# Patient Record
Sex: FEMALE | Race: ASIAN | NOT HISPANIC OR LATINO | Employment: OTHER | URBAN - METROPOLITAN AREA
[De-identification: names, ages, dates, MRNs, and addresses within clinical notes are randomized per-mention and may not be internally consistent; named-entity substitution may affect disease eponyms.]

---

## 2018-04-17 ENCOUNTER — OFFICE VISIT (OUTPATIENT)
Dept: MEDICAL GROUP | Facility: PHYSICIAN GROUP | Age: 83
End: 2018-04-17
Payer: MEDICARE

## 2018-04-17 VITALS
WEIGHT: 155.6 LBS | OXYGEN SATURATION: 94 % | TEMPERATURE: 98 F | DIASTOLIC BLOOD PRESSURE: 82 MMHG | SYSTOLIC BLOOD PRESSURE: 130 MMHG | HEIGHT: 64 IN | BODY MASS INDEX: 26.56 KG/M2 | RESPIRATION RATE: 16 BRPM | HEART RATE: 81 BPM

## 2018-04-17 DIAGNOSIS — Z12.11 COLON CANCER SCREENING: ICD-10-CM

## 2018-04-17 DIAGNOSIS — J44.9 CHRONIC OBSTRUCTIVE PULMONARY DISEASE, UNSPECIFIED COPD TYPE (HCC): ICD-10-CM

## 2018-04-17 DIAGNOSIS — I10 ESSENTIAL HYPERTENSION, BENIGN: ICD-10-CM

## 2018-04-17 DIAGNOSIS — R51.9 ACUTE NONINTRACTABLE HEADACHE, UNSPECIFIED HEADACHE TYPE: ICD-10-CM

## 2018-04-17 PROCEDURE — 99204 OFFICE O/P NEW MOD 45 MIN: CPT | Performed by: NURSE PRACTITIONER

## 2018-04-17 RX ORDER — ALBUTEROL SULFATE 90 UG/1
1-2 AEROSOL, METERED RESPIRATORY (INHALATION) EVERY 4 HOURS PRN
Qty: 1 INHALER | Refills: 3 | Status: SHIPPED | OUTPATIENT
Start: 2018-04-17 | End: 2021-05-03

## 2018-04-17 ASSESSMENT — PATIENT HEALTH QUESTIONNAIRE - PHQ9: CLINICAL INTERPRETATION OF PHQ2 SCORE: 0

## 2018-04-17 ASSESSMENT — ENCOUNTER SYMPTOMS
COUGH: 0
FACIAL SWEATING: 0
MIGRAINE HEADACHES: 1
SINUS PRESSURE: 1
TINGLING: 0
SEIZURES: 0
DIAPHORESIS: 0
SPUTUM PRODUCTION: 0
DIZZINESS: 0
WHEEZING: 0
SHORTNESS OF BREATH: 0
EYE PAIN: 1
BLURRED VISION: 0
ABDOMINAL PAIN: 0
SENSORY CHANGE: 0
CHILLS: 0
DEPRESSION: 0
NAUSEA: 0
VOMITING: 0
FEVER: 0
HEADACHES: 1
SORE THROAT: 1
FOCAL WEAKNESS: 0
BLOOD IN STOOL: 0
PALPITATIONS: 0
SCALP TENDERNESS: 0
VISUAL CHANGE: 0
SPEECH CHANGE: 0
LOSS OF BALANCE: 0

## 2018-04-17 NOTE — ASSESSMENT & PLAN NOTE
Reports that she was diagnosed 3 years ago as incidental finding with xray. She denies CP, cough, or wheezing. She does experience intermittent SOB with vigorous physical activity. 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 She is a former smoker of 30 years.

## 2018-04-17 NOTE — ASSESSMENT & PLAN NOTE
Chronic in nature. Her BP is well-controlled with valsartan 320 mg daily. She denies CP, SOB, and peripheral edema.

## 2018-04-17 NOTE — PROGRESS NOTES
New Patient appointment    Licha Pope is a 82 y.o. female here to establish care. Her previous PCP was Adarsh Jay. She presents with the following concerns:    HPI:      COPD (chronic obstructive pulmonary disease) (CMS-[facility])  Reports that she was diagnosed 3 years ago as incidental finding with xray. She denies CP, cough, or wheezing. She does experience intermittent SOB with vigorous physical activity. 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 She is a former smoker of 30 years.       Essential hypertension, benign  Chronic in nature. Her BP is well-controlled with valsartan 320 mg daily. She denies CP, SOB, and peripheral edema.     Headache    This is a new problem. Episode onset: Friday. The problem occurs intermittently. The problem has been unchanged. The pain is located in the right unilateral region. The quality of the pain is described as stabbing and aching. The pain is mild. Associated symptoms include ear pain, eye pain, sinus pressure and a sore throat. Pertinent negatives include no abdominal pain, blurred vision, coughing, dizziness, facial sweating, fever, hearing loss, loss of balance, nausea, scalp tenderness, seizures, tingling, visual change or vomiting. Nothing aggravates the symptoms. She has tried nothing for the symptoms. Her past medical history is significant for hypertension and migraine headaches.   She does admit to drinking 5 cups of coffee daily. She admits to drinking minimal water intake daily.    Current medicines (including changes today)  Current Outpatient Prescriptions   Medication Sig Dispense Refill   • albuterol 108 (90 Base) MCG/ACT Aero Soln inhalation aerosol Inhale 1-2 Puffs by mouth every four hours as needed for Shortness of Breath. 1 Inhaler 3   • Calcium Carbonate-Vitamin D (CALCIUM + D PO) Take  by mouth every day.     • multivitamin (THERAGRAN) TABS Take 1 Tab by mouth every day.     • Cranberry 300 MG TABS Take  by mouth.     •  GLUCOSAMINE HCL-MSM PO Take  by mouth every day. Takes two     • Omega 3-6-9 Fatty Acids (TRIPLE OMEGA-3-6-9) CAPS Take  by mouth.     • valsartan (DIOVAN) 320 MG tablet Take 1 Tab by mouth every day. 90 Tab 3     No current facility-administered medications for this visit.      She  has a past medical history of Arthritis; COPD (chronic obstructive pulmonary disease) (CMS-[facility]); EMPHYSEMA; and Hypertension.  She  has a past surgical history that includes hemorrhoidectomy (); pr  delivery only (); knee arthroplasty total (2014); cataract phaco with iol (2014); and cataract phaco with iol (2014).  Social History   Substance Use Topics   • Smoking status: Former Smoker     Packs/day: 1.00     Years: 20.00     Types: Cigarettes     Quit date: 1975   • Smokeless tobacco: Never Used   • Alcohol use 1.5 oz/week     3 Standard drinks or equivalent per week      Comment: RARE     Social History     Social History Narrative   • No narrative on file     Family History   Problem Relation Age of Onset   • Alzheimer's Disease Mother    • Heart Disease Father    • Other Father      AAA   • Heart Attack Sister    • Diabetes Brother      Family Status   Relation Status   • Mother    • Father    • Sister    • Brother      Review of Systems   Constitutional: Negative for chills, diaphoresis, fever and malaise/fatigue.   HENT: Positive for congestion, ear pain, sinus pressure and sore throat. Negative for hearing loss.    Eyes: Positive for pain. Negative for blurred vision.   Respiratory: Negative for cough, sputum production, shortness of breath and wheezing.    Cardiovascular: Negative for chest pain, palpitations and leg swelling.   Gastrointestinal: Negative for abdominal pain, blood in stool, nausea and vomiting.   Genitourinary: Negative for dysuria.   Musculoskeletal: Negative for joint pain.   Neurological: Positive for headaches. Negative for dizziness,  "tingling, sensory change, speech change, focal weakness, seizures and loss of balance.   Psychiatric/Behavioral: Negative for depression.       All other systems reviewed and are negative    Depression Screening    Little interest or pleasure in doing things?  0 - not at all  Feeling down, depressed , or hopeless? 0 - not at all  Patient Health Questionnaire Score: 0       Objective:     Blood pressure 130/82, pulse 81, temperature 36.7 °C (98 °F), resp. rate 16, height 1.626 m (5' 4\"), weight 70.6 kg (155 lb 9.6 oz), SpO2 94 %. Body mass index is 26.71 kg/m².    Physical Exam:  Constitutional: Oriented to person, place, and time and well-developed, well-nourished, and in no distress.   HENT:   Head: Normocephalic and atraumatic.   Right Ear: Tympanic membrane and external ear normal.   Left Ear: Tympanic membrane and external ear normal.   Mouth/Throat: Oropharynx is clear and moist and mucous membranes are normal. No oropharyngeal exudate or posterior oropharyngeal erythema.   Eyes: Conjunctivae and EOM are normal. Pupils are equal, round, and reactive to light.   Neck: Normal range of motion. Neck supple. No thyromegaly present.   Cardiovascular: Normal rate, regular rhythm, normal heart sounds. Radial and pedal pulses intact. Exam reveals no friction rub. No murmur heard.  Pulmonary/Chest: Effort normal and breath sounds normal. No respiratory distress or use of accessory muscles. No wheezes, rhonchi, or rales.   Abdominal: Soft. Bowel sounds are normal. Exhibits no distension and no mass. There is no tenderness. No hepatosplenomegaly.    Musculoskeletal: Full range of motion. No deformity or swelling of joints. DTRs intact.   Lymphadenopathy:     No cervical adenopathy.   Neurological: Alert and oriented to person, place, and time. Gait normal.   Skin: Skin is warm and dry. No cyanosis. No edema.  Psychiatric: Mood, memory, affect and judgment normal.     Assessment and Plan:   The following treatment plan was " discussed    1. Chronic obstructive pulmonary disease, unspecified COPD type (CMS-HCC)  Stable. Order for albuterol to use as needed for symptoms of SOB, wheezing, cough, and/or chest tightness. She declined referral to pulmonology for further evaluation.  - albuterol 108 (90 Base) MCG/ACT Aero Soln inhalation aerosol; Inhale 1-2 Puffs by mouth every four hours as needed for Shortness of Breath.  Dispense: 1 Inhaler; Refill: 3    2. Acute nonintractable headache, unspecified headache type  Stable. I suspect her symptoms are r/t dehydration due to minimal daily water intake and excessive daily coffee intake. Advised to take OTC tylenol as needed for pain. Encouraged to limit caffeine intake and increase water intake. Recommend journaling symptoms and RTC if symptoms persist or worsen.    3. Essential hypertension, benign  Stable, controlled. Continue medication as prescribed.    4. Colon cancer screening  - OCCULT BLOOD FECES IMMUNOASSAY (FIT); Future    Records requested. Her last labs were drawn Sept 2017.     Followup: Return in about 6 months (around 10/17/2018), or if symptoms worsen or fail to improve, for For follow-up on, HTN.

## 2018-10-12 ENCOUNTER — HOSPITAL ENCOUNTER (OUTPATIENT)
Dept: RADIOLOGY | Facility: MEDICAL CENTER | Age: 83
End: 2018-10-12
Attending: FAMILY MEDICINE
Payer: MEDICARE

## 2018-10-12 ENCOUNTER — HOSPITAL ENCOUNTER (OUTPATIENT)
Dept: CARDIOLOGY | Facility: MEDICAL CENTER | Age: 83
End: 2018-10-12
Attending: FAMILY MEDICINE
Payer: MEDICARE

## 2018-10-12 DIAGNOSIS — R94.31 ABNORMAL ELECTROCARDIOGRAM: ICD-10-CM

## 2018-10-12 LAB
LV EJECT FRACT  99904: 55
LV EJECT FRACT MOD 2C 99903: 60.72
LV EJECT FRACT MOD 4C 99902: 55.73
LV EJECT FRACT MOD BP 99901: 58.12

## 2018-10-12 PROCEDURE — 78452 HT MUSCLE IMAGE SPECT MULT: CPT | Mod: 26 | Performed by: INTERNAL MEDICINE

## 2018-10-12 PROCEDURE — 93306 TTE W/DOPPLER COMPLETE: CPT

## 2018-10-12 PROCEDURE — 93306 TTE W/DOPPLER COMPLETE: CPT | Mod: 26 | Performed by: INTERNAL MEDICINE

## 2018-10-12 PROCEDURE — 93018 CV STRESS TEST I&R ONLY: CPT | Performed by: INTERNAL MEDICINE

## 2018-10-12 PROCEDURE — 700111 HCHG RX REV CODE 636 W/ 250 OVERRIDE (IP)

## 2018-10-12 PROCEDURE — A9502 TC99M TETROFOSMIN: HCPCS

## 2018-10-12 RX ORDER — REGADENOSON 0.08 MG/ML
INJECTION, SOLUTION INTRAVENOUS
Status: COMPLETED
Start: 2018-10-12 | End: 2018-10-12

## 2018-10-12 RX ADMIN — REGADENOSON 0.4 MG: 0.08 INJECTION, SOLUTION INTRAVENOUS at 09:58

## 2018-10-12 NOTE — PROCEDURES
Nursing care plan includes knowledge deficit, potential for discomfort, potential for compromised cardiac output. POC includes teaching, comfort measures and reassurance, and access to code cart, cardiology stand by and availability of rapid response team. Pt verbalizes good understanding of benefits and risks of pharmacological cardiac stress test. Informed consent obtained. Lexiscan given, pt developed the following symptoms SOB and headache. VS stable, major symptoms resolved. To waiting room, caffeinated fluids and/or snacks given, awaiting second scan. Nursing goals met.

## 2018-12-26 ENCOUNTER — OFFICE VISIT (OUTPATIENT)
Dept: CARDIOLOGY | Facility: MEDICAL CENTER | Age: 83
End: 2018-12-26
Payer: MEDICARE

## 2018-12-26 VITALS
DIASTOLIC BLOOD PRESSURE: 58 MMHG | OXYGEN SATURATION: 95 % | BODY MASS INDEX: 26.46 KG/M2 | HEART RATE: 90 BPM | WEIGHT: 155 LBS | SYSTOLIC BLOOD PRESSURE: 112 MMHG | HEIGHT: 64 IN

## 2018-12-26 DIAGNOSIS — I10 ESSENTIAL HYPERTENSION, BENIGN: ICD-10-CM

## 2018-12-26 DIAGNOSIS — R94.31 NONSPECIFIC ABNORMAL ELECTROCARDIOGRAM (ECG) (EKG): ICD-10-CM

## 2018-12-26 PROCEDURE — 99202 OFFICE O/P NEW SF 15 MIN: CPT | Performed by: INTERNAL MEDICINE

## 2018-12-26 RX ORDER — IRBESARTAN 150 MG/1
150 TABLET ORAL NIGHTLY
COMMUNITY
End: 2019-03-13

## 2018-12-26 ASSESSMENT — ENCOUNTER SYMPTOMS
PSYCHIATRIC NEGATIVE: 1
CARDIOVASCULAR NEGATIVE: 1
NEUROLOGICAL NEGATIVE: 1
CONSTITUTIONAL NEGATIVE: 1
BRUISES/BLEEDS EASILY: 0
RESPIRATORY NEGATIVE: 1
GASTROINTESTINAL NEGATIVE: 1

## 2018-12-26 NOTE — LETTER
"     [facility]   [address]  [place] [address]  Phone: [phone]  Fax: [phone]              Licha Pope  1935    Encounter Date: 2018    Tariq Coyne M.D.          PROGRESS NOTE:  Chief Complaint   Patient presents with   • Abnormal EKG       Subjective:   Licha Pope is a 83 y.o. female who is self-referred for evaluation of an abnormal EKG.  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  Since then she had a myocardial perfusion scan that was entirely normal and an echocardiogram which I have also personally reviewed and demonstrates normal segmental wall motion.  There is no history of chest pain or exercise intolerance.  Cardiac risk factor profile positive for hypertension.  Patient has not smoked in many many years.  There is no family history of premature coronary disease, and no history of diabetes.  Recent lab was reviewed and LDL was not significantly elevated.  She is not very physically active due to knee pain.  She has a long history of hypertension.  Family history: Father  of complications of abdominal aortic aneurysm surgery at age 78.  There is no family history of premature coronary disease.  Social history: , retired laboratory technician.  Non-smoker    Past Medical History:   Diagnosis Date   • Arthritis    • COPD (chronic obstructive pulmonary disease) (HCC)    • EMPHYSEMA    • Hypertension      Past Surgical History:   Procedure Laterality Date   • CATARACT PHACO WITH IOL  2014    Performed by Jarred Almazan M.D. at [facility] ORS   • CATARACT PHACO WITH IOL  2014    Performed by Jarred Almazan M.D. at [facility] ORS   • KNEE ARTHROPLASTY TOTAL  2014    Performed by Jose Hahn M.D. at [facility]   • PB  DELIVERY ONLY     • HEMORRHOIDECTOMY       "     Family History   Problem Relation Age of Onset   • Alzheimer's Disease Mother    • Heart Disease Father    • Other Father         AAA   • Heart Attack Sister    • Diabetes Brother      Social History     Social History   • Marital status:      Spouse name: N/A   • Number of children: N/A   • Years of education: N/A     Occupational History   • Not on file.     Social History Main Topics   • Smoking status: Former Smoker     Packs/day: 1.00     Years: 20.00     Types: Cigarettes     Quit date: 1/1/1975   • Smokeless tobacco: Never Used   • Alcohol use 1.5 oz/week     3 Standard drinks or equivalent per week      Comment: RARE   • Drug use: No   • Sexual activity: Not Currently     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      Comment:      Other Topics Concern   • Not on file     Social History Narrative   • No narrative on file     Allergies   Allergen Reactions   • Codeine Vomiting   • Demerol Vomiting     Injectable type   • Ciprofloxacin Rash     rash   • Iodine      PATIENT ALLERGIC TO SHELLFISH, NOT SURE IF SHE IS ALLERGIC TO IODINE   • Shellfish Allergy      Outpatient Encounter Prescriptions as of 12/26/2018   Medication Sig Dispense Refill   • irbesartan (AVAPRO) 150 MG Tab Take 150 mg by mouth every evening.     • albuterol 108 (90 Base) MCG/ACT Aero Soln inhalation aerosol Inhale 1-2 Puffs by mouth every four hours as needed for Shortness of Breath. 1 Inhaler 3   • Calcium Carbonate-Vitamin D (CALCIUM + D PO) Take  by mouth every day.     • multivitamin (THERAGRAN) TABS Take 1 Tab by mouth every day.     • Cranberry 300 MG TABS Take  by mouth.     • GLUCOSAMINE HCL-MSM PO Take  by mouth every day. Takes two     • Omega 3-6-9 Fatty Acids (TRIPLE OMEGA-3-6-9) CAPS Take  by mouth.     • valsartan (DIOVAN) 320 MG tablet Take 1 Tab by mouth every day. (Patient not taking: Reported on 12/26/2018) 90 Tab 3     No facility-administered encounter medications on file as of 12/26/2018.      Review of Systems  "  Constitutional: Negative.    HENT: Negative.    Respiratory: Negative.    Cardiovascular: Negative.    Gastrointestinal: Negative.    Musculoskeletal: Positive for joint pain.   Skin: Negative.    Neurological: Negative.    Endo/Heme/Allergies: Negative.  Does not bruise/bleed easily.   Psychiatric/Behavioral: Negative.         Objective:   /58 (BP Location: Left arm, Patient Position: Sitting, BP Cuff Size: Adult)   Pulse 90   Ht 1.626 m (5' 4\")   Wt 70.3 kg (155 lb)   SpO2 95%   BMI 26.61 kg/m²      Physical Exam   Constitutional: She is oriented to person, place, and time. No distress.   HENT:   Head: Normocephalic and atraumatic.   Eyes: Pupils are equal, round, and reactive to light. No scleral icterus.   Neck: No JVD present.   Cardiovascular: Normal rate and regular rhythm.  Exam reveals gallop and S4.    No murmur heard.  Pulmonary/Chest: No respiratory distress. She has no wheezes.   Abdominal: Soft. She exhibits no distension. There is no tenderness.   Musculoskeletal: She exhibits no edema.   Neurological: She is alert and oriented to person, place, and time.   Skin: Skin is warm.   Psychiatric: She has a normal mood and affect.       Assessment:     1. Essential hypertension, benign     2. Nonspecific abnormal electrocardiogram (ECG) (EKG)         Medical Decision Making:  Today's Assessment / Status / Plan:   EKG: Personally reviewed.  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    Abnormal EKG: Patient's EKG is a normal variant and not due to prior inferior wall MI.  Both echo and myocardial perfusion scan are normal.  The EKG abnormality is most likely secondary to left anterior hemiblock which is a benign condition.  No further testing is necessary.    Hypertension: Under good control on her current dose of irbesartan.    History of hyperlipidemia: The patient watches her diet somewhat.  Recent LDL was only 108.  She was reassured " that her EKG is a normal variant and that no further testing is necessary.  We will continue her current antihypertensive medication.  Return as needed.      Vazquez Johnson M.D.  [id number]  [address]  [facility] [address]  VIA In Basket

## 2018-12-26 NOTE — PROGRESS NOTES
"Chief Complaint   Patient presents with   • Abnormal EKG       Subjective:   Licha Pope is a 83 y.o. female who is self-referred for evaluation of an abnormal EKG.  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  Since then she had a myocardial perfusion scan that was entirely normal and an echocardiogram which I have also personally reviewed and demonstrates normal segmental wall motion.  There is no history of chest pain or exercise intolerance.  Cardiac risk factor profile positive for hypertension.  Patient has not smoked in many many years.  There is no family history of premature coronary disease, and no history of diabetes.  Recent lab was reviewed and LDL was not significantly elevated.  She is not very physically active due to knee pain.  She has a long history of hypertension.  Family history: Father  of complications of abdominal aortic aneurysm surgery at age 78.  There is no family history of premature coronary disease.  Social history: , retired laboratory technician.  Non-smoker    Past Medical History:   Diagnosis Date   • Arthritis    • COPD (chronic obstructive pulmonary disease) (HCC)    • EMPHYSEMA    • Hypertension      Past Surgical History:   Procedure Laterality Date   • CATARACT PHACO WITH IOL  2014    Performed by Jarred Almazan M.D. at SUR[facility] ORS   • CATARACT PHACO WITH IOL  2014    Performed by Jarred Almazan M.D. at SUR[facility] ORS   • KNEE ARTHROPLASTY TOTAL  2014    Performed by Jose Hahn M.D. at [facility]   • PB  DELIVERY ONLY     • HEMORRHOIDECTOMY  /     Family History   Problem Relation Age of Onset   • Alzheimer's Disease Mother    • Heart Disease Father    • Other Father         AAA   • Heart Attack Sister    • Diabetes Brother      Social History     Social History   • Marital status:      Spouse name: N/A "   • Number of children: N/A   • Years of education: N/A     Occupational History   • Not on file.     Social History Main Topics   • Smoking status: Former Smoker     Packs/day: 1.00     Years: 20.00     Types: Cigarettes     Quit date: 1/1/1975   • Smokeless tobacco: Never Used   • Alcohol use 1.5 oz/week     3 Standard drinks or equivalent per week      Comment: RARE   • Drug use: No   • Sexual activity: Not Currently     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      Comment:      Other Topics Concern   • Not on file     Social History Narrative   • No narrative on file     Allergies   Allergen Reactions   • Codeine Vomiting   • Demerol Vomiting     Injectable type   • Ciprofloxacin Rash     rash   • Iodine      PATIENT ALLERGIC TO SHELLFISH, NOT SURE IF SHE IS ALLERGIC TO IODINE   • Shellfish Allergy      Outpatient Encounter Prescriptions as of 12/26/2018   Medication Sig Dispense Refill   • irbesartan (AVAPRO) 150 MG Tab Take 150 mg by mouth every evening.     • albuterol 108 (90 Base) MCG/ACT Aero Soln inhalation aerosol Inhale 1-2 Puffs by mouth every four hours as needed for Shortness of Breath. 1 Inhaler 3   • Calcium Carbonate-Vitamin D (CALCIUM + D PO) Take  by mouth every day.     • multivitamin (THERAGRAN) TABS Take 1 Tab by mouth every day.     • Cranberry 300 MG TABS Take  by mouth.     • GLUCOSAMINE HCL-MSM PO Take  by mouth every day. Takes two     • Omega 3-6-9 Fatty Acids (TRIPLE OMEGA-3-6-9) CAPS Take  by mouth.     • valsartan (DIOVAN) 320 MG tablet Take 1 Tab by mouth every day. (Patient not taking: Reported on 12/26/2018) 90 Tab 3     No facility-administered encounter medications on file as of 12/26/2018.      Review of Systems   Constitutional: Negative.    HENT: Negative.    Respiratory: Negative.    Cardiovascular: Negative.    Gastrointestinal: Negative.    Musculoskeletal: Positive for joint pain.   Skin: Negative.    Neurological: Negative.    Endo/Heme/Allergies: Negative.  Does not bruise/bleed easily.  "  Psychiatric/Behavioral: Negative.         Objective:   /58 (BP Location: Left arm, Patient Position: Sitting, BP Cuff Size: Adult)   Pulse 90   Ht 1.626 m (5' 4\")   Wt 70.3 kg (155 lb)   SpO2 95%   BMI 26.61 kg/m²     Physical Exam   Constitutional: She is oriented to person, place, and time. No distress.   HENT:   Head: Normocephalic and atraumatic.   Eyes: Pupils are equal, round, and reactive to light. No scleral icterus.   Neck: No JVD present.   Cardiovascular: Normal rate and regular rhythm.  Exam reveals gallop and S4.    No murmur heard.  Pulmonary/Chest: No respiratory distress. She has no wheezes.   Abdominal: Soft. She exhibits no distension. There is no tenderness.   Musculoskeletal: She exhibits no edema.   Neurological: She is alert and oriented to person, place, and time.   Skin: Skin is warm.   Psychiatric: She has a normal mood and affect.       Assessment:     1. Essential hypertension, benign     2. Nonspecific abnormal electrocardiogram (ECG) (EKG)         Medical Decision Making:  Today's Assessment / Status / Plan:   EKG: Personally reviewed.  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    Abnormal EKG: Patient's EKG is a normal variant and not due to prior inferior wall MI.  Both echo and myocardial perfusion scan are normal.  The EKG abnormality is most likely secondary to left anterior hemiblock which is a benign condition.  No further testing is necessary.    Hypertension: Under good control on her current dose of irbesartan.    History of hyperlipidemia: The patient watches her diet somewhat.  Recent LDL was only 108.  She was reassured that her EKG is a normal variant and that no further testing is necessary.  We will continue her current antihypertensive medication.  Return as needed.  "

## 2019-03-13 ENCOUNTER — OFFICE VISIT (OUTPATIENT)
Dept: MEDICAL GROUP | Facility: MEDICAL CENTER | Age: 84
End: 2019-03-13
Payer: MEDICARE

## 2019-03-13 VITALS
SYSTOLIC BLOOD PRESSURE: 144 MMHG | OXYGEN SATURATION: 96 % | HEART RATE: 86 BPM | DIASTOLIC BLOOD PRESSURE: 80 MMHG | HEIGHT: 64 IN | BODY MASS INDEX: 26.57 KG/M2 | WEIGHT: 155.65 LBS | RESPIRATION RATE: 14 BRPM | TEMPERATURE: 98.2 F

## 2019-03-13 DIAGNOSIS — K21.9 GASTROESOPHAGEAL REFLUX DISEASE WITHOUT ESOPHAGITIS: ICD-10-CM

## 2019-03-13 DIAGNOSIS — I10 ESSENTIAL HYPERTENSION, BENIGN: ICD-10-CM

## 2019-03-13 DIAGNOSIS — M19.90 ARTHRITIS: ICD-10-CM

## 2019-03-13 DIAGNOSIS — R94.31 NONSPECIFIC ABNORMAL ELECTROCARDIOGRAM (ECG) (EKG): ICD-10-CM

## 2019-03-13 DIAGNOSIS — J44.9 CHRONIC OBSTRUCTIVE PULMONARY DISEASE, UNSPECIFIED COPD TYPE (HCC): ICD-10-CM

## 2019-03-13 PROCEDURE — 99214 OFFICE O/P EST MOD 30 MIN: CPT | Performed by: FAMILY MEDICINE

## 2019-03-13 RX ORDER — IRBESARTAN 300 MG/1
150 TABLET ORAL DAILY
Qty: 45 TAB | Refills: 3 | Status: SHIPPED | OUTPATIENT
Start: 2019-03-13 | End: 2021-05-03

## 2019-03-13 RX ORDER — OMEPRAZOLE 20 MG/1
20 CAPSULE, DELAYED RELEASE ORAL DAILY
Qty: 90 CAP | Refills: 3 | Status: SHIPPED | OUTPATIENT
Start: 2019-03-13 | End: 2021-05-03

## 2019-03-13 ASSESSMENT — PATIENT HEALTH QUESTIONNAIRE - PHQ9: CLINICAL INTERPRETATION OF PHQ2 SCORE: 0

## 2019-03-13 NOTE — ASSESSMENT & PLAN NOTE
This is a chronic health problem that is well controlled with current medications and lifestyle measures.  She uses an albuterol inhaler as needed.

## 2019-03-13 NOTE — ASSESSMENT & PLAN NOTE
This is a chronic problem that she is trying to manage with OTC meds. She doesn't exercise regularly.  She will start with Tylenol arthritis taking 2 in the AM and PM to help with her back pain and left elbow pain

## 2019-03-13 NOTE — ASSESSMENT & PLAN NOTE
This is a chronic health problem that is well controlled with current medications and lifestyle measures.  Pt requesting that we change her to Irbesartan 300mg and split in 1/2.  The patient denies chest pain, shortness of breath or dyspnea on exertion.

## 2019-03-13 NOTE — ASSESSMENT & PLAN NOTE
This is a new problem where she is having problems with reflux most of the time.  She notes the biggest problem is after she lays down at night, where she will have reflux into the back of her throat.  She needs to be on a PPI for the coming 6 weeks.

## 2019-03-13 NOTE — ASSESSMENT & PLAN NOTE
This is a chronic problem for this patient where she was seen by Dr. Coyne in October because she had an abnormal EKG.  At that time she had an echocardiogram and a stress test and those test she passed with flying colors.  She has nonspecific changes in her EKG that are not concerning.

## 2019-03-13 NOTE — PROGRESS NOTES
CC:  Diagnoses of Essential hypertension, benign, Chronic obstructive pulmonary disease, unspecified COPD type (HCC), Nonspecific abnormal electrocardiogram (ECG) (EKG), Arthritis, and Gastroesophageal reflux disease without esophagitis were pertinent to this visit.    HISTORY OF THE PRESENT ILLNESS: Patient is a 83 y.o. female. This pleasant patient is here today to establish care previously having primary care outside of the [facility] system.  Patient tells me that she sees Dr. Coyne as her cardiologist and would like to be seen by a primary care in the same system.    Health Maintenance: Completed      Essential hypertension, benign  This is a chronic health problem that is well controlled with current medications and lifestyle measures.  Pt requesting that we change her to Irbesartan 300mg and split in 1/2.  The patient denies chest pain, shortness of breath or dyspnea on exertion.      COPD (chronic obstructive pulmonary disease) (HCC)  This is a chronic health problem that is well controlled with current medications and lifestyle measures.  She uses an albuterol inhaler as needed.    Nonspecific abnormal electrocardiogram (ECG) (EKG)  This is a chronic problem for this patient where she was seen by Dr. Coyne in October because she had an abnormal EKG.  At that time she had an echocardiogram and a stress test and those test she passed with flying colors.  She has nonspecific changes in her EKG that are not concerning.    Arthritis  This is a chronic problem that she is trying to manage with OTC meds. She doesn't exercise regularly.  She will start with Tylenol arthritis taking 2 in the AM and PM to help with her back pain and left elbow pain    Gastroesophageal reflux disease without esophagitis  This is a new problem where she is having problems with reflux most of the time.  She notes the biggest problem is after she lays down at night, where she will have reflux into the back of her throat.  She needs to be on  a PPI for the coming 6 weeks.    Allergies: Codeine; Demerol; Ciprofloxacin; Iodine; and Shellfish allergy    Current Outpatient Prescriptions Ordered in [facility]   Medication Sig Dispense Refill   • irbesartan (AVAPRO) 300 MG Tab Take 0.5 Tabs by mouth every day. 45 Tab 3   • omeprazole (PRILOSEC) 20 MG delayed-release capsule Take 1 Cap by mouth every day. 90 Cap 3   • albuterol 108 (90 Base) MCG/ACT Aero Soln inhalation aerosol Inhale 1-2 Puffs by mouth every four hours as needed for Shortness of Breath. 1 Inhaler 3   • Calcium Carbonate-Vitamin D (CALCIUM + D PO) Take  by mouth every day.     • multivitamin (THERAGRAN) TABS Take 1 Tab by mouth every day.     • Cranberry 300 MG TABS Take  by mouth.     • GLUCOSAMINE HCL-MSM PO Take  by mouth every day. Takes two     • Omega 3-6-9 Fatty Acids (TRIPLE OMEGA-3-6-9) CAPS Take  by mouth.       No current Epic-ordered facility-administered medications on file.        Past Medical History:   Diagnosis Date   • Arthritis    • COPD (chronic obstructive pulmonary disease) (HCC)    • EMPHYSEMA    • Gastroesophageal reflux disease without esophagitis 3/13/2019   • Hypertension        Past Surgical History:   Procedure Laterality Date   • CATARACT PHACO WITH IOL  2014    Performed by Jarred Almazan M.D. at SURGERY [facility] ORS   • CATARACT PHACO WITH IOL  2014    Performed by Jarred Almazan M.D. at SURGERY [facility] ORS   • KNEE ARTHROPLASTY TOTAL  2014    Performed by Jose Hahn M.D. at SURGERY [facility] ORS   • PB  DELIVERY ONLY     • HEMORRHOIDECTOMY         Social History   Substance Use Topics   • Smoking status: Former Smoker     Packs/day: 1.00     Years: 20.00     Types: Cigarettes     Quit date: 1975   • Smokeless tobacco: Never Used   • Alcohol use 1.5 oz/week     3 Standard drinks or equivalent per week      Comment: RARE       Social History     Social History Narrative   • No narrative on file  "      Family History   Problem Relation Age of Onset   • Alzheimer's Disease Mother    • Heart Disease Father    • Other Father         AAA   • Heart Attack Sister    • Diabetes Brother        ROS:     - Constitutional: Negative for fever, chills, unexpected weight change, and fatigue/generalized weakness.     - HEENT: Negative for headaches, vision changes, hearing changes, ear pain, ear discharge, rhinorrhea, sinus congestion, sore throat, and neck pain.      - Respiratory: Negative for cough, sputum production, chest congestion, dyspnea, wheezing, and crackles.      - Cardiovascular: Negative for chest pain, palpitations, orthopnea, and bilateral lower extremity edema.     - Gastrointestinal: Positive for heartburn as in HPI otherwise denies nausea, vomiting, abdominal pain, hematochezia, melena, diarrhea, constipation, and greasy/foul-smelling stools.     - Genitourinary: Negative for dysuria, polyuria, hematuria, pyuria, urinary urgency, and urinary incontinence.     - Musculoskeletal: Positive for left elbow pain over the medial epicondyles.      - Skin: Negative for rash, itching, cyanotic skin color change.     - Neurological: Negative for dizziness, tingling, tremors, focal sensory deficit, focal weakness and headaches.     - Endo/Heme/Allergies: Does not bruise/bleed easily.     - Psychiatric/Behavioral: Negative for depression, suicidal/homicidal ideation and memory loss.        - NOTE: All other systems reviewed and are negative, except as in HPI.      .      Exam: Blood pressure 144/80, pulse 86, temperature 36.8 °C (98.2 °F), temperature source Temporal, resp. rate 14, height 1.626 m (5' 4\"), SpO2 96 %. Body mass index is 26.61 kg/m².    General: Normal appearing. No distress.  HEENT: Normocephalic. Eyes conjunctiva clear lids without ptosis, pupils equal and reactive to light accommodation, ears normal shape and contour, canals are clear bilaterally, tympanic membranes are benign, nasal mucosa benign, " "oropharynx is without erythema, edema or exudates.   Neck: Supple without JVD or bruit. Thyroid is not enlarged.  Pulmonary: Clear to ausculation.  Normal effort. No rales, ronchi, or wheezing.  Cardiovascular: Regular rate and rhythm without murmur. Carotid and radial pulses are intact and equal bilaterally.  Abdomen: Soft, nontender, nondistended. Normal bowel sounds. Liver and spleen are not palpable  Neurologic: Grossly nonfocal  Lymph: No cervical, supraclavicular or axillary lymph nodes are palpable  Skin: Warm and dry.  No obvious lesions.  Musculoskeletal: Normal gait. No extremity cyanosis, clubbing, or edema.  Patient with tenderness upon palpation of the medial epicondyles consistent with a torn ligament.  Psych: Normal mood and affect. Alert and oriented x3. Judgment and insight is normal.    Please note that this dictation was created using voice recognition software. I have made every reasonable attempt to correct obvious errors, but I expect that there are errors of grammar and possibly content that I did not discover before finalizing the note.      Assessment/Plan  1. Essential hypertension, benign  Adequately controlled with current medications.  We will recheck in 2 months  - CBC WITH DIFFERENTIAL; Future  - Comp Metabolic Panel; Future  - Lipid Profile; Future    2. Chronic obstructive pulmonary disease, unspecified COPD type (HCC)  Controlled, continue with current meds and lifestyle.      3. Nonspecific abnormal electrocardiogram (ECG) (EKG)  The nonspecific changes on her EKG evidently are \"normal for her\"    4. Arthritis  Uncontrolled, I recommended to the patient that she start on Tylenol extra strength taking 2 in the morning and 2 at night to try and help with her joint pain as well as the pain at her left elbow.  She is going to try this and let us know how is doing when we see her back.    5. Gastroesophageal reflux disease without esophagitis  Uncontrolled, patient has tried " over-the-counter measures without relief.  We will try utilizing omeprazole 20 mg prior to dinner for the coming 3 months and see if we can resolve her issues.

## 2019-05-08 LAB
ALBUMIN SERPL-MCNC: 4.2 G/DL (ref 3.5–4.7)
ALBUMIN/GLOB SERPL: 1.3 {RATIO} (ref 1.2–2.2)
ALP SERPL-CCNC: 70 IU/L (ref 39–117)
ALT SERPL-CCNC: 16 IU/L (ref 0–32)
AST SERPL-CCNC: 20 IU/L (ref 0–40)
BASOPHILS # BLD AUTO: 0 X10E3/UL (ref 0–0.2)
BASOPHILS NFR BLD AUTO: 1 %
BILIRUB SERPL-MCNC: 0.5 MG/DL (ref 0–1.2)
BUN SERPL-MCNC: 17 MG/DL (ref 8–27)
BUN/CREAT SERPL: 18 (ref 12–28)
CALCIUM SERPL-MCNC: 9.3 MG/DL (ref 8.7–10.3)
CHLORIDE SERPL-SCNC: 107 MMOL/L (ref 96–106)
CHOLEST SERPL-MCNC: 176 MG/DL (ref 100–199)
CO2 SERPL-SCNC: 25 MMOL/L (ref 20–29)
CREAT SERPL-MCNC: 0.95 MG/DL (ref 0.57–1)
EOSINOPHIL # BLD AUTO: 0.3 X10E3/UL (ref 0–0.4)
EOSINOPHIL NFR BLD AUTO: 7 %
ERYTHROCYTE [DISTWIDTH] IN BLOOD BY AUTOMATED COUNT: 12.5 % (ref 12.3–15.4)
GLOBULIN SER CALC-MCNC: 3.2 G/DL (ref 1.5–4.5)
GLUCOSE SERPL-MCNC: 112 MG/DL (ref 65–99)
HCT VFR BLD AUTO: 40.6 % (ref 34–46.6)
HDLC SERPL-MCNC: 44 MG/DL
HGB BLD-MCNC: 14.4 G/DL (ref 11.1–15.9)
IMM GRANULOCYTES # BLD AUTO: 0 X10E3/UL (ref 0–0.1)
IMM GRANULOCYTES NFR BLD AUTO: 0 %
IMMATURE CELLS  115398: ABNORMAL
LABORATORY COMMENT REPORT: ABNORMAL
LDLC SERPL CALC-MCNC: 89 MG/DL (ref 0–99)
LYMPHOCYTES # BLD AUTO: 1 X10E3/UL (ref 0.7–3.1)
LYMPHOCYTES NFR BLD AUTO: 20 %
MCH RBC QN AUTO: 34 PG (ref 26.6–33)
MCHC RBC AUTO-ENTMCNC: 35.5 G/DL (ref 31.5–35.7)
MCV RBC AUTO: 96 FL (ref 79–97)
MONOCYTES # BLD AUTO: 0.5 X10E3/UL (ref 0.1–0.9)
MONOCYTES NFR BLD AUTO: 11 %
MORPHOLOGY BLD-IMP: ABNORMAL
NEUTROPHILS # BLD AUTO: 3.1 X10E3/UL (ref 1.4–7)
NEUTROPHILS NFR BLD AUTO: 61 %
NRBC BLD AUTO-RTO: ABNORMAL %
PLATELET # BLD AUTO: 199 X10E3/UL (ref 150–379)
POTASSIUM SERPL-SCNC: 4.2 MMOL/L (ref 3.5–5.2)
PROT SERPL-MCNC: 7.4 G/DL (ref 6–8.5)
RBC # BLD AUTO: 4.23 X10E6/UL (ref 3.77–5.28)
SODIUM SERPL-SCNC: 146 MMOL/L (ref 134–144)
TRIGL SERPL-MCNC: 215 MG/DL (ref 0–149)
VLDLC SERPL CALC-MCNC: 43 MG/DL (ref 5–40)
WBC # BLD AUTO: 5.1 X10E3/UL (ref 3.4–10.8)

## 2019-05-13 ENCOUNTER — OFFICE VISIT (OUTPATIENT)
Dept: MEDICAL GROUP | Facility: MEDICAL CENTER | Age: 84
End: 2019-05-13
Payer: MEDICARE

## 2019-05-13 VITALS
BODY MASS INDEX: 26.76 KG/M2 | DIASTOLIC BLOOD PRESSURE: 98 MMHG | TEMPERATURE: 98.2 F | WEIGHT: 156.75 LBS | SYSTOLIC BLOOD PRESSURE: 144 MMHG | HEART RATE: 95 BPM | RESPIRATION RATE: 14 BRPM | HEIGHT: 64 IN | OXYGEN SATURATION: 98 %

## 2019-05-13 DIAGNOSIS — J44.9 CHRONIC OBSTRUCTIVE PULMONARY DISEASE, UNSPECIFIED COPD TYPE (HCC): ICD-10-CM

## 2019-05-13 DIAGNOSIS — E78.1 HYPERTRIGLYCERIDEMIA: ICD-10-CM

## 2019-05-13 DIAGNOSIS — R73.03 PREDIABETES: ICD-10-CM

## 2019-05-13 DIAGNOSIS — I10 ESSENTIAL HYPERTENSION, BENIGN: ICD-10-CM

## 2019-05-13 PROCEDURE — 99214 OFFICE O/P EST MOD 30 MIN: CPT | Performed by: FAMILY MEDICINE

## 2019-05-13 NOTE — PROGRESS NOTES
CC:Diagnoses of Prediabetes, Essential hypertension, benign, Chronic obstructive pulmonary disease, unspecified COPD type (HCC), and Hypertriglyceridemia were pertinent to this visit.    HISTORY OF PRESENT ILLNESS: Patient is a 83 y.o. female established patient who presents today to talk about her chronic health problems and the labs that she had done prior to the visit.  Patient tells me she is never been told she had an elevated blood sugar.  Back in 2014 her previous notes do not show elevations but her current labs show a blood sugar of 112.  Patient also has elevated triglycerides.  We talked about trying to get this under better control.  She knows that she needs to start being more physically active.    Health Maintenance: Completed    Prediabetes  This is a chronic health problem that is uncontrolled with current medications and lifestyle measures.  Patient has an elevated glucose at 112 with the upper limits of normal being 100.  She is never been told that in the past and the previous lab work I have from 2014 does not show her glucose being elevated.  Patient is going to work on cutting back on chocolates and trying to walk for at least 15 minutes every day to see if we can get her blood sugar under better control and I would like to recheck that in 3 months.    Essential hypertension, benign  This is a chronic health problem for this patient where the blood pressure is high today at 144/98.  In talking with the patient she actually forgot to take her irbesartan 150 mg this morning.  She will take it as soon as she gets home.  Her goal is to keep her blood pressure less than 130/80.The patient denies chest pain, shortness of breath or dyspnea on exertion.      COPD (chronic obstructive pulmonary disease) (HCC)  This patient has a history of COPD for which she does have an albuterol inhaler.  She has not been using it regularly.  I recommended that she take at least 2 puffs prior to her going walking for  15 minutes every day.  This will make her breathing easier for her.    Hypertriglyceridemia  This is a new problem for this patient that had not been present previously.  Her total cholesterol is 176 but her triglycerides are elevated at 215 consistent with her elevated blood sugar.  Her HDL is good at 44 and her LDL is good at 89.  Patient does not like to take pills so she is going to work on trying to get her glucose under control which will get her triglycerides under control.  We will plan to recheck things in 3 months.      Patient Active Problem List    Diagnosis Date Noted   • Prediabetes 05/13/2019   • Hypertriglyceridemia 05/13/2019   • Gastroesophageal reflux disease without esophagitis 03/13/2019   • COPD (chronic obstructive pulmonary disease) ([facility]) 04/17/2018   • Senile nuclear sclerosis 12/02/2014   • Cataract, right 11/18/2014   • Primary localized osteoarthrosis, lower leg 01/27/2014   • Arthritis 01/13/2014   • Nonspecific abnormal electrocardiogram (ECG) (EKG) 01/13/2014   • Essential hypertension, benign 01/13/2014      Allergies:Codeine; Demerol; Ciprofloxacin; Iodine; and Shellfish allergy    Current Outpatient Prescriptions   Medication Sig Dispense Refill   • irbesartan (AVAPRO) 300 MG Tab Take 0.5 Tabs by mouth every day. 45 Tab 3   • omeprazole (PRILOSEC) 20 MG delayed-release capsule Take 1 Cap by mouth every day. 90 Cap 3   • albuterol 108 (90 Base) MCG/ACT Aero Soln inhalation aerosol Inhale 1-2 Puffs by mouth every four hours as needed for Shortness of Breath. 1 Inhaler 3   • Calcium Carbonate-Vitamin D (CALCIUM + D PO) Take  by mouth every day.     • multivitamin (THERAGRAN) TABS Take 1 Tab by mouth every day.     • Cranberry 300 MG TABS Take  by mouth.     • GLUCOSAMINE HCL-MSM PO Take  by mouth every day. Takes two     • Omega 3-6-9 Fatty Acids (TRIPLE OMEGA-3-6-9) CAPS Take  by mouth.       No current facility-administered medications for this visit.        Social History    Substance Use Topics   • Smoking status: Former Smoker     Packs/day: 1.00     Years: 20.00     Types: Cigarettes     Quit date: 1/1/1975   • Smokeless tobacco: Never Used   • Alcohol use 1.5 oz/week     3 Standard drinks or equivalent per week      Comment: RARE     Social History     Social History Narrative   • No narrative on file       Family History   Problem Relation Age of Onset   • Alzheimer's Disease Mother    • Heart Disease Father    • Other Father         AAA   • Heart Attack Sister    • Diabetes Brother         ROS:     - Constitutional:  Negative for fever, chills, unexpected weight change, and fatigue/generalized weakness.    - HEENT:  Negative for headaches, vision changes, hearing changes, ear pain, ear discharge, rhinorrhea, sinus congestion, sore throat, and neck pain.      - Respiratory: Negative for cough, sputum production, chest congestion, dyspnea, wheezing, and crackles.      - Cardiovascular: Negative for chest pain, palpitations, orthopnea, and bilateral lower extremity edema.     - Gastrointestinal: Negative for heartburn, nausea, vomiting, abdominal pain, hematochezia, melena, diarrhea, constipation, and greasy/foul-smelling stools.     - Genitourinary: Negative for dysuria, polyuria, hematuria, pyuria, urinary urgency, and urinary incontinence.     - Musculoskeletal: Negative for myalgias, back pain, and joint pain.     - Skin: Negative for rash, itching, cyanotic skin color change.     - Neurological: Negative for dizziness, tingling, tremors, focal sensory deficit, focal weakness and headaches.     - Endo/Heme/Allergies: Does not bruise/bleed easily.     - Psychiatric/Behavioral: Negative for depression, suicidal/homicidal ideation and memory loss.          - NOTE: All other systems reviewed and are negative, except as in HPI.      Exam:    /98 (BP Location: Left arm, Patient Position: Sitting, BP Cuff Size: Adult)   Pulse 95   Temp 36.8 °C (98.2 °F) (Temporal)   Resp 14   " Ht 1.626 m (5' 4\")   Wt 71.1 kg (156 lb 12 oz)   SpO2 98%  Body mass index is 26.91 kg/m².    General:  Well nourished, well developed female in NAD  Head is grossly normal.  Neck: Supple without JVD or bruit. Thyroid is not enlarged.  Pulmonary: Clear to ausculation and percussion.  Normal effort. No rales, ronchi, or wheezing.  Cardiovascular: Regular rate and rhythm without murmur. Carotid and radial pulses are intact and equal bilaterally.  Extremities: no clubbing, cyanosis, or edema.  LABS: 5/7/2019: Results reviewed and discussed with the patient, questions answered.    Please note that this dictation was created using voice recognition software. I have made every reasonable attempt to correct obvious errors, but I expect that there are errors of grammar and possibly content that I did not discover before finalizing the note.    Assessment/Plan:  1. Prediabetes  Uncontrolled, patient's going to cut back on her intake of chocolate and try to walk for at least 15 minutes every day.  We will plan to recheck in 3 months.  - HEMOGLOBIN A1C; Future    2. Essential hypertension, benign  Patient's blood pressures typically fairly well controlled but she forgot her medication this morning.  She will take it as soon as she gets home.  We will plan to see her back in 3 months.    3. Chronic obstructive pulmonary disease, unspecified COPD type (HCC)  Chronic health problem for this patient that she is going to start utilizing her albuterol inhaler.  She should use this definitely before she goes walking 15 minutes every day.    4. Hypertriglyceridemia  Uncontrolled, patient going to make lifestyle changes and see if we can get this back under control in the coming 3 months.  We will call her to set up that appointment because the computers went out while she was in the office.  - Comp Metabolic Panel; Future  - Lipid Profile; Future         "

## 2019-05-13 NOTE — ASSESSMENT & PLAN NOTE
This is a chronic health problem that is uncontrolled with current medications and lifestyle measures.  Patient has an elevated glucose at 112 with the upper limits of normal being 100.  She is never been told that in the past and the previous lab work I have from 2014 does not show her glucose being elevated.  Patient is going to work on cutting back on chocolates and trying to walk for at least 15 minutes every day to see if we can get her blood sugar under better control and I would like to recheck that in 3 months.

## 2019-05-13 NOTE — ASSESSMENT & PLAN NOTE
This is a chronic health problem for this patient where the blood pressure is high today at 144/98.  In talking with the patient she actually forgot to take her irbesartan 150 mg this morning.  She will take it as soon as she gets home.  Her goal is to keep her blood pressure less than 130/80.The patient denies chest pain, shortness of breath or dyspnea on exertion.

## 2019-05-13 NOTE — ASSESSMENT & PLAN NOTE
This is a new problem for this patient that had not been present previously.  Her total cholesterol is 176 but her triglycerides are elevated at 215 consistent with her elevated blood sugar.  Her HDL is good at 44 and her LDL is good at 89.  Patient does not like to take pills so she is going to work on trying to get her glucose under control which will get her triglycerides under control.  We will plan to recheck things in 3 months.

## 2019-05-13 NOTE — ASSESSMENT & PLAN NOTE
This patient has a history of COPD for which she does have an albuterol inhaler.  She has not been using it regularly.  I recommended that she take at least 2 puffs prior to her going walking for 15 minutes every day.  This will make her breathing easier for her.

## 2019-05-24 ENCOUNTER — OFFICE VISIT (OUTPATIENT)
Dept: URGENT CARE | Facility: MEDICAL CENTER | Age: 84
End: 2019-05-24
Payer: MEDICARE

## 2019-05-24 VITALS
HEART RATE: 97 BPM | SYSTOLIC BLOOD PRESSURE: 140 MMHG | HEIGHT: 64 IN | OXYGEN SATURATION: 95 % | DIASTOLIC BLOOD PRESSURE: 90 MMHG | WEIGHT: 155.2 LBS | TEMPERATURE: 98.6 F | BODY MASS INDEX: 26.5 KG/M2

## 2019-05-24 DIAGNOSIS — J06.9 URI, ACUTE: ICD-10-CM

## 2019-05-24 DIAGNOSIS — R05.9 COUGH: ICD-10-CM

## 2019-05-24 PROCEDURE — 99214 OFFICE O/P EST MOD 30 MIN: CPT | Performed by: NURSE PRACTITIONER

## 2019-05-24 RX ORDER — BENZONATATE 200 MG/1
200 CAPSULE ORAL 3 TIMES DAILY PRN
Qty: 60 CAP | Refills: 0 | Status: ON HOLD | OUTPATIENT
Start: 2019-05-24 | End: 2021-06-30

## 2019-05-24 RX ORDER — AZITHROMYCIN 250 MG/1
TABLET, FILM COATED ORAL
Qty: 6 TAB | Refills: 0 | Status: SHIPPED | OUTPATIENT
Start: 2019-05-24 | End: 2021-04-21

## 2019-05-24 ASSESSMENT — ENCOUNTER SYMPTOMS
FEVER: 0
COUGH: 1
SORE THROAT: 1
SWOLLEN GLANDS: 1

## 2019-05-24 NOTE — PROGRESS NOTES
Subjective:      Licha Pope is a 83 y.o. female who presents with Sore Throat (nasal drip, cough, cant sleep  X3 days knee hurts)    Past Medical History:   Diagnosis Date   • Arthritis    • COPD (chronic obstructive pulmonary disease) (HCC)    • EMPHYSEMA    • Gastroesophageal reflux disease without esophagitis 3/13/2019   • Hypertension    • Hypertriglyceridemia 5/13/2019   • Prediabetes 5/13/2019     Social History     Social History   • Marital status:      Spouse name: N/A   • Number of children: N/A   • Years of education: N/A     Occupational History   • Not on file.     Social History Main Topics   • Smoking status: Former Smoker     Packs/day: 1.00     Years: 20.00     Types: Cigarettes     Quit date: 1/1/1975   • Smokeless tobacco: Never Used   • Alcohol use 1.5 oz/week     3 Standard drinks or equivalent per week      Comment: RARE   • Drug use: No   • Sexual activity: Not Currently     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      Comment: , retired      Other Topics Concern   • Not on file     Social History Narrative   • No narrative on file     Family History   Problem Relation Age of Onset   • Alzheimer's Disease Mother    • Heart Disease Father    • Other Father         AAA   • Heart Attack Sister    • Diabetes Brother        Allergies: Codeine; Demerol; Ciprofloxacin; Iodine; and Shellfish allergy    Patient is a 83-year-old female who presents today with complaint of nasal congestion and cough.  Cough occasionally expectorate sputum that is clear in color.  States she has nasal congestion and postnasal drainage.  She denies any fever, body aches, or chills.  Denies shortness of breath.  Symptoms started over the last 2 days.  States she is preparing to travel out of town and is concerned for getting worse while she is traveling.            Other   This is a new problem. The current episode started in the past 7 days. The problem occurs constantly. The problem has been unchanged. Associated  "symptoms include congestion, coughing, a sore throat and swollen glands. Pertinent negatives include no fever. Associated symptoms comments: Knee pain. Nothing aggravates the symptoms. She has tried nothing for the symptoms. The treatment provided no relief.       Review of Systems   Constitutional: Positive for malaise/fatigue. Negative for fever.   HENT: Positive for congestion and sore throat.    Respiratory: Positive for cough.    Musculoskeletal:        Knee pain   Skin: Negative.    All other systems reviewed and are negative.         Objective:     /90   Pulse 97   Temp 37 °C (98.6 °F)   Ht 1.626 m (5' 4\")   Wt 70.4 kg (155 lb 3.2 oz)   SpO2 95%   BMI 26.64 kg/m²      Physical Exam   Constitutional: She is oriented to person, place, and time. She appears well-developed and well-nourished.   HENT:   Head: Normocephalic.   Right Ear: External ear normal.   Left Ear: External ear normal.   Clear nasal and postnasal drainage.  No tender over the frontal or maxillary sinuses.   Eyes: Pupils are equal, round, and reactive to light. Conjunctivae and EOM are normal.   Neck: Normal range of motion. Neck supple.   Cardiovascular: Normal rate and regular rhythm.    Pulmonary/Chest: Effort normal and breath sounds normal.   Dry cough noted   Musculoskeletal: Normal range of motion.   Neurological: She is alert and oriented to person, place, and time.   Skin: Skin is warm and dry. Capillary refill takes less than 2 seconds.   Psychiatric: She has a normal mood and affect. Her behavior is normal. Thought content normal.   Vitals reviewed.              Assessment/Plan:   Upper respiratory infection  Cough    Tessalon as needed for cough  Zithromax; start only for development of purulent drainage  Tylenol as needed  Follow-up for persistent or worsening of symptoms  There are no diagnoses linked to this encounter.      "

## 2019-07-25 ENCOUNTER — OFFICE VISIT (OUTPATIENT)
Dept: URGENT CARE | Facility: CLINIC | Age: 84
End: 2019-07-25
Payer: MEDICARE

## 2019-07-25 VITALS
TEMPERATURE: 98.6 F | RESPIRATION RATE: 20 BRPM | HEART RATE: 78 BPM | WEIGHT: 152 LBS | HEIGHT: 64 IN | SYSTOLIC BLOOD PRESSURE: 140 MMHG | BODY MASS INDEX: 25.95 KG/M2 | DIASTOLIC BLOOD PRESSURE: 70 MMHG | OXYGEN SATURATION: 94 %

## 2019-07-25 DIAGNOSIS — H92.01 RIGHT EAR PAIN: ICD-10-CM

## 2019-07-25 PROCEDURE — 99214 OFFICE O/P EST MOD 30 MIN: CPT | Performed by: FAMILY MEDICINE

## 2019-07-25 RX ORDER — TRAMADOL HYDROCHLORIDE 50 MG/1
50 TABLET ORAL EVERY 4 HOURS PRN
COMMUNITY
End: 2021-06-22

## 2019-07-25 RX ORDER — AMOXICILLIN 500 MG/1
500 CAPSULE ORAL 3 TIMES DAILY
Qty: 30 CAP | Refills: 0 | Status: SHIPPED | OUTPATIENT
Start: 2019-07-25 | End: 2021-04-21

## 2019-07-25 ASSESSMENT — ENCOUNTER SYMPTOMS
SORE THROAT: 1
HEADACHES: 1

## 2019-07-25 NOTE — PROGRESS NOTES
"Subjective:   Licha Pope is a 83 y.o. female who presents for Otalgia (Right ear, headaches, shooting pain to the top of her head, comes and goes, sore throat x5 days)        Otalgia    There is pain in the right ear. This is a new problem. The current episode started in the past 7 days. The problem occurs every few hours. The problem has been waxing and waning. There has been no fever. The pain is moderate. Associated symptoms include headaches and a sore throat. Pertinent negatives include no ear discharge or hearing loss.     Review of Systems   HENT: Positive for ear pain and sore throat. Negative for ear discharge and hearing loss.    Neurological: Positive for headaches.     Allergies   Allergen Reactions   • Codeine Vomiting   • Demerol Vomiting     Injectable type   • Ciprofloxacin Rash     rash   • Iodine      PATIENT ALLERGIC TO SHELLFISH, NOT SURE IF SHE IS ALLERGIC TO IODINE   • Shellfish Allergy      Soft shell      Objective:   /70 (BP Location: Left arm, Patient Position: Sitting, BP Cuff Size: Adult)   Pulse 78   Temp 37 °C (98.6 °F) (Temporal)   Resp 20   Ht 1.626 m (5' 4\")   Wt 68.9 kg (152 lb)   SpO2 94%   BMI 26.09 kg/m²   Physical Exam   Constitutional: She is oriented to person, place, and time. She appears well-developed and well-nourished. No distress.   HENT:   Head: Normocephalic and atraumatic.   Right Ear: External ear normal.   Left Ear: External ear normal.   Mouth/Throat: Oropharynx is clear and moist.   Eyes: Pupils are equal, round, and reactive to light. Conjunctivae and EOM are normal.   Cardiovascular: Normal rate and regular rhythm.    No murmur heard.  Pulmonary/Chest: Effort normal and breath sounds normal. No respiratory distress.   Abdominal: Soft. She exhibits no distension. There is no tenderness.   Neurological: She is alert and oriented to person, place, and time. She has normal reflexes. No sensory deficit.   Skin: Skin is warm and dry.   Psychiatric: " She has a normal mood and affect.         Assessment/Plan:   1. Right ear pain  - amoxicillin (AMOXIL) 500 MG Cap; Take 1 Cap by mouth 3 times a day.  Dispense: 30 Cap; Refill: 0    Other orders  - tramadol (ULTRAM) 50 MG Tab; Take 50 mg by mouth every four hours as needed.  Patient was given a contingent antibiotic prescription to fill and use as directed if symptoms progressed as discussed and agreed upon.   Differential diagnosis, natural history, supportive care, and indications for immediate follow-up discussed.

## 2019-11-21 ENCOUNTER — APPOINTMENT (RX ONLY)
Dept: URBAN - METROPOLITAN AREA CLINIC 4 | Facility: CLINIC | Age: 84
Setting detail: DERMATOLOGY
End: 2019-11-21

## 2019-11-21 DIAGNOSIS — B07.8 OTHER VIRAL WARTS: ICD-10-CM

## 2019-11-21 PROBLEM — I10 ESSENTIAL (PRIMARY) HYPERTENSION: Status: ACTIVE | Noted: 2019-11-21

## 2019-11-21 PROBLEM — K21.9 GASTRO-ESOPHAGEAL REFLUX DISEASE WITHOUT ESOPHAGITIS: Status: ACTIVE | Noted: 2019-11-21

## 2019-11-21 PROCEDURE — 17110 DESTRUCTION B9 LES UP TO 14: CPT

## 2019-11-21 PROCEDURE — ? COUNSELING

## 2019-11-21 PROCEDURE — ? ADDITIONAL NOTES

## 2019-11-21 PROCEDURE — ? LIQUID NITROGEN

## 2019-11-21 ASSESSMENT — LOCATION DETAILED DESCRIPTION DERM: LOCATION DETAILED: RIGHT NARIS

## 2019-11-21 ASSESSMENT — LOCATION SIMPLE DESCRIPTION DERM: LOCATION SIMPLE: RIGHT NOSE

## 2019-11-21 ASSESSMENT — LOCATION ZONE DERM: LOCATION ZONE: NOSE

## 2019-11-21 NOTE — PROCEDURE: ADDITIONAL NOTES
Detail Level: Simple
Additional Notes: Advised patient if lesion is still present and doesn’t fall off in a month please call our office for another treatment.\\n

## 2020-11-09 ENCOUNTER — HOSPITAL ENCOUNTER (OUTPATIENT)
Dept: RADIOLOGY | Facility: MEDICAL CENTER | Age: 85
End: 2020-11-09
Attending: FAMILY MEDICINE
Payer: MEDICARE

## 2020-11-09 DIAGNOSIS — Z12.31 ENCOUNTER FOR SCREENING MAMMOGRAM FOR MALIGNANT NEOPLASM OF BREAST: ICD-10-CM

## 2020-11-09 DIAGNOSIS — Z78.0 POSTMENOPAUSAL: ICD-10-CM

## 2020-11-09 PROCEDURE — 77080 DXA BONE DENSITY AXIAL: CPT

## 2020-11-09 PROCEDURE — 77067 SCR MAMMO BI INCL CAD: CPT

## 2021-01-11 DIAGNOSIS — Z23 NEED FOR VACCINATION: ICD-10-CM

## 2021-01-14 PROCEDURE — 0001A PFIZER SARS-COV-2 VACCINE: CPT

## 2021-01-14 PROCEDURE — 91300 PFIZER SARS-COV-2 VACCINE: CPT

## 2021-01-15 ENCOUNTER — APPOINTMENT (OUTPATIENT)
Dept: FAMILY PLANNING/WOMEN'S HEALTH CLINIC | Facility: IMMUNIZATION CENTER | Age: 86
End: 2021-01-15
Attending: INTERNAL MEDICINE
Payer: MEDICARE

## 2021-01-15 ENCOUNTER — IMMUNIZATION (OUTPATIENT)
Dept: FAMILY PLANNING/WOMEN'S HEALTH CLINIC | Facility: IMMUNIZATION CENTER | Age: 86
End: 2021-01-15
Payer: MEDICARE

## 2021-01-15 DIAGNOSIS — Z23 ENCOUNTER FOR VACCINATION: Primary | ICD-10-CM

## 2021-01-15 DIAGNOSIS — Z23 NEED FOR VACCINATION: ICD-10-CM

## 2021-02-04 ENCOUNTER — IMMUNIZATION (OUTPATIENT)
Dept: FAMILY PLANNING/WOMEN'S HEALTH CLINIC | Facility: IMMUNIZATION CENTER | Age: 86
End: 2021-02-04
Attending: INTERNAL MEDICINE
Payer: MEDICARE

## 2021-02-04 DIAGNOSIS — Z23 ENCOUNTER FOR VACCINATION: Primary | ICD-10-CM

## 2021-02-04 PROCEDURE — 91300 PFIZER SARS-COV-2 VACCINE: CPT

## 2021-02-04 PROCEDURE — 0002A PFIZER SARS-COV-2 VACCINE: CPT

## 2021-04-21 ENCOUNTER — HOSPITAL ENCOUNTER (EMERGENCY)
Facility: MEDICAL CENTER | Age: 86
End: 2021-04-21
Attending: EMERGENCY MEDICINE
Payer: MEDICARE

## 2021-04-21 VITALS
BODY MASS INDEX: 25.44 KG/M2 | HEART RATE: 95 BPM | SYSTOLIC BLOOD PRESSURE: 128 MMHG | DIASTOLIC BLOOD PRESSURE: 60 MMHG | OXYGEN SATURATION: 93 % | WEIGHT: 149.03 LBS | TEMPERATURE: 97.7 F | HEIGHT: 64 IN | RESPIRATION RATE: 17 BRPM

## 2021-04-21 DIAGNOSIS — H66.90 ACUTE OTITIS MEDIA, UNSPECIFIED OTITIS MEDIA TYPE: ICD-10-CM

## 2021-04-21 DIAGNOSIS — H61.21 IMPACTED CERUMEN OF RIGHT EAR: ICD-10-CM

## 2021-04-21 DIAGNOSIS — N30.00 ACUTE CYSTITIS WITHOUT HEMATURIA: Primary | ICD-10-CM

## 2021-04-21 LAB
ALBUMIN SERPL BCP-MCNC: 3.4 G/DL (ref 3.2–4.9)
ALBUMIN/GLOB SERPL: 1 G/DL
ALP SERPL-CCNC: 53 U/L (ref 30–99)
ALT SERPL-CCNC: 17 U/L (ref 2–50)
ANION GAP SERPL CALC-SCNC: 11 MMOL/L (ref 7–16)
APPEARANCE UR: ABNORMAL
AST SERPL-CCNC: 37 U/L (ref 12–45)
BACTERIA #/AREA URNS HPF: ABNORMAL /HPF
BASOPHILS # BLD AUTO: 0.3 % (ref 0–1.8)
BASOPHILS # BLD: 0.04 K/UL (ref 0–0.12)
BILIRUB SERPL-MCNC: 1.8 MG/DL (ref 0.1–1.5)
BILIRUB UR QL STRIP.AUTO: NEGATIVE
BUN SERPL-MCNC: 21 MG/DL (ref 8–22)
CALCIUM SERPL-MCNC: 8.8 MG/DL (ref 8.4–10.2)
CHLORIDE SERPL-SCNC: 100 MMOL/L (ref 96–112)
CO2 SERPL-SCNC: 22 MMOL/L (ref 20–33)
COLOR UR: YELLOW
COMMENT 1642: NORMAL
CREAT SERPL-MCNC: 1.04 MG/DL (ref 0.5–1.4)
EOSINOPHIL # BLD AUTO: 0.01 K/UL (ref 0–0.51)
EOSINOPHIL NFR BLD: 0.1 % (ref 0–6.9)
EPI CELLS #/AREA URNS HPF: ABNORMAL /HPF
ERYTHROCYTE [DISTWIDTH] IN BLOOD BY AUTOMATED COUNT: 46.4 FL (ref 35.9–50)
GLOBULIN SER CALC-MCNC: 3.5 G/DL (ref 1.9–3.5)
GLUCOSE SERPL-MCNC: 138 MG/DL (ref 65–99)
GLUCOSE UR STRIP.AUTO-MCNC: NEGATIVE MG/DL
HCT VFR BLD AUTO: 41.2 % (ref 37–47)
HGB BLD-MCNC: 13.3 G/DL (ref 12–16)
IMM GRANULOCYTES # BLD AUTO: 0.05 K/UL (ref 0–0.11)
IMM GRANULOCYTES NFR BLD AUTO: 0.4 % (ref 0–0.9)
KETONES UR STRIP.AUTO-MCNC: 15 MG/DL
LEUKOCYTE ESTERASE UR QL STRIP.AUTO: ABNORMAL
LYMPHOCYTES # BLD AUTO: 0.37 K/UL (ref 1–4.8)
LYMPHOCYTES NFR BLD: 3 % (ref 22–41)
MCH RBC QN AUTO: 32.4 PG (ref 27–33)
MCHC RBC AUTO-ENTMCNC: 32.3 G/DL (ref 33.6–35)
MCV RBC AUTO: 100.5 FL (ref 81.4–97.8)
MICRO URNS: ABNORMAL
MONOCYTES # BLD AUTO: 0.76 K/UL (ref 0–0.85)
MONOCYTES NFR BLD AUTO: 6.2 % (ref 0–13.4)
NEUTROPHILS # BLD AUTO: 11 K/UL (ref 2–7.15)
NEUTROPHILS NFR BLD: 90 % (ref 44–72)
NITRITE UR QL STRIP.AUTO: POSITIVE
NRBC # BLD AUTO: 0 K/UL
NRBC BLD-RTO: 0 /100 WBC
PH UR STRIP.AUTO: 5.5 [PH] (ref 5–8)
PLATELET # BLD AUTO: 105 K/UL (ref 164–446)
PMV BLD AUTO: 12.5 FL (ref 9–12.9)
POTASSIUM SERPL-SCNC: 3.8 MMOL/L (ref 3.6–5.5)
PROT SERPL-MCNC: 6.9 G/DL (ref 6–8.2)
PROT UR QL STRIP: 30 MG/DL
RBC # BLD AUTO: 4.1 M/UL (ref 4.2–5.4)
RBC # URNS HPF: ABNORMAL /HPF
RBC UR QL AUTO: ABNORMAL
SODIUM SERPL-SCNC: 133 MMOL/L (ref 135–145)
SP GR UR STRIP.AUTO: 1.02
WBC # BLD AUTO: 12.2 K/UL (ref 4.8–10.8)
WBC #/AREA URNS HPF: ABNORMAL /HPF

## 2021-04-21 PROCEDURE — 700111 HCHG RX REV CODE 636 W/ 250 OVERRIDE (IP): Performed by: EMERGENCY MEDICINE

## 2021-04-21 PROCEDURE — 99284 EMERGENCY DEPT VISIT MOD MDM: CPT

## 2021-04-21 PROCEDURE — 85025 COMPLETE CBC W/AUTO DIFF WBC: CPT

## 2021-04-21 PROCEDURE — 80053 COMPREHEN METABOLIC PANEL: CPT

## 2021-04-21 PROCEDURE — 700105 HCHG RX REV CODE 258: Performed by: EMERGENCY MEDICINE

## 2021-04-21 PROCEDURE — 81001 URINALYSIS AUTO W/SCOPE: CPT

## 2021-04-21 PROCEDURE — 69209 REMOVE IMPACTED EAR WAX UNI: CPT

## 2021-04-21 PROCEDURE — 96365 THER/PROPH/DIAG IV INF INIT: CPT | Mod: XU

## 2021-04-21 PROCEDURE — 36415 COLL VENOUS BLD VENIPUNCTURE: CPT

## 2021-04-21 PROCEDURE — 87086 URINE CULTURE/COLONY COUNT: CPT

## 2021-04-21 RX ORDER — CEFDINIR 300 MG/1
300 CAPSULE ORAL 2 TIMES DAILY
Qty: 20 CAPSULE | Refills: 0 | Status: SHIPPED | OUTPATIENT
Start: 2021-04-21 | End: 2021-05-01

## 2021-04-21 RX ADMIN — CEFTRIAXONE SODIUM 2 G: 2 INJECTION, POWDER, FOR SOLUTION INTRAMUSCULAR; INTRAVENOUS at 16:09

## 2021-04-21 ASSESSMENT — ENCOUNTER SYMPTOMS
FEVER: 0
ABDOMINAL PAIN: 0
NECK PAIN: 0
SHORTNESS OF BREATH: 0
SORE THROAT: 0
NAUSEA: 0
HEADACHES: 0
COUGH: 0
BACK PAIN: 0
VOMITING: 0
CHILLS: 1

## 2021-04-21 ASSESSMENT — LIFESTYLE VARIABLES
DO YOU DRINK ALCOHOL: YES
HAVE YOU EVER FELT YOU SHOULD CUT DOWN ON YOUR DRINKING: NO

## 2021-04-21 ASSESSMENT — FIBROSIS 4 INDEX: FIB4 SCORE: 2.14

## 2021-04-21 NOTE — ED PROVIDER NOTES
ED Provider Note     4/21/2021  12:18 PM    Means of Arrival: Walk In  History obtained by: patient,   Limitations:none  PCP: Elza Morales  CODE STATUS: Full    CHIEF COMPLAINT  Chief Complaint   Patient presents with   • Shaking     shaking started yesterday R ear pain started yesterday       HPI  Licha Pope is a 85 y.o. female who presents with 2 concerns.  First concern is having intermittent right ear pain.  She does have a history of hearing loss and wears hearing aids.  Over the past few days she has had intermittent pain at her right ear.  No drainage.  Review of records she has had right ear pain before associated with infection.  She also has concerns that yesterday she was having some shaking.  Describes the symptom as arms and legs were shaking.  She also had chills.  She did not record fever or check.  No abdominal pain.  No vomiting.  Overall not feeling well.    REVIEW OF SYSTEMS  Review of Systems   Constitutional: Positive for chills. Negative for fever.   HENT: Positive for ear pain. Negative for congestion and sore throat.    Respiratory: Negative for cough and shortness of breath.    Cardiovascular: Negative for chest pain.   Gastrointestinal: Negative for abdominal pain, nausea and vomiting.   Genitourinary: Negative for dysuria.   Musculoskeletal: Negative for back pain and neck pain.   Neurological: Negative for headaches.   All other systems reviewed and are negative.    See HPI for further details.    PAST MEDICAL HISTORY   has a past medical history of Arthritis, COPD (chronic obstructive pulmonary disease) (HCC), EMPHYSEMA, Gastroesophageal reflux disease without esophagitis (3/13/2019), Hypertension, Hypertriglyceridemia (5/13/2019), and Prediabetes (5/13/2019).    SOCIAL HISTORY  Social History     Tobacco Use   • Smoking status: Former Smoker     Packs/day: 1.00     Years: 20.00     Pack years: 20.00     Types: Cigarettes     Quit date: 1/1/1975     Years since quitting:  "46.3   • Smokeless tobacco: Never Used   Substance and Sexual Activity   • Alcohol use: Yes     Alcohol/week: 1.5 oz     Types: 3 Standard drinks or equivalent per week     Comment: RARE   • Drug use: No   • Sexual activity: Not Currently     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     Comment: , retired [employer]       SURGICAL HISTORY   has a past surgical history that includes hemorrhoidectomy ();  delivery only (); knee arthroplasty total (2014); cataract phaco with iol (2014); and cataract phaco with iol (2014).    CURRENT MEDICATIONS  Home Medications    **Home medications have not yet been reviewed for this encounter**         ALLERGIES  Allergies   Allergen Reactions   • Codeine Vomiting   • Demerol Vomiting     Injectable type   • Ciprofloxacin Rash     rash   • Iodine      PATIENT ALLERGIC TO SHELLFISH, NOT SURE IF SHE IS ALLERGIC TO IODINE   • Shellfish Allergy      Soft shell       PHYSICAL EXAM  VITAL SIGNS: /62   Pulse 93   Temp 36.6 °C (97.9 °F) (Temporal)   Resp 17   Ht 1.626 m (5' 4\")   Wt 67.6 kg (149 lb 0.5 oz)   SpO2 92%   BMI 25.58 kg/m²     Pulse ox interpretation: I interpret this pulse ox as normal.  Constitutional: Very pleasant 85-year-old woman.  HENT: No signs of trauma, Bilateral external ears normal, Nose normal.  Right TM with cerumen impaction.  This was irrigated.  On reevaluation there was erythema at the right TM.  Eyes: Pupils are equal, Conjunctiva normal, Non-icteric.   Neck: Normal range of motion, No tenderness, Supple, No stridor.    Cardiovascular: Regular rate and rhythm, no murmurs. Symmetric distal pulses. No cyanosis of extremities. No peripheral edema of extremities.  Thorax & Lungs: Normal breath sounds, No respiratory distress, No wheezing, No chest tenderness.   Abdomen:  Soft, No tenderness, No masses, No pulsatile masses. No peritoneal signs.  Skin: Warm, Dry, No erythema, No rash.   Back: No midline bony tenderness, No CVA " tenderness.   Musculoskeletal: Good range of motion in all major joints. No tenderness to palpation or major deformities noted.   Neurologic: Alert , Normal motor function, Normal sensory function, No focal deficits noted.   Psychiatric: Affect normal, Judgment normal, Mood normal.   Physical Exam      DIAGNOSTIC STUDIES / PROCEDURES    LABS  Pertinent Labs & Imaging studies reviewed. (See chart for details)    RADIOLOGY  Pertinent Labs & Imaging studies reviewed. (See chart for details)    COURSE & MEDICAL DECISION MAKING  Pertinent Labs & Imaging studies reviewed. (See chart for details)    12:18 PM This is an emergent evaluation of a  85 y.o. female who presents with concerns of right ear pain.  After irrigation of the right ear I was able to see the right TM.  There is erythema consistent with otitis.  Symptoms of shaking very vague and she was screened for potential infection.  She has slightly elevated white count 12 with a left shift.  Urinalysis took some time to obtain and when he did return is showed nitrite positive urine with 5100 white blood cell counts and bacteria.  She was given a dose of IV Rocephin for this.  She will be discharged with a prescription for Omnicef.  Typically would prescribe Bactrim given local resistance however she does have mildly decreased GFR and is elderly.  Better option with less side effects would be Omnicef.  This should also cover the mild otitis media infection she has. I would like her to follow up with her primary provider within the next week to be re-evaluated      The patient will return for worsening symptoms and is stable at the time of discharge. The patient verbalizes understanding. Guidance was provided on appropriate use of medications including driving under the influence, overdose, and side effects.         FINAL IMPRESSION    ICD-10-CM   1. Acute cystitis without hematuria  N30.00   2. Impacted cerumen of right ear  H61.21   3. Acute otitis media,  unspecified otitis media type  H66.90            This dictation was created using voice recognition software. The accuracy of the dictation is limited to the abilities of the software. I expect there may be some errors of grammar and possibly content. The nursing notes were reviewed and certain aspects of this information were incorporated into this note.    Electronically signed by: Perry Mcmahan II, M.D., 4/21/2021 12:18 PM

## 2021-04-23 LAB
BACTERIA UR CULT: NORMAL
SIGNIFICANT IND 70042: NORMAL
SITE SITE: NORMAL
SOURCE SOURCE: NORMAL

## 2021-05-03 ENCOUNTER — HOSPITAL ENCOUNTER (OUTPATIENT)
Dept: RADIOLOGY | Facility: MEDICAL CENTER | Age: 86
End: 2021-05-03
Attending: PHYSICIAN ASSISTANT
Payer: MEDICARE

## 2021-05-03 ENCOUNTER — APPOINTMENT (OUTPATIENT)
Dept: RADIOLOGY | Facility: IMAGING CENTER | Age: 86
End: 2021-05-03
Attending: PHYSICIAN ASSISTANT
Payer: MEDICARE

## 2021-05-03 ENCOUNTER — TELEPHONE (OUTPATIENT)
Dept: URGENT CARE | Facility: CLINIC | Age: 86
End: 2021-05-03

## 2021-05-03 ENCOUNTER — OFFICE VISIT (OUTPATIENT)
Dept: URGENT CARE | Facility: CLINIC | Age: 86
End: 2021-05-03
Payer: MEDICARE

## 2021-05-03 ENCOUNTER — HOSPITAL ENCOUNTER (OUTPATIENT)
Facility: MEDICAL CENTER | Age: 86
End: 2021-05-03
Attending: PHYSICIAN ASSISTANT
Payer: MEDICARE

## 2021-05-03 VITALS
DIASTOLIC BLOOD PRESSURE: 78 MMHG | HEART RATE: 88 BPM | OXYGEN SATURATION: 93 % | SYSTOLIC BLOOD PRESSURE: 132 MMHG | WEIGHT: 144.8 LBS | HEIGHT: 64 IN | TEMPERATURE: 98.1 F | RESPIRATION RATE: 16 BRPM | BODY MASS INDEX: 24.72 KG/M2

## 2021-05-03 DIAGNOSIS — R22.41 LOCALIZED SWELLING OF RIGHT LOWER LEG: ICD-10-CM

## 2021-05-03 DIAGNOSIS — R53.83 FATIGUE, UNSPECIFIED TYPE: ICD-10-CM

## 2021-05-03 DIAGNOSIS — M79.671 RIGHT FOOT PAIN: ICD-10-CM

## 2021-05-03 LAB — COVID ORDER STATUS COVID19: NORMAL

## 2021-05-03 PROCEDURE — U0003 INFECTIOUS AGENT DETECTION BY NUCLEIC ACID (DNA OR RNA); SEVERE ACUTE RESPIRATORY SYNDROME CORONAVIRUS 2 (SARS-COV-2) (CORONAVIRUS DISEASE [COVID-19]), AMPLIFIED PROBE TECHNIQUE, MAKING USE OF HIGH THROUGHPUT TECHNOLOGIES AS DESCRIBED BY CMS-2020-01-R: HCPCS

## 2021-05-03 PROCEDURE — 99214 OFFICE O/P EST MOD 30 MIN: CPT | Performed by: PHYSICIAN ASSISTANT

## 2021-05-03 PROCEDURE — 93971 EXTREMITY STUDY: CPT | Mod: RT

## 2021-05-03 PROCEDURE — U0005 INFEC AGEN DETEC AMPLI PROBE: HCPCS

## 2021-05-03 PROCEDURE — 73630 X-RAY EXAM OF FOOT: CPT | Mod: TC,FY,RT | Performed by: PHYSICIAN ASSISTANT

## 2021-05-03 PROCEDURE — 93971 EXTREMITY STUDY: CPT | Mod: 26 | Performed by: INTERNAL MEDICINE

## 2021-05-03 PROCEDURE — 71046 X-RAY EXAM CHEST 2 VIEWS: CPT | Mod: TC,FY | Performed by: PHYSICIAN ASSISTANT

## 2021-05-03 RX ORDER — FEBUXOSTAT 40 MG/1
40 TABLET, FILM COATED ORAL DAILY
COMMUNITY
Start: 2021-04-01 | End: 2022-02-17

## 2021-05-03 RX ORDER — VALSARTAN 160 MG/1
160 TABLET ORAL DAILY
Status: ON HOLD | COMMUNITY
End: 2022-02-19 | Stop reason: SDUPTHER

## 2021-05-03 ASSESSMENT — ENCOUNTER SYMPTOMS
PALPITATIONS: 0
COUGH: 0
SPUTUM PRODUCTION: 0
HEADACHES: 1
MYALGIAS: 1
SHORTNESS OF BREATH: 1
HEMOPTYSIS: 0
WHEEZING: 0

## 2021-05-03 ASSESSMENT — PAIN SCALES - GENERAL: PAINLEVEL: 10=SEVERE PAIN

## 2021-05-03 ASSESSMENT — FIBROSIS 4 INDEX: FIB4 SCORE: 7.26

## 2021-05-03 NOTE — PROGRESS NOTES
Subjective:   Licha Pope is a 85 y.o. female who presents today with   Chief Complaint   Patient presents with   • Coronavirus Screening     bodyache/headache/fatigue x1 week   • Edema     right foot x5 days     Foot Swelling  This is a new problem. The current episode started in the past 7 days. The problem occurs constantly. The problem has been unchanged. Associated symptoms include headaches and myalgias. Pertinent negatives include no chest pain or coughing. She has tried acetaminophen for the symptoms. The treatment provided mild relief.   Patient states she has had fatigue over the last week and 1/2 to 2 weeks.  She has had body aches for about a week now.  She was seen in the ER on 4/21 treated for UTI. Seen by PCP and had labs done at [facility] on Friday 4/30/21. All were WNL upon my review besides Covid antibody test which was positive. Because patient had symptoms she was told she should have COVID swab done.  Patient denies any recent trauma to the right foot. Patient states she has been in bed mostly the last couple of weeks.  reports her sleeping 18 hours a day.   PMH:  has a past medical history of Arthritis, COPD (chronic obstructive pulmonary disease) ([facility]), EMPHYSEMA, Gastroesophageal reflux disease without esophagitis (3/13/2019), Hypertension, Hypertriglyceridemia (5/13/2019), and Prediabetes (5/13/2019).  MEDS:   Current Outpatient Medications:   •  valsartan (DIOVAN) 160 MG Tab, Take 160 mg by mouth every day., Disp: , Rfl:   •  Calcium Carbonate-Vitamin D (CALCIUM + D PO), Take  by mouth every day., Disp: , Rfl:   •  multivitamin (THERAGRAN) TABS, Take 1 Tab by mouth every day., Disp: , Rfl:   •  GLUCOSAMINE HCL-MSM PO, Take  by mouth every day. Takes two, Disp: , Rfl:   •  febuxostat (ULORIC) 40 MG Tab, , Disp: , Rfl:   •  tramadol (ULTRAM) 50 MG Tab, Take 50 mg by mouth every four hours as needed., Disp: , Rfl:   •  benzonatate (TESSALON) 200 MG capsule, Take 1 Cap by mouth 3  "times a day as needed., Disp: 60 Cap, Rfl: 0  •  Cranberry 300 MG TABS, Take  by mouth., Disp: , Rfl:   •  Omega 3-6-9 Fatty Acids (TRIPLE OMEGA-3-6-9) CAPS, Take  by mouth., Disp: , Rfl:   ALLERGIES:   Allergies   Allergen Reactions   • Codeine Vomiting   • Demerol Vomiting     Injectable type   • Ciprofloxacin Rash     rash   • Iodine      PATIENT ALLERGIC TO SHELLFISH, NOT SURE IF SHE IS ALLERGIC TO IODINE   • Shellfish Allergy      Soft shell     SURGHX:   Past Surgical History:   Procedure Laterality Date   • CATARACT PHACO WITH IOL  2014    Performed by Jarred Almazan M.D. at [facility] ORS   • CATARACT PHACO WITH IOL  2014    Performed by Jarred Almazan M.D. at SURGERY [facility] ORS   • KNEE ARTHROPLASTY TOTAL  2014    Performed by Jose Hahn M.D. at SURGERY [facility] ORS   • PB  DELIVERY ONLY     • HEMORRHOIDECTOMY       SOCHX:  reports that she quit smoking about 46 years ago. Her smoking use included cigarettes. She has a 20.00 pack-year smoking history. She has never used smokeless tobacco. She reports previous alcohol use of about 1.5 oz of alcohol per week. She reports that she does not use drugs.  FH: Reviewed with patient, not pertinent to this visit.       Review of Systems   Constitutional: Positive for malaise/fatigue.   Respiratory: Positive for shortness of breath (at baseline since moving to [place]). Negative for cough, hemoptysis, sputum production and wheezing.    Cardiovascular: Positive for leg swelling. Negative for chest pain and palpitations.   Musculoskeletal: Positive for myalgias.        Right foot pain/swelling   Neurological: Positive for headaches.        Objective:   /78 (BP Location: Left arm, Patient Position: Sitting)   Pulse 88   Temp 36.7 °C (98.1 °F) (Tympanic)   Resp 16   Ht 1.626 m (5' 4\")   Wt 65.7 kg (144 lb 12.8 oz)   SpO2 93%   BMI 24.85 kg/m²   Physical Exam  Vitals and nursing note reviewed. "   Constitutional:       General: She is not in acute distress.     Appearance: Normal appearance. She is well-developed. She is not ill-appearing or toxic-appearing.   HENT:      Head: Normocephalic and atraumatic.      Right Ear: Hearing normal.      Left Ear: Hearing normal.   Eyes:      Conjunctiva/sclera: Conjunctivae normal.   Cardiovascular:      Rate and Rhythm: Normal rate and regular rhythm.      Heart sounds: Normal heart sounds.   Pulmonary:      Effort: Pulmonary effort is normal.      Breath sounds: Normal breath sounds. No stridor. No wheezing, rhonchi or rales.   Musculoskeletal:      Right lower le+ Edema present.        Legs:       Comments: TTP to right calf. Erythema limited to right great toe. Swelling extends to dorsum of the right foot. TTP to right great toe.   Skin:     General: Skin is warm and dry.   Neurological:      Mental Status: She is alert.      Coordination: Coordination normal.   Psychiatric:         Mood and Affect: Mood normal.     DX CHEST  FINDINGS:  The heart is normal in size.  No pulmonary infiltrates or consolidations are noted.  No pleural effusions are appreciated.  4 cm hiatal hernia is identified. There is linear opacification in the left lung base.     IMPRESSION:     1.  No focal infiltrates or consolidations identified.     2.  There is hiatal hernia and probable discoid atelectasis or scarring in the left lung base.    DX FOOT  FINDINGS:  Bone mineralization is normal.  There is no evidence of fracture or dislocation.  There is joint space narrowing and marginal spurring and periarticular sclerosis most prominent in the first MTP joint and at the navicular cuneiform joint.     IMPRESSION:     No evidence of fracture or dislocation.  Findings are consistent with osteoarthritis.    US  FINDINGS:   Right lower extremity -.    No evidence of   deep venous thrombosis.   Assessment/Plan:   Assessment    1. Fatigue, unspecified type  - SARS-CoV-2 PCR (24 hour  In-House): Collect NP swab in VTM; Future  - DX-CHEST-2 VIEWS; Future    2. Localized swelling of right lower leg  - US-EXTREMITY VENOUS LOWER UNILAT RIGHT; Future    3. Right foot pain  - DX-FOOT-COMPLETE 3+ RIGHT; Future    Other orders  - valsartan (DIOVAN) 160 MG Tab; Take 160 mg by mouth every day.  - febuxostat (ULORIC) 40 MG Tab  Would like to rule out DVT with ultrasound given pain and swelling in right calf.  Erythema to the right great toe appears more consistent with gout. Recommend continued use of tylenol as this has helped her so far.  X-ray of the foot shows signs of osteoarthritis.  Chest x-ray with atelectasis in the left lung base.  Ultrasound is negative at this time for DVT.  Discussed CDC guidelines including self isolation at home.   Patient encouraged to get plenty of rest, use OTC tylenol for pain/fever, and drink plenty of fluids.  Elevation, compression of right foot.  Differential diagnosis, natural history, supportive care, and indications for immediate follow-up discussed.   Patient given instructions and understanding of medications and treatment.    If not improving in 3-5 days, F/U with PCP or return to  if symptoms worsen.    Patient agreeable to plan.      Please note that this dictation was created using voice recognition software. I have made every reasonable attempt to correct obvious errors, but I expect that there are errors of grammar and possibly content that I did not discover before finalizing the note.    Tony Lane PA-C

## 2021-05-03 NOTE — TELEPHONE ENCOUNTER
Attempted to call patient and inform her that the imaging Rep. Suggested to go to [address] right now to get the US ordered on 05/03/2021.   Was not able to get a hold of patient or pt's .

## 2021-05-04 LAB
SARS-COV-2 RNA RESP QL NAA+PROBE: NOTDETECTED
SPECIMEN SOURCE: NORMAL

## 2021-05-10 ENCOUNTER — TELEPHONE (OUTPATIENT)
Dept: URGENT CARE | Facility: CLINIC | Age: 86
End: 2021-05-10

## 2021-05-10 NOTE — TELEPHONE ENCOUNTER
Called and checked in on patient.  She is very appreciative of my call.  She did follow-up with podiatry and they started her on some prednisone for what they found to be gout at that time.  She states she is feeling much better.

## 2021-06-22 ENCOUNTER — APPOINTMENT (OUTPATIENT)
Dept: RADIOLOGY | Facility: MEDICAL CENTER | Age: 86
DRG: 341 | End: 2021-06-22
Attending: EMERGENCY MEDICINE
Payer: MEDICARE

## 2021-06-22 ENCOUNTER — HOSPITAL ENCOUNTER (INPATIENT)
Facility: MEDICAL CENTER | Age: 86
LOS: 6 days | DRG: 341 | End: 2021-06-28
Attending: EMERGENCY MEDICINE | Admitting: HOSPITALIST
Payer: MEDICARE

## 2021-06-22 ENCOUNTER — OFFICE VISIT (OUTPATIENT)
Dept: URGENT CARE | Facility: CLINIC | Age: 86
End: 2021-06-22
Payer: MEDICARE

## 2021-06-22 VITALS
TEMPERATURE: 98.9 F | RESPIRATION RATE: 15 BRPM | DIASTOLIC BLOOD PRESSURE: 80 MMHG | BODY MASS INDEX: 24.07 KG/M2 | SYSTOLIC BLOOD PRESSURE: 142 MMHG | WEIGHT: 141 LBS | OXYGEN SATURATION: 95 % | HEART RATE: 86 BPM | HEIGHT: 64 IN

## 2021-06-22 DIAGNOSIS — M19.90 ARTHRITIS: ICD-10-CM

## 2021-06-22 DIAGNOSIS — Z86.718 HISTORY OF DEEP VENOUS THROMBOSIS: ICD-10-CM

## 2021-06-22 DIAGNOSIS — M17.10 PRIMARY LOCALIZED OSTEOARTHROSIS OF LOWER LEG, UNSPECIFIED LATERALITY: ICD-10-CM

## 2021-06-22 DIAGNOSIS — I95.81 POSTOPERATIVE HYPOTENSION: ICD-10-CM

## 2021-06-22 DIAGNOSIS — R10.31 RIGHT LOWER QUADRANT ABDOMINAL PAIN: ICD-10-CM

## 2021-06-22 DIAGNOSIS — K35.80 ACUTE APPENDICITIS, UNSPECIFIED ACUTE APPENDICITIS TYPE: ICD-10-CM

## 2021-06-22 DIAGNOSIS — I10 ESSENTIAL HYPERTENSION, BENIGN: ICD-10-CM

## 2021-06-22 DIAGNOSIS — R94.31 NONSPECIFIC ABNORMAL ELECTROCARDIOGRAM (ECG) (EKG): ICD-10-CM

## 2021-06-22 DIAGNOSIS — K35.30 ACUTE APPENDICITIS WITH LOCALIZED PERITONITIS, WITHOUT PERFORATION, ABSCESS, OR GANGRENE: ICD-10-CM

## 2021-06-22 LAB
ALBUMIN SERPL BCP-MCNC: 4.2 G/DL (ref 3.2–4.9)
ALBUMIN/GLOB SERPL: 1.2 G/DL
ALP SERPL-CCNC: 70 U/L (ref 30–99)
ALT SERPL-CCNC: 13 U/L (ref 2–50)
ANION GAP SERPL CALC-SCNC: 11 MMOL/L (ref 7–16)
APPEARANCE UR: ABNORMAL
APPEARANCE UR: NORMAL
AST SERPL-CCNC: 20 U/L (ref 12–45)
BACTERIA #/AREA URNS HPF: ABNORMAL /HPF
BASOPHILS # BLD AUTO: 0.8 % (ref 0–1.8)
BASOPHILS # BLD: 0.08 K/UL (ref 0–0.12)
BILIRUB SERPL-MCNC: 0.7 MG/DL (ref 0.1–1.5)
BILIRUB UR QL STRIP.AUTO: NEGATIVE
BILIRUB UR STRIP-MCNC: NEGATIVE MG/DL
BUN SERPL-MCNC: 15 MG/DL (ref 8–22)
CALCIUM SERPL-MCNC: 9.7 MG/DL (ref 8.4–10.2)
CHLORIDE SERPL-SCNC: 100 MMOL/L (ref 96–112)
CO2 SERPL-SCNC: 28 MMOL/L (ref 20–33)
COLOR UR AUTO: YELLOW
COLOR UR: YELLOW
CREAT SERPL-MCNC: 0.81 MG/DL (ref 0.5–1.4)
EOSINOPHIL # BLD AUTO: 0.27 K/UL (ref 0–0.51)
EOSINOPHIL NFR BLD: 2.8 % (ref 0–6.9)
EPI CELLS #/AREA URNS HPF: ABNORMAL /HPF
ERYTHROCYTE [DISTWIDTH] IN BLOOD BY AUTOMATED COUNT: 46 FL (ref 35.9–50)
GLOBULIN SER CALC-MCNC: 3.5 G/DL (ref 1.9–3.5)
GLUCOSE SERPL-MCNC: 101 MG/DL (ref 65–99)
GLUCOSE UR STRIP.AUTO-MCNC: NEGATIVE MG/DL
GLUCOSE UR STRIP.AUTO-MCNC: NEGATIVE MG/DL
HCT VFR BLD AUTO: 42.3 % (ref 37–47)
HGB BLD-MCNC: 13.7 G/DL (ref 12–16)
IMM GRANULOCYTES # BLD AUTO: 0.04 K/UL (ref 0–0.11)
IMM GRANULOCYTES NFR BLD AUTO: 0.4 % (ref 0–0.9)
KETONES UR STRIP.AUTO-MCNC: ABNORMAL MG/DL
KETONES UR STRIP.AUTO-MCNC: NEGATIVE MG/DL
LEUKOCYTE ESTERASE UR QL STRIP.AUTO: ABNORMAL
LEUKOCYTE ESTERASE UR QL STRIP.AUTO: NORMAL
LIPASE SERPL-CCNC: 97 U/L (ref 7–58)
LYMPHOCYTES # BLD AUTO: 1.08 K/UL (ref 1–4.8)
LYMPHOCYTES NFR BLD: 11.3 % (ref 22–41)
MCH RBC QN AUTO: 32.2 PG (ref 27–33)
MCHC RBC AUTO-ENTMCNC: 32.4 G/DL (ref 33.6–35)
MCV RBC AUTO: 99.3 FL (ref 81.4–97.8)
MICRO URNS: ABNORMAL
MONOCYTES # BLD AUTO: 0.81 K/UL (ref 0–0.85)
MONOCYTES NFR BLD AUTO: 8.5 % (ref 0–13.4)
NEUTROPHILS # BLD AUTO: 7.3 K/UL (ref 2–7.15)
NEUTROPHILS NFR BLD: 76.2 % (ref 44–72)
NITRITE UR QL STRIP.AUTO: NEGATIVE
NITRITE UR QL STRIP.AUTO: NEGATIVE
NRBC # BLD AUTO: 0 K/UL
NRBC BLD-RTO: 0 /100 WBC
PH UR STRIP.AUTO: 5 [PH] (ref 5–8)
PH UR STRIP.AUTO: 5.5 [PH] (ref 5–8)
PLATELET # BLD AUTO: 199 K/UL (ref 164–446)
PMV BLD AUTO: 11.1 FL (ref 9–12.9)
POTASSIUM SERPL-SCNC: 4.1 MMOL/L (ref 3.6–5.5)
PROT SERPL-MCNC: 7.7 G/DL (ref 6–8.2)
PROT UR QL STRIP: NEGATIVE MG/DL
PROT UR QL STRIP: NEGATIVE MG/DL
RBC # BLD AUTO: 4.26 M/UL (ref 4.2–5.4)
RBC # URNS HPF: ABNORMAL /HPF
RBC UR QL AUTO: ABNORMAL
RBC UR QL AUTO: NORMAL
SARS-COV+SARS-COV-2 AG RESP QL IA.RAPID: NOTDETECTED
SODIUM SERPL-SCNC: 139 MMOL/L (ref 135–145)
SP GR UR STRIP.AUTO: 1.01
SP GR UR STRIP.AUTO: 1.01
SPECIMEN SOURCE: NORMAL
UROBILINOGEN UR STRIP-MCNC: 0.2 MG/DL
WBC # BLD AUTO: 9.6 K/UL (ref 4.8–10.8)
WBC #/AREA URNS HPF: ABNORMAL /HPF

## 2021-06-22 PROCEDURE — 99285 EMERGENCY DEPT VISIT HI MDM: CPT

## 2021-06-22 PROCEDURE — C9803 HOPD COVID-19 SPEC COLLECT: HCPCS | Performed by: EMERGENCY MEDICINE

## 2021-06-22 PROCEDURE — 80053 COMPREHEN METABOLIC PANEL: CPT

## 2021-06-22 PROCEDURE — 99223 1ST HOSP IP/OBS HIGH 75: CPT | Performed by: HOSPITALIST

## 2021-06-22 PROCEDURE — 96365 THER/PROPH/DIAG IV INF INIT: CPT

## 2021-06-22 PROCEDURE — 700105 HCHG RX REV CODE 258: Performed by: HOSPITALIST

## 2021-06-22 PROCEDURE — 36415 COLL VENOUS BLD VENIPUNCTURE: CPT

## 2021-06-22 PROCEDURE — 700117 HCHG RX CONTRAST REV CODE 255: Performed by: EMERGENCY MEDICINE

## 2021-06-22 PROCEDURE — 96367 TX/PROPH/DG ADDL SEQ IV INF: CPT

## 2021-06-22 PROCEDURE — 700111 HCHG RX REV CODE 636 W/ 250 OVERRIDE (IP): Performed by: EMERGENCY MEDICINE

## 2021-06-22 PROCEDURE — 700105 HCHG RX REV CODE 258: Performed by: EMERGENCY MEDICINE

## 2021-06-22 PROCEDURE — 81002 URINALYSIS NONAUTO W/O SCOPE: CPT | Performed by: PHYSICIAN ASSISTANT

## 2021-06-22 PROCEDURE — U0003 INFECTIOUS AGENT DETECTION BY NUCLEIC ACID (DNA OR RNA); SEVERE ACUTE RESPIRATORY SYNDROME CORONAVIRUS 2 (SARS-COV-2) (CORONAVIRUS DISEASE [COVID-19]), AMPLIFIED PROBE TECHNIQUE, MAKING USE OF HIGH THROUGHPUT TECHNOLOGIES AS DESCRIBED BY CMS-2020-01-R: HCPCS

## 2021-06-22 PROCEDURE — 74177 CT ABD & PELVIS W/CONTRAST: CPT | Mod: ME

## 2021-06-22 PROCEDURE — 83690 ASSAY OF LIPASE: CPT

## 2021-06-22 PROCEDURE — 700101 HCHG RX REV CODE 250: Performed by: HOSPITALIST

## 2021-06-22 PROCEDURE — 85025 COMPLETE CBC W/AUTO DIFF WBC: CPT

## 2021-06-22 PROCEDURE — 87426 SARSCOV CORONAVIRUS AG IA: CPT

## 2021-06-22 PROCEDURE — 770006 HCHG ROOM/CARE - MED/SURG/GYN SEMI*

## 2021-06-22 PROCEDURE — U0005 INFEC AGEN DETEC AMPLI PROBE: HCPCS

## 2021-06-22 PROCEDURE — 99213 OFFICE O/P EST LOW 20 MIN: CPT | Performed by: PHYSICIAN ASSISTANT

## 2021-06-22 PROCEDURE — 81001 URINALYSIS AUTO W/SCOPE: CPT

## 2021-06-22 RX ORDER — BISACODYL 10 MG
10 SUPPOSITORY, RECTAL RECTAL
Status: DISCONTINUED | OUTPATIENT
Start: 2021-06-22 | End: 2021-06-28 | Stop reason: HOSPADM

## 2021-06-22 RX ORDER — M-VIT,TX,IRON,MINS/CALC/FOLIC 27MG-0.4MG
1 TABLET ORAL DAILY
COMMUNITY
End: 2022-10-26

## 2021-06-22 RX ORDER — AMOXICILLIN 250 MG
2 CAPSULE ORAL 2 TIMES DAILY
Status: DISCONTINUED | OUTPATIENT
Start: 2021-06-22 | End: 2021-06-28 | Stop reason: HOSPADM

## 2021-06-22 RX ORDER — OXYCODONE HYDROCHLORIDE 5 MG/1
2.5 TABLET ORAL
Status: DISCONTINUED | OUTPATIENT
Start: 2021-06-22 | End: 2021-06-28 | Stop reason: HOSPADM

## 2021-06-22 RX ORDER — VALSARTAN 80 MG/1
160 TABLET ORAL DAILY
Status: DISCONTINUED | OUTPATIENT
Start: 2021-06-23 | End: 2021-06-28 | Stop reason: HOSPADM

## 2021-06-22 RX ORDER — ONDANSETRON 4 MG/1
4 TABLET, ORALLY DISINTEGRATING ORAL EVERY 4 HOURS PRN
Status: DISCONTINUED | OUTPATIENT
Start: 2021-06-22 | End: 2021-06-28 | Stop reason: HOSPADM

## 2021-06-22 RX ORDER — POLYETHYLENE GLYCOL 3350 17 G/17G
1 POWDER, FOR SOLUTION ORAL
Status: DISCONTINUED | OUTPATIENT
Start: 2021-06-22 | End: 2021-06-28 | Stop reason: HOSPADM

## 2021-06-22 RX ORDER — OXYCODONE HYDROCHLORIDE 5 MG/1
5 TABLET ORAL
Status: DISCONTINUED | OUTPATIENT
Start: 2021-06-22 | End: 2021-06-22

## 2021-06-22 RX ORDER — ONDANSETRON 2 MG/ML
4 INJECTION INTRAMUSCULAR; INTRAVENOUS EVERY 4 HOURS PRN
Status: DISCONTINUED | OUTPATIENT
Start: 2021-06-22 | End: 2021-06-28 | Stop reason: HOSPADM

## 2021-06-22 RX ORDER — SODIUM CHLORIDE, SODIUM LACTATE, POTASSIUM CHLORIDE, CALCIUM CHLORIDE 600; 310; 30; 20 MG/100ML; MG/100ML; MG/100ML; MG/100ML
INJECTION, SOLUTION INTRAVENOUS CONTINUOUS
Status: DISCONTINUED | OUTPATIENT
Start: 2021-06-22 | End: 2021-06-27

## 2021-06-22 RX ORDER — HYDROMORPHONE HYDROCHLORIDE 1 MG/ML
0.25 INJECTION, SOLUTION INTRAMUSCULAR; INTRAVENOUS; SUBCUTANEOUS
Status: DISCONTINUED | OUTPATIENT
Start: 2021-06-22 | End: 2021-06-28 | Stop reason: HOSPADM

## 2021-06-22 RX ORDER — ACETAMINOPHEN 325 MG/1
650 TABLET ORAL EVERY 6 HOURS PRN
Status: DISCONTINUED | OUTPATIENT
Start: 2021-06-22 | End: 2021-06-28 | Stop reason: HOSPADM

## 2021-06-22 RX ADMIN — METRONIDAZOLE 500 MG: 500 INJECTION, SOLUTION INTRAVENOUS at 19:44

## 2021-06-22 RX ADMIN — CEFTRIAXONE SODIUM 1 G: 1 INJECTION, POWDER, FOR SOLUTION INTRAMUSCULAR; INTRAVENOUS at 19:33

## 2021-06-22 RX ADMIN — IOHEXOL 100 ML: 350 INJECTION, SOLUTION INTRAVENOUS at 18:33

## 2021-06-22 RX ADMIN — SODIUM CHLORIDE, POTASSIUM CHLORIDE, SODIUM LACTATE AND CALCIUM CHLORIDE: 600; 310; 30; 20 INJECTION, SOLUTION INTRAVENOUS at 21:02

## 2021-06-22 ASSESSMENT — ENCOUNTER SYMPTOMS
NAUSEA: 1
BRUISES/BLEEDS EASILY: 0
STRIDOR: 0
ABDOMINAL PAIN: 1
EYE REDNESS: 0
NAUSEA: 0
NERVOUS/ANXIOUS: 0
DIARRHEA: 0
FLANK PAIN: 0
VOMITING: 0
VOMITING: 0
BLOOD IN STOOL: 0
DIARRHEA: 0
MYALGIAS: 0
ABDOMINAL PAIN: 1
CONSTIPATION: 0
HEMATOCHEZIA: 0
FEVER: 0
EYE DISCHARGE: 0
ANOREXIA: 0
FEVER: 0
SHORTNESS OF BREATH: 0
FOCAL WEAKNESS: 0
CHILLS: 0
SHORTNESS OF BREATH: 0
COUGH: 0

## 2021-06-22 ASSESSMENT — LIFESTYLE VARIABLES
TOTAL SCORE: 0
CONSUMPTION TOTAL: NEGATIVE
ALCOHOL_USE: YES
AVERAGE NUMBER OF DAYS PER WEEK YOU HAVE A DRINK CONTAINING ALCOHOL: 0
HOW MANY TIMES IN THE PAST YEAR HAVE YOU HAD 5 OR MORE DRINKS IN A DAY: 0
HAVE PEOPLE ANNOYED YOU BY CRITICIZING YOUR DRINKING: NO
ON A TYPICAL DAY WHEN YOU DRINK ALCOHOL HOW MANY DRINKS DO YOU HAVE: 0
HAVE YOU EVER FELT YOU SHOULD CUT DOWN ON YOUR DRINKING: NO
TOTAL SCORE: 0
EVER HAD A DRINK FIRST THING IN THE MORNING TO STEADY YOUR NERVES TO GET RID OF A HANGOVER: NO
EVER FELT BAD OR GUILTY ABOUT YOUR DRINKING: NO
TOTAL SCORE: 0

## 2021-06-22 ASSESSMENT — FIBROSIS 4 INDEX
FIB4 SCORE: 7.26
FIB4 SCORE: 2.37
FIB4 SCORE: 7.26

## 2021-06-22 ASSESSMENT — PAIN DESCRIPTION - PAIN TYPE: TYPE: ACUTE PAIN

## 2021-06-22 NOTE — ED TRIAGE NOTES
Pt ambulates to triage with   Sent from   Chief Complaint   Patient presents with   • RLQ Pain   sudden RLQ pain last night, pain is worsening,  denies N/V/D  Denies hx of same  Pt A & 0 x 4, speech clear, ambulates well  + COVID vaccine, [place]  Pt back for blood draw

## 2021-06-22 NOTE — PROGRESS NOTES
Subjective:   Licha Pope is a 85 y.o. female who presents for Abdominal Pain (lower abd, low back)      Abdominal Pain  This is a new problem. The current episode started yesterday. The onset quality is gradual. The problem occurs constantly. The problem has been gradually worsening. The pain is located in the LLQ and RLQ (worse to RLQ). The pain is at a severity of 9/10. The quality of the pain is sharp. The abdominal pain does not radiate. Pertinent negatives include no anorexia, constipation, diarrhea, dysuria, fever, frequency, hematochezia, melena, nausea or vomiting. The pain is aggravated by certain positions. The pain is relieved by nothing. She has tried nothing for the symptoms. There is no history of ulcerative colitis. C-Secion in 1969. Colonoscopy 2014       Review of Systems   Constitutional: Negative for fever.   HENT: Negative.    Respiratory: Negative for shortness of breath.    Cardiovascular: Negative for chest pain.   Gastrointestinal: Positive for abdominal pain. Negative for anorexia, blood in stool, constipation, diarrhea, hematochezia, melena, nausea and vomiting.   Genitourinary: Negative for dysuria, frequency and urgency.       Medications:    • apixaban Tabs  • benzonatate  • CALCIUM + D PO  • Cranberry Tabs  • febuxostat Tabs  • GLUCOSAMINE HCL-MSM PO  • multivitamin Tabs  • traMADol Tabs  • Triple Omega-3-6-9 Caps  • valsartan Tabs    Allergies: Codeine, Demerol, Ciprofloxacin, Iodine, and Shellfish allergy    Problem List: Licha Pope does not have any pertinent problems on file.    Surgical History:  Past Surgical History:   Procedure Laterality Date   • CATARACT PHACO WITH IOL  12/2/2014    Performed by Jarred Almazan M.D. at SURGERY [facility] ORS   • CATARACT PHACO WITH IOL  11/18/2014    Performed by Jarred Almazan M.D. at SURGERY [facility] ORS   • KNEE ARTHROPLASTY TOTAL  1/27/2014    Performed by Jose Hahn M.D. at [facility] ORS   • PB  " DELIVERY ONLY     • HEMORRHOIDECTOMY         Past Social Hx: Licha Pope  reports that she quit smoking about 46 years ago. Her smoking use included cigarettes. She has a 20.00 pack-year smoking history. She has never used smokeless tobacco. She reports previous alcohol use of about 1.5 oz of alcohol per week. She reports that she does not use drugs.     Past Family Hx:  Licha Pope family history includes Alzheimer's Disease in her mother; Diabetes in her brother; Heart Attack in her sister; Heart Disease in her father; Other in her father.     Problem list, medications, and allergies reviewed by myself today in Epic.     Objective:     Pulse 86   Temp 37.2 °C (98.9 °F) (Temporal)   Resp 15   Ht 1.626 m (5' 4\")   Wt 64 kg (141 lb)   SpO2 95%   BMI 24.20 kg/m²     Physical Exam  Vitals reviewed.   Constitutional:       General: She is not in acute distress.     Appearance: Normal appearance. She is not ill-appearing or toxic-appearing.   Eyes:      Conjunctiva/sclera: Conjunctivae normal.      Pupils: Pupils are equal, round, and reactive to light.   Cardiovascular:      Rate and Rhythm: Normal rate and regular rhythm.      Heart sounds: Normal heart sounds.   Pulmonary:      Effort: Pulmonary effort is normal. No respiratory distress.      Breath sounds: Normal breath sounds. No wheezing, rhonchi or rales.   Abdominal:      General: Abdomen is flat. Bowel sounds are normal. There is no distension.      Palpations: Abdomen is soft. There is no hepatomegaly, splenomegaly or mass.      Tenderness: There is abdominal tenderness in the right lower quadrant. There is guarding. There is no right CVA tenderness, left CVA tenderness or rebound. Negative signs include Rovsing's sign.   Musculoskeletal:      Cervical back: Neck supple.   Skin:     General: Skin is warm and dry.   Neurological:      General: No focal deficit present.      Mental Status: She is alert and oriented to person, " place, and time.   Psychiatric:         Mood and Affect: Mood normal.         Behavior: Behavior normal.         Assessment/Associated Orders     1. Right lower quadrant abdominal pain  POCT Urinalysis       Medical Decision Making      This is a pleasant 85-year-old female who presents to clinic with complaints of severe right lower quadrant abdominal pain onset last night.  She states this is gradually worsening and described as a sharp pain.  Exam findings above with significant tenderness to palpation more so to the right lower quadrant.  There is some guarding but no rebound.  UA nonspecific.  She is complaining of no other signs of urinary tract infection.    Due to patient's age and risk factors, severe abdominal tenderness symptoms and exam findings, it was mutually agreed she is going to present to the emergency room for higher level evaluation and care.  Discussed differential diagnosis to include but not limited to colitis, SBO, appendicitis, abscess.  The patient verbalized an understanding and agreed to plan of care.  She denied any other questions or concerns.  She appreciated the visit.  She is going to present to the ER today via private vehicle.  Her  is driving.    Please note that this dictation was created using voice recognition software. I have made a reasonable attempt to correct obvious errors, but I expect that there are errors of grammar and possibly content that I did not discover before finalizing the note.    This note was electronically signed by Lonnie Aguirre PA-C

## 2021-06-23 LAB
ALBUMIN SERPL BCP-MCNC: 3.6 G/DL (ref 3.2–4.9)
ALBUMIN/GLOB SERPL: 1.2 G/DL
ALP SERPL-CCNC: 60 U/L (ref 30–99)
ALT SERPL-CCNC: 10 U/L (ref 2–50)
ANION GAP SERPL CALC-SCNC: 12 MMOL/L (ref 7–16)
AST SERPL-CCNC: 18 U/L (ref 12–45)
BASOPHILS # BLD AUTO: 0.5 % (ref 0–1.8)
BASOPHILS # BLD: 0.05 K/UL (ref 0–0.12)
BILIRUB SERPL-MCNC: 0.8 MG/DL (ref 0.1–1.5)
BUN SERPL-MCNC: 11 MG/DL (ref 8–22)
CALCIUM SERPL-MCNC: 9.1 MG/DL (ref 8.4–10.2)
CHLORIDE SERPL-SCNC: 100 MMOL/L (ref 96–112)
CO2 SERPL-SCNC: 25 MMOL/L (ref 20–33)
CREAT SERPL-MCNC: 0.68 MG/DL (ref 0.5–1.4)
EOSINOPHIL # BLD AUTO: 0.09 K/UL (ref 0–0.51)
EOSINOPHIL NFR BLD: 1 % (ref 0–6.9)
ERYTHROCYTE [DISTWIDTH] IN BLOOD BY AUTOMATED COUNT: 45.5 FL (ref 35.9–50)
GLOBULIN SER CALC-MCNC: 3.1 G/DL (ref 1.9–3.5)
GLUCOSE SERPL-MCNC: 127 MG/DL (ref 65–99)
HCT VFR BLD AUTO: 39.9 % (ref 37–47)
HGB BLD-MCNC: 12.8 G/DL (ref 12–16)
IMM GRANULOCYTES # BLD AUTO: 0.04 K/UL (ref 0–0.11)
IMM GRANULOCYTES NFR BLD AUTO: 0.4 % (ref 0–0.9)
LYMPHOCYTES # BLD AUTO: 0.57 K/UL (ref 1–4.8)
LYMPHOCYTES NFR BLD: 6.1 % (ref 22–41)
MCH RBC QN AUTO: 31.4 PG (ref 27–33)
MCHC RBC AUTO-ENTMCNC: 32.1 G/DL (ref 33.6–35)
MCV RBC AUTO: 97.8 FL (ref 81.4–97.8)
MONOCYTES # BLD AUTO: 0.75 K/UL (ref 0–0.85)
MONOCYTES NFR BLD AUTO: 8 % (ref 0–13.4)
NEUTROPHILS # BLD AUTO: 7.89 K/UL (ref 2–7.15)
NEUTROPHILS NFR BLD: 84 % (ref 44–72)
NRBC # BLD AUTO: 0 K/UL
NRBC BLD-RTO: 0 /100 WBC
PLATELET # BLD AUTO: 186 K/UL (ref 164–446)
PMV BLD AUTO: 11.9 FL (ref 9–12.9)
POTASSIUM SERPL-SCNC: 3.9 MMOL/L (ref 3.6–5.5)
PROT SERPL-MCNC: 6.7 G/DL (ref 6–8.2)
RBC # BLD AUTO: 4.08 M/UL (ref 4.2–5.4)
SARS-COV-2 RNA RESP QL NAA+PROBE: NOTDETECTED
SODIUM SERPL-SCNC: 137 MMOL/L (ref 135–145)
SPECIMEN SOURCE: NORMAL
WBC # BLD AUTO: 9.4 K/UL (ref 4.8–10.8)

## 2021-06-23 PROCEDURE — 80053 COMPREHEN METABOLIC PANEL: CPT

## 2021-06-23 PROCEDURE — 770006 HCHG ROOM/CARE - MED/SURG/GYN SEMI*

## 2021-06-23 PROCEDURE — 700101 HCHG RX REV CODE 250: Performed by: HOSPITALIST

## 2021-06-23 PROCEDURE — 99233 SBSQ HOSP IP/OBS HIGH 50: CPT | Performed by: INTERNAL MEDICINE

## 2021-06-23 PROCEDURE — 700111 HCHG RX REV CODE 636 W/ 250 OVERRIDE (IP): Performed by: HOSPITALIST

## 2021-06-23 PROCEDURE — 36415 COLL VENOUS BLD VENIPUNCTURE: CPT

## 2021-06-23 PROCEDURE — 85025 COMPLETE CBC W/AUTO DIFF WBC: CPT

## 2021-06-23 PROCEDURE — A9270 NON-COVERED ITEM OR SERVICE: HCPCS | Performed by: HOSPITALIST

## 2021-06-23 PROCEDURE — 700102 HCHG RX REV CODE 250 W/ 637 OVERRIDE(OP): Performed by: HOSPITALIST

## 2021-06-23 PROCEDURE — 700105 HCHG RX REV CODE 258: Performed by: HOSPITALIST

## 2021-06-23 RX ADMIN — METRONIDAZOLE 500 MG: 500 INJECTION, SOLUTION INTRAVENOUS at 15:01

## 2021-06-23 RX ADMIN — METRONIDAZOLE 500 MG: 500 INJECTION, SOLUTION INTRAVENOUS at 06:01

## 2021-06-23 RX ADMIN — METRONIDAZOLE 500 MG: 500 INJECTION, SOLUTION INTRAVENOUS at 22:36

## 2021-06-23 RX ADMIN — DOCUSATE SODIUM 50 MG AND SENNOSIDES 8.6 MG 2 TABLET: 8.6; 5 TABLET, FILM COATED ORAL at 05:59

## 2021-06-23 RX ADMIN — CEFTRIAXONE SODIUM 1 G: 1 INJECTION, POWDER, FOR SOLUTION INTRAMUSCULAR; INTRAVENOUS at 12:58

## 2021-06-23 RX ADMIN — DOCUSATE SODIUM 50 MG AND SENNOSIDES 8.6 MG 2 TABLET: 8.6; 5 TABLET, FILM COATED ORAL at 17:47

## 2021-06-23 RX ADMIN — SODIUM CHLORIDE, POTASSIUM CHLORIDE, SODIUM LACTATE AND CALCIUM CHLORIDE: 600; 310; 30; 20 INJECTION, SOLUTION INTRAVENOUS at 10:33

## 2021-06-23 ASSESSMENT — COGNITIVE AND FUNCTIONAL STATUS - GENERAL
DAILY ACTIVITIY SCORE: 24
MOVING TO AND FROM BED TO CHAIR: A LITTLE
WALKING IN HOSPITAL ROOM: A LITTLE
SUGGESTED CMS G CODE MODIFIER DAILY ACTIVITY: CH
STANDING UP FROM CHAIR USING ARMS: A LITTLE
MOBILITY SCORE: 19
CLIMB 3 TO 5 STEPS WITH RAILING: A LITTLE
SUGGESTED CMS G CODE MODIFIER MOBILITY: CK
MOVING FROM LYING ON BACK TO SITTING ON SIDE OF FLAT BED: A LITTLE

## 2021-06-23 ASSESSMENT — PAIN DESCRIPTION - PAIN TYPE
TYPE: ACUTE PAIN
TYPE: ACUTE PAIN

## 2021-06-23 ASSESSMENT — ENCOUNTER SYMPTOMS
NAUSEA: 1
ABDOMINAL PAIN: 1

## 2021-06-23 ASSESSMENT — PATIENT HEALTH QUESTIONNAIRE - PHQ9
SUM OF ALL RESPONSES TO PHQ9 QUESTIONS 1 AND 2: 0
1. LITTLE INTEREST OR PLEASURE IN DOING THINGS: NOT AT ALL
2. FEELING DOWN, DEPRESSED, IRRITABLE, OR HOPELESS: NOT AT ALL

## 2021-06-23 NOTE — ED PROVIDER NOTES
ED Provider Note    CHIEF COMPLAINT  Chief Complaint   Patient presents with   • RLQ Pain         HPI  Licha Pope is a 85 y.o. female who presents to the ED with right lower quadrant abdominal pain.  Patient states she started building right lower quadrant abdominal pain last night, is constant, no fevers, nausea vomiting, diarrhea, normal bowel movements.  The patient states is worse with movement.  She has had a previous C-sections.  She has no hematuria or dysuria.  She does have some decreased appetite.  She has never had this before    REVIEW OF SYSTEMS  See HPI for further details. All other systems are negative.     PAST MEDICAL HISTORY  Past Medical History:   Diagnosis Date   • Arthritis    • COPD (chronic obstructive pulmonary disease) ([facility])    • EMPHYSEMA    • Gastroesophageal reflux disease without esophagitis 3/13/2019   • Hypertension    • Hypertriglyceridemia 2019   • Prediabetes 2019       FAMILY HISTORY  Family History   Problem Relation Age of Onset   • Alzheimer's Disease Mother    • Heart Disease Father    • Other Father         AAA   • Heart Attack Sister    • Diabetes Brother        SOCIAL HISTORY  Social History     Socioeconomic History   • Marital status:      Spouse name: Not on file   • Number of children: Not on file   • Years of education: Not on file   • Highest education level: Not on file   Occupational History   • Not on file   Tobacco Use   • Smoking status: Former Smoker     Packs/day: 1.00     Years: 20.00     Pack years: 20.00     Types: Cigarettes     Quit date: 1975     Years since quittin.5   • Smokeless tobacco: Never Used   Vaping Use   • Vaping Use: Never used   Substance and Sexual Activity   • Alcohol use: Not Currently     Alcohol/week: 1.5 oz     Types: 3 Standard drinks or equivalent per week     Comment: RARE   • Drug use: No   • Sexual activity: Not Currently     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     Comment: , retired lab tech   Other Topics  Concern   • Not on file   Social History Narrative   • Not on file     Social Determinants of Health     Financial Resource Strain:    • Difficulty of Paying Living Expenses:    Food Insecurity:    • Worried About Running Out of Food in the Last Year:    • Ran Out of Food in the Last Year:    Transportation Needs:    • Lack of Transportation (Medical):    • Lack of Transportation (Non-Medical):    Physical Activity:    • Days of Exercise per Week:    • Minutes of Exercise per Session:    Stress:    • Feeling of Stress :    Social Connections:    • Frequency of Communication with Friends and Family:    • Frequency of Social Gatherings with Friends and Family:    • Attends Zoroastrianism Services:    • Active Member of Clubs or Organizations:    • Attends Club or Organization Meetings:    • Marital Status:    Intimate Partner Violence:    • Fear of Current or Ex-Partner:    • Emotionally Abused:    • Physically Abused:    • Sexually Abused:        SURGICAL HISTORY  Past Surgical History:   Procedure Laterality Date   • CATARACT PHACO WITH IOL  2014    Performed by Jarred Almazan M.D. at SURGERY SURGICAL [facility] ORS   • CATARACT PHACO WITH IOL  2014    Performed by Jarred Almazan M.D. at SURGERY [facility] ORS   • KNEE ARTHROPLASTY TOTAL  2014    Performed by Jose Hahn M.D. at SURGERY [facility] ORS   • PB  DELIVERY ONLY  1969   • HEMORRHOIDECTOMY         CURRENT MEDICATIONS  Home Medications     Reviewed by Meeta Crocker (Pharmacy Tech) on 21 at 1711  Med List Status: Complete   Medication Last Dose Status   apixaban (ELIQUIS) 5mg Tab 2021 Active   Ascorbic Acid (VITAMIN C PO) 2021 Active   benzonatate (TESSALON) 200 MG capsule Not Taking Active   Boswellia-Glucosamine-Vit D (OSTEO BI-FLEX ONE PER DAY PO) 2021 Active   CALCIUM-MAGNESIUM-ZINC PO 2021 Active   Cholecalciferol (D3 PO) 2021 Active   febuxostat (ULORIC) 40 MG Tab 2021  "Active   GLUCOSAMINE HCL-MSM PO 6/21/2021 Active   Multiple Vitamins-Minerals (LUTEIN-ZEAXANTHIN PO) 6/21/2021 Active   therapeutic multivitamin-minerals (THERAGRAN-M) Tab 6/21/2021 Active   TURMERIC PO 6/21/2021 Active   valsartan (DIOVAN) 160 MG Tab 6/21/2021 Active                ALLERGIES  Allergies   Allergen Reactions   • Codeine Vomiting   • Demerol Vomiting     Injectable type   • Shellfish Allergy Shortness of Breath     Soft shell   • Ciprofloxacin Rash     rash   • Iodine      PATIENT ALLERGIC TO SHELLFISH, NOT SURE IF SHE IS ALLERGIC TO IODINE       PHYSICAL EXAM  VITAL SIGNS: /74   Pulse 86   Temp 36.4 °C (97.5 °F) (Axillary)   Resp 18   Ht 1.626 m (5' 4\")   Wt 64.4 kg (141 lb 15.6 oz)   SpO2 93%   BMI 24.37 kg/m²   Constitutional: Well developed, Well nourished, mild to moderate distress, Non-toxic appearance.   HENT: Normocephalic, Atraumatic, Bilateral external ears normal, Oropharynx clear, No oral exudates, Nose normal.   Eyes: PERRLA, EOMI, Conjunctiva normal, No discharge.   Neck: Normal range of motion, No tenderness, Supple, No stridor.   Lymphatic: No lymphadenopathy noted.   Cardiovascular: Normal heart rate, Normal rhythm.   Thorax & Lungs: Normal breath sounds, No respiratory distress, No wheezing, No chest tenderness.   Abdomen: Focal tenderness palpation of the right lower quadrant no rebound  Skin: Warm, Dry, No erythema, No rash.   Back: No tenderness, No CVA tenderness.   Extremities: Intact distal pulses, No edema, No tenderness.   Neurologic: Alert & oriented x 3, moves all extremities spontaneously.     RADIOLOGY/PROCEDURES  CT-ABDOMEN-PELVIS WITH   Final Result      1.  Acute appendicitis.  No evidence for perforation.   2.  Extrahepatic biliary dilation, unchanged from prior.   3.  Multiple liver cysts, similar to prior.  No further evaluation necessary.   4.  Moderate hiatal hernia.        Results for orders placed or performed during the hospital encounter of " 06/22/21   CBC WITH DIFFERENTIAL   Result Value Ref Range    WBC 9.6 4.8 - 10.8 K/uL    RBC 4.26 4.20 - 5.40 M/uL    Hemoglobin 13.7 12.0 - 16.0 g/dL    Hematocrit 42.3 37.0 - 47.0 %    MCV 99.3 (H) 81.4 - 97.8 fL    MCH 32.2 27.0 - 33.0 pg    MCHC 32.4 (L) 33.6 - 35.0 g/dL    RDW 46.0 35.9 - 50.0 fL    Platelet Count 199 164 - 446 K/uL    MPV 11.1 9.0 - 12.9 fL    Neutrophils-Polys 76.20 (H) 44.00 - 72.00 %    Lymphocytes 11.30 (L) 22.00 - 41.00 %    Monocytes 8.50 0.00 - 13.40 %    Eosinophils 2.80 0.00 - 6.90 %    Basophils 0.80 0.00 - 1.80 %    Immature Granulocytes 0.40 0.00 - 0.90 %    Nucleated RBC 0.00 /100 WBC    Neutrophils (Absolute) 7.30 (H) 2.00 - 7.15 K/uL    Lymphs (Absolute) 1.08 1.00 - 4.80 K/uL    Monos (Absolute) 0.81 0.00 - 0.85 K/uL    Eos (Absolute) 0.27 0.00 - 0.51 K/uL    Baso (Absolute) 0.08 0.00 - 0.12 K/uL    Immature Granulocytes (abs) 0.04 0.00 - 0.11 K/uL    NRBC (Absolute) 0.00 K/uL   COMP METABOLIC PANEL   Result Value Ref Range    Sodium 139 135 - 145 mmol/L    Potassium 4.1 3.6 - 5.5 mmol/L    Chloride 100 96 - 112 mmol/L    Co2 28 20 - 33 mmol/L    Anion Gap 11.0 7.0 - 16.0    Glucose 101 (H) 65 - 99 mg/dL    Bun 15 8 - 22 mg/dL    Creatinine 0.81 0.50 - 1.40 mg/dL    Calcium 9.7 8.4 - 10.2 mg/dL    AST(SGOT) 20 12 - 45 U/L    ALT(SGPT) 13 2 - 50 U/L    Alkaline Phosphatase 70 30 - 99 U/L    Total Bilirubin 0.7 0.1 - 1.5 mg/dL    Albumin 4.2 3.2 - 4.9 g/dL    Total Protein 7.7 6.0 - 8.2 g/dL    Globulin 3.5 1.9 - 3.5 g/dL    A-G Ratio 1.2 g/dL   LIPASE   Result Value Ref Range    Lipase 97 (H) 7 - 58 U/L   URINALYSIS    Specimen: Urine, Clean Catch   Result Value Ref Range    Color Yellow     Character Hazy (A)     Specific Gravity 1.010 <1.035    Ph 5.0 5.0 - 8.0    Glucose Negative Negative mg/dL    Ketones Trace (A) Negative mg/dL    Protein Negative Negative mg/dL    Bilirubin Negative Negative    Nitrite Negative Negative    Leukocyte Esterase Small (A) Negative    Occult  Blood Small (A) Negative    Micro Urine Req Microscopic    ESTIMATED GFR   Result Value Ref Range    GFR If African American >60 >60 mL/min/1.73 m 2    GFR If Non African American >60 >60 mL/min/1.73 m 2   URINE MICROSCOPIC (W/UA)   Result Value Ref Range    WBC 20-50 (A) /hpf    RBC 0-2 /hpf    Bacteria Few (A) None /hpf    Epithelial Cells Rare Few /hpf   SARS-COV Antigen RADHA: Collect dry nasal swab AND NP swab in VTM   Result Value Ref Range    SARS-CoV-2 Source Nasal Swab     SARS-COV ANTIGEN RADHA NotDetected Not-Detected   SARS-CoV-2 PCR (24 hour In-House): Collect NP swab in VTM    Specimen: Respirate   Result Value Ref Range    SARS-CoV-2 Source NP Swab         COURSE & MEDICAL DECISION MAKING  Pertinent Labs & Imaging studies reviewed. (See chart for details)  Patient with focal right lower quadrant abdominal pain concerning for appendicitis, the patient's lipase is slightly elevated, we will get a CT scan of the abdomen pelvis, the patient does not want any pain medicines at this point time.    CT scan shows acute Dr. Ding who will consult on the patient, discussed the case with the hospitalist for hospitalization.      FINAL IMPRESSION  1. Acute appendicitis with localized peritonitis, without perforation, abscess, or gangrene        Patient referred to primary care provider for blood pressure management    This dictation was created using voice recognition software. The accuracy of the dictation is limited to the abilities of the software. I expect there may be some errors of grammar and possibly content. The nursing notes were reviewed and certain aspects of this information were incorporated into this note.    Electronically signed by: Philip Thomas M.D., 6/22/2021 6:18 PM

## 2021-06-23 NOTE — CARE PLAN
The patient is Stable - Low risk of patient condition declining or worsening    Shift Goals  Clinical Goals: pain control  Patient Goals: pain control; knowledge of POC    Progress made toward(s) clinical / shift goals:  pt reports pain is within a tolerable level; pt is able to effectively verbalize plan of care  Problem: Pain - Standard  Goal: Alleviation of pain or a reduction in pain to the patient’s comfort goal  Outcome: Progressing     Problem: Knowledge Deficit - Standard  Goal: Patient and family/care givers will demonstrate understanding of plan of care, disease process/condition, diagnostic tests and medications  Outcome: Progressing       Patient is not progressing towards the following goals:

## 2021-06-23 NOTE — PROGRESS NOTES
Assumed care of patient at 0700hrs; bedside report from SIDRA Hutchins. Updated on POC. Patient currently A & O x 4; on RA; up SBA; without complaints of acute pain. Assessment completed. Call light within reach. Whiteboard updated. Fall precautions in place. Bed locked and in lowest position. All questions answered. No other needs indicated at this time.

## 2021-06-23 NOTE — ASSESSMENT & PLAN NOTE
Not currently in exacerbation.  Oxygen as needed, Respiratory protocol, Bronchodilators, Incentive spirometry   We will need outpatient pulmonary referral

## 2021-06-23 NOTE — CONSULTS
ATSP by Dr Samuel for Appendicitis    HPI: 85y F here with acute onset abdominal pain that started several days ago. She was seen in the ED and diagnosed with appendicitis and admitted for further care.  Since that time she continues to have pain although she is able to rest comfortably.  Surgery was consulted, but operative intervention deferred due to her anticoagulation.  She takes Elliquis daily and last dose was yesterday morning.  Medication was held on admission to the hospital.  She is currently receiving IVF and Abx.       PMHx: Arthritis, COPD, GERD, HTN, HLD, Prediabetes, on Elliquis for hx of DVT    PSHx: Hemorrhoidectomy, , Knee replacement, cataracts    Meds: see Med Rec, no anticoagulation    All: Codeine, Demerol, Shellfish, Cipro, Iodine    FamHx: no colon/rectal cancers, no other pertinent family history    SocHx: No Tob/Drugs, occasional EtOH, quit smoking 46years ag      ROS: negative except as above    Consitutional- above  HEENT- no visual changes, no sneezing or runny nose  Skin- no rashes or itching  Cardiovascular- no chest pain or palpatations  Respiratory- no SOB or cough  GI- above  - no dysurea  Neuro- no weakness or syncope  Musculoskeletal- no muscle or joint pain  Heme- no bleeding or bruising  Lymphatic- no enlarged nodes or previous splenectomy  Endocrine- No sweating or heat/cold intolerance  Allergy- No asthma or hives  Psychiatric- no depression or anxiety        Physical Exam:   AFVSS  A@O x3, NAD  NCAT, no scleral icterus  Neck nontender, no lymphadenopathy  Normal respiratory effort, no chest wall masses  RRR, 2+ pulses  Abdomen soft, no peritonitis, no masses, mildly tender in RQL at McBurney's point  Extremities warm and well perfused  No skin rashes or lesions    Labs: WBC 9.4, Hct 40, Plt 186, marge 86  Lytes wnl    Radiology: CT A/P: 1.  Acute appendicitis.  No evidence for perforation.  2.  Extrahepatic biliary dilation, unchanged from prior.  3.   Multiple liver cysts, similar to prior.  No further evaluation necessary.  4.  Moderate hiatal hernia.    A/P: 85y F here with acute appendicitis.  She is on blood thinners without ready reversal agent available.  Fortunately she does not have evidence of appendiceal perforation.  I would recommend holding her anticoagulation and deferring surgery until she is off this medicine 72hrs.  Iin the meantime, will maintain her hydration with IVF, IV Abx for appendicitis.  There is some evidence for non-operative treatment success in this setting, so it is possible these interventions alone may resolve the issue.  Clear liquid diet should be ok, continue to monitor for improvement.

## 2021-06-23 NOTE — ED NOTES
ERP at bedside. Pt agrees with plan of care discussed by ERP. AIDET acknowledged with patient. IV established. Blood sent to lab. Alysia in low position, side rail up for pt safety. Call light within reach. Will continue to monitor.

## 2021-06-23 NOTE — H&P
Hospital Medicine History & Physical Note    Date of Service  2021    Primary Care Physician  Elza Morales M.D.    Consultants  Surgery, [facility]    Code Status  Full Code    Chief Complaint  Chief Complaint   Patient presents with   • RLQ Pain     History of Presenting Illness  85 y.o. female with a past medical history of hypertension, chronic obstructive pulmonary disease, history of deep vein thrombosis on anticoagulation with apixaban who presented 2021 with abdominal pain.  Pain is mostly in the right lower quadrant.  Patient reports that pain started the day prior to admission.  She described the pain as severe rated as 10 out of 10, sharp in nature.  No radiation to other places no aggravating or relieving factors.  She denies having fevers chills cough or shortness of breath.  She denies having diarrhea or vomiting.      Review of Systems  Review of Systems   Constitutional: Positive for malaise/fatigue. Negative for chills and fever.   Eyes: Negative for discharge and redness.   Respiratory: Negative for cough, shortness of breath and stridor.    Cardiovascular: Negative for chest pain and leg swelling.   Gastrointestinal: Positive for abdominal pain and nausea. Negative for diarrhea and vomiting.   Genitourinary: Negative for flank pain.   Musculoskeletal: Negative for myalgias.   Skin: Negative.    Neurological: Negative for focal weakness.   Endo/Heme/Allergies: Does not bruise/bleed easily.   Psychiatric/Behavioral: The patient is not nervous/anxious.      Past Medical History   has a past medical history of Arthritis, COPD (chronic obstructive pulmonary disease) (HCC), EMPHYSEMA, Gastroesophageal reflux disease without esophagitis (3/13/2019), Hypertension, Hypertriglyceridemia (2019), and Prediabetes (2019).    Surgical History   has a past surgical history that includes hemorrhoidectomy (/); pr  delivery only (); knee arthroplasty total (2014); cataract  phaco with iol (11/18/2014); and cataract phaco with iol (12/2/2014).     Family History  family history includes Alzheimer's Disease in her mother; Diabetes in her brother; Heart Attack in her sister; Heart Disease in her father; Other in her father.     Social History   reports that she quit smoking about 46 years ago. Her smoking use included cigarettes. She has a 20.00 pack-year smoking history. She has never used smokeless tobacco. She reports previous alcohol use of about 1.5 oz of alcohol per week. She reports that she does not use drugs.    Allergies  Allergies   Allergen Reactions   • Codeine Vomiting   • Demerol Vomiting     Injectable type   • Shellfish Allergy Shortness of Breath     Soft shell   • Ciprofloxacin Rash     rash   • Iodine      PATIENT ALLERGIC TO SHELLFISH, NOT SURE IF SHE IS ALLERGIC TO IODINE     Medications  Prior to Admission Medications   Prescriptions Last Dose Informant Patient Reported? Taking?   Ascorbic Acid (VITAMIN C PO) 6/21/2021 at 1000 Patient Yes Yes   Sig: Take 1 tablet by mouth every day.   Boswellia-Glucosamine-Vit D (OSTEO BI-FLEX ONE PER DAY PO) 6/21/2021 at 1000 Patient Yes Yes   Sig: Take 2 Tablets by mouth every day.   CALCIUM-MAGNESIUM-ZINC PO 6/21/2021 at 1000 Patient Yes Yes   Sig: Take 1 tablet by mouth every day.   Cholecalciferol (D3 PO) 6/21/2021 at 1000 Patient Yes Yes   Sig: Take 1 capsule by mouth every day.   GLUCOSAMINE HCL-MSM PO 6/21/2021 at 1000 Patient Yes No   Sig: Take 2 Tablets by mouth every day.   Multiple Vitamins-Minerals (LUTEIN-ZEAXANTHIN PO) 6/21/2021 at 1000 Patient Yes Yes   Sig: Take 1 tablet by mouth every day.   TURMERIC PO 6/21/2021 at 1000 Patient Yes Yes   Sig: Take 2 Capsules by mouth every day.   apixaban (ELIQUIS) 5mg Tab 6/22/2021 at 0930 Patient Yes No   Sig: Take 1 tablet by mouth 2 times a day.   benzonatate (TESSALON) 200 MG capsule Not Taking at Unknown time Patient No No   Sig: Take 1 Cap by mouth 3 times a day as needed.    Patient not taking: Reported on 6/22/2021   febuxostat (ULORIC) 40 MG Tab 6/21/2021 at 1000 Patient Yes No   Sig: Take 40 mg by mouth every day.   therapeutic multivitamin-minerals (THERAGRAN-M) Tab 6/21/2021 at 1000 Patient Yes Yes   Sig: Take 1 tablet by mouth every day.   valsartan (DIOVAN) 160 MG Tab 6/21/2021 at 1000 Patient Yes No   Sig: Take 160 mg by mouth every day.      Facility-Administered Medications: None     Physical Exam  Temp:  [36.4 °C (97.5 °F)-37.1 °C (98.7 °F)] 36.4 °C (97.5 °F)  Pulse:  [83-92] 86  Resp:  [16-18] 18  BP: (142-162)/(73-90) 150/74  SpO2:  [89 %-94 %] 93 %    Physical Exam  Constitutional:       General: She is not in acute distress.  HENT:      Head: Normocephalic and atraumatic.      Right Ear: External ear normal.      Left Ear: External ear normal.      Nose: No congestion or rhinorrhea.      Mouth/Throat:      Mouth: Mucous membranes are dry.      Pharynx: No oropharyngeal exudate or posterior oropharyngeal erythema.   Eyes:      General: No scleral icterus.        Right eye: No discharge.         Left eye: No discharge.      Conjunctiva/sclera: Conjunctivae normal.      Pupils: Pupils are equal, round, and reactive to light.   Cardiovascular:      Rate and Rhythm: Tachycardia present.      Heart sounds: No friction rub. No gallop.    Pulmonary:      Effort: Pulmonary effort is normal.   Abdominal:      General: Abdomen is flat. There is no distension.      Tenderness: There is no guarding.   Musculoskeletal:         General: No swelling.      Cervical back: Neck supple. No rigidity. No muscular tenderness.      Right lower leg: No edema.      Left lower leg: No edema.   Skin:     General: Skin is dry.      Capillary Refill: Capillary refill takes 2 to 3 seconds.      Coloration: Skin is pale. Skin is not jaundiced.      Findings: No bruising or erythema.   Neurological:      Mental Status: She is alert and oriented to person, place, and time.   Psychiatric:         Mood  "and Affect: Mood normal.         Judgment: Judgment normal.         Laboratory:  Recent Labs     06/22/21  1535   WBC 9.6   RBC 4.26   HEMOGLOBIN 13.7   HEMATOCRIT 42.3   MCV 99.3*   MCH 32.2   MCHC 32.4*   RDW 46.0   PLATELETCT 199   MPV 11.1     Recent Labs     06/22/21  1535   SODIUM 139   POTASSIUM 4.1   CHLORIDE 100   CO2 28   GLUCOSE 101*   BUN 15   CREATININE 0.81   CALCIUM 9.7     Recent Labs     06/22/21  1535   ALTSGPT 13   ASTSGOT 20   ALKPHOSPHAT 70   TBILIRUBIN 0.7   LIPASE 97*   GLUCOSE 101*         No results for input(s): NTPROBNP in the last 72 hours.      No results for input(s): TROPONINT in the last 72 hours.    Imaging:  CT-ABDOMEN-PELVIS WITH   Final Result      1.  Acute appendicitis.  No evidence for perforation.   2.  Extrahepatic biliary dilation, unchanged from prior.   3.  Multiple liver cysts, similar to prior.  No further evaluation necessary.   4.  Moderate hiatal hernia.        Assessment/Plan:  I anticipate this patient will require at least two midnights for appropriate medical management, necessitating inpatient admission.    * Appendicitis, acute- (present on admission)  Assessment & Plan  Started on ceftriaxone and metronidazole in the emergency department, continue for now follow on cultures and sensitivities  Takes apixaban for history of venous thrombosis, last dose 6/22 AM, will hold  Revised Cardiac Risk Index for Pre-Operative Risk score of:  2 points Class III Risk  The patient has a 10.1 % 30-day risk of death, MI, or cardiac arrest   General surgery consulted by EDP, Dr Edmond will see  Patient reports \"very sensitive to anesthetics, and had prolonged recovery after anesthesia on prior surgeries\", and if surgery is indicated ensure anesthesiologist are aware.     History of deep venous thrombosis- (present on admission)  Assessment & Plan  We will hold home apixaban for anticipated need for surgical intervention    COPD (chronic obstructive pulmonary disease) (HCC)- " (present on admission)  Assessment & Plan  Not currently in exacerbation.  Oxygen as needed, Respiratory protocol, Bronchodilators, Incentive spirometry     Essential hypertension, benign- (present on admission)  Assessment & Plan  Resume home valsartan with hold parameters

## 2021-06-23 NOTE — RESPIRATORY CARE
"   COPD EDUCATION by COPD CLINICAL EDUCATOR  6/23/2021 at 1:37 PM by Karen England, RRT     Patient reviewed by COPD education team. Patient not have a formal history or diagnosis of COPD and is a  Former smoker.  Therefore does not qualify for the COPD program. A short intervention was completed with this patient covering: What is COPD (how the lungs work), daily medications rescue and maintenance, breathing techniques, infection prevention covered in detail.  A comprehensive packet including information about COPD, treatments, and oxygen safety was given.           COPD Screen       COPD Assessment  COPD Clinical Specialists ONLY  COPD Education Initiated: Yes--Short Intervention (Pt does not have a formal diagnosis of COPD, quit smoking in 1975, no respiratory medications used, pt had an xray in 2018 and was told by this xray she had COPD, encourage follow up w/ Pulmonary for outpatient PFT, admit for apendix RLQ pain)  Referrals Initiated: Yes (Pt interested in seeing a Pulmonologist)  Pulmonary Rehab: N/A  Smoking Cessation: N/A  Hospice: N/A  Home Health Care: N/A  [facility]: N/A  [facility]: N/A  [facility]: N/A  Private In-Home Care Agency: N/A  Is this a COPD exacerbation patient?: No (RLQ Pain -)  (OP) Pulmonary Function Testing: Yes (doctor to place referral will call ext [phone])    Meds to Beds        MY COPD ACTION PLAN     It is recommended that patients and physicians /healthcare providers complete this action plan together. This plan should be discussed at each physician visit and updated as needed.    The green, yellow and red zones show groups of symptoms of COPD. This list of symptoms is not comprehensive, and you may experience other symptoms. In the \"Actions\" column, your healthcare provider has recommended actions for you to take based on your symptoms.    Patient Name: Licha Pope   YOB: 1935   Last Updated on:     Green Zone:  I am " "doing well today Actions   •  Usual activitiy and exercise level •  Take daily medications   •  Usual amounts of cough and phlegm/mucus •  Use oxygen as prescribed   •  Sleep well at night •  Continue regular exercise/diet plan   •  Appetite is good •  At all times avoid cigarette smoke, inhaled irritants     Daily Medications (these medications are taken every day):                Yellow Zone:  I am having a bad day or a COPD flare Actions   •  More breathless than usual •  Continue daily medications   •  I have less energy for my daily activities •  Use quick relief inhaler as ordered   •  Increased or thicker phlegm/mucus •  Use oxygen as prescribed   •  Using quick relief inhaler/nebulizer more often •  Get plenty of rest   •  Swelling of ankles more than usual •  Use pursed lip breathing   •  More coughing than usual •  At all times avoid cigarette smoke, inhaled irritants   •  I feel like I have a \"chest cold\"   •  Poor sleep and my symptoms woke me up   •  My appetite is not good   •  My medicine is not helping    •  Call provider immediately if symptoms don’t improve     Continue daily medications, add rescue medications:               Medications to be used during a flare up, (as Discussed with Provider):              Red Zone:  I need urgent medical care Actions   •  Severe shortness of breath even at rest •  Call 911 or seek medical care immediately   •  Not able to do any activity because of breathing    •  Fever or shaking chills    •  Feeling confused or very drowsy     •  Chest pains    •  Coughing up blood              "

## 2021-06-23 NOTE — ASSESSMENT & PLAN NOTE
"Started on ceftriaxone and metronidazole in the emergency department, continue for now follow on cultures and sensitivities  Takes apixaban for history of venous thrombosis, last dose 6/22 AM, will hold  Revised Cardiac Risk Index for Pre-Operative Risk score of:  2 points Class III Risk  The patient has a 10.1 % 30-day risk of death, MI, or cardiac arrest   Patient reports \"very sensitive to anesthetics, and had prolonged recovery after anesthesia on prior surgeries\", and if surgery is indicated ensure anesthesiologist are aware.  She does have some postop hypotension.  I did discuss with the patient's  that given her previous sensitivity to anesthetics and anesthesia as well as her age she is at risk for delirium, we reviewed the risk of delirium and will monitor closely.    Discussed with general surgery, patient underwent appendectomy on 6/25, progress diet as tolerated.  Patient had slight drop in hemoglobin, will continue to monitor closely, for surgery if stable can discharge in the morning    PT recommending home health.  "

## 2021-06-23 NOTE — ASSESSMENT & PLAN NOTE
Apixaban has been held since 6/22 for surgical intervention.  Will discuss with surgery when appropriate to restart.

## 2021-06-23 NOTE — CARE PLAN
The patient is Watcher - Medium risk of patient condition declining or worsening    Shift Goals  Clinical Goals: Pt will be able to rest comfortably   Patient Goals: Use the call light to use the bathroom    Progress made toward(s) clinical / shift goals:  Pt denies the need for pain medication at this time. Pt is resting comfortably in bed, bed alarm on     Patient is not progressing towards the following goals: n/a       Problem: Pain - Standard  Goal: Alleviation of pain or a reduction in pain to the patient’s comfort goal  Outcome: Progressing     Problem: Knowledge Deficit - Standard  Goal: Patient and family/care givers will demonstrate understanding of plan of care, disease process/condition, diagnostic tests and medications  Outcome: Progressing

## 2021-06-23 NOTE — PROGRESS NOTES
4 Eyes Skin Assessment Completed by Liset , RN and Hemanth RN.    Head WDL  Ears WDL  Nose WDL  Mouth WDL  Neck WDL  Breast/Chest WDL  Shoulder Blades WDL  Spine WDL  (R) Arm/Elbow/Hand WDL  (L) Arm/Elbow/Hand WDL  Abdomen WDL  Groin WDL  Scrotum/Coccyx/Buttocks WDL  (R) Leg Redness, Swelling and Edema  (L) Leg Redness and Scar  (R) Heel/Foot/Toe Discoloration  (L) Heel/Foot/Toe Discoloration          Devices In Places SCD's      Interventions In Place Pillows    Possible Skin Injury No    Pictures Uploaded Into Epic N/A  Wound Consult Placed N/A  RN Wound Prevention Protocol Ordered No

## 2021-06-23 NOTE — PROGRESS NOTES
Report from ER RN at 2038    Pt up to the floor 2045. Pt A+Ox4, showing no signs of distress. Denies chest pains and SOB. Pt educated on POC. Fall precautions in place. Call light in reach, bed locked in lowest position. Rounding in place

## 2021-06-23 NOTE — ED NOTES
Med rec updated and complete  Allergies reviewed  Pt had a list of medications, went over list of medications and returned list of medications back to pt at bedside.   Pt reports no antibiotics in the last 2 weeks      No current facility-administered medications on file prior to encounter.     Current Outpatient Medications on File Prior to Encounter   Medication Sig Dispense Refill   • CALCIUM-MAGNESIUM-ZINC PO Take 1 tablet by mouth every day.     • therapeutic multivitamin-minerals (THERAGRAN-M) Tab Take 1 tablet by mouth every day.     • Multiple Vitamins-Minerals (LUTEIN-ZEAXANTHIN PO) Take 1 tablet by mouth every day.     • [place]-Glucosamine-Vit D (OSTEO BI-FLEX ONE PER DAY PO) Take 2 Tablets by mouth every day.     • TURMERIC PO Take 2 Capsules by mouth every day.     • Ascorbic Acid (VITAMIN C PO) Take 1 tablet by mouth every day.     • Cholecalciferol (D3 PO) Take 1 capsule by mouth every day.     • apixaban (ELIQUIS) 5mg Tab Take 1 tablet by mouth 2 times a day.     • valsartan (DIOVAN) 160 MG Tab Take 160 mg by mouth every day.     • febuxostat (ULORIC) 40 MG Tab Take 40 mg by mouth every day.     • GLUCOSAMINE HCL-MSM PO Take 2 Tablets by mouth every day.

## 2021-06-24 PROCEDURE — 700111 HCHG RX REV CODE 636 W/ 250 OVERRIDE (IP): Performed by: HOSPITALIST

## 2021-06-24 PROCEDURE — 770006 HCHG ROOM/CARE - MED/SURG/GYN SEMI*

## 2021-06-24 PROCEDURE — 700101 HCHG RX REV CODE 250: Performed by: HOSPITALIST

## 2021-06-24 PROCEDURE — 99233 SBSQ HOSP IP/OBS HIGH 50: CPT | Performed by: INTERNAL MEDICINE

## 2021-06-24 PROCEDURE — 700105 HCHG RX REV CODE 258: Performed by: HOSPITALIST

## 2021-06-24 RX ADMIN — METRONIDAZOLE 500 MG: 500 INJECTION, SOLUTION INTRAVENOUS at 22:03

## 2021-06-24 RX ADMIN — METRONIDAZOLE 500 MG: 500 INJECTION, SOLUTION INTRAVENOUS at 05:34

## 2021-06-24 RX ADMIN — CEFTRIAXONE SODIUM 1 G: 1 INJECTION, POWDER, FOR SOLUTION INTRAMUSCULAR; INTRAVENOUS at 12:07

## 2021-06-24 RX ADMIN — METRONIDAZOLE 500 MG: 500 INJECTION, SOLUTION INTRAVENOUS at 14:22

## 2021-06-24 RX ADMIN — SODIUM CHLORIDE, POTASSIUM CHLORIDE, SODIUM LACTATE AND CALCIUM CHLORIDE: 600; 310; 30; 20 INJECTION, SOLUTION INTRAVENOUS at 16:10

## 2021-06-24 RX ADMIN — SODIUM CHLORIDE, POTASSIUM CHLORIDE, SODIUM LACTATE AND CALCIUM CHLORIDE: 600; 310; 30; 20 INJECTION, SOLUTION INTRAVENOUS at 01:01

## 2021-06-24 ASSESSMENT — PAIN DESCRIPTION - PAIN TYPE
TYPE: ACUTE PAIN
TYPE: ACUTE PAIN

## 2021-06-24 ASSESSMENT — ENCOUNTER SYMPTOMS
NAUSEA: 1
ABDOMINAL PAIN: 1

## 2021-06-24 NOTE — HOSPITAL COURSE
"Per notes, \"85 y.o. female with a past medical history of hypertension, chronic obstructive pulmonary disease, history of deep vein thrombosis on anticoagulation with apixaban who presented 6/22/2021 with abdominal pain.  Pain is mostly in the right lower quadrant.  Patient reports that pain started the day prior to admission.  She described the pain as severe rated as 10 out of 10, sharp in nature.  No radiation to other places no aggravating or relieving factors.  She denies having fevers chills cough or shortness of breath.  She denies having diarrhea or vomiting.\"  "

## 2021-06-24 NOTE — PROGRESS NOTES
6/24  Pt seen and examined, resting comfortably this am, ambulating in room and hallways.  Denies any nausea or emesis, tolerating some clear liquids PO.  She still has the same abdominal pain though, focally present in the RLQ.  She remains off Eliquis at this time. Pain not improving with abx care, appropriate for surgery tomorrow, will tentatively plan on laparoscopic appendectomy.

## 2021-06-24 NOTE — PROGRESS NOTES
Received report from Day shift RN. Pt is laying in bed, A+OX4 , showing no signs of distress. VSS. CMS intact. Pt educated on POC, SCD's applied to BLE.  Call light and belongings at bedside and within reach. All questions answered at this time. Fall precautions in place. Rounding in place

## 2021-06-24 NOTE — CARE PLAN
The patient is Stable - Low risk of patient condition declining or worsening    Shift Goals  Clinical Goals: Pt will remain free from falls  Patient Goals: Remain aware of POC    Progress made toward(s) clinical / shift goals:  Bed alarm on, fall education provided, Rounding in place. Pt educated on POC.     Patient is not progressing towards the following goals: n/a      Problem: Pain - Standard  Goal: Alleviation of pain or a reduction in pain to the patient’s comfort goal  Outcome: Progressing     Problem: Knowledge Deficit - Standard  Goal: Patient and family/care givers will demonstrate understanding of plan of care, disease process/condition, diagnostic tests and medications  Outcome: Progressing

## 2021-06-24 NOTE — PROGRESS NOTES
Received bedside report. Assumed pt care. Assessment and chart check complete. Pt is AAOX4, denies N/V, ambulating in room. Denies abdominal pain. VSS. CMS intact. SCD's in place. Tolerating clear liquids. Fall precautions in place, treaded socks on pt, bed in low position. Call light within reach. Educated pt to call if needing anything.

## 2021-06-24 NOTE — PROGRESS NOTES
"Hospital Medicine Daily Progress Note    Date of Service  6/23/2021    Chief Complaint  85 y.o. female admitted 6/22/2021 with abdominal pain    Hospital Course  Per notes, \"85 y.o. female with a past medical history of hypertension, chronic obstructive pulmonary disease, history of deep vein thrombosis on anticoagulation with apixaban who presented 6/22/2021 with abdominal pain.  Pain is mostly in the right lower quadrant.  Patient reports that pain started the day prior to admission.  She described the pain as severe rated as 10 out of 10, sharp in nature.  No radiation to other places no aggravating or relieving factors.  She denies having fevers chills cough or shortness of breath.  She denies having diarrhea or vomiting.\"      Interval Problem Update  Seen examined by me at bedside, still having some abdominal pain.    Discussed with surgery, patient had been on Eliquis, for now we will treat medically and plan for surgery on Friday if appropriate at that time.    Consultants/Specialty  General surgery    Code Status  Full Code    Disposition  Anticipated discharge TBD    Review of Systems  Review of Systems   Gastrointestinal: Positive for abdominal pain and nausea.   All other systems reviewed and are negative.       Physical Exam  Temp:  [36.4 °C (97.5 °F)-36.6 °C (97.8 °F)] 36.6 °C (97.8 °F)  Pulse:  [] 102  Resp:  [18-20] 20  BP: (114-150)/(58-74) 127/68  SpO2:  [91 %-93 %] 91 %    Physical Exam  Vitals and nursing note reviewed.   Constitutional:       Appearance: Normal appearance.   Cardiovascular:      Rate and Rhythm: Normal rate and regular rhythm.      Pulses: Normal pulses.      Heart sounds: Normal heart sounds.   Pulmonary:      Effort: Pulmonary effort is normal.      Breath sounds: Normal breath sounds.   Abdominal:      General: Abdomen is flat. Bowel sounds are normal.      Tenderness: There is abdominal tenderness. There is no guarding or rebound.   Musculoskeletal:      Right lower " "leg: No edema.      Left lower leg: No edema.   Skin:     General: Skin is warm and dry.   Neurological:      Mental Status: She is alert.         Fluids    Intake/Output Summary (Last 24 hours) at 6/23/2021 1949  Last data filed at 6/23/2021 0400  Gross per 24 hour   Intake 1018.23 ml   Output 200 ml   Net 818.23 ml       Laboratory  Recent Labs     06/22/21  1535 06/23/21  0342   WBC 9.6 9.4   RBC 4.26 4.08*   HEMOGLOBIN 13.7 12.8   HEMATOCRIT 42.3 39.9   MCV 99.3* 97.8   MCH 32.2 31.4   MCHC 32.4* 32.1*   RDW 46.0 45.5   PLATELETCT 199 186   MPV 11.1 11.9     Recent Labs     06/22/21  1535 06/23/21  0342   SODIUM 139 137   POTASSIUM 4.1 3.9   CHLORIDE 100 100   CO2 28 25   GLUCOSE 101* 127*   BUN 15 11   CREATININE 0.81 0.68   CALCIUM 9.7 9.1                   Imaging  CT-ABDOMEN-PELVIS WITH   Final Result      1.  Acute appendicitis.  No evidence for perforation.   2.  Extrahepatic biliary dilation, unchanged from prior.   3.  Multiple liver cysts, similar to prior.  No further evaluation necessary.   4.  Moderate hiatal hernia.           Assessment/Plan  * Appendicitis, acute- (present on admission)  Assessment & Plan  Started on ceftriaxone and metronidazole in the emergency department, continue for now follow on cultures and sensitivities  Takes apixaban for history of venous thrombosis, last dose 6/22 AM, will hold  Revised Cardiac Risk Index for Pre-Operative Risk score of:  2 points Class III Risk  The patient has a 10.1 % 30-day risk of death, MI, or cardiac arrest   Patient reports \"very sensitive to anesthetics, and had prolonged recovery after anesthesia on prior surgeries\", and if surgery is indicated ensure anesthesiologist are aware.     Discussed with general surgery, due to patient's use of apixaban, will treat with antibiotics for now and reevaluate once the exam is persistent.  Surgery following along, follow-up on notes for further recommendations.    History of deep venous thrombosis- (present " on admission)  Assessment & Plan  We will hold home apixaban for anticipated need for surgical intervention    COPD (chronic obstructive pulmonary disease) (HCC)- (present on admission)  Assessment & Plan  Not currently in exacerbation.  Oxygen as needed, Respiratory protocol, Bronchodilators, Incentive spirometry     Essential hypertension, benign- (present on admission)  Assessment & Plan  Continue valsartan with hold parameters       VTE prophylaxis: SCDs

## 2021-06-24 NOTE — CARE PLAN
The patient is Stable - Low risk of patient condition declining or worsening    Shift Goals  Clinical Goals: manage pain anfd free from falls  Patient Goals: rest, use of call light    Progress made toward(s) clinical / shift goals:  Pt is educated to call for pain medication when needed.            Call light within reach.    Patient is not progressing towards the following goals:      Problem: Pain - Standard  Goal: Alleviation of pain or a reduction in pain to the patient’s comfort goal  Outcome: Progressing     Problem: Fall Risk  Goal: Patient will remain free from falls  Outcome: Progressing

## 2021-06-25 ENCOUNTER — ANESTHESIA (OUTPATIENT)
Dept: SURGERY | Facility: MEDICAL CENTER | Age: 86
DRG: 341 | End: 2021-06-25
Payer: MEDICARE

## 2021-06-25 ENCOUNTER — ANESTHESIA EVENT (OUTPATIENT)
Dept: SURGERY | Facility: MEDICAL CENTER | Age: 86
DRG: 341 | End: 2021-06-25
Payer: MEDICARE

## 2021-06-25 PROBLEM — I95.81 POSTOPERATIVE HYPOTENSION: Status: ACTIVE | Noted: 2021-06-25

## 2021-06-25 LAB
ANION GAP SERPL CALC-SCNC: 11 MMOL/L (ref 7–16)
BUN SERPL-MCNC: 9 MG/DL (ref 8–22)
CALCIUM SERPL-MCNC: 8.7 MG/DL (ref 8.4–10.2)
CHLORIDE SERPL-SCNC: 104 MMOL/L (ref 96–112)
CO2 SERPL-SCNC: 25 MMOL/L (ref 20–33)
CREAT SERPL-MCNC: 0.63 MG/DL (ref 0.5–1.4)
ERYTHROCYTE [DISTWIDTH] IN BLOOD BY AUTOMATED COUNT: 45.2 FL (ref 35.9–50)
GLUCOSE SERPL-MCNC: 107 MG/DL (ref 65–99)
HCT VFR BLD AUTO: 36.5 % (ref 37–47)
HGB BLD-MCNC: 11.6 G/DL (ref 12–16)
MCH RBC QN AUTO: 31.4 PG (ref 27–33)
MCHC RBC AUTO-ENTMCNC: 31.8 G/DL (ref 33.6–35)
MCV RBC AUTO: 98.9 FL (ref 81.4–97.8)
PATHOLOGY CONSULT NOTE: NORMAL
PLATELET # BLD AUTO: 189 K/UL (ref 164–446)
PMV BLD AUTO: 11.5 FL (ref 9–12.9)
POTASSIUM SERPL-SCNC: 3.5 MMOL/L (ref 3.6–5.5)
RBC # BLD AUTO: 3.69 M/UL (ref 4.2–5.4)
SODIUM SERPL-SCNC: 140 MMOL/L (ref 135–145)
WBC # BLD AUTO: 7.2 K/UL (ref 4.8–10.8)

## 2021-06-25 PROCEDURE — 700101 HCHG RX REV CODE 250: Performed by: HOSPITALIST

## 2021-06-25 PROCEDURE — 501463 HCHG STAPLES, UNIV. ROTIC: Performed by: SURGERY

## 2021-06-25 PROCEDURE — 700101 HCHG RX REV CODE 250: Performed by: SURGERY

## 2021-06-25 PROCEDURE — 700111 HCHG RX REV CODE 636 W/ 250 OVERRIDE (IP): Performed by: HOSPITALIST

## 2021-06-25 PROCEDURE — 160035 HCHG PACU - 1ST 60 MINS PHASE I: Performed by: SURGERY

## 2021-06-25 PROCEDURE — 500802 HCHG LAPRASONIC ULTRA SHEAR: Performed by: SURGERY

## 2021-06-25 PROCEDURE — 502571 HCHG PACK, LAP CHOLE: Performed by: SURGERY

## 2021-06-25 PROCEDURE — 160036 HCHG PACU - EA ADDL 30 MINS PHASE I: Performed by: SURGERY

## 2021-06-25 PROCEDURE — 160048 HCHG OR STATISTICAL LEVEL 1-5: Performed by: SURGERY

## 2021-06-25 PROCEDURE — 160041 HCHG SURGERY MINUTES - EA ADDL 1 MIN LEVEL 4: Performed by: SURGERY

## 2021-06-25 PROCEDURE — 501583 HCHG TROCAR, THRD CAN&SEAL 5X100: Performed by: SURGERY

## 2021-06-25 PROCEDURE — 501429 HCHG STAPLER, ENDO GIA UNIV: Performed by: SURGERY

## 2021-06-25 PROCEDURE — 0DTJ4ZZ RESECTION OF APPENDIX, PERCUTANEOUS ENDOSCOPIC APPROACH: ICD-10-PCS | Performed by: SURGERY

## 2021-06-25 PROCEDURE — 501571 HCHG TROCAR, SEPARATOR 12X100: Performed by: SURGERY

## 2021-06-25 PROCEDURE — 80048 BASIC METABOLIC PNL TOTAL CA: CPT

## 2021-06-25 PROCEDURE — 501838 HCHG SUTURE GENERAL: Performed by: SURGERY

## 2021-06-25 PROCEDURE — 36415 COLL VENOUS BLD VENIPUNCTURE: CPT

## 2021-06-25 PROCEDURE — 99233 SBSQ HOSP IP/OBS HIGH 50: CPT | Performed by: INTERNAL MEDICINE

## 2021-06-25 PROCEDURE — 501399 HCHG SPECIMAN BAG, ENDO CATC: Performed by: SURGERY

## 2021-06-25 PROCEDURE — 500002 HCHG ADHESIVE, DERMABOND: Performed by: SURGERY

## 2021-06-25 PROCEDURE — 700111 HCHG RX REV CODE 636 W/ 250 OVERRIDE (IP): Performed by: ANESTHESIOLOGY

## 2021-06-25 PROCEDURE — 501570 HCHG TROCAR, SEPARATOR: Performed by: SURGERY

## 2021-06-25 PROCEDURE — 160009 HCHG ANES TIME/MIN: Performed by: SURGERY

## 2021-06-25 PROCEDURE — 700105 HCHG RX REV CODE 258: Performed by: SURGERY

## 2021-06-25 PROCEDURE — 770006 HCHG ROOM/CARE - MED/SURG/GYN SEMI*

## 2021-06-25 PROCEDURE — 88304 TISSUE EXAM BY PATHOLOGIST: CPT

## 2021-06-25 PROCEDURE — 700105 HCHG RX REV CODE 258: Performed by: HOSPITALIST

## 2021-06-25 PROCEDURE — 85027 COMPLETE CBC AUTOMATED: CPT

## 2021-06-25 PROCEDURE — 160002 HCHG RECOVERY MINUTES (STAT): Performed by: SURGERY

## 2021-06-25 PROCEDURE — 160029 HCHG SURGERY MINUTES - 1ST 30 MINS LEVEL 4: Performed by: SURGERY

## 2021-06-25 PROCEDURE — 700101 HCHG RX REV CODE 250: Performed by: ANESTHESIOLOGY

## 2021-06-25 RX ORDER — HALOPERIDOL 5 MG/ML
1 INJECTION INTRAMUSCULAR
Status: DISCONTINUED | OUTPATIENT
Start: 2021-06-25 | End: 2021-06-25 | Stop reason: HOSPADM

## 2021-06-25 RX ORDER — KETOROLAC TROMETHAMINE 30 MG/ML
INJECTION, SOLUTION INTRAMUSCULAR; INTRAVENOUS PRN
Status: DISCONTINUED | OUTPATIENT
Start: 2021-06-25 | End: 2021-06-25 | Stop reason: SURG

## 2021-06-25 RX ORDER — CEFOTETAN DISODIUM 2 G/20ML
INJECTION, POWDER, FOR SOLUTION INTRAMUSCULAR; INTRAVENOUS PRN
Status: DISCONTINUED | OUTPATIENT
Start: 2021-06-25 | End: 2021-06-25 | Stop reason: SURG

## 2021-06-25 RX ORDER — METOCLOPRAMIDE HYDROCHLORIDE 5 MG/ML
INJECTION INTRAMUSCULAR; INTRAVENOUS PRN
Status: DISCONTINUED | OUTPATIENT
Start: 2021-06-25 | End: 2021-06-25 | Stop reason: SURG

## 2021-06-25 RX ORDER — ONDANSETRON 2 MG/ML
4 INJECTION INTRAMUSCULAR; INTRAVENOUS
Status: DISCONTINUED | OUTPATIENT
Start: 2021-06-25 | End: 2021-06-25 | Stop reason: HOSPADM

## 2021-06-25 RX ORDER — DIPHENHYDRAMINE HYDROCHLORIDE 50 MG/ML
12.5 INJECTION INTRAMUSCULAR; INTRAVENOUS
Status: DISCONTINUED | OUTPATIENT
Start: 2021-06-25 | End: 2021-06-25 | Stop reason: HOSPADM

## 2021-06-25 RX ORDER — ONDANSETRON 2 MG/ML
INJECTION INTRAMUSCULAR; INTRAVENOUS PRN
Status: DISCONTINUED | OUTPATIENT
Start: 2021-06-25 | End: 2021-06-25 | Stop reason: SURG

## 2021-06-25 RX ORDER — BUPIVACAINE HYDROCHLORIDE 5 MG/ML
INJECTION, SOLUTION EPIDURAL; INTRACAUDAL
Status: DISCONTINUED | OUTPATIENT
Start: 2021-06-25 | End: 2021-06-25 | Stop reason: HOSPADM

## 2021-06-25 RX ORDER — LIDOCAINE HYDROCHLORIDE 20 MG/ML
INJECTION, SOLUTION EPIDURAL; INFILTRATION; INTRACAUDAL; PERINEURAL PRN
Status: DISCONTINUED | OUTPATIENT
Start: 2021-06-25 | End: 2021-06-25 | Stop reason: SURG

## 2021-06-25 RX ORDER — SIMETHICONE 80 MG
80 TABLET,CHEWABLE ORAL 3 TIMES DAILY PRN
Status: DISCONTINUED | OUTPATIENT
Start: 2021-06-25 | End: 2021-06-28 | Stop reason: HOSPADM

## 2021-06-25 RX ORDER — SODIUM CHLORIDE, SODIUM LACTATE, POTASSIUM CHLORIDE, CALCIUM CHLORIDE 600; 310; 30; 20 MG/100ML; MG/100ML; MG/100ML; MG/100ML
INJECTION, SOLUTION INTRAVENOUS CONTINUOUS
Status: ACTIVE | OUTPATIENT
Start: 2021-06-25 | End: 2021-06-25

## 2021-06-25 RX ADMIN — ONDANSETRON 4 MG: 2 INJECTION INTRAMUSCULAR; INTRAVENOUS at 11:26

## 2021-06-25 RX ADMIN — LIDOCAINE HYDROCHLORIDE 100 MG: 20 INJECTION, SOLUTION EPIDURAL; INFILTRATION; INTRACAUDAL; PERINEURAL at 10:45

## 2021-06-25 RX ADMIN — METRONIDAZOLE 500 MG: 500 INJECTION, SOLUTION INTRAVENOUS at 14:02

## 2021-06-25 RX ADMIN — SUGAMMADEX 200 MG: 100 INJECTION, SOLUTION INTRAVENOUS at 11:24

## 2021-06-25 RX ADMIN — SODIUM CHLORIDE, POTASSIUM CHLORIDE, SODIUM LACTATE AND CALCIUM CHLORIDE: 600; 310; 30; 20 INJECTION, SOLUTION INTRAVENOUS at 10:22

## 2021-06-25 RX ADMIN — ONDANSETRON 4 MG: 2 INJECTION INTRAMUSCULAR; INTRAVENOUS at 12:46

## 2021-06-25 RX ADMIN — METRONIDAZOLE 500 MG: 500 INJECTION, SOLUTION INTRAVENOUS at 05:45

## 2021-06-25 RX ADMIN — ROCURONIUM BROMIDE 40 MG: 10 INJECTION INTRAVENOUS at 10:45

## 2021-06-25 RX ADMIN — PROPOFOL 100 MG: 10 INJECTION, EMULSION INTRAVENOUS at 10:45

## 2021-06-25 RX ADMIN — KETOROLAC TROMETHAMINE 15 MG: 30 INJECTION, SOLUTION INTRAMUSCULAR at 11:26

## 2021-06-25 RX ADMIN — CEFOTETAN DISODIUM 2 G: 2 INJECTION, POWDER, FOR SOLUTION INTRAMUSCULAR; INTRAVENOUS at 10:45

## 2021-06-25 RX ADMIN — METRONIDAZOLE 500 MG: 500 INJECTION, SOLUTION INTRAVENOUS at 22:07

## 2021-06-25 RX ADMIN — METOCLOPRAMIDE 10 MG: 5 INJECTION, SOLUTION INTRAMUSCULAR; INTRAVENOUS at 11:26

## 2021-06-25 RX ADMIN — FENTANYL CITRATE 25 MCG: 50 INJECTION, SOLUTION INTRAMUSCULAR; INTRAVENOUS at 11:51

## 2021-06-25 RX ADMIN — SODIUM CHLORIDE, POTASSIUM CHLORIDE, SODIUM LACTATE AND CALCIUM CHLORIDE: 600; 310; 30; 20 INJECTION, SOLUTION INTRAVENOUS at 10:38

## 2021-06-25 ASSESSMENT — COGNITIVE AND FUNCTIONAL STATUS - GENERAL
SUGGESTED CMS G CODE MODIFIER MOBILITY: CJ
HELP NEEDED FOR BATHING: A LITTLE
SUGGESTED CMS G CODE MODIFIER DAILY ACTIVITY: CJ
MOBILITY SCORE: 20
WALKING IN HOSPITAL ROOM: A LITTLE
DAILY ACTIVITIY SCORE: 22
MOVING TO AND FROM BED TO CHAIR: A LITTLE
STANDING UP FROM CHAIR USING ARMS: A LITTLE
CLIMB 3 TO 5 STEPS WITH RAILING: A LITTLE
DRESSING REGULAR LOWER BODY CLOTHING: A LITTLE

## 2021-06-25 ASSESSMENT — ENCOUNTER SYMPTOMS
ABDOMINAL PAIN: 1
NAUSEA: 1

## 2021-06-25 ASSESSMENT — PAIN DESCRIPTION - PAIN TYPE
TYPE: SURGICAL PAIN

## 2021-06-25 NOTE — ANESTHESIA PREPROCEDURE EVALUATION
Relevant Problems   PULMONARY   (positive) COPD (chronic obstructive pulmonary disease) (HCC)      NEURO   (positive) History of deep venous thrombosis      CARDIAC   (positive) Essential hypertension, benign      GI   (positive) Gastroesophageal reflux disease without esophagitis      Other   (positive) Arthritis     Very slow to awaken from anesthesia    Physical Exam    Airway   Mallampati: II  TM distance: <3 FB  Neck ROM: full       Cardiovascular   Rhythm: regular  Rate: normal     Dental    Pulmonary   Breath sounds clear to auscultation     Abdominal    Neurological - normal exam                 Anesthesia Plan    ASA 2- EMERGENT       Plan - general       Airway plan will be ETT          Induction: intravenous      Pertinent diagnostic labs and testing reviewed    Informed Consent:    Anesthetic plan and risks discussed with patient.

## 2021-06-25 NOTE — CARE PLAN
Problem: Pain - Standard  Goal: Alleviation of pain or a reduction in pain to the patient’s comfort goal  Outcome: Progressing     Problem: Knowledge Deficit - Standard  Goal: Patient and family/care givers will demonstrate understanding of plan of care, disease process/condition, diagnostic tests and medications  Outcome: Progressing     Problem: Fall Risk  Goal: Patient will remain free from falls  Outcome: Progressing   The patient is Stable - Low risk of patient condition declining or worsening    Shift Goals  Clinical Goals: Rest comfortably and pain mangement   Patient Goals: pain management     Progress made toward(s) clinical / shift goals: Fall risk and pain    Patient is not progressing towards the following goals:Knowledge deficit

## 2021-06-25 NOTE — PROGRESS NOTES
4 Eyes Skin Assessment Completed by SIDRA White and SIDRA Calderon.    Head WDL  Ears WDL  Nose WDL  Mouth WDL  Neck WDL  Breast/Chest WDL  Shoulder Blades WDL  Spine WDL  (R) Arm/Elbow/Hand Bruising  (L) Arm/Elbow/Hand Bruising  Abdomen WDL  Groin WDL  Scrotum/Coccyx/Buttocks WDL  (R) Leg WDL  (L) Leg Bruising and Abrasion  (R) Heel/Foot/Toe Bruising  (L) Heel/Foot/Toe Bruising          Devices In Places SCD's      Interventions In Place N/A    Possible Skin Injury No    Pictures Uploaded Into Epic N/A  Wound Consult Placed N/A  RN Wound Prevention Protocol Ordered No

## 2021-06-25 NOTE — ANESTHESIA PROCEDURE NOTES
Airway    Date/Time: 6/25/2021 10:45 AM  Performed by: Robert Callejas M.D.  Authorized by: Robert Callejas M.D.     Location:  OR  Urgency:  Elective  Indications for Airway Management:  Anesthesia      Spontaneous Ventilation: absent    Sedation Level:  Deep  Preoxygenated: Yes    Patient Position:  Sniffing  Final Airway Type:  Endotracheal airway  Final Endotracheal Airway:  ETT  Cuffed: Yes    Technique Used for Successful ETT Placement:  Direct laryngoscopy    Insertion Site:  Oral  Blade Type:  Glide  Laryngoscope Blade/Videolaryngoscope Blade Size:  3  ETT Size (mm):  7.0  Measured from:  Teeth  ETT to Teeth (cm):  22  Placement Verified by: auscultation and capnometry    Cormack-Lehane Classification:  Grade I - full view of glottis  Number of Attempts at Approach:  1

## 2021-06-25 NOTE — PROGRESS NOTES
"Hospital Medicine Daily Progress Note    Date of Service  6/25/2021    Chief Complaint  85 y.o. female admitted 6/22/2021 with abdominal pain    Hospital Course  Per notes, \"85 y.o. female with a past medical history of hypertension, chronic obstructive pulmonary disease, history of deep vein thrombosis on anticoagulation with apixaban who presented 6/22/2021 with abdominal pain.  Pain is mostly in the right lower quadrant.  Patient reports that pain started the day prior to admission.  She described the pain as severe rated as 10 out of 10, sharp in nature.  No radiation to other places no aggravating or relieving factors.  She denies having fevers chills cough or shortness of breath.  She denies having diarrhea or vomiting.\"      Interval Problem Update  Patient was seen and examined by me this morning at bedside and then again in the afternoon after procedure.    During patient's procedure  was at bedside, I was able to give him updates regarding the plan.  Previously had not received a request to provide updates to family from patient, nor the available during rounds.  I did explain to the  that the patient has been seen and updated on plan daily, with additional updates for desired by family I am available to answer any questions, at the patient's request as she has been awake alert and oriented.  All questions and concerns were answered.    Patient underwent appendectomy on 6/25/2021, nursing reported she did have some hypotension after surgery, she was receiving IV fluids and will monitor closely.  If patient is progressing well and surgery is able to sign off possible discharge in 24 hours on 6/26.    Consultants/Specialty  General surgery    Code Status  Full Code    Disposition  Anticipated discharge to home in 24 to 48 hours.    Review of Systems  Review of Systems   Gastrointestinal: Positive for abdominal pain and nausea.   All other systems reviewed and are negative.       Physical " Exam  Temp:  [36.3 °C (97.3 °F)-36.7 °C (98 °F)] 36.5 °C (97.7 °F)  Pulse:  [79-96] 80  Resp:  [14-20] 16  BP: (125-158)/(62-90) 144/78  SpO2:  [88 %-99 %] 96 %    Physical Exam  Vitals and nursing note reviewed.   Constitutional:       Appearance: Normal appearance.   Cardiovascular:      Rate and Rhythm: Normal rate and regular rhythm.      Pulses: Normal pulses.      Heart sounds: Normal heart sounds.   Pulmonary:      Effort: Pulmonary effort is normal.      Breath sounds: Normal breath sounds.   Abdominal:      General: Abdomen is flat. Bowel sounds are normal. There is no distension.      Palpations: There is no mass.      Tenderness: There is no abdominal tenderness. There is no guarding or rebound.   Musculoskeletal:      Right lower leg: No edema.      Left lower leg: No edema.   Skin:     General: Skin is warm and dry.   Neurological:      General: No focal deficit present.      Mental Status: She is alert and oriented to person, place, and time. Mental status is at baseline.         Fluids    Intake/Output Summary (Last 24 hours) at 6/25/2021 1426  Last data filed at 6/25/2021 1158  Gross per 24 hour   Intake 2178 ml   Output --   Net 2178 ml       Laboratory  Recent Labs     06/22/21  1535 06/23/21  0342 06/25/21  0341   WBC 9.6 9.4 7.2   RBC 4.26 4.08* 3.69*   HEMOGLOBIN 13.7 12.8 11.6*   HEMATOCRIT 42.3 39.9 36.5*   MCV 99.3* 97.8 98.9*   MCH 32.2 31.4 31.4   MCHC 32.4* 32.1* 31.8*   RDW 46.0 45.5 45.2   PLATELETCT 199 186 189   MPV 11.1 11.9 11.5     Recent Labs     06/22/21  1535 06/23/21  0342 06/25/21  0341   SODIUM 139 137 140   POTASSIUM 4.1 3.9 3.5*   CHLORIDE 100 100 104   CO2 28 25 25   GLUCOSE 101* 127* 107*   BUN 15 11 9   CREATININE 0.81 0.68 0.63   CALCIUM 9.7 9.1 8.7                   Imaging  CT-ABDOMEN-PELVIS WITH   Final Result      1.  Acute appendicitis.  No evidence for perforation.   2.  Extrahepatic biliary dilation, unchanged from prior.   3.  Multiple liver cysts, similar to  "prior.  No further evaluation necessary.   4.  Moderate hiatal hernia.           Assessment/Plan  * Appendicitis, acute- (present on admission)  Assessment & Plan  Started on ceftriaxone and metronidazole in the emergency department, continue for now follow on cultures and sensitivities  Takes apixaban for history of venous thrombosis, last dose 6/22 AM, will hold  Revised Cardiac Risk Index for Pre-Operative Risk score of:  2 points Class III Risk  The patient has a 10.1 % 30-day risk of death, MI, or cardiac arrest   Patient reports \"very sensitive to anesthetics, and had prolonged recovery after anesthesia on prior surgeries\", and if surgery is indicated ensure anesthesiologist are aware.  She does have some postop hypotension.  I did discuss with the patient's  that given her previous sensitivity to anesthetics and anesthesia as well as her age she is at risk for delirium, we reviewed the risk of delirium and will monitor closely.    Discussed with general surgery, patient underwent appendectomy on 6/25, without complications.  We will continue to follow-up with surgery regarding further recommendations.  If patient is progressing well can possibly discharge on 6/26, will monitor closely.    Postoperative hypotension- (present on admission)  Assessment & Plan  Patient's blood pressure meds have holding parameters, is receiving some IV fluids, will continue to monitor closely.  Discussed with nursing.    History of deep venous thrombosis- (present on admission)  Assessment & Plan  Apixaban has been held since 6/22 for surgical intervention.  Will discuss with surgery when appropriate to restart.    COPD (chronic obstructive pulmonary disease) (HCC)- (present on admission)  Assessment & Plan  Not currently in exacerbation.  Oxygen as needed, Respiratory protocol, Bronchodilators, Incentive spirometry     Essential hypertension, benign- (present on admission)  Assessment & Plan  Continue valsartan with hold " parameters       VTE prophylaxis: SCDs

## 2021-06-25 NOTE — OP REPORT
Operative Report    Date: 6/25/2021    Surgeon: Pedro Mcneil M.D.    Assistant: Kasey BURNHAM  The indications for a surgical assistant in this surgery were indicated due to complexity of the procedure. Their role included aiding in incision, retraction, holding devices including camera for laparoscopic procedure, and closure of the wound.      Anesthesiologist: Kai Callejas    Pre-operative Diagnosis: acute appendicitis    K35.3 Acute appendicitis with localized peritonitis    Post-operative Diagnosis:    Arthritis  COPD  GERD  Hypertension  Hyperlipidemia  Prediabetes  History of DVT on chronic anticoagulation    Procedure: laparoscopic appendectomy    Findings: Acute appendicitis    Procedure in detail: The patient was identified in the pre-operative holding area and brought to the operating room. Correct side and site were identified. Pre-operative antibiotics of Cefotetan were administered prior to the procedure. GETA was smoothly induced. The patient was prepped and draped in the usual sterile fashion.     After infiltration of local anesthetic, an incision was made superior to the umbilicus to accomodate a 5 mm trocar. A 0° 5 mm laparoscope was used with a Visiport toenter the abdomen under direct visualization.  The abdomen was insufflated to 15 mmHg, which the patient tolerated well, with initially low insufflation pressures. A 5 mm trocar was placed, and a 5 mm 30 degree lapararoscope was used to survey the abdomen. No evidence of intraperitoneal trauma from trocar placement was found.    I then placed, under video assistance, a 5 mm trocar in the suprapubic area, and a 12 mm trocar in the left lower quadrant. I visualized the cecum and found the appendix, it was acute appendicitis. The appendix was elevated with an atraumatic grasper, a window was created in its mesentery with a [place] dissector, and the appendiceal mesentery was divided with a white load of the endoGIA stapler. The base of  the appendix was divided with a blue load of the endoGIA stapler. The appendix was placed in an endocatch bag and removed from the 12 mm port site. Copious irrigation was performed. The appendiceal artery and base were hemostatic.    All ports were removed with video assist, and were hemostatic. The 12 mm port site fascia was closed with 0 vicryl suture. All skin sites were closed with subcuticular Monocryl and band-aids were applied.    The patient was awakened from general anesthetic, and was taken to the recovery room in stable condition.    Sponge and needle counts were correct at the end of the case.     Specimen: appendix for permanent pathology    EBL: 10mL    Dispo: stable, extubated, to PACU    Pedro Mcneil MD PhD  [facility]  Colon and Rectal Surgeon  [phone]

## 2021-06-25 NOTE — OR NURSING
1128: To PACU from OR via bed, respirations spontaneous and non-labored. 3 derma-bond closed surgical sites on abdomen, all CDI. No pain or nausea.  1141: Pain is increased, but tolerable. Tolerating decrease of supplemental O2.  1151: Pain increased, pain medication given per MAR, no nausea. Incision sites still CDI and derma-bond still intact.  1210: Pain is tolerable, pt sitting up more in the bed, tolerating it well, tolerating sips of water.  1225: Meets criteria to transfer back to her room, left [facility] for [facility] RN to call for report.  1233: Report given to SIDRA White and transport scheduled. Pain remains tolerable.  1236: Transport arrived, pt readied to transfer, pain remains tolerable, no nausea, awake and alert, incision sites CDI.

## 2021-06-25 NOTE — ASSESSMENT & PLAN NOTE
Patient's blood pressure meds have holding parameters, is receiving some IV fluids, will continue to monitor closely.  Discussed with nursing.

## 2021-06-25 NOTE — PROGRESS NOTES
"Hospital Medicine Daily Progress Note    Date of Service  6/24/2021    Chief Complaint  85 y.o. female admitted 6/22/2021 with abdominal pain    Hospital Course  Per notes, \"85 y.o. female with a past medical history of hypertension, chronic obstructive pulmonary disease, history of deep vein thrombosis on anticoagulation with apixaban who presented 6/22/2021 with abdominal pain.  Pain is mostly in the right lower quadrant.  Patient reports that pain started the day prior to admission.  She described the pain as severe rated as 10 out of 10, sharp in nature.  No radiation to other places no aggravating or relieving factors.  She denies having fevers chills cough or shortness of breath.  She denies having diarrhea or vomiting.\"      Interval Problem Update  Since edema remains morning at bedside, abdominal pain well controlled.    Per surgery, plan is for surgery on Friday 6/25 in a.m.    Consultants/Specialty  General surgery    Code Status  Full Code    Disposition  Anticipated discharge TBD    Review of Systems  Review of Systems   Gastrointestinal: Positive for abdominal pain and nausea.   All other systems reviewed and are negative.       Physical Exam  Temp:  [36.3 °C (97.4 °F)-36.6 °C (97.9 °F)] 36.4 °C (97.6 °F)  Pulse:  [80-96] 96  Resp:  [16-18] 18  BP: (111-146)/(55-76) 146/76  SpO2:  [88 %-91 %] 88 %    Physical Exam  Vitals and nursing note reviewed.   Constitutional:       Appearance: Normal appearance.   Cardiovascular:      Rate and Rhythm: Normal rate and regular rhythm.      Pulses: Normal pulses.      Heart sounds: Normal heart sounds.   Pulmonary:      Effort: Pulmonary effort is normal.      Breath sounds: Normal breath sounds.   Abdominal:      General: Abdomen is flat. Bowel sounds are normal.      Tenderness: There is abdominal tenderness. There is no guarding or rebound.   Musculoskeletal:      Right lower leg: No edema.      Left lower leg: No edema.   Skin:     General: Skin is warm and " "dry.   Neurological:      Mental Status: She is alert.         Fluids    Intake/Output Summary (Last 24 hours) at 6/24/2021 2030  Last data filed at 6/24/2021 1321  Gross per 24 hour   Intake 1134 ml   Output 200 ml   Net 934 ml       Laboratory  Recent Labs     06/22/21  1535 06/23/21  0342   WBC 9.6 9.4   RBC 4.26 4.08*   HEMOGLOBIN 13.7 12.8   HEMATOCRIT 42.3 39.9   MCV 99.3* 97.8   MCH 32.2 31.4   MCHC 32.4* 32.1*   RDW 46.0 45.5   PLATELETCT 199 186   MPV 11.1 11.9     Recent Labs     06/22/21  1535 06/23/21  0342   SODIUM 139 137   POTASSIUM 4.1 3.9   CHLORIDE 100 100   CO2 28 25   GLUCOSE 101* 127*   BUN 15 11   CREATININE 0.81 0.68   CALCIUM 9.7 9.1                   Imaging  CT-ABDOMEN-PELVIS WITH   Final Result      1.  Acute appendicitis.  No evidence for perforation.   2.  Extrahepatic biliary dilation, unchanged from prior.   3.  Multiple liver cysts, similar to prior.  No further evaluation necessary.   4.  Moderate hiatal hernia.           Assessment/Plan  * Appendicitis, acute- (present on admission)  Assessment & Plan  Started on ceftriaxone and metronidazole in the emergency department, continue for now follow on cultures and sensitivities  Takes apixaban for history of venous thrombosis, last dose 6/22 AM, will hold  Revised Cardiac Risk Index for Pre-Operative Risk score of:  2 points Class III Risk  The patient has a 10.1 % 30-day risk of death, MI, or cardiac arrest   Patient reports \"very sensitive to anesthetics, and had prolonged recovery after anesthesia on prior surgeries\", and if surgery is indicated ensure anesthesiologist are aware.     Discussed with general surgery, plan is for surgery on 6/25 in a.m.  N.p.o. after midnight    History of deep venous thrombosis- (present on admission)  Assessment & Plan  We will hold home apixaban for anticipated need for surgical intervention    COPD (chronic obstructive pulmonary disease) (HCC)- (present on admission)  Assessment & Plan  Not " currently in exacerbation.  Oxygen as needed, Respiratory protocol, Bronchodilators, Incentive spirometry     Essential hypertension, benign- (present on admission)  Assessment & Plan  Continue valsartan with hold parameters       VTE prophylaxis: SCDs

## 2021-06-25 NOTE — CARE PLAN
The patient is Stable - Low risk of patient condition declining or worsening    Shift Goals  Clinical Goals: Pt will be able to rest comfortably   Patient Goals: Rest before surgery     Progress made toward(s) clinical / shift goals:  Pt resting in bed and denies the need for pain medication. Pt encouraged to voice concerns.     Patient is not progressing towards the following goals: n/a       Problem: Pain - Standard  Goal: Alleviation of pain or a reduction in pain to the patient’s comfort goal  Outcome: Progressing     Problem: Knowledge Deficit - Standard  Goal: Patient and family/care givers will demonstrate understanding of plan of care, disease process/condition, diagnostic tests and medications  Outcome: Progressing     Problem: Fall Risk  Goal: Patient will remain free from falls  Outcome: Progressing

## 2021-06-25 NOTE — PROGRESS NOTES
HISTORY OF PRESENT ILLNESS: The patient is a 85 y.o. female with acute appendicitis who is on Eliquis and has been treated with antibiotics while she has been off Eliquis in anticipation of a laparoscopic appendectomy.. The patient describes the pain as continued and worsened with movement. The patient denies any recent or intercurrent illness.     PAST MEDICAL HISTORY:  has a past medical history of Arthritis, COPD (chronic obstructive pulmonary disease) (HCC), EMPHYSEMA, Gastroesophageal reflux disease without esophagitis (3/13/2019), Hypertension, Hypertriglyceridemia (2019), and Prediabetes (2019).     PAST SURGICAL HISTORY:  has a past surgical history that includes hemorrhoidectomy ();  delivery only (); knee arthroplasty total (2014); cataract phaco with iol (2014); and cataract phaco with iol (2014).     ALLERGIES:   Allergies   Allergen Reactions   • Codeine Vomiting   • Demerol Vomiting     Injectable type   • Shellfish Allergy Shortness of Breath     Soft shell   • Ciprofloxacin Rash     rash   • Iodine      PATIENT ALLERGIC TO SHELLFISH, NOT SURE IF SHE IS ALLERGIC TO IODINE        CURRENT MEDICATIONS:   Home Medications     Reviewed by Meeta Crocker (Pharmacy Tech) on 21 at 1711  Med List Status: Complete   Medication Last Dose Status   apixaban (ELIQUIS) 5mg Tab 2021 Active   Ascorbic Acid (VITAMIN C PO) 2021 Active   benzonatate (TESSALON) 200 MG capsule Not Taking Active   Boswellia-Glucosamine-Vit D (OSTEO BI-FLEX ONE PER DAY PO) 2021 Active   CALCIUM-MAGNESIUM-ZINC PO 2021 Active   Cholecalciferol (D3 PO) 2021 Active   febuxostat (ULORIC) 40 MG Tab 2021 Active   GLUCOSAMINE HCL-MSM PO 2021 Active   Multiple Vitamins-Minerals (LUTEIN-ZEAXANTHIN PO) 2021 Active   therapeutic multivitamin-minerals (THERAGRAN-M) Tab 2021 Active   TURMERIC PO 2021 Active   valsartan (DIOVAN) 160 MG Tab  "2021 Active                FAMILY HISTORY:   Family History   Problem Relation Age of Onset   • Alzheimer's Disease Mother    • Heart Disease Father    • Other Father         AAA   • Heart Attack Sister    • Diabetes Brother         SOCIAL HISTORY:   Social History     Tobacco Use   • Smoking status: Former Smoker     Packs/day: 1.00     Years: 20.00     Pack years: 20.00     Types: Cigarettes     Quit date: 1975     Years since quittin.5   • Smokeless tobacco: Never Used   Vaping Use   • Vaping Use: Never used   Substance and Sexual Activity   • Alcohol use: Not Currently     Alcohol/week: 1.5 oz     Types: 3 Standard drinks or equivalent per week     Comment: RARE   • Drug use: No   • Sexual activity: Not Currently     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     Comment: , retired        Review of Systems:  Constitutional: Negative for fever, chills or weight loss  HENT: Negative for nosebleeds   Eyes: Negative for changes in vision or photophobia  Respiratory: Negative for cough, shortness of breath or wheezing  Cardiovascular: Negative for chest pain or palpitations  Gastrointestinal: Negative for nausea, vomiting, diarrhea, blood in stool and melena.   Genitourinary: Negative for dysuria or urinary incontinence   Musculoskeletal: Negative for back pain and joint pain.   Skin: Negative for itching and rash.  Neurological: Negative for dizziness, lightheadedness or loss of consciousness  Endo/Heme/Allergies: Does not bruise/bleed easily.   Psychiatric/Behavioral: Negative for substance abuse. The patient is not nervous/anxious and does not have insomnia.    PHYSICAL EXAMINATION:   GENERAL: The patient is not ill-appearing.   VITAL SIGNS: /72   Pulse 79   Temp 36.7 °C (98 °F) (Oral)   Resp 18   Ht 1.626 m (5' 4\")   Wt 64.4 kg (141 lb 15.6 oz)   SpO2 91%   ENT: Extra-ocular movement intact. Nares and oropharynx are clear.  Throat is clear with moist mucus membranes.  NECK: Soft and supple with no " lymphadenopathy.  CHEST: Clear to auscultation bilaterally.    CARDIOVASCULAR: Regular rate and rhythm without appreciable murmurs. Extremities are warm and well perfused.   ABDOMEN: Focal tenderness to palpation over the right lower quadrant, no peritoneal signs, no Rovsing's sign.  EXTREMITIES: Examination of the bilateral upper and lower extremities demonstrates no cyanosis edema or clubbing.  NEUROLOGIC: Alert & oriented x 3, Normal motor function, Normal sensory function, No focal deficits noted.    LABORATORY VALUES:   Recent Labs     06/22/21  1535 06/23/21  0342 06/25/21  0341   WBC 9.6 9.4 7.2   RBC 4.26 4.08* 3.69*   HEMOGLOBIN 13.7 12.8 11.6*   HEMATOCRIT 42.3 39.9 36.5*   MCV 99.3* 97.8 98.9*   MCH 32.2 31.4 31.4   MCHC 32.4* 32.1* 31.8*   RDW 46.0 45.5 45.2   PLATELETCT 199 186 189   MPV 11.1 11.9 11.5     Recent Labs     06/22/21  1535 06/23/21  0342 06/25/21  0341   SODIUM 139 137 140   POTASSIUM 4.1 3.9 3.5*   CHLORIDE 100 100 104   CO2 28 25 25   GLUCOSE 101* 127* 107*   BUN 15 11 9   CREATININE 0.81 0.68 0.63   CALCIUM 9.7 9.1 8.7     Recent Labs     06/22/21  1535 06/23/21  0342   ASTSGOT 20 18   ALTSGPT 13 10   TBILIRUBIN 0.7 0.8   ALKPHOSPHAT 70 60   GLOBULIN 3.5 3.1            IMAGING:   CT-ABDOMEN-PELVIS WITH   Final Result      1.  Acute appendicitis.  No evidence for perforation.   2.  Extrahepatic biliary dilation, unchanged from prior.   3.  Multiple liver cysts, similar to prior.  No further evaluation necessary.   4.  Moderate hiatal hernia.          IMPRESSION AND PLAN:     Active Hospital Problems    Diagnosis    • History of deep venous thrombosis [Z86.718]    • Appendicitis, acute [K35.80]    • COPD (chronic obstructive pulmonary disease) (HCC) [J44.9]    • Essential hypertension, benign [I10]        Given the above presentation, the patient will be taken to the operating room for laparoscopic appendectomy. The surgical plan was discussed the the patient and available family.  Potential complications were discussed in detail and include but are not limited to infection, bleeding, damage to adjacent tissues and organs, anesthetic complications . Additional possible complications specifically related to the planned procedure included in the discussion were related to stump leak, dropped/ lost fecalith or appendix, bowel, bladder, or vascular trocar injury.    Questions were elicited and answered to the patient's and available family's satisfaction. The patient understands the rationale for surgery and agrees to proceed.  Operative consent was obtained.  ____________________________________   Pedro Mcneil MD PhD  [facility]  Colon and Rectal Surgeon  [phone]      DD: 6/25/2021   DT: 10:10 AM

## 2021-06-25 NOTE — ANESTHESIA TIME REPORT
Anesthesia Start and Stop Event Times     Date Time Event    6/25/2021 1018 Ready for Procedure     1038 Anesthesia Start     1151 Anesthesia Stop        Responsible Staff  06/25/21    Name Role Begin End    Robert Callejas M.D. Anesth 1038 1151        Preop Diagnosis (Free Text):  Pre-op Diagnosis     Acute appendicitis        Preop Diagnosis (Codes):    Post op Diagnosis  Acute appendicitis      Premium Reason  Non-Premium    Comments:

## 2021-06-25 NOTE — ANESTHESIA POSTPROCEDURE EVALUATION
Patient: Licha Pope    Procedure Summary     Date: 06/25/21 Room / Location:  OR  / SURGERY [facility]    Anesthesia Start: 1038 Anesthesia Stop: 1151    Procedure: APPENDECTOMY, LAPAROSCOPIC (Abdomen) Diagnosis: (Acute appendicitis)    Surgeons: Pedro Mcneil M.D. Responsible Provider: Robert Callejas M.D.    Anesthesia Type: general ASA Status: 2 - Emergent          Final Anesthesia Type: general  Last vitals  BP   Blood Pressure : 144/78    Temp   36.5 °C (97.7 °F)    Pulse   80   Resp   16    SpO2   96 %      Anesthesia Post Evaluation    Patient location during evaluation: PACU  Patient participation: complete - patient participated  Level of consciousness: awake and alert    Airway patency: patent  Anesthetic complications: no  Cardiovascular status: hemodynamically stable  Respiratory status: acceptable  Hydration status: euvolemic    PONV: none          There were no known complications for this encounter.     Nurse Pain Score: 1 (NPRS)

## 2021-06-25 NOTE — PROGRESS NOTES
6/25/2021  Pt seen and examined.  Resting in bed with family member at bedside.  NPO since midnight in preparation for laparoscopic appendectomy this am.  Denies N/V, pain controlled, voids w/o difficulty and is passing flatus and stool.  Her pain persists to her RLQ when she moves, coughs, focal in nature.  She has been off eliquis since Tuesday.      Of note, she is very sensitive to anesthesia.  Discussed with MD Mcneil.      Plan for lap appy this am

## 2021-06-25 NOTE — PROGRESS NOTES
1930: Received report from Day shift RN. Pt is laying in bed with  at bedside, A+OX4 , showing no signs of distress. Pt educated on POC. VSS. CMS intact. Pt is aware of NPO status at midnight. Fall precautions in place. Call light and belongings at bedside and within reach. All questions answered at this time. Rounding in place

## 2021-06-26 LAB
ANION GAP SERPL CALC-SCNC: 10 MMOL/L (ref 7–16)
BASOPHILS # BLD AUTO: 0.3 % (ref 0–1.8)
BASOPHILS # BLD: 0.03 K/UL (ref 0–0.12)
BUN SERPL-MCNC: 14 MG/DL (ref 8–22)
CALCIUM SERPL-MCNC: 8.1 MG/DL (ref 8.4–10.2)
CHLORIDE SERPL-SCNC: 101 MMOL/L (ref 96–112)
CO2 SERPL-SCNC: 26 MMOL/L (ref 20–33)
CREAT SERPL-MCNC: 0.81 MG/DL (ref 0.5–1.4)
EOSINOPHIL # BLD AUTO: 0.08 K/UL (ref 0–0.51)
EOSINOPHIL NFR BLD: 0.8 % (ref 0–6.9)
ERYTHROCYTE [DISTWIDTH] IN BLOOD BY AUTOMATED COUNT: 45.2 FL (ref 35.9–50)
ERYTHROCYTE [DISTWIDTH] IN BLOOD BY AUTOMATED COUNT: 45.7 FL (ref 35.9–50)
GLUCOSE SERPL-MCNC: 106 MG/DL (ref 65–99)
HCT VFR BLD AUTO: 27.9 % (ref 37–47)
HCT VFR BLD AUTO: 28.3 % (ref 37–47)
HGB BLD-MCNC: 8.9 G/DL (ref 12–16)
HGB BLD-MCNC: 9.2 G/DL (ref 12–16)
IMM GRANULOCYTES # BLD AUTO: 0.07 K/UL (ref 0–0.11)
IMM GRANULOCYTES NFR BLD AUTO: 0.7 % (ref 0–0.9)
LYMPHOCYTES # BLD AUTO: 0.6 K/UL (ref 1–4.8)
LYMPHOCYTES NFR BLD: 6.3 % (ref 22–41)
MAGNESIUM SERPL-MCNC: 1.7 MG/DL (ref 1.5–2.5)
MCH RBC QN AUTO: 31.9 PG (ref 27–33)
MCH RBC QN AUTO: 32.7 PG (ref 27–33)
MCHC RBC AUTO-ENTMCNC: 31.9 G/DL (ref 33.6–35)
MCHC RBC AUTO-ENTMCNC: 32.5 G/DL (ref 33.6–35)
MCV RBC AUTO: 100 FL (ref 81.4–97.8)
MCV RBC AUTO: 100.7 FL (ref 81.4–97.8)
MONOCYTES # BLD AUTO: 0.64 K/UL (ref 0–0.85)
MONOCYTES NFR BLD AUTO: 6.7 % (ref 0–13.4)
NEUTROPHILS # BLD AUTO: 8.13 K/UL (ref 2–7.15)
NEUTROPHILS NFR BLD: 85.2 % (ref 44–72)
NRBC # BLD AUTO: 0 K/UL
NRBC BLD-RTO: 0 /100 WBC
PLATELET # BLD AUTO: 204 K/UL (ref 164–446)
PLATELET # BLD AUTO: 206 K/UL (ref 164–446)
PMV BLD AUTO: 10.6 FL (ref 9–12.9)
PMV BLD AUTO: 11.7 FL (ref 9–12.9)
POTASSIUM SERPL-SCNC: 3.7 MMOL/L (ref 3.6–5.5)
RBC # BLD AUTO: 2.79 M/UL (ref 4.2–5.4)
RBC # BLD AUTO: 2.81 M/UL (ref 4.2–5.4)
SODIUM SERPL-SCNC: 137 MMOL/L (ref 135–145)
WBC # BLD AUTO: 10.5 K/UL (ref 4.8–10.8)
WBC # BLD AUTO: 9.6 K/UL (ref 4.8–10.8)

## 2021-06-26 PROCEDURE — 85027 COMPLETE CBC AUTOMATED: CPT

## 2021-06-26 PROCEDURE — 36415 COLL VENOUS BLD VENIPUNCTURE: CPT

## 2021-06-26 PROCEDURE — 770006 HCHG ROOM/CARE - MED/SURG/GYN SEMI*

## 2021-06-26 PROCEDURE — 700101 HCHG RX REV CODE 250: Performed by: HOSPITALIST

## 2021-06-26 PROCEDURE — 700111 HCHG RX REV CODE 636 W/ 250 OVERRIDE (IP): Performed by: HOSPITALIST

## 2021-06-26 PROCEDURE — 80048 BASIC METABOLIC PNL TOTAL CA: CPT

## 2021-06-26 PROCEDURE — 99233 SBSQ HOSP IP/OBS HIGH 50: CPT | Performed by: INTERNAL MEDICINE

## 2021-06-26 PROCEDURE — 85025 COMPLETE CBC W/AUTO DIFF WBC: CPT

## 2021-06-26 PROCEDURE — 700102 HCHG RX REV CODE 250 W/ 637 OVERRIDE(OP): Performed by: HOSPITALIST

## 2021-06-26 PROCEDURE — A9270 NON-COVERED ITEM OR SERVICE: HCPCS | Performed by: HOSPITALIST

## 2021-06-26 PROCEDURE — 83735 ASSAY OF MAGNESIUM: CPT

## 2021-06-26 PROCEDURE — 700105 HCHG RX REV CODE 258: Performed by: HOSPITALIST

## 2021-06-26 RX ADMIN — DOCUSATE SODIUM 50 MG AND SENNOSIDES 8.6 MG 2 TABLET: 8.6; 5 TABLET, FILM COATED ORAL at 18:01

## 2021-06-26 RX ADMIN — METRONIDAZOLE 500 MG: 500 INJECTION, SOLUTION INTRAVENOUS at 23:03

## 2021-06-26 RX ADMIN — SODIUM CHLORIDE, POTASSIUM CHLORIDE, SODIUM LACTATE AND CALCIUM CHLORIDE: 600; 310; 30; 20 INJECTION, SOLUTION INTRAVENOUS at 05:35

## 2021-06-26 RX ADMIN — ACETAMINOPHEN 650 MG: 325 TABLET, FILM COATED ORAL at 09:31

## 2021-06-26 RX ADMIN — CEFTRIAXONE SODIUM 1 G: 1 INJECTION, POWDER, FOR SOLUTION INTRAMUSCULAR; INTRAVENOUS at 13:14

## 2021-06-26 RX ADMIN — METRONIDAZOLE 500 MG: 500 INJECTION, SOLUTION INTRAVENOUS at 05:33

## 2021-06-26 RX ADMIN — METRONIDAZOLE 500 MG: 500 INJECTION, SOLUTION INTRAVENOUS at 15:37

## 2021-06-26 ASSESSMENT — ENCOUNTER SYMPTOMS
ABDOMINAL PAIN: 1
NAUSEA: 1

## 2021-06-26 ASSESSMENT — PAIN DESCRIPTION - PAIN TYPE
TYPE: SURGICAL PAIN
TYPE: SURGICAL PAIN

## 2021-06-26 NOTE — PROGRESS NOTES
"Hospital Medicine Daily Progress Note    Date of Service  6/26/2021    Chief Complaint  85 y.o. female admitted 6/22/2021 with abdominal pain    Hospital Course  Per notes, \"85 y.o. female with a past medical history of hypertension, chronic obstructive pulmonary disease, history of deep vein thrombosis on anticoagulation with apixaban who presented 6/22/2021 with abdominal pain.  Pain is mostly in the right lower quadrant.  Patient reports that pain started the day prior to admission.  She described the pain as severe rated as 10 out of 10, sharp in nature.  No radiation to other places no aggravating or relieving factors.  She denies having fevers chills cough or shortness of breath.  She denies having diarrhea or vomiting.\"      Interval Problem Update  6/25: During patient's procedure  was at bedside, I was able to give him updates regarding the plan.  Previously had not received a request to provide updates to family from patient, nor the available during rounds.  I did explain to the  that the patient has been seen and updated on plan daily, with additional updates for desired by family I am available to answer any questions, at the patient's request as she has been awake alert and oriented.  All questions and concerns were answered.    6/26: Patient underwent appendectomy on 6/25/2021, and had some hypotension after. This am still feeling a little \"tired\" and weak. Will continue to monitor, if progressing well, possible DC in 24h.     Consultants/Specialty  General surgery    Code Status  Full Code    Disposition  Anticipated discharge to home in 24 to 48 hours.    Review of Systems  Review of Systems   Gastrointestinal: Positive for abdominal pain and nausea.   All other systems reviewed and are negative.       Physical Exam  Temp:  [36.3 °C (97.3 °F)-36.7 °C (98 °F)] 36.3 °C (97.4 °F)  Pulse:  [83-98] 89  Resp:  [17-20] 17  BP: ()/(44-58) 99/47  SpO2:  [81 %-98 %] 91 %    Physical " Exam  Vitals and nursing note reviewed.   Constitutional:       Appearance: Normal appearance.   Cardiovascular:      Rate and Rhythm: Normal rate and regular rhythm.      Pulses: Normal pulses.      Heart sounds: Normal heart sounds.   Pulmonary:      Effort: Pulmonary effort is normal.      Breath sounds: Normal breath sounds.   Abdominal:      General: Abdomen is flat. Bowel sounds are normal. There is no distension.      Palpations: There is no mass.      Tenderness: There is no abdominal tenderness. There is no guarding or rebound.   Musculoskeletal:      Right lower leg: No edema.      Left lower leg: No edema.   Skin:     General: Skin is warm and dry.   Neurological:      General: No focal deficit present.      Mental Status: She is alert and oriented to person, place, and time. Mental status is at baseline.         Fluids    Intake/Output Summary (Last 24 hours) at 6/26/2021 1445  Last data filed at 6/26/2021 1200  Gross per 24 hour   Intake 1126.33 ml   Output 900 ml   Net 226.33 ml       Laboratory  Recent Labs     06/25/21  0341 06/26/21  0358 06/26/21  1137   WBC 7.2 10.5 9.6   RBC 3.69* 2.81* 2.79*   HEMOGLOBIN 11.6* 9.2* 8.9*   HEMATOCRIT 36.5* 28.3* 27.9*   MCV 98.9* 100.7* 100.0*   MCH 31.4 32.7 31.9   MCHC 31.8* 32.5* 31.9*   RDW 45.2 45.7 45.2   PLATELETCT 189 206 204   MPV 11.5 11.7 10.6     Recent Labs     06/25/21  0341 06/26/21  0358   SODIUM 140 137   POTASSIUM 3.5* 3.7   CHLORIDE 104 101   CO2 25 26   GLUCOSE 107* 106*   BUN 9 14   CREATININE 0.63 0.81   CALCIUM 8.7 8.1*                   Imaging  CT-ABDOMEN-PELVIS WITH   Final Result      1.  Acute appendicitis.  No evidence for perforation.   2.  Extrahepatic biliary dilation, unchanged from prior.   3.  Multiple liver cysts, similar to prior.  No further evaluation necessary.   4.  Moderate hiatal hernia.           Assessment/Plan  * Appendicitis, acute- (present on admission)  Assessment & Plan  Started on ceftriaxone and metronidazole  "in the emergency department, continue for now follow on cultures and sensitivities  Takes apixaban for history of venous thrombosis, last dose 6/22 AM, will hold  Revised Cardiac Risk Index for Pre-Operative Risk score of:  2 points Class III Risk  The patient has a 10.1 % 30-day risk of death, MI, or cardiac arrest   Patient reports \"very sensitive to anesthetics, and had prolonged recovery after anesthesia on prior surgeries\", and if surgery is indicated ensure anesthesiologist are aware.  She does have some postop hypotension.  I did discuss with the patient's  that given her previous sensitivity to anesthetics and anesthesia as well as her age she is at risk for delirium, we reviewed the risk of delirium and will monitor closely.    Discussed with general surgery, patient underwent appendectomy on 6/25, progress diet as tolerated.   Possible DC in am if progressing well.     Postoperative hypotension- (present on admission)  Assessment & Plan  Patient's blood pressure meds have holding parameters, is receiving some IV fluids, will continue to monitor closely.  Discussed with nursing.    History of deep venous thrombosis- (present on admission)  Assessment & Plan  Apixaban has been held since 6/22 for surgical intervention.  Will discuss with surgery when appropriate to restart.    COPD (chronic obstructive pulmonary disease) (HCC)- (present on admission)  Assessment & Plan  Not currently in exacerbation.  Oxygen as needed, Respiratory protocol, Bronchodilators, Incentive spirometry     Essential hypertension, benign- (present on admission)  Assessment & Plan  Continue valsartan with hold parameters for hypotension          VTE prophylaxis: SCDs      "

## 2021-06-26 NOTE — PROGRESS NOTES
Patient up with staff assistance and use of walker to restroom with bowel movement noted. Patient states she has walker for home. Tylenol given as charted for patient complaints of abdominal discomfort. Patient abdomen noted as soft and round with tenderness noted upon palpation to abdomen. Patient sat to bedside chair for approximately 10 minutes followed by complaints of dizziness at time patient assisted back to bed. Call bell at side and patient advised to call for needs as they arise.

## 2021-06-26 NOTE — PROGRESS NOTES
"S:  85 y.o.female s/p laparoscopic appendectomy POD# 1.  Patient did well overnight.  Has been out of bed to chair but not ambulating.  Tolerating small amounts of food.  Passing stool and flatus.  Denies nausea vomiting.    O:  /55   Pulse 85   Temp 36.3 °C (97.3 °F) (Oral)   Resp 17   Ht 1.626 m (5' 4\")   Wt 64.4 kg (141 lb 15.6 oz)   SpO2 97%   I/O last 3 completed shifts:  In: 2824.3 [P.O.:500; I.V.:1724.3]  Out: 200 [Urine:200]  Recent Labs     06/25/21  0341 06/26/21  0358   SODIUM 140 137   POTASSIUM 3.5* 3.7   CHLORIDE 104 101   CO2 25 26   GLUCOSE 107* 106*   BUN 9 14   CREATININE 0.63 0.81   CALCIUM 8.7 8.1*     Recent Labs     06/25/21  0341 06/26/21  0358   WBC 7.2 10.5   RBC 3.69* 2.81*   HEMOGLOBIN 11.6* 9.2*   HEMATOCRIT 36.5* 28.3*   MCV 98.9* 100.7*   MCH 31.4 32.7   MCHC 31.8* 32.5*   RDW 45.2 45.7   PLATELETCT 189 206   MPV 11.5 11.7       Alert and Oriented x3, No Acute Distress  Normal Respiratory Effort  Abdomen soft, appropriately tender  Incisions/Bandages clean/dry/intact  Extremities warm and well perfused      A/P: Patient is doing well this morning  Pain control with multimodal pain control and judicious narcotics  Diet as tolerated  Fluids per primary  Ambulate tid and ad sheyla  Recommend continuation of antibiotics  We will repeat CBC this afternoon and continue to hold anticoagulation  Pedro Mcneil MD PhD  [facility]  Colon and Rectal Surgeon  [phone]    "

## 2021-06-26 NOTE — CARE PLAN
The patient is Stable - Low risk of patient condition declining or worsening    Shift Goals  Clinical Goals: Rest comfortably   Patient Goals: Go home 6/26    Progress made toward(s) clinical / shift goals:  Pt is able to rest comfortably and denies the need for pain medication at this time.     Patient is not progressing towards the following goals: n/a       Problem: Pain - Standard  Goal: Alleviation of pain or a reduction in pain to the patient’s comfort goal  Outcome: Progressing     Problem: Knowledge Deficit - Standard  Goal: Patient and family/care givers will demonstrate understanding of plan of care, disease process/condition, diagnostic tests and medications  Outcome: Progressing     Problem: Fall Risk  Goal: Patient will remain free from falls  Outcome: Progressing

## 2021-06-26 NOTE — PROGRESS NOTES
1930: Received report from Day shift RN. Pt is laying in bed, A+OX4 , showing no signs of distress. Pt c/o 0/10 pain. VSS. CMS intact. Lap stabs x3, CDI. Pt up to the bathroom with SBA. Pt educated on POC. Call light and belongings at bedside and within reach. All questions answered at this time. Rounding in place

## 2021-06-27 PROBLEM — D64.9 ANEMIA: Status: ACTIVE | Noted: 2021-06-27

## 2021-06-27 LAB
ANION GAP SERPL CALC-SCNC: 8 MMOL/L (ref 7–16)
BASOPHILS # BLD AUTO: 0.6 % (ref 0–1.8)
BASOPHILS # BLD: 0.04 K/UL (ref 0–0.12)
BUN SERPL-MCNC: 13 MG/DL (ref 8–22)
CALCIUM SERPL-MCNC: 8.1 MG/DL (ref 8.4–10.2)
CHLORIDE SERPL-SCNC: 102 MMOL/L (ref 96–112)
CO2 SERPL-SCNC: 26 MMOL/L (ref 20–33)
CREAT SERPL-MCNC: 0.63 MG/DL (ref 0.5–1.4)
EOSINOPHIL # BLD AUTO: 0.27 K/UL (ref 0–0.51)
EOSINOPHIL NFR BLD: 3.7 % (ref 0–6.9)
ERYTHROCYTE [DISTWIDTH] IN BLOOD BY AUTOMATED COUNT: 45.3 FL (ref 35.9–50)
ERYTHROCYTE [DISTWIDTH] IN BLOOD BY AUTOMATED COUNT: 45.3 FL (ref 35.9–50)
GLUCOSE SERPL-MCNC: 105 MG/DL (ref 65–99)
HCT VFR BLD AUTO: 26.3 % (ref 37–47)
HCT VFR BLD AUTO: 26.3 % (ref 37–47)
HGB BLD-MCNC: 8.4 G/DL (ref 12–16)
HGB BLD-MCNC: 8.4 G/DL (ref 12–16)
IMM GRANULOCYTES # BLD AUTO: 0.03 K/UL (ref 0–0.11)
IMM GRANULOCYTES NFR BLD AUTO: 0.4 % (ref 0–0.9)
LYMPHOCYTES # BLD AUTO: 0.55 K/UL (ref 1–4.8)
LYMPHOCYTES NFR BLD: 7.6 % (ref 22–41)
MAGNESIUM SERPL-MCNC: 1.7 MG/DL (ref 1.5–2.5)
MCH RBC QN AUTO: 32.4 PG (ref 27–33)
MCH RBC QN AUTO: 32.4 PG (ref 27–33)
MCHC RBC AUTO-ENTMCNC: 31.9 G/DL (ref 33.6–35)
MCHC RBC AUTO-ENTMCNC: 31.9 G/DL (ref 33.6–35)
MCV RBC AUTO: 101.5 FL (ref 81.4–97.8)
MCV RBC AUTO: 101.5 FL (ref 81.4–97.8)
MONOCYTES # BLD AUTO: 0.67 K/UL (ref 0–0.85)
MONOCYTES NFR BLD AUTO: 9.2 % (ref 0–13.4)
NEUTROPHILS # BLD AUTO: 5.71 K/UL (ref 2–7.15)
NEUTROPHILS NFR BLD: 78.5 % (ref 44–72)
NRBC # BLD AUTO: 0 K/UL
NRBC BLD-RTO: 0 /100 WBC
PLATELET # BLD AUTO: 194 K/UL (ref 164–446)
PLATELET # BLD AUTO: 194 K/UL (ref 164–446)
PMV BLD AUTO: 11.6 FL (ref 9–12.9)
PMV BLD AUTO: 11.6 FL (ref 9–12.9)
POTASSIUM SERPL-SCNC: 3.6 MMOL/L (ref 3.6–5.5)
RBC # BLD AUTO: 2.59 M/UL (ref 4.2–5.4)
RBC # BLD AUTO: 2.59 M/UL (ref 4.2–5.4)
SODIUM SERPL-SCNC: 136 MMOL/L (ref 135–145)
WBC # BLD AUTO: 7.4 K/UL (ref 4.8–10.8)
WBC # BLD AUTO: 7.4 K/UL (ref 4.8–10.8)

## 2021-06-27 PROCEDURE — 97161 PT EVAL LOW COMPLEX 20 MIN: CPT

## 2021-06-27 PROCEDURE — 80048 BASIC METABOLIC PNL TOTAL CA: CPT

## 2021-06-27 PROCEDURE — 85025 COMPLETE CBC W/AUTO DIFF WBC: CPT

## 2021-06-27 PROCEDURE — 36415 COLL VENOUS BLD VENIPUNCTURE: CPT

## 2021-06-27 PROCEDURE — A9270 NON-COVERED ITEM OR SERVICE: HCPCS | Performed by: HOSPITALIST

## 2021-06-27 PROCEDURE — 770006 HCHG ROOM/CARE - MED/SURG/GYN SEMI*

## 2021-06-27 PROCEDURE — 700102 HCHG RX REV CODE 250 W/ 637 OVERRIDE(OP): Performed by: HOSPITALIST

## 2021-06-27 PROCEDURE — 99233 SBSQ HOSP IP/OBS HIGH 50: CPT | Performed by: INTERNAL MEDICINE

## 2021-06-27 PROCEDURE — 83735 ASSAY OF MAGNESIUM: CPT

## 2021-06-27 PROCEDURE — A9270 NON-COVERED ITEM OR SERVICE: HCPCS | Performed by: INTERNAL MEDICINE

## 2021-06-27 PROCEDURE — 700102 HCHG RX REV CODE 250 W/ 637 OVERRIDE(OP): Performed by: INTERNAL MEDICINE

## 2021-06-27 RX ORDER — METRONIDAZOLE 500 MG/1
500 TABLET ORAL EVERY 8 HOURS
Status: DISCONTINUED | OUTPATIENT
Start: 2021-06-27 | End: 2021-06-28 | Stop reason: HOSPADM

## 2021-06-27 RX ADMIN — DOCUSATE SODIUM 50 MG AND SENNOSIDES 8.6 MG 2 TABLET: 8.6; 5 TABLET, FILM COATED ORAL at 17:29

## 2021-06-27 RX ADMIN — ACETAMINOPHEN 650 MG: 325 TABLET, FILM COATED ORAL at 06:28

## 2021-06-27 RX ADMIN — METRONIDAZOLE 500 MG: 500 TABLET ORAL at 15:31

## 2021-06-27 RX ADMIN — METRONIDAZOLE 500 MG: 500 TABLET ORAL at 22:27

## 2021-06-27 RX ADMIN — METRONIDAZOLE 500 MG: 500 TABLET ORAL at 06:28

## 2021-06-27 ASSESSMENT — GAIT ASSESSMENTS
ASSISTIVE DEVICE: FRONT WHEEL WALKER
DISTANCE (FEET): 75
GAIT LEVEL OF ASSIST: MINIMAL ASSIST
DEVIATION: SHUFFLED GAIT;BRADYKINETIC

## 2021-06-27 ASSESSMENT — PAIN DESCRIPTION - PAIN TYPE
TYPE: SURGICAL PAIN
TYPE: ACUTE PAIN;SURGICAL PAIN
TYPE: SURGICAL PAIN

## 2021-06-27 ASSESSMENT — COGNITIVE AND FUNCTIONAL STATUS - GENERAL
TURNING FROM BACK TO SIDE WHILE IN FLAT BAD: A LOT
CLIMB 3 TO 5 STEPS WITH RAILING: A LITTLE
MOVING FROM LYING ON BACK TO SITTING ON SIDE OF FLAT BED: A LITTLE
MOBILITY SCORE: 16
MOVING TO AND FROM BED TO CHAIR: A LOT
SUGGESTED CMS G CODE MODIFIER MOBILITY: CK
WALKING IN HOSPITAL ROOM: A LITTLE
STANDING UP FROM CHAIR USING ARMS: A LITTLE

## 2021-06-27 ASSESSMENT — ENCOUNTER SYMPTOMS
NAUSEA: 1
ABDOMINAL PAIN: 1

## 2021-06-27 NOTE — PROGRESS NOTES
"Hospital Medicine Daily Progress Note    Date of Service  6/27/2021    Chief Complaint  85 y.o. female admitted 6/22/2021 with abdominal pain    Hospital Course  Per notes, \"85 y.o. female with a past medical history of hypertension, chronic obstructive pulmonary disease, history of deep vein thrombosis on anticoagulation with apixaban who presented 6/22/2021 with abdominal pain.  Pain is mostly in the right lower quadrant.  Patient reports that pain started the day prior to admission.  She described the pain as severe rated as 10 out of 10, sharp in nature.  No radiation to other places no aggravating or relieving factors.  She denies having fevers chills cough or shortness of breath.  She denies having diarrhea or vomiting.\"      Interval Problem Update  6/25: During patient's procedure  was at bedside, I was able to give him updates regarding the plan.  Previously had not received a request to provide updates to family from patient, nor the available during rounds.  I did explain to the  that the patient has been seen and updated on plan daily, with additional updates for desired by family I am available to answer any questions, at the patient's request as she has been awake alert and oriented.  All questions and concerns were answered.    6/26: Patient underwent appendectomy on 6/25/2021, and had some hypotension after. This am still feeling a little \"tired\" and weak. Will continue to monitor, if progressing well, possible DC in 24h.     6/27: Seen and examined this morning bedside, still feeling very weak.  Encourage ambulation, up 3 times daily.  PT recommended home health.  Discussed with surgery, can discharge if hemoglobin is stable.  Start requiring some O2, continue to encourage incentive spirometry.    Consultants/Specialty  General surgery    Code Status  Full Code    Disposition  Anticipated discharge to home in 24 h      Review of Systems  Review of Systems   Gastrointestinal: Positive " for abdominal pain and nausea.   All other systems reviewed and are negative.       Physical Exam  Temp:  [36.3 °C (97.4 °F)-37.1 °C (98.7 °F)] 36.3 °C (97.4 °F)  Pulse:  [81-92] 83  Resp:  [16-20] 18  BP: (106-129)/(54-66) 120/58  SpO2:  [73 %-95 %] 95 %    Physical Exam  Vitals and nursing note reviewed.   Constitutional:       Appearance: Normal appearance.   Cardiovascular:      Rate and Rhythm: Normal rate and regular rhythm.      Pulses: Normal pulses.      Heart sounds: Normal heart sounds.   Pulmonary:      Effort: Pulmonary effort is normal.      Breath sounds: Normal breath sounds.   Abdominal:      General: Abdomen is flat. Bowel sounds are normal. There is no distension.      Palpations: There is no mass.      Tenderness: There is no abdominal tenderness. There is no guarding or rebound.   Musculoskeletal:      Right lower leg: No edema.      Left lower leg: No edema.   Skin:     General: Skin is warm and dry.   Neurological:      General: No focal deficit present.      Mental Status: She is alert and oriented to person, place, and time. Mental status is at baseline.         Fluids    Intake/Output Summary (Last 24 hours) at 6/27/2021 1401  Last data filed at 6/27/2021 0003  Gross per 24 hour   Intake 100 ml   Output --   Net 100 ml       Laboratory  Recent Labs     06/26/21  0358 06/26/21  1137 06/27/21  0208   WBC 10.5 9.6 7.4  7.4   RBC 2.81* 2.79* 2.59*  2.59*   HEMOGLOBIN 9.2* 8.9* 8.4*  8.4*   HEMATOCRIT 28.3* 27.9* 26.3*  26.3*   .7* 100.0* 101.5*  101.5*   MCH 32.7 31.9 32.4  32.4   MCHC 32.5* 31.9* 31.9*  31.9*   RDW 45.7 45.2 45.3  45.3   PLATELETCT 206 204 194  194   MPV 11.7 10.6 11.6  11.6     Recent Labs     06/25/21  0341 06/26/21  0358 06/27/21  0208   SODIUM 140 137 136   POTASSIUM 3.5* 3.7 3.6   CHLORIDE 104 101 102   CO2 25 26 26   GLUCOSE 107* 106* 105*   BUN 9 14 13   CREATININE 0.63 0.81 0.63   CALCIUM 8.7 8.1* 8.1*                   Imaging  CT-ABDOMEN-PELVIS WITH  "  Final Result      1.  Acute appendicitis.  No evidence for perforation.   2.  Extrahepatic biliary dilation, unchanged from prior.   3.  Multiple liver cysts, similar to prior.  No further evaluation necessary.   4.  Moderate hiatal hernia.           Assessment/Plan  * Appendicitis, acute- (present on admission)  Assessment & Plan  Started on ceftriaxone and metronidazole in the emergency department, continue for now follow on cultures and sensitivities  Takes apixaban for history of venous thrombosis, last dose 6/22 AM, will hold  Revised Cardiac Risk Index for Pre-Operative Risk score of:  2 points Class III Risk  The patient has a 10.1 % 30-day risk of death, MI, or cardiac arrest   Patient reports \"very sensitive to anesthetics, and had prolonged recovery after anesthesia on prior surgeries\", and if surgery is indicated ensure anesthesiologist are aware.  She does have some postop hypotension.  I did discuss with the patient's  that given her previous sensitivity to anesthetics and anesthesia as well as her age she is at risk for delirium, we reviewed the risk of delirium and will monitor closely.    Discussed with general surgery, patient underwent appendectomy on 6/25, progress diet as tolerated.  Patient had slight drop in hemoglobin, will continue to monitor closely, for surgery if stable can discharge in the morning    PT recommending home health.    Anemia- (present on admission)  Assessment & Plan  Postoperative anemia, no signs of acute bleed, continue to monitor    Postoperative hypotension- (present on admission)  Assessment & Plan  Patient's blood pressure meds have holding parameters, is receiving some IV fluids, will continue to monitor closely.  Discussed with nursing.    History of deep venous thrombosis- (present on admission)  Assessment & Plan  Apixaban has been held since 6/22 for surgical intervention.  Will discuss with surgery when appropriate to restart.    COPD (chronic " obstructive pulmonary disease) (HCC)- (present on admission)  Assessment & Plan  Not currently in exacerbation.  Oxygen as needed, Respiratory protocol, Bronchodilators, Incentive spirometry   We will need outpatient pulmonary referral    Essential hypertension, benign- (present on admission)  Assessment & Plan  Continue valsartan with hold parameters for hypotension          VTE prophylaxis: SCDs

## 2021-06-27 NOTE — CARE PLAN
The patient is Stable - Low risk of patient condition declining or worsening    Shift Goals  Clinical Goals: Pt's vitals will be stable and titrate O2 to baseline as tolerated.  Patient Goals: Pain relief    Progress made toward(s) clinical / shift goals:  Pt's vitals signs stable overnight. O2 titrated to 1L NC from 3L.    Patient is not progressing towards the following goals:      Problem: Pain - Standard  Goal: Alleviation of pain or a reduction in pain to the patient’s comfort goal  Outcome: Progressing     Problem: Fall Risk  Goal: Patient will remain free from falls  Outcome: Progressing

## 2021-06-27 NOTE — PROGRESS NOTES
Received bedside report. Assumed pt care. Assessment and chart check complete. Pt is AAOX4, no signs of distress, denies N/V. Ambulates to bathroom. Lap stabs in abdomen X3 dermabond, CDI. Fall precautions in place, treaded socks on pt, bed in low position. Call light within reach. Educated pt to call if needing anything. Hourly rounding.

## 2021-06-27 NOTE — CARE PLAN
The patient is Stable - Low risk of patient condition declining or worsening    Shift Goals  Clinical Goals: manage pain and mobility  Patient Goals: rest and pain relief    Progress made toward(s) clinical / shift goals:  Pt is educated to call for pain medication when needed.                      Ambulating in room with stand by assist.    Patient is not progressing towards the following goals:      Problem: Pain - Standard  Goal: Alleviation of pain or a reduction in pain to the patient’s comfort goal  Outcome: Progressing     Problem: Fall Risk  Goal: Patient will remain free from falls  Outcome: Progressing     Problem: Mobility  Goal: Patient's capacity to carry out activities will improve  Outcome: Progressing

## 2021-06-27 NOTE — PROGRESS NOTES
"S:  85 y.o.female s/p laparoscopic appendectomy POD# .  Overnight no acute events.  Has been out of bed to chair but not ambulating.  Tolerating small amounts of food.  Passing stool and flatus.  Denies nausea or vomiting but the patient is having belching.    O:  /58   Pulse 83   Temp 36.3 °C (97.4 °F) (Oral)   Resp 18   Ht 1.626 m (5' 4\")   Wt 64.4 kg (141 lb 15.6 oz)   SpO2 95%   I/O last 3 completed shifts:  In: 2824.3 [P.O.:500; I.V.:1724.3]  Out: 200 [Urine:200]  Recent Labs     06/25/21  0341 06/26/21  0358 06/27/21  0208   SODIUM 140 137 136   POTASSIUM 3.5* 3.7 3.6   CHLORIDE 104 101 102   CO2 25 26 26   GLUCOSE 107* 106* 105*   BUN 9 14 13   CREATININE 0.63 0.81 0.63   CALCIUM 8.7 8.1* 8.1*     Recent Labs     06/26/21  0358 06/26/21  1137 06/27/21  0208   WBC 10.5 9.6 7.4  7.4   RBC 2.81* 2.79* 2.59*  2.59*   HEMOGLOBIN 9.2* 8.9* 8.4*  8.4*   HEMATOCRIT 28.3* 27.9* 26.3*  26.3*   .7* 100.0* 101.5*  101.5*   MCH 32.7 31.9 32.4  32.4   MCHC 32.5* 31.9* 31.9*  31.9*   RDW 45.7 45.2 45.3  45.3   PLATELETCT 206 204 194  194   MPV 11.7 10.6 11.6  11.6       Alert and Oriented x3, No Acute Distress  Normal Respiratory Effort  Abdomen soft, appropriately tender  Incisions/Bandages clean/dry/intact  Extremities warm and well perfused      A/P: Patient is feeling poorly this morning  Pain control with multimodal pain control and judicious narcotics  Diet as tolerated  Fluids per primary  Ambulate tid and ad sheyla  Recommend continuation of antibiotics  Her hemoglobin has fell we will continue to hold anticoagulation despite the risk of clot formation as the patient has a larger risk for bleeding postoperatively.  Repeat CBC at noon.  Discharge planning for when hemoglobin is stabilized and the patient is tolerating adequate by mouth intake.  Pedro Mcneil MD PhD  [facility]  Colon and Rectal Surgeon  [phone]    "

## 2021-06-27 NOTE — FACE TO FACE
Face to Face Supporting Documentation - Home Health    The encounter with this patient was in whole or in part the primary reason for home health admission.    Date of encounter:   Patient:                    MRN:                       YOB: 2021  Licha Pope  [id number]  1935     Home health to see patient for:  Skilled Nursing care for assessment, interventions & education, Physical Therapy evaluation and treatment and Occupational therapy evaluation and treatment    Skilled need for:  Surgical Aftercare Appendectomy, pt/ot    Skilled nursing interventions to include:  Comment: PT/OT    Homebound status evidenced by:  Need the aid of supportive devices such as crutches, canes, wheelchairs or walkers or Needs the assistance of another person in order to leave the home. Leaving home requires a considerable and taxing effort. There is a normal inability to leave the home.    Community Physician to provide follow up care: Elza Morales M.D.     Optional Interventions? No      I certify the face to face encounter for this home health care referral meets the CMS requirements and the encounter/clinical assessment with the patient was, in whole, or in part, for the medical condition(s) listed above, which is the primary reason for home health care. Based on my clinical findings: the service(s) are medically necessary, support the need for home health care, and the homebound criteria are met.  I certify that this patient has had a face to face encounter by myself.  Nabil Delaney M.D. - NPI: [id number]

## 2021-06-27 NOTE — THERAPY
"Physical Therapy   Initial Evaluation     Patient Name: Licha Pope  Age:  85 y.o., Sex:  female  Medical Record #: [id number]  Today's Date: 6/27/2021     Precautions: Fall Risk (abdominal pxs)    Assessment  Patient is 85 y.o. female s/p appendectomy 6/25 presenting with post- op pain and decreased activity tolerance. Pt c/o feeling weak and tired at beginning of eval, while ambulating with FWW c/o feeling \"woozy\".  Pt does not appear to be ready for DC today, recommend continued acute PT to activity tolerance and independence. D/W RN and rec pt amb 2x more today at least and OOB to chair for meals. PT will f/u tomorrow, pt states would like to DC home tomorrow if feeling better. Pt has supportive spouse ath ome however he uses a SPC himself so pt will need to be moving better to DC home safely.     Plan    Recommend Physical Therapy 5 times per week until therapy goals are met for the following treatments:  Bed Mobility, Gait Training, Neuro Re-Education / Balance, Stair Training, Therapeutic Activities and Therapeutic Exercises    DC Equipment Recommendations: Unable to determine at this time  Discharge Recommendations: Recommend home health for continued physical therapy services        06/27/21 1040   Prior Living Situation   Housing / Facility 1 Story House   Steps Into Home 3   Steps In Home 0   Rail None   Equipment Owned Front-Wheel Walker;Single Point Cane   Lives with - Patient's Self Care Capacity Spouse   Comments Spouse uses a SPC at baseline and appears    Prior Level of Functional Mobility   Bed Mobility Independent   Transfer Status Independent   Ambulation Independent   Assistive Devices Used None   Stairs Independent   Cognition    Level of Consciousness Alert   Comments Pt slow to respond to questions, [place]   Passive ROM Lower Body   Passive ROM Lower Body Not Tested   Active ROM Lower Body    Active ROM Lower Body  WDL   Strength Lower Body   Lower Body Strength  WDL   Balance Assessment " "  Sitting Balance (Static) Fair +   Sitting Balance (Dynamic) Fair   Standing Balance (Static) Fair   Standing Balance (Dynamic) Fair -   Weight Shift Sitting Fair   Weight Shift Standing Fair   Comments stdg with FWW   Gait Analysis   Gait Level Of Assist Minimal Assist   Assistive Device Front Wheel Walker   Distance (Feet) 75   # of Times Distance was Traveled 1   Deviation Shuffled Gait;Bradykinetic   Comments c/o feeling \"woozy\" while ambulating   Bed Mobility    Supine to Sit Moderate Assist  (log roll, no rail)   Functional Mobility   Sit to Stand Minimal Assist   Bed, Chair, Wheelchair Transfer Minimal Assist   Transfer Method Stand Step   Activity Tolerance   Sitting in Chair as tolerated   Standing 5 min   Short Term Goals    Short Term Goal # 1 Pt will be able to perform bed mobility and sup <> sit Jeff in 6 visits.   Short Term Goal # 2 Pt will be able to perform sit < >stand and transfer Jeff in 6 visits socan DC home safely.   Short Term Goal # 3 Pt will be able to ambulate 200 ft with FWW Jeff in 6 visits so can DC home safely.   Short Term Goal # 4 Pt will be able to go up/down 3 steps Farhat in 6 visits so can enter home safely.       "

## 2021-06-27 NOTE — PROGRESS NOTES
Pt accidentally pulled IV line when coming back from bathroom to bed. Dr. Benoit notified and asked if pt needed to have another IV line. Pt has been tolerating regular diet and drinking oral fluids. MD stated he'll order Flagyl tablet instead, and okay to leave IV out, since pt is planned to d/c in the AM. Pt resting w/o reports of discomfort. Denies nausea, SOB. Safety and comfort measures in place. No other needs at this time.

## 2021-06-28 VITALS
HEIGHT: 64 IN | OXYGEN SATURATION: 93 % | BODY MASS INDEX: 24.24 KG/M2 | HEART RATE: 85 BPM | RESPIRATION RATE: 18 BRPM | DIASTOLIC BLOOD PRESSURE: 66 MMHG | WEIGHT: 141.98 LBS | SYSTOLIC BLOOD PRESSURE: 137 MMHG | TEMPERATURE: 98.8 F

## 2021-06-28 PROBLEM — K35.80 APPENDICITIS, ACUTE: Status: RESOLVED | Noted: 2021-06-22 | Resolved: 2021-06-28

## 2021-06-28 PROBLEM — I95.81 POSTOPERATIVE HYPOTENSION: Status: RESOLVED | Noted: 2021-06-25 | Resolved: 2021-06-28

## 2021-06-28 LAB
ANION GAP SERPL CALC-SCNC: 10 MMOL/L (ref 7–16)
BUN SERPL-MCNC: 9 MG/DL (ref 8–22)
CALCIUM SERPL-MCNC: 8.3 MG/DL (ref 8.4–10.2)
CHLORIDE SERPL-SCNC: 98 MMOL/L (ref 96–112)
CO2 SERPL-SCNC: 26 MMOL/L (ref 20–33)
CREAT SERPL-MCNC: 0.53 MG/DL (ref 0.5–1.4)
ERYTHROCYTE [DISTWIDTH] IN BLOOD BY AUTOMATED COUNT: 44.6 FL (ref 35.9–50)
GLUCOSE SERPL-MCNC: 93 MG/DL (ref 65–99)
HCT VFR BLD AUTO: 26.7 % (ref 37–47)
HGB BLD-MCNC: 8.6 G/DL (ref 12–16)
MCH RBC QN AUTO: 32 PG (ref 27–33)
MCHC RBC AUTO-ENTMCNC: 32.2 G/DL (ref 33.6–35)
MCV RBC AUTO: 99.3 FL (ref 81.4–97.8)
PLATELET # BLD AUTO: 220 K/UL (ref 164–446)
PMV BLD AUTO: 11.2 FL (ref 9–12.9)
POTASSIUM SERPL-SCNC: 3.5 MMOL/L (ref 3.6–5.5)
RBC # BLD AUTO: 2.69 M/UL (ref 4.2–5.4)
SODIUM SERPL-SCNC: 134 MMOL/L (ref 135–145)
WBC # BLD AUTO: 7.2 K/UL (ref 4.8–10.8)

## 2021-06-28 PROCEDURE — 85027 COMPLETE CBC AUTOMATED: CPT

## 2021-06-28 PROCEDURE — 36415 COLL VENOUS BLD VENIPUNCTURE: CPT

## 2021-06-28 PROCEDURE — 700102 HCHG RX REV CODE 250 W/ 637 OVERRIDE(OP): Performed by: HOSPITALIST

## 2021-06-28 PROCEDURE — A9270 NON-COVERED ITEM OR SERVICE: HCPCS | Performed by: INTERNAL MEDICINE

## 2021-06-28 PROCEDURE — 700102 HCHG RX REV CODE 250 W/ 637 OVERRIDE(OP): Performed by: INTERNAL MEDICINE

## 2021-06-28 PROCEDURE — 80048 BASIC METABOLIC PNL TOTAL CA: CPT

## 2021-06-28 PROCEDURE — A9270 NON-COVERED ITEM OR SERVICE: HCPCS | Performed by: HOSPITALIST

## 2021-06-28 PROCEDURE — 99239 HOSP IP/OBS DSCHRG MGMT >30: CPT | Performed by: INTERNAL MEDICINE

## 2021-06-28 RX ADMIN — VALSARTAN 160 MG: 80 TABLET, FILM COATED ORAL at 06:27

## 2021-06-28 RX ADMIN — DOCUSATE SODIUM 50 MG AND SENNOSIDES 8.6 MG 2 TABLET: 8.6; 5 TABLET, FILM COATED ORAL at 06:27

## 2021-06-28 RX ADMIN — METRONIDAZOLE 500 MG: 500 TABLET ORAL at 06:26

## 2021-06-28 ASSESSMENT — COGNITIVE AND FUNCTIONAL STATUS - GENERAL
MOVING TO AND FROM BED TO CHAIR: A LOT
MOBILITY SCORE: 16
TURNING FROM BACK TO SIDE WHILE IN FLAT BAD: A LOT
MOVING FROM LYING ON BACK TO SITTING ON SIDE OF FLAT BED: A LITTLE
STANDING UP FROM CHAIR USING ARMS: A LITTLE
CLIMB 3 TO 5 STEPS WITH RAILING: A LITTLE
WALKING IN HOSPITAL ROOM: A LITTLE
SUGGESTED CMS G CODE MODIFIER MOBILITY: CK

## 2021-06-28 ASSESSMENT — GAIT ASSESSMENTS
DEVIATION: BRADYKINETIC;SHUFFLED GAIT
DISTANCE (FEET): 100
ASSISTIVE DEVICE: FRONT WHEEL WALKER
GAIT LEVEL OF ASSIST: SUPERVISED

## 2021-06-28 ASSESSMENT — PAIN DESCRIPTION - PAIN TYPE: TYPE: SURGICAL PAIN

## 2021-06-28 NOTE — DISCHARGE SUMMARY
"Discharge Summary    CHIEF COMPLAINT ON ADMISSION  Chief Complaint   Patient presents with   • RLQ Pain       Reason for Admission  Abdominal Pain      Admission Date  6/22/2021    CODE STATUS  Full Code    HPI & HOSPITAL COURSE  Per notes, \"85 y.o. female with a past medical history of hypertension, chronic obstructive pulmonary disease, history of deep vein thrombosis on anticoagulation with apixaban who presented 6/22/2021 with abdominal pain.  Pain is mostly in the right lower quadrant.  Patient reports that pain started the day prior to admission.  She described the pain as severe rated as 10 out of 10, sharp in nature.  No radiation to other places no aggravating or relieving factors.  She denies having fevers chills cough or shortness of breath.  She denies having diarrhea or vomiting.\"    Patient was admitted and started on IV antibiotics for appendicitis.  She was evaluated by general surgery and the decision for appendectomy was made, however she was taking Eliquis and it was necessary to wait for washout..  She underwent an uncomplicated appendectomy on 6/25/2021.  During the postoperative period she did have some anemia which was closely monitored with daily labs, also had some hypotension and some respiratory failure, requiring oxygen.  She was evaluated by physical therapy recommended home with home health.  She was able to wean off oxygen and anemia was stable.  I discussed all findings and treatment plans in detail with the patient and her .    Therefore, she is discharged in good and stable condition to home with close outpatient follow-up.    The patient met 2-midnight criteria for an inpatient stay at the time of discharge.    Discharge Date  6/28/2021    FOLLOW UP ITEMS POST DISCHARGE  Follow-up with PCP for lab check in 1 to 2 weeks    DISCHARGE DIAGNOSES  Principal Problem (Resolved):    Appendicitis, acute POA: Yes  Active Problems:    Essential hypertension, benign POA: Yes    COPD " (chronic obstructive pulmonary disease) (HCC) POA: Yes    History of deep venous thrombosis POA: Yes    Anemia POA: Yes  Resolved Problems:    Postoperative hypotension POA: Yes      FOLLOW UP  Future Appointments   Date Time Provider Department Center   8/12/2021  1:30 PM Elza Morales M.D. [facility]     No follow-up provider specified.    MEDICATIONS ON DISCHARGE     Medication List      CONTINUE taking these medications      Instructions   apixaban 5mg Tabs  Commonly known as: ELIQUIS   Take 1 tablet by mouth 2 times a day.  Dose: 1 tablet     benzonatate 200 MG capsule  Commonly known as: TESSALON   Take 1 Cap by mouth 3 times a day as needed.  Dose: 200 mg     CALCIUM-MAGNESIUM-ZINC PO   Take 1 tablet by mouth every day.  Dose: 1 tablet     D3 PO   Take 1 capsule by mouth every day.  Dose: 1 capsule     febuxostat 40 MG Tabs  Commonly known as: ULORIC   Take 40 mg by mouth every day.  Dose: 40 mg     GLUCOSAMINE HCL-MSM PO   Take 2 Tablets by mouth every day.  Dose: 2 tablet     LUTEIN-ZEAXANTHIN PO   Take 1 tablet by mouth every day.  Dose: 1 tablet     OSTEO BI-FLEX ONE PER DAY PO   Take 2 Tablets by mouth every day.  Dose: 2 tablet     therapeutic multivitamin-minerals Tabs   Take 1 tablet by mouth every day.  Dose: 1 tablet     TURMERIC PO   Take 2 Capsules by mouth every day.  Dose: 2 capsule     valsartan 160 MG Tabs  Commonly known as: DIOVAN   Take 160 mg by mouth every day.  Dose: 160 mg     VITAMIN C PO   Take 1 tablet by mouth every day.  Dose: 1 tablet            Allergies  Allergies   Allergen Reactions   • Codeine Vomiting   • Demerol Vomiting     Injectable type   • Shellfish Allergy Shortness of Breath     Soft shell   • Ciprofloxacin Rash     rash   • Iodine      PATIENT ALLERGIC TO SHELLFISH, NOT SURE IF SHE IS ALLERGIC TO IODINE       DIET  Orders Placed This Encounter   Procedures   • Diet Order Diet: Regular     Standing Status:   Standing     Number of Occurrences:   1     Order  Specific Question:   Diet:     Answer:   Regular [1]       ACTIVITY  As tolerated.  Weight bearing as tolerated    CONSULTATIONS  General surgery    PROCEDURES  Appendectomy    LABORATORY  Lab Results   Component Value Date    SODIUM 134 (L) 06/28/2021    POTASSIUM 3.5 (L) 06/28/2021    CHLORIDE 98 06/28/2021    CO2 26 06/28/2021    GLUCOSE 93 06/28/2021    BUN 9 06/28/2021    CREATININE 0.53 06/28/2021        Lab Results   Component Value Date    WBC 7.2 06/28/2021    HEMOGLOBIN 8.6 (L) 06/28/2021    HEMATOCRIT 26.7 (L) 06/28/2021    PLATELETCT 220 06/28/2021        Total time of the discharge process exceeds 40 minutes.

## 2021-06-28 NOTE — PROGRESS NOTES
Received bedside report. Assumed pt care. Assessment and chart check complete. Pt is AAOX4, no signs of distress, denies N/V. Ambulates to bathroom and hallway. Lap stabs in abdomen X3, dermabond CDI. Fall precautions in place, treaded socks on pt, bed in low position. Call light within reach. Educated pt to call if needing anything. Hourly rounding.

## 2021-06-28 NOTE — DISCHARGE PLANNING
Anticipated Discharge Disposition: Home with HH     Action: Jacinta PAULSON was able to collect HH CHOICE. Pt provided referrals be placed to #1- [facility] #2- [facility] #3- [facility].     LSW faxed HH CHOICE to Lupe ZIMMER.     Pt discussed during morning rounds. Per Dr. Delaney it will be attempted to wean pt off of O2.     Barriers to Discharge: None     Plan: Await HH acceptance, LSW to assist as needed     Addendum 0956  LSW met with pt at bedside to provide copy of IMM.   Pt able to sign IMM.     Addendum 0947  LSW faxed signed IMM to Lupe ZIMMER.     Addendum 1017  LSW informed by RENÉ ZIMMER that pt has been accepted by [facility].

## 2021-06-28 NOTE — PROGRESS NOTES
Discharging patient home per MD order.  Pt demonstrated understanding of discharge instructions, follow up appointments, home medications, prescriptions, and home care for surgical wound. Pt is voiding without difficulty, pain well controlled, tolerating oral medications, O2 greater than 90%. Pt verbalized understanding of discharge instructions and educational handouts and all questions answered.  Pt discharged off unit with hospital escort.

## 2021-06-28 NOTE — DISCHARGE INSTRUCTIONS
Discharge Instructions    Discharged to home by car with relative. Discharged via wheelchair, hospital escort: Yes.  Special equipment needed: Not Applicable    Be sure to schedule a follow-up appointment with your primary care doctor or any specialists as instructed.     Discharge Plan:   Diet Plan: Discussed  Activity Level: Discussed  Confirmed Follow up Appointment: Patient to Call and Schedule Appointment  Confirmed Symptoms Management: Discussed  Medication Reconciliation Updated: No (Comments)    I understand that a diet low in cholesterol, fat, and sodium is recommended for good health. Unless I have been given specific instructions below for another diet, I accept this instruction as my diet prescription.   Other diet: Regular    Special Instructions: None    · Is patient discharged on Warfarin / Coumadin?   No     Depression / Suicide Risk    As you are discharged from this [facility] facility, it is important to learn how to keep safe from harming yourself.    Recognize the warning signs:  · Abrupt changes in personality, positive or negative- including increase in energy   · Giving away possessions  · Change in eating patterns- significant weight changes-  positive or negative  · Change in sleeping patterns- unable to sleep or sleeping all the time   · Unwillingness or inability to communicate  · Depression  · Unusual sadness, discouragement and loneliness  · Talk of wanting to die  · Neglect of personal appearance   · Rebelliousness- reckless behavior  · Withdrawal from people/activities they love  · Confusion- inability to concentrate     If you or a loved one observes any of these behaviors or has concerns about self-harm, here's what you can do:  · Talk about it- your feelings and reasons for harming yourself  · Remove any means that you might use to hurt yourself (examples: pills, rope, extension cords, firearm)  · Get professional help from the community (Mental Health, Substance Abuse,  psychological counseling)  · Do not be alone:Call your Safe Contact- someone whom you trust who will be there for you.  · Call your local CRISIS HOTLINE 811-9402 or 150-481-6488  · Call your local Children's Mobile Crisis Response Team [place] (213) 438-1959 or www.[employer]  · Call the toll free National Suicide Prevention Hotlines   · National Suicide Prevention Lifeline 212-812-FAMA (6987)  · [employer] Line Network 800-SUICIDE (814-2257)      Appendicitis, Adult    The appendix is a tube in the body that is shaped like a finger. It is attached to the large intestine. Appendicitis means that this tube is swollen (inflamed). If this is not treated, the tube can tear (rupture). This can lead to a life-threatening infection. This condition can also cause pus to build up in the appendix (abscess).  What are the causes?  This condition may be caused by something that blocks the appendix. These include:  · A ball of poop (stool).  · Lymph glands that are bigger than normal.  Sometimes the cause is not known.  What increases the risk?  You are more likely to develop this condition if you are between 10 and 30 years of age.  What are the signs or symptoms?  Symptoms of this condition include:  · Pain around the belly button (navel).  ? The pain moves toward the lower right belly (abdomen).  ? The pain can get worse with time.  ? The pain can get worse if you cough.  ? The pain can get worse if you move suddenly.  · Tenderness in the lower right belly.  · Feeling sick to your stomach (nauseous).  · Throwing up (vomiting).  · Not feeling hungry (loss of appetite).  · A fever.  · Having trouble pooping (constipation).  · Watery poop (diarrhea).  · Not feeling well.  How is this treated?  Most often, this condition is treated by taking out the appendix (appendectomy). There are two ways to do this:  · Open surgery. For this method, the appendix is taken out through a large cut (incision). The cut is made in  the lower right belly. This surgery may be used if:  ? You have scars from another surgery.  ? You have a bleeding condition.  ? You are pregnant and will be having your baby soon.  ? You have a condition that makes it hard to do the other type of surgery.  · Laparoscopic surgery. For this method, the appendix is taken out through small cuts. Often, this surgery:  ? Causes less pain.  ? Causes fewer problems.  ? Is easier to heal from.  If your appendix tears and pus forms:  · A drain may be put into the sore. The drain will be used to get rid of the pus.  · You may get an antibiotic medicine through an IV line.  · Your appendix may or may not need to be taken out.  Follow these instructions at home:  If you had surgery, follow instructions from your doctor on how to care for yourself at home and how to take care of your cut from surgery.  Medicines  · Take over-the-counter and prescription medicines only as told by your doctor.  · If you were prescribed an antibiotic medicine, take it as told by your doctor. Do not stop taking the antibiotic even if you start to feel better.  Eating and drinking  Follow instructions from your doctor about what you cannot eat or drink. You may go back to your diet slowly if:  · You no longer feel sick to your stomach.  · You have stopped throwing up.  General instructions  · Do not use any products that contain nicotine or tobacco, such as cigarettes, e-cigarettes, and chewing tobacco. If you need help quitting, ask your doctor.  · Do not drive or use heavy machinery while taking prescription pain medicine.  · Ask your doctor if the medicine you are taking can cause trouble pooping. You may need to take steps to prevent or treat trouble pooping:  ? Drink enough fluid to keep your pee (urine) pale yellow.  ? Take over-the-counter or prescription medicines.  ? Eat foods that are high in fiber. These include beans, whole grains, and fresh fruits and vegetables.  ? Limit foods that are  high in fat and sugar. These include fried or sweet foods.  · Keep all follow-up visits as told by your doctor. This is important.  Contact a doctor if:  · There is pus, blood, or a lot of fluid coming from your cut or cuts from surgery.  · You are sick to your stomach or you throw up.  Get help right away if:  · You have pain in your belly, and the pain is getting worse.  · You have a fever.  · You have chills.  · You are very tired.  · You have muscle pain.  · You are short of breath.  Summary  · Appendicitis is swelling of the appendix. The appendix is a tube that is shaped like a finger. It is joined to the large intestine.  · This condition may be caused by something that blocks the appendix. This can lead to an infection.  · This condition is usually treated by taking out the appendix.  This information is not intended to replace advice given to you by your health care provider. Make sure you discuss any questions you have with your health care provider.  Document Released: 03/11/2013 Document Revised: 06/05/2019 Document Reviewed: 06/05/2019  Elsevier Patient Education © 2020 [employer]

## 2021-06-28 NOTE — CARE PLAN
The patient is Stable - Low risk of patient condition declining or worsening    Shift Goals  Clinical Goals: Manage surgical pain   Patient Goals: Able to have bowel movement     Progress made toward(s) clinical / shift goals: normoactive bowel sounds to x4 quads, pt had BM yesterday, passed flatus; able to ambulate to bathroom with standby assist and FWW     Problem: Pain - Standard  Goal: Alleviation of pain or a reduction in pain to the patient’s comfort goal  Outcome: Progressing  Note: Pt denied any pain upon assessment   Educated pt to report to the nurse if pt starts to experience any pain; pt verbalized understanding     Problem: Discharge Barriers/Planning  Goal: Patient's continuum of care needs are met  Outcome: Progressing  Note: Planning to discharge home today with home health

## 2021-06-28 NOTE — DISCHARGE PLANNING
Agency/Facility Name: [facility]  Spoke To: Angelica  Outcome: Pt Accepted. This DPA called and notified Angelica about pts D/C date and manually fax D/C summary.       LSW notified via Teams

## 2021-06-28 NOTE — THERAPY
Physical Therapy   Daily Treatment     Patient Name: Licha Pope  Age:  85 y.o., Sex:  female  Medical Record #: [id number]  Today's Date: 6/28/2021     Precautions: Fall Risk (abdominal pxs)    Assessment    Pt making progress with activity tolerance, able to ambulate safely with FWW. Pt still with some difficulty with sup <> sit due to abdominal pain, log rolling reviewed with pt, pt states may sleep in recliner for a few days. Pt reports spouse should be able to assist at home with bed mobility and ADLS, pt also reports has supportive son who can help with 3 steps to enter home and will be with her daily.  Recommend HHPT.     Plan    Patient discharged from facility.    DC Equipment Recommendations: None  Discharge Recommendations: Recommend home health for continued physical therapy services       06/28/21 1158   Cognition    Level of Consciousness Alert   Comments Pt reports spouse can assist with getting dressed at home, states has supportive son who will be able to help- he is retired   Balance   Sitting Balance (Static) Good   Sitting Balance (Dynamic) Fair   Standing Balance (Static) Fair   Standing Balance (Dynamic) Fair   Weight Shift Sitting Fair   Weight Shift Standing Fair   Skilled Intervention Postural Facilitation;Sequencing;Tactile Cuing;Verbal Cuing   Comments stdg with FWW   Gait Analysis   Gait Level Of Assist Supervised   Assistive Device Front Wheel Walker   Distance (Feet) 100   # of Times Distance was Traveled 2   Deviation Bradykinetic;Shuffled Gait   # of Stairs Climbed 3   Level of Assist with Stairs Minimal Assist  (CGA- used 1 railing- pt states has shelf at home to assist)   Bed Mobility    Supine to Sit Minimal Assist  (log roll instructed)   Rolling Minimum Assist to Lt.;Minimal Assist to Rt.  (without bed rail)   Skilled Intervention Verbal Cuing;Tactile Cuing;Sequencing   Functional Mobility   Sit to Stand Supervised   Bed, Chair, Wheelchair Transfer Supervised   Toilet Transfers  Supervised   Transfer Method Stand Step   Activity Tolerance   Standing O2 sats 94% after amb on RA   Short Term Goals    Short Term Goal # 1 Pt will be able to perform bed mobility and sup <> sit Jeff in 6 visits.   Goal Outcome # 1 Progressing as expected   Short Term Goal # 2 Pt will be able to perform sit < >stand and transfer Jeff in 6 visits socan DC home safely.   Goal Outcome # 2 Goal met   Short Term Goal # 3 Pt will be able to ambulate 200 ft with FWW Jeff in 6 visits so can DC home safely.   Goal Outcome # 3 Goal met   Short Term Goal # 4 Pt will be able to go up/down 3 steps Farhat in 6 visits so can enter home safely.   Goal Outcome # 4 Goal met

## 2021-06-28 NOTE — DISCHARGE PLANNING
Received Choice form at 0912  Agency/Facility Name: [facility]   Referral sent per Choice form @ 0957    LSW informed

## 2021-06-28 NOTE — PROGRESS NOTES
Received report from day shift nurse.   Assumed pt care  Pt is A&Ox4, resting comfortably in bed.   Pt on 1L of oxygen via NC, will wean pt off oxygen   No signs of SOB/respiratory distress.   Labs noted, VSS.   Pt denied any pain upon assessment   Assessment completed; x3 lap stabs to abdomen - dermabond, CDI   Fall precautions in place.   Bed locked & at lowest position.   Call light and personal belongings within reach.   Continue to monitor

## 2021-06-29 ENCOUNTER — APPOINTMENT (OUTPATIENT)
Dept: RADIOLOGY | Facility: MEDICAL CENTER | Age: 86
End: 2021-06-29
Attending: EMERGENCY MEDICINE
Payer: MEDICARE

## 2021-06-29 ENCOUNTER — HOSPITAL ENCOUNTER (OUTPATIENT)
Facility: MEDICAL CENTER | Age: 86
End: 2021-06-30
Attending: EMERGENCY MEDICINE | Admitting: INTERNAL MEDICINE
Payer: MEDICARE

## 2021-06-29 PROBLEM — T14.8XXA HEMATOMA: Status: ACTIVE | Noted: 2021-06-29

## 2021-06-29 LAB
ALBUMIN SERPL BCP-MCNC: 3.7 G/DL (ref 3.2–4.9)
ALBUMIN/GLOB SERPL: 1.2 G/DL
ALP SERPL-CCNC: 46 U/L (ref 30–99)
ALT SERPL-CCNC: 8 U/L (ref 2–50)
ANION GAP SERPL CALC-SCNC: 7 MMOL/L (ref 7–16)
AST SERPL-CCNC: 28 U/L (ref 12–45)
BASOPHILS # BLD AUTO: 0.6 % (ref 0–1.8)
BASOPHILS # BLD: 0.05 K/UL (ref 0–0.12)
BILIRUB SERPL-MCNC: 1.5 MG/DL (ref 0.1–1.5)
BUN SERPL-MCNC: 12 MG/DL (ref 8–22)
CALCIUM SERPL-MCNC: 9.1 MG/DL (ref 8.4–10.2)
CHLORIDE SERPL-SCNC: 99 MMOL/L (ref 96–112)
CO2 SERPL-SCNC: 28 MMOL/L (ref 20–33)
CREAT SERPL-MCNC: 0.58 MG/DL (ref 0.5–1.4)
EOSINOPHIL # BLD AUTO: 0.34 K/UL (ref 0–0.51)
EOSINOPHIL NFR BLD: 4.2 % (ref 0–6.9)
ERYTHROCYTE [DISTWIDTH] IN BLOOD BY AUTOMATED COUNT: 43.6 FL (ref 35.9–50)
GLOBULIN SER CALC-MCNC: 3.1 G/DL (ref 1.9–3.5)
GLUCOSE SERPL-MCNC: 112 MG/DL (ref 65–99)
HCT VFR BLD AUTO: 30.7 % (ref 37–47)
HGB BLD-MCNC: 10.1 G/DL (ref 12–16)
IMM GRANULOCYTES # BLD AUTO: 0.11 K/UL (ref 0–0.11)
IMM GRANULOCYTES NFR BLD AUTO: 1.4 % (ref 0–0.9)
LYMPHOCYTES # BLD AUTO: 0.95 K/UL (ref 1–4.8)
LYMPHOCYTES NFR BLD: 11.8 % (ref 22–41)
MCH RBC QN AUTO: 32.3 PG (ref 27–33)
MCHC RBC AUTO-ENTMCNC: 32.9 G/DL (ref 33.6–35)
MCV RBC AUTO: 98.1 FL (ref 81.4–97.8)
MONOCYTES # BLD AUTO: 0.9 K/UL (ref 0–0.85)
MONOCYTES NFR BLD AUTO: 11.2 % (ref 0–13.4)
NEUTROPHILS # BLD AUTO: 5.72 K/UL (ref 2–7.15)
NEUTROPHILS NFR BLD: 70.8 % (ref 44–72)
NRBC # BLD AUTO: 0 K/UL
NRBC BLD-RTO: 0 /100 WBC
PLATELET # BLD AUTO: 304 K/UL (ref 164–446)
PMV BLD AUTO: 9.8 FL (ref 9–12.9)
POTASSIUM SERPL-SCNC: 3.7 MMOL/L (ref 3.6–5.5)
PROT SERPL-MCNC: 6.8 G/DL (ref 6–8.2)
RBC # BLD AUTO: 3.13 M/UL (ref 4.2–5.4)
SODIUM SERPL-SCNC: 134 MMOL/L (ref 135–145)
WBC # BLD AUTO: 8.1 K/UL (ref 4.8–10.8)

## 2021-06-29 PROCEDURE — G0378 HOSPITAL OBSERVATION PER HR: HCPCS

## 2021-06-29 PROCEDURE — 99220 PR INITIAL OBSERVATION CARE,LEVL III: CPT | Performed by: INTERNAL MEDICINE

## 2021-06-29 PROCEDURE — 700117 HCHG RX CONTRAST REV CODE 255: Performed by: EMERGENCY MEDICINE

## 2021-06-29 PROCEDURE — 71045 X-RAY EXAM CHEST 1 VIEW: CPT

## 2021-06-29 PROCEDURE — 74177 CT ABD & PELVIS W/CONTRAST: CPT | Mod: MG

## 2021-06-29 PROCEDURE — 99285 EMERGENCY DEPT VISIT HI MDM: CPT

## 2021-06-29 PROCEDURE — 85025 COMPLETE CBC W/AUTO DIFF WBC: CPT

## 2021-06-29 PROCEDURE — 80053 COMPREHEN METABOLIC PANEL: CPT

## 2021-06-29 PROCEDURE — 36415 COLL VENOUS BLD VENIPUNCTURE: CPT

## 2021-06-29 RX ORDER — ONDANSETRON 2 MG/ML
4 INJECTION INTRAMUSCULAR; INTRAVENOUS EVERY 4 HOURS PRN
Status: DISCONTINUED | OUTPATIENT
Start: 2021-06-29 | End: 2021-06-30 | Stop reason: HOSPADM

## 2021-06-29 RX ORDER — LABETALOL HYDROCHLORIDE 5 MG/ML
10 INJECTION, SOLUTION INTRAVENOUS EVERY 4 HOURS PRN
Status: DISCONTINUED | OUTPATIENT
Start: 2021-06-29 | End: 2021-06-30 | Stop reason: HOSPADM

## 2021-06-29 RX ORDER — ENALAPRILAT 1.25 MG/ML
1.25 INJECTION INTRAVENOUS EVERY 6 HOURS PRN
Status: DISCONTINUED | OUTPATIENT
Start: 2021-06-29 | End: 2021-06-30 | Stop reason: HOSPADM

## 2021-06-29 RX ORDER — BISACODYL 10 MG
10 SUPPOSITORY, RECTAL RECTAL
Status: DISCONTINUED | OUTPATIENT
Start: 2021-06-29 | End: 2021-06-30 | Stop reason: HOSPADM

## 2021-06-29 RX ORDER — POLYETHYLENE GLYCOL 3350 17 G/17G
1 POWDER, FOR SOLUTION ORAL
Status: DISCONTINUED | OUTPATIENT
Start: 2021-06-29 | End: 2021-06-30 | Stop reason: HOSPADM

## 2021-06-29 RX ORDER — AMOXICILLIN 250 MG
2 CAPSULE ORAL 2 TIMES DAILY
Status: DISCONTINUED | OUTPATIENT
Start: 2021-06-30 | End: 2021-06-30 | Stop reason: HOSPADM

## 2021-06-29 RX ORDER — VALSARTAN 80 MG/1
160 TABLET ORAL DAILY
Status: DISCONTINUED | OUTPATIENT
Start: 2021-06-30 | End: 2021-06-30 | Stop reason: HOSPADM

## 2021-06-29 RX ORDER — ACETAMINOPHEN 325 MG/1
650 TABLET ORAL EVERY 6 HOURS PRN
Status: DISCONTINUED | OUTPATIENT
Start: 2021-06-29 | End: 2021-06-30 | Stop reason: HOSPADM

## 2021-06-29 RX ORDER — OXYCODONE HYDROCHLORIDE 5 MG/1
5 TABLET ORAL
Status: DISCONTINUED | OUTPATIENT
Start: 2021-06-29 | End: 2021-06-30 | Stop reason: HOSPADM

## 2021-06-29 RX ORDER — OXYCODONE HYDROCHLORIDE 10 MG/1
10 TABLET ORAL
Status: DISCONTINUED | OUTPATIENT
Start: 2021-06-29 | End: 2021-06-30 | Stop reason: HOSPADM

## 2021-06-29 RX ORDER — ONDANSETRON 4 MG/1
4 TABLET, ORALLY DISINTEGRATING ORAL EVERY 4 HOURS PRN
Status: DISCONTINUED | OUTPATIENT
Start: 2021-06-29 | End: 2021-06-30 | Stop reason: HOSPADM

## 2021-06-29 RX ORDER — HYDROMORPHONE HYDROCHLORIDE 1 MG/ML
0.5 INJECTION, SOLUTION INTRAMUSCULAR; INTRAVENOUS; SUBCUTANEOUS
Status: DISCONTINUED | OUTPATIENT
Start: 2021-06-29 | End: 2021-06-30 | Stop reason: HOSPADM

## 2021-06-29 RX ORDER — FEBUXOSTAT 40 MG/1
40 TABLET, FILM COATED ORAL DAILY
Status: DISCONTINUED | OUTPATIENT
Start: 2021-06-30 | End: 2021-06-30 | Stop reason: HOSPADM

## 2021-06-29 RX ADMIN — IOHEXOL 100 ML: 350 INJECTION, SOLUTION INTRAVENOUS at 22:26

## 2021-06-29 ASSESSMENT — ENCOUNTER SYMPTOMS
SHORTNESS OF BREATH: 0
ROS SKIN COMMENTS: BRUISING
TINGLING: 0
PALPITATIONS: 0
SPUTUM PRODUCTION: 0
STRIDOR: 0
DIZZINESS: 0
FALLS: 0
DIARRHEA: 0
WEAKNESS: 0
CHILLS: 0
MYALGIAS: 0
ABDOMINAL PAIN: 1
NAUSEA: 0
COUGH: 0
FEVER: 0
CONSTIPATION: 0
VOMITING: 0
LOSS OF CONSCIOUSNESS: 0
HEADACHES: 0
DEPRESSION: 0

## 2021-06-29 ASSESSMENT — FIBROSIS 4 INDEX: FIB4 SCORE: 2.2

## 2021-06-30 VITALS
SYSTOLIC BLOOD PRESSURE: 121 MMHG | TEMPERATURE: 98.7 F | HEART RATE: 92 BPM | RESPIRATION RATE: 16 BRPM | OXYGEN SATURATION: 90 % | BODY MASS INDEX: 24.8 KG/M2 | WEIGHT: 145.28 LBS | DIASTOLIC BLOOD PRESSURE: 57 MMHG | HEIGHT: 64 IN

## 2021-06-30 LAB
ANION GAP SERPL CALC-SCNC: 6 MMOL/L (ref 7–16)
BUN SERPL-MCNC: 11 MG/DL (ref 8–22)
CALCIUM SERPL-MCNC: 8.8 MG/DL (ref 8.4–10.2)
CHLORIDE SERPL-SCNC: 98 MMOL/L (ref 96–112)
CO2 SERPL-SCNC: 30 MMOL/L (ref 20–33)
CREAT SERPL-MCNC: 0.51 MG/DL (ref 0.5–1.4)
ERYTHROCYTE [DISTWIDTH] IN BLOOD BY AUTOMATED COUNT: 44.2 FL (ref 35.9–50)
GLUCOSE SERPL-MCNC: 167 MG/DL (ref 65–99)
HCT VFR BLD AUTO: 29.7 % (ref 37–47)
HGB BLD-MCNC: 10 G/DL (ref 12–16)
HGB BLD-MCNC: 9.9 G/DL (ref 12–16)
MCH RBC QN AUTO: 33 PG (ref 27–33)
MCHC RBC AUTO-ENTMCNC: 33.7 G/DL (ref 33.6–35)
MCV RBC AUTO: 98 FL (ref 81.4–97.8)
PLATELET # BLD AUTO: 291 K/UL (ref 164–446)
PMV BLD AUTO: 10.6 FL (ref 9–12.9)
POTASSIUM SERPL-SCNC: 3.2 MMOL/L (ref 3.6–5.5)
RBC # BLD AUTO: 3.03 M/UL (ref 4.2–5.4)
SODIUM SERPL-SCNC: 134 MMOL/L (ref 135–145)
WBC # BLD AUTO: 7.4 K/UL (ref 4.8–10.8)

## 2021-06-30 PROCEDURE — 36415 COLL VENOUS BLD VENIPUNCTURE: CPT

## 2021-06-30 PROCEDURE — 85027 COMPLETE CBC AUTOMATED: CPT

## 2021-06-30 PROCEDURE — G0378 HOSPITAL OBSERVATION PER HR: HCPCS

## 2021-06-30 PROCEDURE — 99217 PR OBSERVATION CARE DISCHARGE: CPT | Performed by: INTERNAL MEDICINE

## 2021-06-30 PROCEDURE — 85018 HEMOGLOBIN: CPT | Mod: XU

## 2021-06-30 PROCEDURE — A9270 NON-COVERED ITEM OR SERVICE: HCPCS | Performed by: INTERNAL MEDICINE

## 2021-06-30 PROCEDURE — 700102 HCHG RX REV CODE 250 W/ 637 OVERRIDE(OP): Performed by: INTERNAL MEDICINE

## 2021-06-30 PROCEDURE — 80048 BASIC METABOLIC PNL TOTAL CA: CPT

## 2021-06-30 RX ADMIN — FEBUXOSTAT 40 MG: 40 TABLET, FILM COATED ORAL at 05:19

## 2021-06-30 RX ADMIN — VALSARTAN 160 MG: 80 TABLET, FILM COATED ORAL at 05:18

## 2021-06-30 ASSESSMENT — PATIENT HEALTH QUESTIONNAIRE - PHQ9
1. LITTLE INTEREST OR PLEASURE IN DOING THINGS: NOT AT ALL
SUM OF ALL RESPONSES TO PHQ9 QUESTIONS 1 AND 2: 0
2. FEELING DOWN, DEPRESSED, IRRITABLE, OR HOPELESS: NOT AT ALL

## 2021-06-30 ASSESSMENT — COGNITIVE AND FUNCTIONAL STATUS - GENERAL
MOVING TO AND FROM BED TO CHAIR: A LITTLE
DAILY ACTIVITIY SCORE: 24
SUGGESTED CMS G CODE MODIFIER DAILY ACTIVITY: CH
WALKING IN HOSPITAL ROOM: A LITTLE
CLIMB 3 TO 5 STEPS WITH RAILING: A LITTLE
MOBILITY SCORE: 19
MOVING FROM LYING ON BACK TO SITTING ON SIDE OF FLAT BED: A LITTLE
SUGGESTED CMS G CODE MODIFIER MOBILITY: CK
STANDING UP FROM CHAIR USING ARMS: A LITTLE

## 2021-06-30 ASSESSMENT — LIFESTYLE VARIABLES
EVER HAD A DRINK FIRST THING IN THE MORNING TO STEADY YOUR NERVES TO GET RID OF A HANGOVER: NO
AVERAGE NUMBER OF DAYS PER WEEK YOU HAVE A DRINK CONTAINING ALCOHOL: 0
CONSUMPTION TOTAL: NEGATIVE
HAVE PEOPLE ANNOYED YOU BY CRITICIZING YOUR DRINKING: NO
TOTAL SCORE: 0
HAVE YOU EVER FELT YOU SHOULD CUT DOWN ON YOUR DRINKING: NO
EVER FELT BAD OR GUILTY ABOUT YOUR DRINKING: NO
TOTAL SCORE: 0
ON A TYPICAL DAY WHEN YOU DRINK ALCOHOL HOW MANY DRINKS DO YOU HAVE: 0
HOW MANY TIMES IN THE PAST YEAR HAVE YOU HAD 5 OR MORE DRINKS IN A DAY: 0
ALCOHOL_USE: YES
TOTAL SCORE: 0

## 2021-06-30 ASSESSMENT — PAIN DESCRIPTION - PAIN TYPE
TYPE: SURGICAL PAIN
TYPE: SURGICAL PAIN

## 2021-06-30 NOTE — PROGRESS NOTES
4 Eyes Skin Assessment Completed by Liset , RN and Hemanth, RN.    Head WDL  Ears WDL  Nose WDL  Mouth WDL  Neck WDL  Breast/Chest WDL  Shoulder Blades WDL  Spine WDL, bruising spread to left lower flank area  (R) Arm/Elbow/Hand WDL  (L) Arm/Elbow/Hand WDL  Abdomen Bruising and Incision x3 lap stabs from previous surgery 6/25  Groin Bruising  Scrotum/Coccyx/Buttocks WDL  (R) Leg Swelling and Edema  (L) Leg Edema dusky discoloration, pt states baseline  (R) Heel/Foot/Toe WDL  (L) Heel/Foot/Toe WDL          Devices In Places Pulse Ox and Nasal Cannula      Interventions In Place Gray Ear Foams    Possible Skin Injury No    Pictures Uploaded Into Epic N/A  Wound Consult Placed N/A  RN Wound Prevention Protocol Ordered No

## 2021-06-30 NOTE — PROGRESS NOTES
0028: received report from ER RN. Pt up to the floor via gurney. Pt ambulated to bed with SBA, pt tolerated well. Pt denies pain. POC discussed. x3 lap sites from previous surgery 6/25, x1 lap site on pts LLQ presents with drainage and redness. Pt c/o abdominal pain with palpation. Fall precautions in place. Call light and belongings at bedside. Bed locked in lowest position. Rounding in place.

## 2021-06-30 NOTE — PROGRESS NOTES
Received bedside report. Assumed pt care. Assessment and chart check complete. Pt is AAOX4, no signs of distress, denies N/V. Ambulates to room. Lap stabs in abdomen X3 dermabond, with old drainage and bruising. Fall precautions in place, treaded socks on pt, bed in low position. Call light within reach. Educated pt to call if needing anything. Hourly rounding. Hourly rounding.

## 2021-06-30 NOTE — DISCHARGE INSTRUCTIONS
Discharge Instructions    Discharged to home by car with relative. Discharged via wheelchair, hospital escort: Yes.  Special equipment needed: Not Applicable    Be sure to schedule a follow-up appointment with your primary care doctor or any specialists as instructed.     Discharge Plan:   Diet Plan: Discussed  Activity Level: Discussed  Confirmed Follow up Appointment: Patient to Call and Schedule Appointment  Confirmed Symptoms Management: Discussed  Medication Reconciliation Updated: No (Comments)    I understand that a diet low in cholesterol, fat, and sodium is recommended for good health. Unless I have been given specific instructions below for another diet, I accept this instruction as my diet prescription.   Other diet: Regular    Special Instructions: None    · Is patient discharged on Warfarin / Coumadin?   No     Depression / Suicide Risk    As you are discharged from this [facility], it is important to learn how to keep safe from harming yourself.    Recognize the warning signs:  · Abrupt changes in personality, positive or negative- including increase in energy   · Giving away possessions  · Change in eating patterns- significant weight changes-  positive or negative  · Change in sleeping patterns- unable to sleep or sleeping all the time   · Unwillingness or inability to communicate  · Depression  · Unusual sadness, discouragement and loneliness  · Talk of wanting to die  · Neglect of personal appearance   · Rebelliousness- reckless behavior  · Withdrawal from people/activities they love  · Confusion- inability to concentrate     If you or a loved one observes any of these behaviors or has concerns about self-harm, here's what you can do:  · Talk about it- your feelings and reasons for harming yourself  · Remove any means that you might use to hurt yourself (examples: pills, rope, extension cords, firearm)  · Get professional help from the community (Mental Health, Substance Abuse,  psychological counseling)  · Do not be alone:Call your Safe Contact- someone whom you trust who will be there for you.  · Call your local CRISIS HOTLINE 257-1682 or 017-872-0715  · Call your local Children's Mobile Crisis Response Team [place] (123) 795-2405 or www.[employer]  · Call the toll free National Suicide Prevention Hotlines   · National Suicide Prevention Lifeline 255-763-BDEF (3562)  · [place] [employer] [facility] Network 800-SUICIDE (690-7801)      Hematoma  A hematoma is a collection of blood under the skin, in an organ, in a body space, in a joint space, or in other tissue. The blood can thicken (clot) to form a lump that you can see and feel. The lump is often firm and may become sore and tender. Most hematomas get better in a few days to weeks. However, some hematomas may be serious and require medical care. Hematomas can range from very small to very large.  What are the causes?  This condition is caused by:  · A blunt or penetrating injury.  · A leakage from a blood vessel under the skin.  · Some medical procedures, including surgeries, such as oral surgery, face lifts, and surgeries on the joints.  · Some medical conditions that cause bleeding or bruising. There may be multiple hematomas that appear in different areas of the body.  What increases the risk?  You are more likely to develop this condition if:  · You are an older adult.  · You use blood thinners.  What are the signs or symptoms?    Symptoms of this condition depend on where the hematoma is located.   Common symptoms of a hematoma that is under the skin include:  · A firm lump on the body.  · Pain and tenderness in the area.  · Bruising. Blue, dark blue, purple-red, or yellowish skin (discoloration) may appear at the site of the hematoma if the hematoma is close to the surface of the skin.  Common symptoms of a hematoma that is deep in the tissues or body spaces may be less obvious. They include:  · A collection of blood in the  stomach (intra-abdominal hematoma). This may cause pain in the abdomen, weakness, fainting, and shortness of breath.  · A collection of blood in the head (intracranial hematoma). This may cause a headache or symptoms such as weakness, trouble speaking or understanding, or a change in consciousness.   How is this diagnosed?  This condition is diagnosed based on:  · Your medical history.  · A physical exam.  · Imaging tests, such as an ultrasound or CT scan. These may be needed if your health care provider suspects a hematoma in deeper tissues or body spaces.  · Blood tests. These may be needed if your health care provider believes that the hematoma is caused by a medical condition.  How is this treated?  Treatment for this condition depends on the cause, size, and location of the hematoma. Treatment may include:  · Doing nothing. The majority of hematomas do not need treatment as many of them go away on their own over time.  · Surgery or close monitoring. This may be needed for large hematomas or hematomas that affect vital organs.  · Medicines. Medicines may be given if there is an underlying medical cause for the hematoma.  Follow these instructions at home:  Managing pain, stiffness, and swelling    · If directed, put ice on the affected area.  ? Put ice in a plastic bag.  ? Place a towel between your skin and the bag.  ? Leave the ice on for 20 minutes, 2-3 times a day for the first couple of days.  · If directed, apply heat to the affected area after applying ice for a couple of days. Use the heat source that your health care provider recommends, such as a moist heat pack or a heating pad.  ? Place a towel between your skin and the heat source.  ? Leave the heat on for 20-30 minutes.  ? Remove the heat if your skin turns bright red. This is especially important if you are unable to feel pain, heat, or cold. You may have a greater risk of getting burned.  · Raise (elevate) the affected area above the level of  your heart while you are sitting or lying down.  · If told, wrap the affected area with an elastic bandage. The bandage applies pressure (compression) to the area, which may help to reduce swelling and promote healing. Do not wrap the bandage too tightly around the affected area.  · If your hematoma is on a leg or foot (lower extremity) and is painful, your health care provider may recommend crutches. Use them as told by your health care provider.  General instructions  · Take over-the-counter and prescription medicines only as told by your health care provider.  · Keep all follow-up visits as told by your health care provider. This is important.  Contact a health care provider if:  · You have a fever.  · The swelling or discoloration gets worse.  · You develop more hematomas.  Get help right away if:  · Your pain is worse or your pain is not controlled with medicine.  · Your skin over the hematoma breaks or starts bleeding.  · Your hematoma is in your chest or abdomen and you have weakness, shortness of breath, or a change in consciousness.  · You have a hematoma on your scalp that is caused by a fall or injury, and you also have:  ? A headache that gets worse.  ? Trouble speaking or understanding speech.  ? Weakness.  ? Change in alertness or consciousness.  Summary  · A hematoma is a collection of blood under the skin, in an organ, in a body space, in a joint space, or in other tissue.  · This condition usually does not need treatment because many hematomas go away on their own over time.  · Large hematomas, or those that may affect vital organs, may need surgical drainage or monitoring. If the hematoma is caused by a medical condition, medicines may be prescribed.  · Get help right away if your hematoma breaks or starts to bleed, you have shortness of breath, or you have a headache or trouble speaking after a fall.  This information is not intended to replace advice given to you by your health care provider.  Make sure you discuss any questions you have with your health care provider.  Document Released: 08/01/2005 Document Revised: 05/23/2019 Document Reviewed: 05/23/2019  Elsevier Patient Education © 2020 [employer]

## 2021-06-30 NOTE — FLOWSHEET NOTE
06/30/21 1130   Events/Summary/Plan   Events/Summary/Plan sitting up in chair   Vital Signs   Pulse 92   Respiration 16   Pulse Oximetry 90 %   Respiratory Assessment   Level of Consciousness Alert   Chest Exam   Work Of Breathing / Effort Within Normal Limits   Breath Sounds   RUL Breath Sounds Clear   RML Breath Sounds Clear   RLL Breath Sounds Diminished   FARAZ Breath Sounds Clear   LLL Breath Sounds Diminished   Oxygen   O2 Delivery Device None - Room Air

## 2021-06-30 NOTE — H&P
"Hospital Medicine History & Physical Note    Date of Service  6/29/2021    Primary Care Physician  Elza Morales M.D.    Consultants  Surgery - Dr Mcneil    Code Status  Full Code    Chief Complaint  Chief Complaint   Patient presents with   • Post-Op Complications     Bruising abd and   LT  flank Pt post -op appy on Fri By Dr Mcneil Also has \" some kind of drainage at one of the incision sites \" On blood thinners ( elequist ) for DVT 1 mon ago   • Foot Swelling     Bilat since surg       History of Presenting Illness  85 y.o. female who presented 6/29/2021 with bruising.  Today, patient was using the restroom, noted some left-sided lower quadrant abdominal bruising that went toward the right as well as toward the groin and around toward the back on the left.   also checked and noted how far when around the back, felt that it was worsening so they presented to the emergency department.  Patient denied any abdominal pain, nausea, vomiting, fever, chills, cough or shortness of breath. She did have an appendectomy on Friday, stayed in the hospital until Monday.  Patient was having some borderline low oxygen, they were weaning her off which eventually they did, she did not need to be discharged with oxygen, she does have a history of COPD.  She states that when she went home she was feeling well, continues to feel well but did note the bruising.  She also noted a little bit of discharge from the left most incision site.  I did discuss the case including labs and imaging with the ER physician.    Review of Systems  Review of Systems   Constitutional: Negative for chills, fever and malaise/fatigue.   HENT: Positive for congestion.    Respiratory: Negative for cough, sputum production, shortness of breath and stridor.    Cardiovascular: Negative for chest pain, palpitations and leg swelling.   Gastrointestinal: Positive for abdominal pain (at incision site with some discharge). Negative for constipation, diarrhea, " nausea and vomiting.   Genitourinary: Negative for dysuria and urgency.   Musculoskeletal: Negative for falls and myalgias.   Skin:        Bruising    Neurological: Negative for dizziness, tingling, loss of consciousness, weakness and headaches.   Psychiatric/Behavioral: Negative for depression and suicidal ideas.   All other systems reviewed and are negative.      Past Medical History   has a past medical history of Arthritis, COPD (chronic obstructive pulmonary disease) ([facility]), DVT (deep venous thrombosis) ([facility]), EMPHYSEMA, Gastroesophageal reflux disease without esophagitis (3/13/2019), Hypertension, Hypertriglyceridemia (2019), and Prediabetes (2019).    Surgical History   has a past surgical history that includes hemorrhoidectomy (/); pr  delivery only (); knee arthroplasty total (2014); cataract phaco with iol (2014); cataract phaco with iol (2014); and pr lap,appendectomy (2021).     Family History  family history includes Alzheimer's Disease in her mother; Diabetes in her brother; Heart Attack in her sister; Heart Disease in her father; Other in her father.     Social History   reports that she quit smoking about 46 years ago. Her smoking use included cigarettes. She has a 20.00 pack-year smoking history. She has never used smokeless tobacco. She reports previous alcohol use of about 1.5 oz of alcohol per week. She reports that she does not use drugs.    Allergies  Allergies   Allergen Reactions   • Codeine Vomiting   • Demerol Vomiting     Injectable type   • Shellfish Allergy Shortness of Breath     Soft shell   • Ciprofloxacin Rash     rash   • Iodine      PATIENT ALLERGIC TO SHELLFISH, NOT SURE IF SHE IS ALLERGIC TO IODINE       Medications  Prior to Admission Medications   Prescriptions Last Dose Informant Patient Reported? Taking?   Ascorbic Acid (VITAMIN C PO)  Patient Yes No   Sig: Take 1 tablet by mouth every day.   Boswellia-Glucosamine-Vit D (OSTEO  BI-FLEX ONE PER DAY PO)  Patient Yes No   Sig: Take 2 Tablets by mouth every day.   CALCIUM-MAGNESIUM-ZINC PO  Patient Yes No   Sig: Take 1 tablet by mouth every day.   Cholecalciferol (D3 PO)  Patient Yes No   Sig: Take 1 capsule by mouth every day.   GLUCOSAMINE HCL-MSM PO  Patient Yes No   Sig: Take 2 Tablets by mouth every day.   Multiple Vitamins-Minerals (LUTEIN-ZEAXANTHIN PO)  Patient Yes No   Sig: Take 1 tablet by mouth every day.   TURMERIC PO  Patient Yes No   Sig: Take 2 Capsules by mouth every day.   apixaban (ELIQUIS) 5mg Tab  Patient Yes No   Sig: Take 1 tablet by mouth 2 times a day.   benzonatate (TESSALON) 200 MG capsule  Patient No No   Sig: Take 1 Cap by mouth 3 times a day as needed.   Patient not taking: Reported on 6/22/2021   febuxostat (ULORIC) 40 MG Tab  Patient Yes No   Sig: Take 40 mg by mouth every day.   therapeutic multivitamin-minerals (THERAGRAN-M) Tab  Patient Yes No   Sig: Take 1 tablet by mouth every day.   valsartan (DIOVAN) 160 MG Tab  Patient Yes No   Sig: Take 160 mg by mouth every day.      Facility-Administered Medications: None       Physical Exam  Temp:  [37.1 °C (98.7 °F)] 37.1 °C (98.7 °F)  Pulse:  [88-98] 88  Resp:  [16] 16  BP: (147)/(92) 147/92  SpO2:  [91 %-98 %] 98 %    Physical Exam  Vitals and nursing note reviewed.   Constitutional:       General: She is not in acute distress.     Appearance: She is well-developed. She is not diaphoretic.   HENT:      Head: Normocephalic and atraumatic.      Right Ear: External ear normal.      Left Ear: External ear normal.      Nose: Nose normal. No congestion or rhinorrhea.      Mouth/Throat:      Mouth: Mucous membranes are moist.      Pharynx: No oropharyngeal exudate.   Eyes:      General:         Right eye: No discharge.         Left eye: No discharge.      Extraocular Movements: Extraocular movements intact.   Neck:      Trachea: No tracheal deviation.   Cardiovascular:      Rate and Rhythm: Normal rate and regular rhythm.       Heart sounds: No murmur heard.   No friction rub. No gallop.    Pulmonary:      Effort: Pulmonary effort is normal. No respiratory distress.      Breath sounds: Normal breath sounds. No stridor. No wheezing or rales.   Chest:      Chest wall: No tenderness.   Abdominal:      General: Bowel sounds are normal. There is no distension.      Palpations: Abdomen is soft.      Tenderness: There is no abdominal tenderness.      Comments: Surgical incisions, no significant discharge    Musculoskeletal:         General: No tenderness. Normal range of motion.      Cervical back: Neck supple.      Right lower leg: Edema present.      Left lower leg: Edema present.   Lymphadenopathy:      Cervical: No cervical adenopathy.   Skin:     General: Skin is warm and dry.      Findings: Bruising (lower quadrants L > R to right lateral) present. No erythema or rash.   Neurological:      General: No focal deficit present.      Mental Status: She is alert and oriented to person, place, and time.      Cranial Nerves: No cranial nerve deficit.   Psychiatric:         Mood and Affect: Mood normal.         Behavior: Behavior normal.         Thought Content: Thought content normal.         Judgment: Judgment normal.         Laboratory:  Recent Labs     06/27/21 0208 06/28/21 0234 06/29/21 2145   WBC 7.4  7.4 7.2 8.1   RBC 2.59*  2.59* 2.69* 3.13*   HEMOGLOBIN 8.4*  8.4* 8.6* 10.1*   HEMATOCRIT 26.3*  26.3* 26.7* 30.7*   .5*  101.5* 99.3* 98.1*   MCH 32.4  32.4 32.0 32.3   MCHC 31.9*  31.9* 32.2* 32.9*   RDW 45.3  45.3 44.6 43.6   PLATELETCT 194  194 220 304   MPV 11.6  11.6 11.2 9.8     Recent Labs     06/27/21 0208 06/28/21 0234 06/29/21 2145   SODIUM 136 134* 134*   POTASSIUM 3.6 3.5* 3.7   CHLORIDE 102 98 99   CO2 26 26 28   GLUCOSE 105* 93 112*   BUN 13 9 12   CREATININE 0.63 0.53 0.58   CALCIUM 8.1* 8.3* 9.1     Recent Labs     06/27/21 0208 06/28/21 0234 06/29/21 2145   ALTSGPT  --   --  8   ASTSGOT  --   --   28   ALKPHOSPHAT  --   --  46   TBILIRUBIN  --   --  1.5   GLUCOSE 105* 93 112*         No results for input(s): NTPROBNP in the last 72 hours.      No results for input(s): TROPONINT in the last 72 hours.    Imaging:  CT-ABDOMEN-PELVIS WITH   Final Result         1. A 6.4 cm hematoma at the appendectomy site. No evidence of active extravasation on this study.   2. Small-volume into peritoneum      Findings were discussed with JULIET PAUL on 6/29/2021 11:04 PM.      DX-CHEST-PORTABLE (1 VIEW)    (Results Pending)         Assessment/Plan:  I anticipate this patient is appropriate for observation status at this time.    Hematoma- (present on admission)  Assessment & Plan  -Patient noted significant bruising, presented to the emergency department, ERP did obtain a CT scan  -6.4 cm hematoma was noted at the appendectomy site, no evidence of active extravasation on the study  -Closely monitor for worsening bleeding with hemoglobins every 8 hours  -Transfuse as needed however currently hemoglobin has increased from discharge  -ERP did discuss the case with Dr. Mcneil who will follow along  -Hold home Eliquis    Anemia- (present on admission)  Assessment & Plan  -There is significant bruising left lower quadrant, left flank going towards her groin as well as the right lower quadrant  -There is a 6.4 cm hematoma at the appendectomy site noted on CT scan  -Repeat CBC in the morning and obtain hemoglobins every 8 hours  -Hemoglobin is actually increased since discharge    History of deep venous thrombosis- (present on admission)  Assessment & Plan  -Diagnosed May 18  -Unfortunately, Eliquis will need to be held  -Resume anticoagulation if cleared by surgery    COPD (chronic obstructive pulmonary disease) (HCC)- (present on admission)  Assessment & Plan  -No acute exacerbation  -Patient was requiring oxygen during hospitalization, was placed on oxygen in the ER as well  -ERP did obtain a chest x-ray, I have personally  reviewed the chest x-ray and noted scattered atelectasis  -Start incentive spirometry  -Continue pulse ox  -Start respiratory care per protocol    Essential hypertension, benign- (present on admission)  Assessment & Plan  -Continue home valsartan  -Start as needed enalapril and labetalol  -Adjust as needed

## 2021-06-30 NOTE — ASSESSMENT & PLAN NOTE
-Patient noted significant bruising, presented to the emergency department, ERP did obtain a CT scan  -6.4 cm hematoma was noted at the appendectomy site, no evidence of active extravasation on the study  -Closely monitor for worsening bleeding with hemoglobins every 8 hours  -Transfuse as needed however currently hemoglobin has increased from discharge  -ERP did discuss the case with Dr. Mcneil who will follow along  -Hold home Eliquis

## 2021-06-30 NOTE — ED PROVIDER NOTES
"ED Provider Note    CHIEF COMPLAINT  Chief Complaint   Patient presents with   • Post-Op Complications     Bruising abd and   LT  flank Pt post -op appy on Fri By Dr Mcneil Also has \" some kind of drainage at one of the incision sites \" On blood thinners ( elequist ) for DVT 1 mon ago   • Foot Swelling     Bilat since surg       HPI  Licha Pope is a 85 y.o. female who presents to the emergency department with complaint of abdominal wall.  She had an appendectomy performed by Dr. Mcneil on 6/25/2021 secondary to acute appendicitis.  The patient does take Eliquis secondary to known DVT of the lower extremity.  She states she is doing fine but she started having more discomfort abdominal cavity and noticed that she had bruising in the anterior abdominal wall tracks down to her vagina.  In addition, she endorses bilateral lower extremity edema.  Patient denies fever, shakes, chills, sweats, nausea, vomiting, vaginal bleeding.  She did take her Eliquis earlier this morning.    REVIEW OF SYSTEMS  Positives as above. Pertinent negatives include fever, shakes, chills, sweats, nausea, vomiting, shortness of breath.  All other 10 review of systems are negative    PAST MEDICAL HISTORY  Past Medical History:   Diagnosis Date   • Arthritis    • COPD (chronic obstructive pulmonary disease) (Formerly [facility])    • DVT (deep venous thrombosis) (Formerly [facility])    • EMPHYSEMA    • Gastroesophageal reflux disease without esophagitis 3/13/2019   • Hypertension    • Hypertriglyceridemia 5/13/2019   • Prediabetes 5/13/2019       FAMILY HISTORY  Noncontributory    SOCIAL HISTORY  Social History     Socioeconomic History   • Marital status:      Spouse name: Not on file   • Number of children: Not on file   • Years of education: Not on file   • Highest education level: Not on file   Occupational History   • Not on file   Tobacco Use   • Smoking status: Former Smoker     Packs/day: 1.00     Years: 20.00     Pack years: 20.00     Types: Cigarettes     " Quit date: 1975     Years since quittin.5   • Smokeless tobacco: Never Used   Vaping Use   • Vaping Use: Never used   Substance and Sexual Activity   • Alcohol use: Not Currently     Alcohol/week: 1.5 oz     Types: 3 Standard drinks or equivalent per week     Comment: RARE   • Drug use: No   • Sexual activity: Not Currently     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     Comment: , retired    Other Topics Concern   • Not on file   Social History Narrative   • Not on file     Social Determinants of Health     Financial Resource Strain:    • Difficulty of Paying Living Expenses:    Food Insecurity:    • Worried About Running Out of Food in the Last Year:    • Ran Out of Food in the Last Year:    Transportation Needs:    • Lack of Transportation (Medical):    • Lack of Transportation (Non-Medical):    Physical Activity:    • Days of Exercise per Week:    • Minutes of Exercise per Session:    Stress:    • Feeling of Stress :    Social Connections:    • Frequency of Communication with Friends and Family:    • Frequency of Social Gatherings with Friends and Family:    • Attends [employer] Services:    • Active Member of Clubs or Organizations:    • Attends Club or Organization Meetings:    • Marital Status:    Intimate Partner Violence:    • Fear of Current or Ex-Partner:    • Emotionally Abused:    • Physically Abused:    • Sexually Abused:        SURGICAL HISTORY  Past Surgical History:   Procedure Laterality Date   • PB LAP,APPENDECTOMY  2021    Procedure: APPENDECTOMY, LAPAROSCOPIC;  Surgeon: Pedro Mcneil M.D.;  Location: [facility];  Service: Gastroenterology   • CATARACT PHACO WITH IOL  2014    Performed by Jarred Almazan M.D. at [facility] ORS   • CATARACT PHACO WITH IOL  2014    Performed by Jarred Almazan M.D. at [facility] ORS   • KNEE ARTHROPLASTY TOTAL  2014    Performed by Jose Hahn M.D. at [facility] ORS   • PB  DELIVERY  "ONLY  1969   • HEMORRHOIDECTOMY  1954/1988       CURRENT MEDICATIONS  Home Medications    **Home medications have not yet been reviewed for this encounter**         ALLERGIES  Allergies   Allergen Reactions   • Codeine Vomiting   • Demerol Vomiting     Injectable type   • Shellfish Allergy Shortness of Breath     Soft shell   • Ciprofloxacin Rash     rash   • Iodine      PATIENT ALLERGIC TO SHELLFISH, NOT SURE IF SHE IS ALLERGIC TO IODINE       PHYSICAL EXAM  VITAL SIGNS: /92   Pulse 98   Temp 37.1 °C (98.7 °F) (Temporal)   Resp 16   Ht 1.626 m (5' 4\")   Wt 65.9 kg (145 lb 4.5 oz)   SpO2 91%   BMI 24.94 kg/m²      Constitutional: Well developed, Well nourished, No acute distress, Non-toxic appearance.   Eyes: PERRLA, EOMI, Conjunctiva normal, No discharge.   Cardiovascular: Normal heart rate, Normal rhythm, No murmurs, No rubs, No gallops, and intact distal pulses.   Thorax & Lungs:  No respiratory distress, no rales, no rhonchi, No wheezing, No chest wall tenderness.   Abdomen: Bowel sounds normal, Soft, right lower quadrant tenderness, slight guarding  Skin: Warm, ecchymosis in the anterior abdominal wall left lower quadrant greater than right with slight tracking down to the pelvis  Extremities: Full range of motion, no deformity, slight pedal edema bilaterally   neurologic: Alert & oriented x 3, No focal deficits noted, acting appropriately on exam.  Psychiatric: Affect normal for clinical presentation.      RADIOLOGY/PROCEDURES  CT-ABDOMEN-PELVIS WITH   Final Result         1. A 6.4 cm hematoma at the appendectomy site. No evidence of active extravasation on this study.   2. Small-volume into peritoneum      Findings were discussed with JULIET PAUL on 6/29/2021 11:04 PM.      DX-CHEST-PORTABLE (1 VIEW)    (Results Pending)     Results for orders placed or performed during the hospital encounter of 06/29/21   CBC WITH DIFFERENTIAL   Result Value Ref Range    WBC 8.1 4.8 - 10.8 K/uL    RBC " 3.13 (L) 4.20 - 5.40 M/uL    Hemoglobin 10.1 (L) 12.0 - 16.0 g/dL    Hematocrit 30.7 (L) 37.0 - 47.0 %    MCV 98.1 (H) 81.4 - 97.8 fL    MCH 32.3 27.0 - 33.0 pg    MCHC 32.9 (L) 33.6 - 35.0 g/dL    RDW 43.6 35.9 - 50.0 fL    Platelet Count 304 164 - 446 K/uL    MPV 9.8 9.0 - 12.9 fL    Neutrophils-Polys 70.80 44.00 - 72.00 %    Lymphocytes 11.80 (L) 22.00 - 41.00 %    Monocytes 11.20 0.00 - 13.40 %    Eosinophils 4.20 0.00 - 6.90 %    Basophils 0.60 0.00 - 1.80 %    Immature Granulocytes 1.40 (H) 0.00 - 0.90 %    Nucleated RBC 0.00 /100 WBC    Neutrophils (Absolute) 5.72 2.00 - 7.15 K/uL    Lymphs (Absolute) 0.95 (L) 1.00 - 4.80 K/uL    Monos (Absolute) 0.90 (H) 0.00 - 0.85 K/uL    Eos (Absolute) 0.34 0.00 - 0.51 K/uL    Baso (Absolute) 0.05 0.00 - 0.12 K/uL    Immature Granulocytes (abs) 0.11 0.00 - 0.11 K/uL    NRBC (Absolute) 0.00 K/uL   COMP METABOLIC PANEL   Result Value Ref Range    Sodium 134 (L) 135 - 145 mmol/L    Potassium 3.7 3.6 - 5.5 mmol/L    Chloride 99 96 - 112 mmol/L    Co2 28 20 - 33 mmol/L    Anion Gap 7.0 7.0 - 16.0    Glucose 112 (H) 65 - 99 mg/dL    Bun 12 8 - 22 mg/dL    Creatinine 0.58 0.50 - 1.40 mg/dL    Calcium 9.1 8.4 - 10.2 mg/dL    AST(SGOT) 28 12 - 45 U/L    ALT(SGPT) 8 2 - 50 U/L    Alkaline Phosphatase 46 30 - 99 U/L    Total Bilirubin 1.5 0.1 - 1.5 mg/dL    Albumin 3.7 3.2 - 4.9 g/dL    Total Protein 6.8 6.0 - 8.2 g/dL    Globulin 3.1 1.9 - 3.5 g/dL    A-G Ratio 1.2 g/dL   ESTIMATED GFR   Result Value Ref Range    GFR If African American >60 >60 mL/min/1.73 m 2    GFR If Non African American >60 >60 mL/min/1.73 m 2         COURSE & MEDICAL DECISION MAKING  Pertinent Labs & Imaging studies reviewed. (See chart for details)  This is a pleasant 85-year-old female postop day #4 from an appendectomy.  Here in the emergency room as a concern for possible dural infection or fluid collection secondary to her anticoagulant use.  CT scan with IV contrast was completed reveals evidence of a  hematoma but no evidence of active exacerbation.  In addition, the patient has slight free fluid in the pelvis.  Her hemoglobin stable at 10, she has normal vital signs except for slight hypoxia.  I did discuss the patient with Dr. Mcneil who asked if the hospitalist who graciously hospitalize this patient he will be able to come see the patient tomorrow for further evaluation and management.  The patient also had evidence of hypoxia here and her saturations are dropping down to 85-87 percentile room air therefore she required nasal cannula oxygen supplementation.  X-rays equivocal for infection do believe she probably has atelectasis.  I discussed the patient with Dr. Benoit who excepts this hospitalization.    FINAL IMPRESSION  Postoperative intra-abdominal hematoma  Hypoxia         Electronically signed by: Lito Meza D.O., 6/29/2021 9:40 PM

## 2021-06-30 NOTE — PROGRESS NOTES
Med rec updated and complete on 6/22/2021  Allergies reviewed  Asked pt last time she took her medications   Pt reports no antibiotics in the last 30 days, that she had to take at home    No current facility-administered medications on file prior to encounter.     Current Outpatient Medications on File Prior to Encounter   Medication Sig Dispense Refill   • CALCIUM-MAGNESIUM-ZINC PO Take 1 tablet by mouth every day.     • therapeutic multivitamin-minerals (THERAGRAN-M) Tab Take 1 tablet by mouth every day.     • Multiple Vitamins-Minerals (LUTEIN-ZEAXANTHIN PO) Take 1 tablet by mouth every day.     • [place]-Glucosamine-Vit D (OSTEO BI-FLEX ONE PER DAY PO) Take 2 Tablets by mouth every day.     • TURMERIC PO Take 2 Capsules by mouth every day.     • Ascorbic Acid (VITAMIN C PO) Take 1 tablet by mouth every day.     • Cholecalciferol (D3 PO) Take 1 capsule by mouth every day.     • apixaban (ELIQUIS) 5mg Tab Take 1 tablet by mouth 2 times a day.     • valsartan (DIOVAN) 160 MG Tab Take 160 mg by mouth every day.     • febuxostat (ULORIC) 40 MG Tab Take 40 mg by mouth every day.     • GLUCOSAMINE HCL-MSM PO Take 2 Tablets by mouth every day.

## 2021-06-30 NOTE — ASSESSMENT & PLAN NOTE
-No acute exacerbation  -Patient was requiring oxygen during hospitalization, was placed on oxygen in the ER as well  -ERP did obtain a chest x-ray, I have personally reviewed the chest x-ray and noted scattered atelectasis  -Start incentive spirometry  -Continue pulse ox  -Start respiratory care per protocol

## 2021-06-30 NOTE — CARE PLAN
The patient is Stable - Low risk of patient condition declining or worsening    Shift Goals  Clinical Goals: Vital signs will remain stable, pt will remiain free from falls  Patient Goals: Rest and discharge home    Progress made toward(s) clinical / shift goals:  Fall precautions in place, bed alarm on, call light in reach, pt educated on POC.     Patient is not progressing towards the following goals: n/a      Problem: Knowledge Deficit - Standard  Goal: Patient and family/care givers will demonstrate understanding of plan of care, disease process/condition, diagnostic tests and medications  Outcome: Progressing

## 2021-06-30 NOTE — ASSESSMENT & PLAN NOTE
-There is significant bruising left lower quadrant, left flank going towards her groin as well as the right lower quadrant  -There is a 6.4 cm hematoma at the appendectomy site noted on CT scan  -Repeat CBC in the morning and obtain hemoglobins every 8 hours  -Hemoglobin is actually increased since discharge

## 2021-06-30 NOTE — CARE PLAN
The patient is Stable - Low risk of patient condition declining or worsening    Shift Goals  Clinical Goals: decrease hematoma and free from falls  Patient Goals: Rest and discharge home    Progress made toward(s) clinical / shift goals:  Ice pack applied.               Bed alarm on, call light within the reach.            Pt is educated to call for pain medication when needed.    Patient is not progressing towards the following goals:      Problem: Fall Risk  Goal: Patient will remain free from falls  Outcome: Progressing     Problem: Pain - Standard  Goal: Alleviation of pain or a reduction in pain to the patient’s comfort goal  Outcome: Progressing

## 2021-06-30 NOTE — ASSESSMENT & PLAN NOTE
-Diagnosed May 18  -Unfortunately, Eliquis will need to be held  -Resume anticoagulation if cleared by surgery

## 2021-06-30 NOTE — PROGRESS NOTES
Discharging patient home per MD order. Pt demonstrated understanding of discharge instructions, follow up appointments with Dr. Mcneil, home medications, prescriptions, and home care for surgical wound. Pt is voiding without difficulty, pain well controlled, tolerating oral medications, O2 greater than 90%. IVF removed. Pt verbalized understanding of discharge instructions and educational handouts and all questions answered. Pt discharged off unit with hospital escort.

## 2021-06-30 NOTE — DISCHARGE SUMMARY
"Discharge Summary    CHIEF COMPLAINT ON ADMISSION  Chief Complaint   Patient presents with   • Post-Op Complications     Bruising abd and   LT  flank Pt post -op appy on Fri By Dr Mcneil Also has \" some kind of drainage at one of the incision sites \" On blood thinners ( elequist ) for DVT 1 mon ago   • Foot Swelling     Bilat since surg       Reason for Admission  Bleeding and pain at incision site     Admission Date  6/29/2021    CODE STATUS  Full Code    HPI & HOSPITAL COURSE  This is a 85 y.o. female here with recent appendectomy on Friday with Dr Pedro Mcneil.  She has done well since surgery but has had increased bruising on her abdomen and CT showed a hematoma 6.4cm in size.  Dr Mcneil recommended admission overnight to make sure she did not continue to bleed and that pain remained controlled.  Her hemoglobin is stable and her pain is controlled and she requests discharge home.  Dr Mcneil will see her in clinic tomorrow and patient will hold her Eliquis for 1 week before resuming.      Therefore, she is discharged in good and stable condition to home with close outpatient follow-up.      Discharge Date  6/30/2021    FOLLOW UP ITEMS POST DISCHARGE  PCP - 2 weeks  Dr Mcneil - clinic tomorrow    DISCHARGE DIAGNOSES  Active Problems:    Essential hypertension, benign POA: Yes    COPD (chronic obstructive pulmonary disease) (HCC) POA: Yes    History of deep venous thrombosis POA: Yes    Anemia POA: Yes    Hematoma POA: Yes  Resolved Problems:    * No resolved hospital problems. *      FOLLOW UP  Future Appointments   Date Time Provider Department Center   8/12/2021  1:30 PM Elza Morales M.D. [facility]     Elza Morales M.D.  [address] [facility] [address]  [phone]          Pedro Mcneil M.D.  [address] [address]  [phone]    In 1 day  office will call you today to schedule appt tomorrow.      MEDICATIONS ON DISCHARGE     Medication List      CHANGE how you take these " medications      Instructions   apixaban 5mg Tabs  Start taking on: July 7, 2021  What changed: These instructions start on July 7, 2021. If you are unsure what to do until then, ask your doctor or other care provider.  Commonly known as: ELIQUIS   Take 1 tablet by mouth 2 times a day.  Dose: 5 mg        CONTINUE taking these medications      Instructions   CALCIUM-MAGNESIUM-ZINC PO   Take 1 tablet by mouth every day.  Dose: 1 tablet     D3 PO   Take 1 capsule by mouth every day.  Dose: 1 capsule     febuxostat 40 MG Tabs  Commonly known as: ULORIC   Take 40 mg by mouth every day.  Dose: 40 mg     GLUCOSAMINE HCL-MSM PO   Take 2 Tablets by mouth every day.  Dose: 2 tablet     LUTEIN-ZEAXANTHIN PO   Take 1 tablet by mouth every day.  Dose: 1 tablet     OSTEO BI-FLEX ONE PER DAY PO   Take 2 Tablets by mouth every day.  Dose: 2 tablet     therapeutic multivitamin-minerals Tabs   Take 1 tablet by mouth every day.  Dose: 1 tablet     TURMERIC PO   Take 2 Capsules by mouth every day.  Dose: 2 capsule     valsartan 160 MG Tabs  Commonly known as: DIOVAN   Take 160 mg by mouth every day.  Dose: 160 mg     VITAMIN C PO   Take 1 tablet by mouth every day.  Dose: 1 tablet            Allergies  Allergies   Allergen Reactions   • Codeine Vomiting   • Demerol Vomiting     Injectable type   • Shellfish Allergy Shortness of Breath     Soft shell   • Ciprofloxacin Rash     rash   • Iodine      PATIENT ALLERGIC TO SHELLFISH, NOT SURE IF SHE IS ALLERGIC TO IODINE       DIET  Orders Placed This Encounter   Procedures   • Diet Order Diet: Regular     Standing Status:   Standing     Number of Occurrences:   1     Order Specific Question:   Diet:     Answer:   Regular [1]       ACTIVITY  As tolerated.  Weight bearing as tolerated    CONSULTATIONS  none    PROCEDURES  none    LABORATORY  Lab Results   Component Value Date    SODIUM 134 (L) 06/30/2021    POTASSIUM 3.2 (L) 06/30/2021    CHLORIDE 98 06/30/2021    CO2 30 06/30/2021    GLUCOSE  167 (H) 06/30/2021    BUN 11 06/30/2021    CREATININE 0.51 06/30/2021        Lab Results   Component Value Date    WBC 7.4 06/30/2021    HEMOGLOBIN 9.9 (L) 06/30/2021    HEMATOCRIT 29.7 (L) 06/30/2021    PLATELETCT 291 06/30/2021        Total time of the discharge process exceeds 35 minutes.

## 2021-07-03 PROBLEM — Z79.01 CHRONIC ANTICOAGULATION: Status: ACTIVE | Noted: 2021-07-03

## 2021-07-06 ENCOUNTER — HOSPITAL ENCOUNTER (OUTPATIENT)
Dept: RADIOLOGY | Facility: MEDICAL CENTER | Age: 86
End: 2021-07-06
Attending: PHYSICIAN ASSISTANT
Payer: MEDICARE

## 2021-07-06 DIAGNOSIS — M79.89 LEFT LEG SWELLING: ICD-10-CM

## 2021-07-06 PROCEDURE — 93971 EXTREMITY STUDY: CPT | Mod: LT

## 2021-07-06 PROCEDURE — 93971 EXTREMITY STUDY: CPT | Mod: 26,LT | Performed by: INTERNAL MEDICINE

## 2021-07-07 PROBLEM — M79.672 FOOT PAIN, LEFT: Status: ACTIVE | Noted: 2021-07-07

## 2021-07-09 ENCOUNTER — HOSPITAL ENCOUNTER (OUTPATIENT)
Facility: MEDICAL CENTER | Age: 86
End: 2021-07-09
Attending: PHYSICIAN ASSISTANT
Payer: MEDICARE

## 2021-07-09 PROCEDURE — 84550 ASSAY OF BLOOD/URIC ACID: CPT

## 2021-07-09 PROCEDURE — 80048 BASIC METABOLIC PNL TOTAL CA: CPT

## 2021-07-10 LAB
ANION GAP SERPL CALC-SCNC: 13 MMOL/L (ref 7–16)
BUN SERPL-MCNC: 12 MG/DL (ref 8–22)
CALCIUM SERPL-MCNC: 9.2 MG/DL (ref 8.5–10.5)
CHLORIDE SERPL-SCNC: 103 MMOL/L (ref 96–112)
CO2 SERPL-SCNC: 23 MMOL/L (ref 20–33)
CREAT SERPL-MCNC: 0.67 MG/DL (ref 0.5–1.4)
GLUCOSE SERPL-MCNC: 101 MG/DL (ref 65–99)
POTASSIUM SERPL-SCNC: 3.9 MMOL/L (ref 3.6–5.5)
SODIUM SERPL-SCNC: 139 MMOL/L (ref 135–145)
URATE SERPL-MCNC: 3.5 MG/DL (ref 1.9–8.2)

## 2021-07-12 NOTE — DOCUMENTATION QUERY
[facility]                                                                       Query Response Note      PATIENT:               SATINDER LARES  ACCT #:                  [id number]  MRN:                     [id number]  :                      1935  ADMIT DATE:       2021 4:21 PM  DISCH DATE:        2021 1:06 PM  RESPONDING  PROVIDER #:        [id number]           QUERY TEXT:    Anemia is documented in the progress note  and the DC summary.  Please specify the underlying cause of the anemia.    NOTE:  If the appropriate response is not listed below, please respond with a new note.              The patient's Clinical Indicators include:  - Findings:  DC summary:  during the postoperative period she did have some anemia.  Anemia POA: yes  - progress note 21:  postoperative anemia, no signs of acute bleed, continue to monitor  - H&H 21:  13.7 &  42.3,  H&H 21:  8.4 & 26.3  - EBL per OP report 21:  10ml    - Treatments:  monitor H&H, assess for bleeding     - Risk factors:  acute appendicitis requiring appendectomy, taking Eliquis     Thank You,  Abbie Johnson RN  Clinical Documentation   [email or web address]  Connect via Vo[employer] Messenger  Options provided:   -- Postoperative blood loss anemia   -- Blood loss anemia, acute   -- Blood loss anemia, chronic   -- Chronic anemia   -- Iron deficiency anemia   -- Normocytic anemia   -- Unable to determine      Query created by: Abbie Johnson on 2021 6:42 AM    RESPONSE TEXT:    Blood loss anemia, acute          Electronically signed by:  FERNANDA ROACH MD 2021 1:56 PM

## 2021-07-16 ENCOUNTER — HOSPITAL ENCOUNTER (OUTPATIENT)
Facility: MEDICAL CENTER | Age: 86
End: 2021-07-16
Attending: PHYSICIAN ASSISTANT
Payer: MEDICARE

## 2021-07-16 LAB
BASOPHILS # BLD AUTO: 1.5 % (ref 0–1.8)
BASOPHILS # BLD: 0.07 K/UL (ref 0–0.12)
EOSINOPHIL # BLD AUTO: 0.23 K/UL (ref 0–0.51)
EOSINOPHIL NFR BLD: 4.8 % (ref 0–6.9)
ERYTHROCYTE [DISTWIDTH] IN BLOOD BY AUTOMATED COUNT: 55.8 FL (ref 35.9–50)
HCT VFR BLD AUTO: 35.7 % (ref 37–47)
HGB BLD-MCNC: 11.9 G/DL (ref 12–16)
IMM GRANULOCYTES # BLD AUTO: 0.01 K/UL (ref 0–0.11)
IMM GRANULOCYTES NFR BLD AUTO: 0.2 % (ref 0–0.9)
LYMPHOCYTES # BLD AUTO: 1.03 K/UL (ref 1–4.8)
LYMPHOCYTES NFR BLD: 21.4 % (ref 22–41)
MCH RBC QN AUTO: 35.6 PG (ref 27–33)
MCHC RBC AUTO-ENTMCNC: 33.3 G/DL (ref 33.6–35)
MCV RBC AUTO: 106.9 FL (ref 81.4–97.8)
MONOCYTES # BLD AUTO: 0.55 K/UL (ref 0–0.85)
MONOCYTES NFR BLD AUTO: 11.4 % (ref 0–13.4)
NEUTROPHILS # BLD AUTO: 2.93 K/UL (ref 2–7.15)
NEUTROPHILS NFR BLD: 60.7 % (ref 44–72)
NRBC # BLD AUTO: 0 K/UL
NRBC BLD-RTO: 0 /100 WBC
PLATELET # BLD AUTO: 298 K/UL (ref 164–446)
PMV BLD AUTO: 10.7 FL (ref 9–12.9)
RBC # BLD AUTO: 3.34 M/UL (ref 4.2–5.4)
WBC # BLD AUTO: 4.8 K/UL (ref 4.8–10.8)

## 2021-07-16 PROCEDURE — 85025 COMPLETE CBC W/AUTO DIFF WBC: CPT

## 2021-07-20 NOTE — DOCUMENTATION QUERY
[facility]                                                                       Query Response Note      PATIENT:               SATINDER LARES  ACCT #:                  [id number]  MRN:                     [id number]  :                      1935  ADMIT DATE:       2021 4:21 PM  DISCH DATE:        2021 1:06 PM  RESPONDING  PROVIDER #:        [id number]           QUERY TEXT:    Respiratory Failure is documented in the Medical Record. Please specify the underlying cause (includes suspected or probable)    NOTE:  If an appropriate response is not listed below, please respond with a new note.    [email or web address]    The patient's Clinical Indicators include:  D/C summary mentions postoperative period with some respiratory failure, requiring oxygen.  Not mentioned elsewhere.  Please specify the underlying cause of respiratory failure.  Clinical indicators:Oxygen as needed, respiratory protocol, , resp - 18, -Resp- 16,   treatment/monitoring: oxygen required, iv antibiotics for appendicities, respiratory protocol, bronchodilators,   Risks:  worsening respiratory failure, emphysema, hypotension, organ dysfunction  Options provided:   -- Respiratory failure due to procedure   -- Respiratory failure due to COPD   -- Unable to determine      Query created by: Lexie Murillo on 7/15/2021 8:13 AM    RESPONSE TEXT:    Respiratory failure due to COPD          Electronically signed by:  FERNANDA ROACH MD 2021 5:51 PM

## 2022-02-17 ENCOUNTER — APPOINTMENT (OUTPATIENT)
Dept: RADIOLOGY | Facility: MEDICAL CENTER | Age: 87
DRG: 439 | End: 2022-02-17
Attending: EMERGENCY MEDICINE
Payer: MEDICARE

## 2022-02-17 ENCOUNTER — HOSPITAL ENCOUNTER (INPATIENT)
Facility: MEDICAL CENTER | Age: 87
LOS: 2 days | DRG: 439 | End: 2022-02-19
Attending: EMERGENCY MEDICINE | Admitting: HOSPITALIST
Payer: MEDICARE

## 2022-02-17 DIAGNOSIS — I10 ESSENTIAL HYPERTENSION, BENIGN: ICD-10-CM

## 2022-02-17 DIAGNOSIS — R93.3 ABNORMAL CT SCAN, SMALL BOWEL: ICD-10-CM

## 2022-02-17 PROBLEM — K85.90 ACUTE PANCREATITIS: Status: ACTIVE | Noted: 2022-02-17

## 2022-02-17 PROBLEM — R93.89 ABNORMAL CT SCAN: Status: ACTIVE | Noted: 2022-02-17

## 2022-02-17 PROBLEM — R10.9 ABDOMINAL PAIN: Status: ACTIVE | Noted: 2022-02-17

## 2022-02-17 LAB
ALBUMIN SERPL BCP-MCNC: 4.2 G/DL (ref 3.2–4.9)
ALBUMIN/GLOB SERPL: 1.2 G/DL
ALP SERPL-CCNC: 65 U/L (ref 30–99)
ALT SERPL-CCNC: 12 U/L (ref 2–50)
ANION GAP SERPL CALC-SCNC: 11 MMOL/L (ref 7–16)
APPEARANCE UR: ABNORMAL
AST SERPL-CCNC: 21 U/L (ref 12–45)
BACTERIA #/AREA URNS HPF: ABNORMAL /HPF
BASOPHILS # BLD AUTO: 0.7 % (ref 0–1.8)
BASOPHILS # BLD: 0.05 K/UL (ref 0–0.12)
BILIRUB SERPL-MCNC: 0.7 MG/DL (ref 0.1–1.5)
BILIRUB UR QL STRIP.AUTO: NEGATIVE
BUN SERPL-MCNC: 20 MG/DL (ref 8–22)
CALCIUM SERPL-MCNC: 10.3 MG/DL (ref 8.4–10.2)
CHLORIDE SERPL-SCNC: 101 MMOL/L (ref 96–112)
CO2 SERPL-SCNC: 28 MMOL/L (ref 20–33)
COLOR UR: YELLOW
CREAT SERPL-MCNC: 0.87 MG/DL (ref 0.5–1.4)
EKG IMPRESSION: NORMAL
EOSINOPHIL # BLD AUTO: 0.43 K/UL (ref 0–0.51)
EOSINOPHIL NFR BLD: 5.9 % (ref 0–6.9)
EPI CELLS #/AREA URNS HPF: ABNORMAL /HPF
ERYTHROCYTE [DISTWIDTH] IN BLOOD BY AUTOMATED COUNT: 42.2 FL (ref 35.9–50)
GLOBULIN SER CALC-MCNC: 3.4 G/DL (ref 1.9–3.5)
GLUCOSE SERPL-MCNC: 120 MG/DL (ref 65–99)
GLUCOSE UR STRIP.AUTO-MCNC: NEGATIVE MG/DL
HCT VFR BLD AUTO: 42.1 % (ref 37–47)
HGB BLD-MCNC: 13.6 G/DL (ref 12–16)
IMM GRANULOCYTES # BLD AUTO: 0.02 K/UL (ref 0–0.11)
IMM GRANULOCYTES NFR BLD AUTO: 0.3 % (ref 0–0.9)
KETONES UR STRIP.AUTO-MCNC: 15 MG/DL
LEUKOCYTE ESTERASE UR QL STRIP.AUTO: ABNORMAL
LIPASE SERPL-CCNC: 264 U/L (ref 7–58)
LYMPHOCYTES # BLD AUTO: 0.78 K/UL (ref 1–4.8)
LYMPHOCYTES NFR BLD: 10.6 % (ref 22–41)
MCH RBC QN AUTO: 32 PG (ref 27–33)
MCHC RBC AUTO-ENTMCNC: 32.3 G/DL (ref 33.6–35)
MCV RBC AUTO: 99.1 FL (ref 81.4–97.8)
MICRO URNS: ABNORMAL
MONOCYTES # BLD AUTO: 0.57 K/UL (ref 0–0.85)
MONOCYTES NFR BLD AUTO: 7.8 % (ref 0–13.4)
MUCOUS THREADS #/AREA URNS HPF: ABNORMAL /HPF
NEUTROPHILS # BLD AUTO: 5.48 K/UL (ref 2–7.15)
NEUTROPHILS NFR BLD: 74.7 % (ref 44–72)
NITRITE UR QL STRIP.AUTO: NEGATIVE
NRBC # BLD AUTO: 0 K/UL
NRBC BLD-RTO: 0 /100 WBC
PH UR STRIP.AUTO: 6 [PH] (ref 5–8)
PLATELET # BLD AUTO: 208 K/UL (ref 164–446)
PMV BLD AUTO: 10.8 FL (ref 9–12.9)
POTASSIUM SERPL-SCNC: 4.2 MMOL/L (ref 3.6–5.5)
PROT SERPL-MCNC: 7.6 G/DL (ref 6–8.2)
PROT UR QL STRIP: NEGATIVE MG/DL
RBC # BLD AUTO: 4.25 M/UL (ref 4.2–5.4)
RBC # URNS HPF: ABNORMAL /HPF
RBC UR QL AUTO: ABNORMAL
SODIUM SERPL-SCNC: 140 MMOL/L (ref 135–145)
SP GR UR STRIP.AUTO: 1.02
WBC # BLD AUTO: 7.3 K/UL (ref 4.8–10.8)
WBC #/AREA URNS HPF: ABNORMAL /HPF

## 2022-02-17 PROCEDURE — 93005 ELECTROCARDIOGRAM TRACING: CPT | Performed by: EMERGENCY MEDICINE

## 2022-02-17 PROCEDURE — 700105 HCHG RX REV CODE 258: Performed by: EMERGENCY MEDICINE

## 2022-02-17 PROCEDURE — 87040 BLOOD CULTURE FOR BACTERIA: CPT | Mod: 91

## 2022-02-17 PROCEDURE — 81001 URINALYSIS AUTO W/SCOPE: CPT

## 2022-02-17 PROCEDURE — 83690 ASSAY OF LIPASE: CPT

## 2022-02-17 PROCEDURE — 99285 EMERGENCY DEPT VISIT HI MDM: CPT

## 2022-02-17 PROCEDURE — 85025 COMPLETE CBC W/AUTO DIFF WBC: CPT

## 2022-02-17 PROCEDURE — 80053 COMPREHEN METABOLIC PANEL: CPT

## 2022-02-17 PROCEDURE — 74177 CT ABD & PELVIS W/CONTRAST: CPT | Mod: ME

## 2022-02-17 PROCEDURE — 700101 HCHG RX REV CODE 250: Performed by: EMERGENCY MEDICINE

## 2022-02-17 PROCEDURE — 700105 HCHG RX REV CODE 258: Performed by: HOSPITALIST

## 2022-02-17 PROCEDURE — 700111 HCHG RX REV CODE 636 W/ 250 OVERRIDE (IP): Performed by: HOSPITALIST

## 2022-02-17 PROCEDURE — 76705 ECHO EXAM OF ABDOMEN: CPT

## 2022-02-17 PROCEDURE — 770006 HCHG ROOM/CARE - MED/SURG/GYN SEMI*

## 2022-02-17 PROCEDURE — 36415 COLL VENOUS BLD VENIPUNCTURE: CPT

## 2022-02-17 PROCEDURE — 700111 HCHG RX REV CODE 636 W/ 250 OVERRIDE (IP): Performed by: EMERGENCY MEDICINE

## 2022-02-17 PROCEDURE — 99223 1ST HOSP IP/OBS HIGH 75: CPT | Mod: AI | Performed by: HOSPITALIST

## 2022-02-17 PROCEDURE — 93005 ELECTROCARDIOGRAM TRACING: CPT

## 2022-02-17 PROCEDURE — 700117 HCHG RX CONTRAST REV CODE 255: Performed by: EMERGENCY MEDICINE

## 2022-02-17 RX ORDER — ONDANSETRON 2 MG/ML
4 INJECTION INTRAMUSCULAR; INTRAVENOUS EVERY 4 HOURS PRN
Status: DISCONTINUED | OUTPATIENT
Start: 2022-02-17 | End: 2022-02-19 | Stop reason: HOSPADM

## 2022-02-17 RX ORDER — VALSARTAN 80 MG/1
160 TABLET ORAL DAILY
Status: DISCONTINUED | OUTPATIENT
Start: 2022-02-18 | End: 2022-02-19 | Stop reason: HOSPADM

## 2022-02-17 RX ORDER — BISACODYL 10 MG
10 SUPPOSITORY, RECTAL RECTAL
Status: DISCONTINUED | OUTPATIENT
Start: 2022-02-17 | End: 2022-02-19 | Stop reason: HOSPADM

## 2022-02-17 RX ORDER — OXYCODONE HYDROCHLORIDE 5 MG/1
5 TABLET ORAL
Status: DISCONTINUED | OUTPATIENT
Start: 2022-02-17 | End: 2022-02-19 | Stop reason: HOSPADM

## 2022-02-17 RX ORDER — LABETALOL HYDROCHLORIDE 5 MG/ML
10 INJECTION, SOLUTION INTRAVENOUS EVERY 4 HOURS PRN
Status: DISCONTINUED | OUTPATIENT
Start: 2022-02-17 | End: 2022-02-19 | Stop reason: HOSPADM

## 2022-02-17 RX ORDER — ONDANSETRON 4 MG/1
4 TABLET, ORALLY DISINTEGRATING ORAL EVERY 4 HOURS PRN
Status: DISCONTINUED | OUTPATIENT
Start: 2022-02-17 | End: 2022-02-19 | Stop reason: HOSPADM

## 2022-02-17 RX ORDER — HYDROMORPHONE HYDROCHLORIDE 1 MG/ML
0.25 INJECTION, SOLUTION INTRAMUSCULAR; INTRAVENOUS; SUBCUTANEOUS
Status: DISCONTINUED | OUTPATIENT
Start: 2022-02-17 | End: 2022-02-19 | Stop reason: HOSPADM

## 2022-02-17 RX ORDER — AMOXICILLIN 250 MG
2 CAPSULE ORAL 2 TIMES DAILY
Status: DISCONTINUED | OUTPATIENT
Start: 2022-02-18 | End: 2022-02-19 | Stop reason: HOSPADM

## 2022-02-17 RX ORDER — ACETAMINOPHEN 325 MG/1
650 TABLET ORAL EVERY 6 HOURS PRN
Status: DISCONTINUED | OUTPATIENT
Start: 2022-02-17 | End: 2022-02-19 | Stop reason: HOSPADM

## 2022-02-17 RX ORDER — OXYCODONE HYDROCHLORIDE 5 MG/1
2.5 TABLET ORAL
Status: DISCONTINUED | OUTPATIENT
Start: 2022-02-17 | End: 2022-02-19 | Stop reason: HOSPADM

## 2022-02-17 RX ORDER — POLYETHYLENE GLYCOL 3350 17 G/17G
1 POWDER, FOR SOLUTION ORAL
Status: DISCONTINUED | OUTPATIENT
Start: 2022-02-17 | End: 2022-02-19 | Stop reason: HOSPADM

## 2022-02-17 RX ORDER — SODIUM CHLORIDE, SODIUM LACTATE, POTASSIUM CHLORIDE, CALCIUM CHLORIDE 600; 310; 30; 20 MG/100ML; MG/100ML; MG/100ML; MG/100ML
INJECTION, SOLUTION INTRAVENOUS CONTINUOUS
Status: DISCONTINUED | OUTPATIENT
Start: 2022-02-17 | End: 2022-02-19 | Stop reason: HOSPADM

## 2022-02-17 RX ORDER — HYDRALAZINE HYDROCHLORIDE 20 MG/ML
20 INJECTION INTRAMUSCULAR; INTRAVENOUS EVERY 6 HOURS PRN
Status: DISCONTINUED | OUTPATIENT
Start: 2022-02-17 | End: 2022-02-19 | Stop reason: HOSPADM

## 2022-02-17 RX ADMIN — ENOXAPARIN SODIUM 60 MG: 60 INJECTION SUBCUTANEOUS at 22:02

## 2022-02-17 RX ADMIN — IOHEXOL 80 ML: 350 INJECTION, SOLUTION INTRAVENOUS at 17:31

## 2022-02-17 RX ADMIN — CEFTRIAXONE SODIUM 1 G: 1 INJECTION, POWDER, FOR SOLUTION INTRAMUSCULAR; INTRAVENOUS at 21:30

## 2022-02-17 RX ADMIN — METRONIDAZOLE 500 MG: 500 INJECTION, SOLUTION INTRAVENOUS at 22:03

## 2022-02-17 RX ADMIN — SODIUM CHLORIDE, POTASSIUM CHLORIDE, SODIUM LACTATE AND CALCIUM CHLORIDE: 600; 310; 30; 20 INJECTION, SOLUTION INTRAVENOUS at 22:02

## 2022-02-17 ASSESSMENT — ENCOUNTER SYMPTOMS
ABDOMINAL PAIN: 1
FOCAL WEAKNESS: 0
VOMITING: 0
EYE DISCHARGE: 0
SHORTNESS OF BREATH: 0
EYE REDNESS: 0
MYALGIAS: 0
CHILLS: 0
STRIDOR: 0
FLANK PAIN: 0
NERVOUS/ANXIOUS: 0
BRUISES/BLEEDS EASILY: 0
COUGH: 0
FEVER: 0

## 2022-02-17 ASSESSMENT — COGNITIVE AND FUNCTIONAL STATUS - GENERAL
DRESSING REGULAR LOWER BODY CLOTHING: A LITTLE
EATING MEALS: A LITTLE
TOILETING: A LITTLE
MOBILITY SCORE: 18
STANDING UP FROM CHAIR USING ARMS: A LITTLE
HELP NEEDED FOR BATHING: A LITTLE
SUGGESTED CMS G CODE MODIFIER DAILY ACTIVITY: CK
SUGGESTED CMS G CODE MODIFIER MOBILITY: CK
TURNING FROM BACK TO SIDE WHILE IN FLAT BAD: A LITTLE
WALKING IN HOSPITAL ROOM: A LITTLE
MOVING FROM LYING ON BACK TO SITTING ON SIDE OF FLAT BED: A LITTLE
DAILY ACTIVITIY SCORE: 18
CLIMB 3 TO 5 STEPS WITH RAILING: A LITTLE
PERSONAL GROOMING: A LITTLE
MOVING TO AND FROM BED TO CHAIR: A LITTLE
DRESSING REGULAR UPPER BODY CLOTHING: A LITTLE

## 2022-02-17 ASSESSMENT — LIFESTYLE VARIABLES
TOTAL SCORE: 0
CONSUMPTION TOTAL: NEGATIVE
HOW MANY TIMES IN THE PAST YEAR HAVE YOU HAD 5 OR MORE DRINKS IN A DAY: 0
ON A TYPICAL DAY WHEN YOU DRINK ALCOHOL HOW MANY DRINKS DO YOU HAVE: 0
TOTAL SCORE: 0
HAVE PEOPLE ANNOYED YOU BY CRITICIZING YOUR DRINKING: NO
TOTAL SCORE: 0
EVER HAD A DRINK FIRST THING IN THE MORNING TO STEADY YOUR NERVES TO GET RID OF A HANGOVER: NO
AVERAGE NUMBER OF DAYS PER WEEK YOU HAVE A DRINK CONTAINING ALCOHOL: 0
ALCOHOL_USE: NO
HAVE YOU EVER FELT YOU SHOULD CUT DOWN ON YOUR DRINKING: NO
EVER FELT BAD OR GUILTY ABOUT YOUR DRINKING: NO

## 2022-02-17 ASSESSMENT — PATIENT HEALTH QUESTIONNAIRE - PHQ9
SUM OF ALL RESPONSES TO PHQ9 QUESTIONS 1 AND 2: 0
2. FEELING DOWN, DEPRESSED, IRRITABLE, OR HOPELESS: NOT AT ALL
1. LITTLE INTEREST OR PLEASURE IN DOING THINGS: NOT AT ALL

## 2022-02-17 ASSESSMENT — PAIN DESCRIPTION - PAIN TYPE
TYPE: ACUTE PAIN
TYPE: ACUTE PAIN

## 2022-02-17 ASSESSMENT — FIBROSIS 4 INDEX
FIB4 SCORE: 2.86
FIB4 SCORE: 2.51

## 2022-02-17 NOTE — ED TRIAGE NOTES
85 yo female presents to triage with reports of mid to lower abdominal pain for the past 3 weeks.  Patient reports she has had some changes with her bowel habits during this period of time as well.  Reports sometimes she has a normal BM and sometimes it is a very little amount.  Last BM this AM.  Denies nausea/vomitng or fever. Reports some epigastric pain and increased belching  Denies pain with urination.    Patient is Covid vaccinated

## 2022-02-18 ENCOUNTER — APPOINTMENT (OUTPATIENT)
Dept: RADIOLOGY | Facility: MEDICAL CENTER | Age: 87
DRG: 439 | End: 2022-02-18
Attending: INTERNAL MEDICINE
Payer: MEDICARE

## 2022-02-18 LAB
ALBUMIN SERPL BCP-MCNC: 3.4 G/DL (ref 3.2–4.9)
ALBUMIN/GLOB SERPL: 1.1 G/DL
ALP SERPL-CCNC: 51 U/L (ref 30–99)
ALT SERPL-CCNC: 10 U/L (ref 2–50)
ANION GAP SERPL CALC-SCNC: 12 MMOL/L (ref 7–16)
AST SERPL-CCNC: 17 U/L (ref 12–45)
BASOPHILS # BLD AUTO: 0.9 % (ref 0–1.8)
BASOPHILS # BLD: 0.05 K/UL (ref 0–0.12)
BILIRUB SERPL-MCNC: 0.6 MG/DL (ref 0.1–1.5)
BUN SERPL-MCNC: 17 MG/DL (ref 8–22)
CALCIUM SERPL-MCNC: 9.3 MG/DL (ref 8.4–10.2)
CHLORIDE SERPL-SCNC: 102 MMOL/L (ref 96–112)
CHOLEST SERPL-MCNC: 152 MG/DL (ref 100–199)
CO2 SERPL-SCNC: 26 MMOL/L (ref 20–33)
CREAT SERPL-MCNC: 0.75 MG/DL (ref 0.5–1.4)
CRP SERPL HS-MCNC: <0.3 MG/DL (ref 0–0.75)
EOSINOPHIL # BLD AUTO: 0.46 K/UL (ref 0–0.51)
EOSINOPHIL NFR BLD: 8.2 % (ref 0–6.9)
ERYTHROCYTE [DISTWIDTH] IN BLOOD BY AUTOMATED COUNT: 42.6 FL (ref 35.9–50)
ERYTHROCYTE [SEDIMENTATION RATE] IN BLOOD BY WESTERGREN METHOD: 34 MM/HOUR (ref 0–25)
GLOBULIN SER CALC-MCNC: 3 G/DL (ref 1.9–3.5)
GLUCOSE SERPL-MCNC: 102 MG/DL (ref 65–99)
HCT VFR BLD AUTO: 36.3 % (ref 37–47)
HDLC SERPL-MCNC: 48 MG/DL
HGB BLD-MCNC: 11.9 G/DL (ref 12–16)
IMM GRANULOCYTES # BLD AUTO: 0.01 K/UL (ref 0–0.11)
IMM GRANULOCYTES NFR BLD AUTO: 0.2 % (ref 0–0.9)
LDLC SERPL CALC-MCNC: 86 MG/DL
LYMPHOCYTES # BLD AUTO: 0.77 K/UL (ref 1–4.8)
LYMPHOCYTES NFR BLD: 13.8 % (ref 22–41)
MAGNESIUM SERPL-MCNC: 1.9 MG/DL (ref 1.5–2.5)
MCH RBC QN AUTO: 32.1 PG (ref 27–33)
MCHC RBC AUTO-ENTMCNC: 32.8 G/DL (ref 33.6–35)
MCV RBC AUTO: 97.8 FL (ref 81.4–97.8)
MONOCYTES # BLD AUTO: 0.53 K/UL (ref 0–0.85)
MONOCYTES NFR BLD AUTO: 9.5 % (ref 0–13.4)
NEUTROPHILS # BLD AUTO: 3.77 K/UL (ref 2–7.15)
NEUTROPHILS NFR BLD: 67.4 % (ref 44–72)
NRBC # BLD AUTO: 0 K/UL
NRBC BLD-RTO: 0 /100 WBC
PLATELET # BLD AUTO: 192 K/UL (ref 164–446)
PMV BLD AUTO: 11.5 FL (ref 9–12.9)
POTASSIUM SERPL-SCNC: 3.7 MMOL/L (ref 3.6–5.5)
PROCALCITONIN SERPL-MCNC: 0.06 NG/ML
PROT SERPL-MCNC: 6.4 G/DL (ref 6–8.2)
RBC # BLD AUTO: 3.71 M/UL (ref 4.2–5.4)
SODIUM SERPL-SCNC: 140 MMOL/L (ref 135–145)
TRIGL SERPL-MCNC: 88 MG/DL (ref 0–149)
WBC # BLD AUTO: 5.6 K/UL (ref 4.8–10.8)

## 2022-02-18 PROCEDURE — 700111 HCHG RX REV CODE 636 W/ 250 OVERRIDE (IP): Performed by: HOSPITALIST

## 2022-02-18 PROCEDURE — 80061 LIPID PANEL: CPT

## 2022-02-18 PROCEDURE — 99232 SBSQ HOSP IP/OBS MODERATE 35: CPT | Performed by: INTERNAL MEDICINE

## 2022-02-18 PROCEDURE — 85652 RBC SED RATE AUTOMATED: CPT

## 2022-02-18 PROCEDURE — 36415 COLL VENOUS BLD VENIPUNCTURE: CPT

## 2022-02-18 PROCEDURE — 84145 PROCALCITONIN (PCT): CPT

## 2022-02-18 PROCEDURE — 74181 MRI ABDOMEN W/O CONTRAST: CPT | Mod: MG

## 2022-02-18 PROCEDURE — 83735 ASSAY OF MAGNESIUM: CPT

## 2022-02-18 PROCEDURE — A9270 NON-COVERED ITEM OR SERVICE: HCPCS | Performed by: HOSPITALIST

## 2022-02-18 PROCEDURE — 770006 HCHG ROOM/CARE - MED/SURG/GYN SEMI*

## 2022-02-18 PROCEDURE — 86140 C-REACTIVE PROTEIN: CPT

## 2022-02-18 PROCEDURE — 80053 COMPREHEN METABOLIC PANEL: CPT

## 2022-02-18 PROCEDURE — 700102 HCHG RX REV CODE 250 W/ 637 OVERRIDE(OP): Performed by: HOSPITALIST

## 2022-02-18 PROCEDURE — 85025 COMPLETE CBC W/AUTO DIFF WBC: CPT

## 2022-02-18 RX ADMIN — VALSARTAN 160 MG: 80 TABLET, FILM COATED ORAL at 05:46

## 2022-02-18 RX ADMIN — SENNOSIDES AND DOCUSATE SODIUM 2 TABLET: 50; 8.6 TABLET ORAL at 05:46

## 2022-02-18 RX ADMIN — ENOXAPARIN SODIUM 60 MG: 60 INJECTION SUBCUTANEOUS at 17:17

## 2022-02-18 ASSESSMENT — ENCOUNTER SYMPTOMS
SHORTNESS OF BREATH: 0
DIZZINESS: 0
NAUSEA: 0
ABDOMINAL PAIN: 1
VOMITING: 0
FEVER: 0
DEPRESSION: 0
CHILLS: 0
MYALGIAS: 0
HEADACHES: 1

## 2022-02-18 ASSESSMENT — PAIN DESCRIPTION - PAIN TYPE
TYPE: ACUTE PAIN
TYPE: ACUTE PAIN

## 2022-02-18 NOTE — ASSESSMENT & PLAN NOTE
On apixaban at home, will switch to Lovenox for shorter half-life and easy reversibility in case patient needed to undergo surgical intervention

## 2022-02-18 NOTE — ED NOTES
Medication history updated by interviewing patient.  The patient ran out of valsartan a few days ago and needs a new prescription. She has a new primary care provider and her request for refill was not approved.    No additional OTC medication use or recent antibiotic prescriptions.    Kedar Espinoza, AjD, BCPS

## 2022-02-18 NOTE — CARE PLAN
The patient is Stable - Low risk of patient condition declining or worsening    Shift Goals  Clinical Goals:Pain control, CLD  Patient Goals: sleep comfortably    Progress made toward(s) clinical / shift goals:   Patient pain managed by current regimen.    Patient is not progressing towards the following goals: N/A   Problem: Knowledge Deficit - Standard  Goal: Patient and family/care givers will demonstrate understanding of plan of care, disease process/condition, diagnostic tests and medications  Outcome: Progressing  Note: Plan of care discussion, sx consult.  Patient will verbalize plan of care with greater understanding as care progresses.      Problem: Nutrition  Goal: Patient's nutritional and fluid intake will be adequate or improve  Outcome: Progressing  Note: Patient on CLD, patient will verbalize understanding of diet and correlation to treatment plan.

## 2022-02-18 NOTE — CONSULTS
DATE OF SERVICE:  02/17/2022     REASON FOR SURGERY CONSULTATION:  Abdominal pain.     HISTORY OF PRESENT ILLNESS:  The patient is an 86-year-old female with history   significant for laparoscopic appendectomy in 06/2021, pathology confirmed   acute appendicitis without rupture.  She was brought into the emergency   department with the family member for lower abdominal pain.  The patient   describes as a mild dull pain in her right lower quadrant that has been   present for the past week, but worsening for the last 24 hours.  She denies   nausea, vomiting, hematochezia or loose stools.  She is having regular   semi-formed bowel movements and passing flatus.  The pain is nonradiating   without modifying factors.  The patient reports she had a normal colonoscopy   around 10 years ago.     REVIEW OF SYSTEMS:  Positive for abdominal pain and fatigue.  All other review   of systems on a 10-point review according to AMA guidelines are negative   except for that stated in HPI.     PAST MEDICAL HISTORY:  COPD, DVT, gastroesophageal reflux disease,   hypertension, dyslipidemia.     PAST SURGICAL HISTORY:  Include hemorrhoidectomy, knee arthroplasty in 2014,   cataract surgery and laparoscopic appendectomy in 06/2021 by Dr. Pedro Mcneil.     FAMILY HISTORY:  No colorectal cancer, inflammatory bowel disease, bleeding   diathesis or problems with anesthesia.     SOCIAL HISTORY:  Former smoker.  Denies alcohol or illicit drugs.     ALLERGIES:  CODEINE, DEMEROL, CIPRO, IBUPROFEN AND IODINE.     MEDICATIONS:  Include glucosamine, turmeric, Eliquis, colchicine, Uloric,   valsartan.     PHYSICAL EXAMINATION:  VITAL SIGNS:  Temperature of 36, pulse is 80, blood pressure 144/68, sats are   95% on room air.  CONSTITUTIONAL:  The patient is alert and oriented x4.  Nontoxic appearing, in   no apparent distress.  HEENT:  Normocephalic, atraumatic.  Pupils equally round and reactive to   light.  No scleral icterus.  NECK:  No JVD, no  cervical lymphadenopathy.  CARDIOVASCULAR:  Regular rate and rhythm.  EXTREMITIES:  Warm.  No edema.  THORAX AND LUNGS:  Normal respiratory effort.  No wheezing or stridor.  ABDOMEN:  Soft, nondistended.  There is mild tenderness in the right lower   quadrant without rebound or guarding.  No palpable ventral or inguinal   hernias.  NEUROLOGIC:  Cranial nerves II-XII grossly intact.  Normal sensation and   strength in bilateral upper and lower extremities.  SKIN:  Warm and well perfused.  No petechia or rashes.     LABORATORY DATA:  White count of 7.3, hemoglobin 13, hematocrit 42, platelets   208.  Sodium is 140, potassium 4.2, chloride is 101, bicarb is 20, BUN is 20,   creatinine 0.8, ALT 12, AST 21, alk phos 65, lipase of 264, total bilirubin of   0.7, and glucose 120.     DIAGNOSTIC DATA:  CT abdomen and pelvis shows possible small gallstones with   distended gallbladder.  There is biliary ductal dilatation with stranding   around the pancreas.  Terminal ileum is thickened and the base of the appendix   is thickened as well.   There is dilated small bowel leading up to the thickened terminal ileum suggestive of bowel obstruction.        Assessment/Plan: 86 year old female with mild right lower quadrant pain x 2 weeks, CT findings suggestive of terminal ileitis; differential is infectious versus low flow ischemia.  Currently the patient is without signs of sepsis, her abdominal exam is benign, and there is no clinical sign of bowel obstruction.        ______________________________  MD TIANNA Muhammad/JAN/MAN    DD:  02/17/2022 22:27  DT:  02/17/2022 22:51    Job#:  [id number]

## 2022-02-18 NOTE — RESPIRATORY CARE
COPD EDUCATION by COPD CLINICAL EDUCATOR  2/18/2022 at 9:43 AM by Karen England, RRT     Patient reviewed by COPD education team. Patient does not have a history or diagnosis of COPD, but NO PFT and does not use any inhaler/medications for respiratory status. Patient is a former smoker of 20 yrs quit in 1975.  Patient not interested in the COPD program at this time without formal diagnosis. Encourage OP follow up with Pulmonary.  Provided a short discussion/intervention with patient covering : What is COPD (how the lungs work), daily medications rescue and maintenance, breathing techniques, infection prevention and oxygen safety were covered in detail.   Request from Dr. Boucher OP follow up with Pulmonary for PFT. Consult Dr. Watts due to patient chemical exposure and shortness of breath getting worse.         COPD Screen  COPD Risk Screening  Do you have a history of COPD?:  (pt is unsure has never had a PFT, was told in 2018 by an xray it looked like COPD?)    COPD Assessment  COPD Clinical Specialists ONLY  COPD Education Initiated: Yes--Short Intervention,No--Quick Screen (Had the pleasure to have seen this pt in June 2021, advised her of getting a PFT had to have a referral from PCP, pt did not follow up has had issues with PCP, is here for abdominal pain)  DME Company: none  Physician Name: none  Pulmonologist Name: none - Need to R/O COPD (pt did not want to do bedside due to abdominal pain) pt has 20 yrs smoking, does not use any inhalers, had many years of chemical exposure at work, pt not very active and has noticed slowing down and gets sob. Wants to see a Pulmonary will request OP referral. Pt did not want education material has it from last time, said she will call doctors this time....  Referrals Initiated:  (pt declined)  Is this a COPD exacerbation patient?: No  (OP) Pulmonary Function Testing: Yes (Encourage follow up!)    PFT Results    No results found for: PFT    Meds to Beds  Would the  "patient like to opt in for Bedside Medication Delivery at Discharge?: Yes, interested     MY COPD ACTION PLAN     It is recommended that patients and physicians /healthcare providers complete this action plan together. This plan should be discussed at each physician visit and updated as needed.    The green, yellow and red zones show groups of symptoms of COPD. This list of symptoms is not comprehensive, and you may experience other symptoms. In the \"Actions\" column, your healthcare provider has recommended actions for you to take based on your symptoms.    Patient Name: Licha Pope   YOB: 1935   Last Updated on:     Green Zone:  I am doing well today Actions   •  Usual activitiy and exercise level •  Take daily medications   •  Usual amounts of cough and phlegm/mucus •  Use oxygen as prescribed   •  Sleep well at night •  Continue regular exercise/diet plan   •  Appetite is good •  At all times avoid cigarette smoke, inhaled irritants     Daily Medications (these medications are taken every day):                Yellow Zone:  I am having a bad day or a COPD flare Actions   •  More breathless than usual •  Continue daily medications   •  I have less energy for my daily activities •  Use quick relief inhaler as ordered   •  Increased or thicker phlegm/mucus •  Use oxygen as prescribed   •  Using quick relief inhaler/nebulizer more often •  Get plenty of rest   •  Swelling of ankles more than usual •  Use pursed lip breathing   •  More coughing than usual •  At all times avoid cigarette smoke, inhaled irritants   •  I feel like I have a \"chest cold\"   •  Poor sleep and my symptoms woke me up   •  My appetite is not good   •  My medicine is not helping    •  Call provider immediately if symptoms don’t improve     Continue daily medications, add rescue medications:               Medications to be used during a flare up, (as Discussed with Provider):              Red Zone:  I need urgent medical care " Actions   •  Severe shortness of breath even at rest •  Call 911 or seek medical care immediately   •  Not able to do any activity because of breathing    •  Fever or shaking chills    •  Feeling confused or very drowsy     •  Chest pains    •  Coughing up blood

## 2022-02-18 NOTE — ASSESSMENT & PLAN NOTE
AST, ALT, alkaline phosphatase and bilirubin within normal limits in the setting of abnormal appearing biliary duct on CT scan.  MRCP pending  Supportive care, multimodal pain control.  Bowel rest.  Liquid diet   Watch input and output closely, watch respiratory status closely  Watch closely for potential complications including pleural effusion, hypocalcemia, acute renal failure, compartment syndrome

## 2022-02-18 NOTE — ED NOTES
"Pt states mid lower abd pain is 0/10 at rest, on palpation it is \"a little sore.\" Denies nausea.   Ambulatory to BR to provide urine sample.   "

## 2022-02-18 NOTE — PROGRESS NOTES
Pt arrived on unit. VSS. Pt states she has no pain or n/v at this time. Discussed POC with pt and family. All questions answered. Fall precautions in place.

## 2022-02-18 NOTE — PROGRESS NOTES
[facility] Daily Progress Note    Date of Service  2/18/2022    Chief Complaint  Licha Pope is a 86 y.o. female admitted 2/17/2022 with abdominal pain.    Hospital Course  86 y.o. female with a past medical history of hypertension and history of deep vein thrombosis on anticoagulation with apixaban who presented 2/17/2022 with severe epigastric abdominal pain.  CT abdomen pelvis showed terminal ileum and base of the cecum is thick-walled, more proximal ileum is dilated consistent with small bowel obstruction, mild heterogeneity of the pancreatic tail with minimal surrounding inflammation possibly acute pancreatitis, and hepatic cysts.  Noted to have elevated lipase.  General surgery was consulted.  Patient admitted and started on IV fluids and pain control for her acute pancreatitis.  Right upper quadrant ultrasound showed dependent gallbladder sludge versus small stones, unchanged biliary dilatation and multiple hepatic cysts.    Interval Problem Update  Pro-Jaquan negative, lipid panel wnl. Vitals stable.  Ultrasound showed dependent gallbladder sludge versus dependent small stones, biliary dilatation.  MRCP pending.  Patient reports her abdominal pain has improved though is still present.  Patient denies any nausea or vomiting.  Patient also complaining of left ear pain, normally wears hearing aids, no signs of infection on exam.  Discussed plan of care with her son at bedside, all questions answered.    I have personally seen and examined the patient at bedside. I discussed the plan of care with patient, bedside RN, charge RN and .    Consultants/Specialty  general surgery    Code Status  Full Code    Disposition  Patient is not medically cleared for discharge.   Anticipate discharge to to home with close outpatient follow-up.  I have placed the appropriate orders for post-discharge needs.    Review of Systems  Review of Systems   Constitutional: Positive for malaise/fatigue. Negative for  chills and fever.   HENT: Positive for ear pain (left).    Respiratory: Negative for shortness of breath.    Cardiovascular: Negative for chest pain.   Gastrointestinal: Positive for abdominal pain. Negative for nausea and vomiting.   Genitourinary: Negative for dysuria.   Musculoskeletal: Negative for myalgias.   Skin: Negative for rash.   Neurological: Positive for headaches. Negative for dizziness.   Psychiatric/Behavioral: Negative for depression.   All other systems reviewed and are negative.       Physical Exam  Temp:  [36.7 °C (98 °F)-36.8 °C (98.2 °F)] 36.7 °C (98 °F)  Pulse:  [76-93] 76  Resp:  [14-18] 14  BP: (116-169)/() 120/73  SpO2:  [91 %-96 %] 91 %    Physical Exam  Vitals and nursing note reviewed.   Constitutional:       General: She is not in acute distress.  HENT:      Head: Normocephalic and atraumatic.      Left Ear: Tympanic membrane, ear canal and external ear normal.      Ears:      Comments: Hard of hearing   Eyes:      Extraocular Movements: Extraocular movements intact.      Conjunctiva/sclera: Conjunctivae normal.   Cardiovascular:      Rate and Rhythm: Normal rate and regular rhythm.      Pulses: Normal pulses.      Heart sounds: Normal heart sounds.   Pulmonary:      Effort: Pulmonary effort is normal. No respiratory distress.      Breath sounds: Normal breath sounds.   Abdominal:      General: Abdomen is flat. There is no distension.      Palpations: Abdomen is soft.      Tenderness: There is abdominal tenderness (lower abdomen). There is no guarding.   Musculoskeletal:         General: Normal range of motion.      Cervical back: Normal range of motion and neck supple.      Right lower leg: No edema.      Left lower leg: No edema.   Skin:     General: Skin is warm and dry.   Neurological:      General: No focal deficit present.      Mental Status: She is alert and oriented to person, place, and time.      Cranial Nerves: No cranial nerve deficit.   Psychiatric:         Mood and  Affect: Mood normal.         Behavior: Behavior normal.         Fluids    Intake/Output Summary (Last 24 hours) at 2/18/2022 1405  Last data filed at 2/18/2022 1300  Gross per 24 hour   Intake 1080 ml   Output 500 ml   Net 580 ml       Laboratory  Recent Labs     02/17/22  1601 02/18/22  0130   WBC 7.3 5.6   RBC 4.25 3.71*   HEMOGLOBIN 13.6 11.9*   HEMATOCRIT 42.1 36.3*   MCV 99.1* 97.8   MCH 32.0 32.1   MCHC 32.3* 32.8*   RDW 42.2 42.6   PLATELETCT 208 192   MPV 10.8 11.5     Recent Labs     02/17/22  1601 02/18/22  0130   SODIUM 140 140   POTASSIUM 4.2 3.7   CHLORIDE 101 102   CO2 28 26   GLUCOSE 120* 102*   BUN 20 17   CREATININE 0.87 0.75   CALCIUM 10.3* 9.3             Recent Labs     02/18/22  0130   TRIGLYCERIDE 88   HDL 48   LDL 86       Imaging  [id number]   Final Result      1.  There is dependent gallbladder sludge versus dependent small stones.   2.  There is biliary dilatation, unchanged.   3.  Multiple hepatic cysts with questionable echogenic debris within one of the cysts.      CT-ABDOMEN-PELVIS WITH   Final Result      1.  Terminal ileum and base of the cecum is thick-walled which may be infectious or inflammatory. The more proximal ileum is dilated consistent with small bowel obstruction.   2.  Prominence of the common duct is unchanged. Periampullary stricture is not excluded.   3.  Mild heterogeneity of the pancreatic tail with minimal surrounding inflammation can be seen in the setting of acute pancreatitis.   4.  Hepatic cyst and small hypodense hepatic lesions which also most likely represent small cysts.   5.  Atherosclerotic plaque.   6.  Moderate hiatal hernia.   7.  Small amount of free fluid in the pelvis.      [id number]    (Results Pending)        Assessment/Plan  * Abdominal pain- (present on admission)  Assessment & Plan  Multifactorial secondary to acute pancreatitis and intestinal obstruction.  Supportive care with rehydration modified diet, encourage ambulation.     Abnormal CT  scan, small bowel concerning for intestinal obstruction- (present on admission)  Assessment & Plan  Imaging shows evidence for thick walled terminal ileum which may be infectious or inflammatory in addition to dilated proximal ileum consistent with small bowel obstruction.  General surgery, Dr. Nix consulted  Patient clinically does not appear to be having an acute infection, will hold antibiotics for now we will check inflammatory markers and collect blood cultures, consider starting antibiotics according to test results, and clinical course    Abnormal CT scan of the abdomen concerning for prominence of the common bile duct- (present on admission)  Assessment & Plan  Prominence of the bile duct on CT scan in the setting of pancreatitis.  MRCP pending    Acute pancreatitis- (present on admission)  Assessment & Plan  AST, ALT, alkaline phosphatase and bilirubin within normal limits in the setting of abnormal appearing biliary duct on CT scan.  MRCP pending  Supportive care, multimodal pain control.  Bowel rest.  Liquid diet   Watch input and output closely, watch respiratory status closely  Watch closely for potential complications including pleural effusion, hypocalcemia, acute renal failure, compartment syndrome    History of deep venous thrombosis- (present on admission)  Assessment & Plan  On apixaban at home, will switch to Lovenox for shorter half-life and easy reversibility in case patient needed to undergo surgical intervention    COPD (chronic obstructive pulmonary disease) (HCC)- (present on admission)  Assessment & Plan  Not currently in exacerbation.  Oxygen as needed, Respiratory protocol, Bronchodilators, Incentive spirometry    Essential hypertension, benign- (present on admission)  Assessment & Plan  Resume home losartan with hold parameters.  We will start as needed hydralazine and labetalol for extreme hypertension       VTE prophylaxis: enoxaparin ppx    I have performed a physical exam and  reviewed and updated ROS and Plan today (2/18/2022). In review of yesterday's note (2/17/2022), there are no changes except as documented above.

## 2022-02-18 NOTE — ASSESSMENT & PLAN NOTE
Imaging shows evidence for thick walled terminal ileum which may be infectious or inflammatory in addition to dilated proximal ileum consistent with small bowel obstruction.  General surgery, Dr. Nix consulted  Patient clinically does not appear to be having an acute infection, will hold antibiotics for now we will check inflammatory markers and collect blood cultures, consider starting antibiotics according to test results, and clinical course

## 2022-02-18 NOTE — ED NOTES
Pt up ambulated to BR- steady on feet.  Pt waiting for bed assignment.  States her pain comes and goes but right now she is pain free.

## 2022-02-18 NOTE — H&P
Hospital Medicine History & Physical Note    Date of Service  2/17/2022    Primary Care Physician  No primary care provider on file.    Consultants  Dr Nix, general surgery    Code Status  Full Code    Chief Complaint  Chief Complaint   Patient presents with   • Abdominal Pain     History of Presenting Illness  Licha Pope is a 86 y.o. female with a past medical history of hypertension and history of deep vein thrombosis on anticoagulation with apixaban who presented 2/17/2022 with abdominal pain.  Patient reports that she has been having progressively worsening abdominal pain over the past two - three weeks.  The pain got worse over the past 2 days.  Pain used to be moderate but is now severe, located mostly in the epigastric region.  Pain is described as crampy in nature.  Denies noticing any aggravating or relieving factors.  Denies having associated nausea or vomiting.  Denies noticing any fevers chills cough or shortness of breath.  She denies noticing any changes to her bowel movement apart from becoming more hard recently.     I discussed the plan of care with emergency department physician, the patient and patient's son who was present at bedside in the emergency room.     Review of Systems  Review of Systems   Constitutional: Positive for malaise/fatigue. Negative for chills and fever.   Eyes: Negative for discharge and redness.   Respiratory: Negative for cough, shortness of breath and stridor.    Cardiovascular: Negative for chest pain and leg swelling.   Gastrointestinal: Positive for abdominal pain. Negative for vomiting.   Genitourinary: Negative for flank pain.   Musculoskeletal: Negative for myalgias.   Skin: Negative.    Neurological: Negative for focal weakness.   Endo/Heme/Allergies: Does not bruise/bleed easily.   Psychiatric/Behavioral: The patient is not nervous/anxious.      Past Medical History   has a past medical history of Arthritis, COPD (chronic obstructive pulmonary disease)  (HCC), DVT (deep venous thrombosis) (HCC), EMPHYSEMA, Gastroesophageal reflux disease without esophagitis (3/13/2019), Hypertension, Hypertriglyceridemia (2019), and Prediabetes (2019).    Surgical History   has a past surgical history that includes hemorrhoidectomy (); pr  delivery only (); knee arthroplasty total (2014); cataract phaco with iol (2014); cataract phaco with iol (2014); and pr lap,appendectomy (2021).     Family History  family history includes Alzheimer's Disease in her mother; Diabetes in her brother; Heart Attack in her sister; Heart Disease in her father; Other in her father.      Social History   reports that she quit smoking about 47 years ago. Her smoking use included cigarettes. She has a 20.00 pack-year smoking history. She has never used smokeless tobacco. She reports previous alcohol use of about 1.5 oz of alcohol per week. She reports that she does not use drugs.    Allergies  Allergies   Allergen Reactions   • Codeine Vomiting   • Demerol Vomiting     Injectable type   • Shellfish Allergy Shortness of Breath     Soft shell   • Ciprofloxacin Rash     rash   • Ibuprofen      Patient developed gastric ulcer/GI bleed from ibuprofen use   • Iodine      PATIENT ALLERGIC TO SHELLFISH, NOT SURE IF SHE IS ALLERGIC TO IODINE     Medications  Prior to Admission Medications   Prescriptions Last Dose Informant Patient Reported? Taking?   Ascorbic Acid (VITAMIN C PO)  Patient Yes No   Sig: Take 1 tablet by mouth every day.   Boswellia-Glucosamine-Vit D (OSTEO BI-FLEX ONE PER DAY PO)  Patient Yes No   Sig: Take 2 Tablets by mouth every day.   CALCIUM-MAGNESIUM-ZINC PO  Patient Yes No   Sig: Take 1 tablet by mouth every day.   Cholecalciferol (D3 PO)  Patient Yes No   Sig: Take 1 capsule by mouth every day.   GLUCOSAMINE HCL-MSM PO  Patient Yes No   Sig: Take 2 Tablets by mouth every day.   Multiple Vitamins-Minerals (LUTEIN-ZEAXANTHIN PO)  Patient Yes  No   Sig: Take 1 tablet by mouth every day.   TURMERIC PO  Patient Yes No   Sig: Take 2 Capsules by mouth every day.   apixaban (ELIQUIS) 5mg Tab   No No   Sig: Take 1 tablet by mouth 2 times a day.   colchicine (COLCRYS) 0.6 MG Tab   No No   Sig: Take 1 tablet by mouth every day.   febuxostat (ULORIC) 40 MG Tab  Patient Yes No   Sig: Take 40 mg by mouth every day.   therapeutic multivitamin-minerals (THERAGRAN-M) Tab  Patient Yes No   Sig: Take 1 tablet by mouth every day.   valsartan (DIOVAN) 160 MG Tab  Patient Yes No   Sig: Take 160 mg by mouth every day.      Facility-Administered Medications: None     Physical Exam  Temp:  [36.7 °C (98 °F)-36.7 °C (98.1 °F)] 36.7 °C (98.1 °F)  Pulse:  [84-93] 88  Resp:  [18] 18  BP: (141-169)/() 144/68  SpO2:  [92 %-96 %] 95 %  Blood Pressure : 152/78   Temperature: 36.7 °C (98 °F)   Pulse: 85   Respiration: 18   Pulse Oximetry: 94 %     Physical Exam  Constitutional:       General: She is not in acute distress.  HENT:      Head: Normocephalic and atraumatic.      Right Ear: External ear normal.      Left Ear: External ear normal.      Nose: No congestion or rhinorrhea.      Mouth/Throat:      Mouth: Mucous membranes are moist.      Pharynx: No oropharyngeal exudate or posterior oropharyngeal erythema.   Eyes:      General: No scleral icterus.        Right eye: No discharge.         Left eye: No discharge.      Conjunctiva/sclera: Conjunctivae normal.      Pupils: Pupils are equal, round, and reactive to light.   Cardiovascular:      Heart sounds:     No friction rub. No gallop.   Pulmonary:      Effort: Pulmonary effort is normal.   Abdominal:      General: Abdomen is flat. There is no distension.      Tenderness: There is abdominal tenderness. There is no guarding.   Musculoskeletal:         General: No swelling.      Cervical back: Neck supple. No rigidity. No muscular tenderness.      Right lower leg: No edema.      Left lower leg: No edema.   Skin:     Capillary  Refill: Capillary refill takes 2 to 3 seconds.      Coloration: Skin is not jaundiced or pale.      Findings: No bruising or erythema.   Neurological:      Mental Status: She is alert and oriented to person, place, and time.   Psychiatric:         Mood and Affect: Mood normal.         Judgment: Judgment normal.       Laboratory:  Recent Labs     02/17/22  1601   WBC 7.3   RBC 4.25   HEMOGLOBIN 13.6   HEMATOCRIT 42.1   MCV 99.1*   MCH 32.0   MCHC 32.3*   RDW 42.2   PLATELETCT 208   MPV 10.8     Recent Labs     02/17/22  1601   SODIUM 140   POTASSIUM 4.2   CHLORIDE 101   CO2 28   GLUCOSE 120*   BUN 20   CREATININE 0.87   CALCIUM 10.3*     Recent Labs     02/17/22  1601   ALTSGPT 12   ASTSGOT 21   ALKPHOSPHAT 65   TBILIRUBIN 0.7   LIPASE 264*   GLUCOSE 120*         No results for input(s): NTPROBNP in the last 72 hours.      No results for input(s): TROPONINT in the last 72 hours.    Imaging:  US-RUQ   Final Result      1.  There is dependent gallbladder sludge versus dependent small stones.   2.  There is biliary dilatation, unchanged.   3.  Multiple hepatic cysts with questionable echogenic debris within one of the cysts.      CT-ABDOMEN-PELVIS WITH   Final Result      1.  Terminal ileum and base of the cecum is thick-walled which may be infectious or inflammatory. The more proximal ileum is dilated consistent with small bowel obstruction.   2.  Prominence of the common duct is unchanged. Periampullary stricture is not excluded.   3.  Mild heterogeneity of the pancreatic tail with minimal surrounding inflammation can be seen in the setting of acute pancreatitis.   4.  Hepatic cyst and small hypodense hepatic lesions which also most likely represent small cysts.   5.  Atherosclerotic plaque.   6.  Moderate hiatal hernia.   7.  Small amount of free fluid in the pelvis.        Assessment/Plan:  I anticipate this patient will require at least two midnights for appropriate medical management, necessitating inpatient  admission.    * Abdominal pain- (present on admission)  Assessment & Plan  Multifactorial secondary to acute pancreatitis and intestinal obstruction.  Supportive care with rehydration modified diet, encourage ambulation.     Abnormal CT scan, small bowel concerning for intestinal obstruction- (present on admission)  Assessment & Plan  Imaging shows evidence for thick walled terminal ileum which may be infectious or inflammatory in addition to dilated proximal ileum consistent with small bowel obstruction.  General surgery, Dr. Nix consulted  Patient clinically does not appear to be having an acute infection, will hold antibiotics for now we will check inflammatory markers and collect blood cultures, consider starting antibiotics according to test results, and clinical course    Abnormal CT scan of the abdomen concerning for prominence of the common bile duct- (present on admission)  Assessment & Plan  Prominence of the bile duct on CT scan in the setting of pancreatitis.  Plan to obtain right upper quadrant ultrasound for further evaluation.  May need MRCP of this is not diagnostic to rule out biliary tumor    Acute pancreatitis- (present on admission)  Assessment & Plan  AST, ALT, alkaline phosphatase and bilirubin within normal limits in the setting of abnormal appearing biliary duct on CT scan.  Plan to obtain right upper quadrant ultrasound for further evaluation.  If this is nondiagnostic patient may need to undergo MRCP to rule out biliary tumor.  Supportive care, multimodal pain control.  Bowel rest.  N.p.o. for now, consider starting clear liquid diet when pain improves   Watch input and output closely, watch respiratory status closely  Watch closely for potential complications including pleural effusion, hypocalcemia, acute renal failure, compartment syndrome    History of deep venous thrombosis- (present on admission)  Assessment & Plan  On apixaban at home, will switch to Lovenox for shorter half-life  and easy reversibility in case patient needed to undergo surgical intervention    COPD (chronic obstructive pulmonary disease) (HCC)- (present on admission)  Assessment & Plan  Not currently in exacerbation.  Oxygen as needed, Respiratory protocol, Bronchodilators, Incentive spirometry    Essential hypertension, benign- (present on admission)  Assessment & Plan  Resume home losartan with hold parameters.  We will start as needed hydralazine and labetalol for extreme hypertension    VTE prophylaxis: SCDs/TEDs and therapeutic anticoagulation with Lovenox, switched from home apixaban for shorter half-life in case patient needs to have surgery

## 2022-02-18 NOTE — PROGRESS NOTES
Surgical Progress Note               Author: Jonna Soni M.D. Date & Time created: 2/18/2022  1:51 PM     Interval History:  Feeling better today. No significant abdominal pain. Not really feeling hungry, but tolerating clear liquid diet without difficulty.     Review of Systems:  Review of Systems   Constitutional: Negative for chills and fever.   Gastrointestinal: Negative for nausea and vomiting.       Physical Exam:  Physical Exam  Constitutional:       Appearance: Normal appearance.   HENT:      Head: Normocephalic and atraumatic.      Right Ear: External ear normal.      Left Ear: External ear normal.      Nose: Nose normal.      Mouth/Throat:      Mouth: Mucous membranes are moist.   Eyes:      Extraocular Movements: Extraocular movements intact.   Cardiovascular:      Rate and Rhythm: Normal rate and regular rhythm.   Pulmonary:      Effort: Pulmonary effort is normal.   Abdominal:      General: Abdomen is flat. There is no distension.      Tenderness: There is no abdominal tenderness.   Skin:     General: Skin is warm and dry.      Capillary Refill: Capillary refill takes less than 2 seconds.   Neurological:      General: No focal deficit present.      Mental Status: She is alert and oriented to person, place, and time.   Psychiatric:         Mood and Affect: Mood normal.         Behavior: Behavior normal.         Labs:          Recent Labs     02/17/22  1601 02/18/22  0130   SODIUM 140 140   POTASSIUM 4.2 3.7   CHLORIDE 101 102   CO2 28 26   BUN 20 17   CREATININE 0.87 0.75   MAGNESIUM  --  1.9   CALCIUM 10.3* 9.3     Recent Labs     02/17/22  1601 02/18/22  0130   ALTSGPT 12 10   ASTSGOT 21 17   ALKPHOSPHAT 65 51   TBILIRUBIN 0.7 0.6   LIPASE 264*  --    GLUCOSE 120* 102*     Recent Labs     02/17/22  1601 02/18/22  0130   RBC 4.25 3.71*   HEMOGLOBIN 13.6 11.9*   HEMATOCRIT 42.1 36.3*   PLATELETCT 208 192     Recent Labs     02/17/22  1601 02/18/22  0130   WBC 7.3 5.6   NEUTSPOLYS 74.70* 67.40    LYMPHOCYTES 10.60* 13.80*   MONOCYTES 7.80 9.50   EOSINOPHILS 5.90 8.20*   BASOPHILS 0.70 0.90   ASTSGOT 21 17   ALTSGPT 12 10   ALKPHOSPHAT 65 51   TBILIRUBIN 0.7 0.6     Hemodynamics:  Temp (24hrs), Av.7 °C (98.1 °F), Min:36.7 °C (98 °F), Max:36.8 °C (98.2 °F)  Temperature: 36.7 °C (98 °F)  Pulse  Av.9  Min: 76  Max: 93   Blood Pressure : 120/73     Respiratory:    Respiration: 14, Pulse Oximetry: 91 %           Fluids:    Intake/Output Summary (Last 24 hours) at 2022 1351  Last data filed at 2022 0900  Gross per 24 hour   Intake 720 ml   Output 500 ml   Net 220 ml     Weight: 59.2 kg (130 lb 8.2 oz)  GI/Nutrition:  Orders Placed This Encounter   Procedures   • Diet Order Diet: Full Liquid     Standing Status:   Standing     Number of Occurrences:   1     Order Specific Question:   Diet:     Answer:   Full Liquid [11]     Medical Decision Making, by Problem:  Active Hospital Problems    Diagnosis    • *Abdominal pain [R10.9]    • Acute pancreatitis [K85.90]    • Abnormal CT scan of the abdomen concerning for prominence of the common bile duct [R93.89]    • Abnormal CT scan, small bowel concerning for intestinal obstruction [R93.3]    • History of deep venous thrombosis [Z86.718]    • COPD (chronic obstructive pulmonary disease) (HCC) [J44.9]    • Essential hypertension, benign [I10]        Plan:  Advance to full liquid diet.   Recommend colonoscopy as an outpatient to further evaluate cecum and terminal ileum.   Will follow.     Quality-Core Measures

## 2022-02-18 NOTE — ED PROVIDER NOTES
ED Provider Note    CHIEF COMPLAINT  Chief Complaint   Patient presents with   • Abdominal Pain       HPI  Licha Pope is a 86 y.o. female who presents with chief complaint of abdominal pain.  Patient reports that abdominal pain has been present for the last 2 weeks.  Abdominal pain was relatively mild for the last 2 weeks, but worsened significantly today.  Abdominal pain is cramping in nature, she reports that was coming and going but for the last 24 hours has been more constant.  Abdominal pain is periumbilical and bilateral lower quadrants.  Patient reports she is status post appendectomy years ago.  Patient denies any associated vomiting or nausea but does report some mild anorexia.  Patient denies any associated fevers.  She reports that she was having some hard small pellet-like stools but these have since normalized and she is having a normal bowel movement for the last 2 days.  Patient denies any associated bloody or black stool.  Patient reports occasionally she will get some epigastric pain but this is infrequent.    REVIEW OF SYSTEMS  ROS    See HPI for further details. All other systems are negative.     PAST MEDICAL HISTORY   has a past medical history of Arthritis, COPD (chronic obstructive pulmonary disease) (HCC), DVT (deep venous thrombosis) (HCC), EMPHYSEMA, Gastroesophageal reflux disease without esophagitis (3/13/2019), Hypertension, Hypertriglyceridemia (2019), and Prediabetes (2019).    SOCIAL HISTORY  Social History     Tobacco Use   • Smoking status: Former Smoker     Packs/day: 1.00     Years: 20.00     Pack years: 20.00     Types: Cigarettes     Quit date: 1975     Years since quittin.1   • Smokeless tobacco: Never Used   Vaping Use   • Vaping Use: Never used   Substance and Sexual Activity   • Alcohol use: Not Currently     Alcohol/week: 1.5 oz     Types: 3 Standard drinks or equivalent per week     Comment: RARE   • Drug use: No   • Sexual activity: Not Currently      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     Comment: , retired [employer]       SURGICAL HISTORY   has a past surgical history that includes hemorrhoidectomy ();  delivery only (); knee arthroplasty total (2014); cataract phaco with iol (2014); cataract phaco with iol (2014); and lap,appendectomy (2021).    CURRENT MEDICATIONS  Home Medications     Reviewed by Mackenzie Thompson R.N. (Registered Nurse) on 22 at 1543  Med List Status: <None>   Medication Last Dose Status   apixaban (ELIQUIS) 5mg Tab  Active   Ascorbic Acid (VITAMIN C PO)  Active   Boswellia-Glucosamine-Vit D (OSTEO BI-FLEX ONE PER DAY PO)  Active   CALCIUM-MAGNESIUM-ZINC PO  Active   Cholecalciferol (D3 PO)  Active   colchicine (COLCRYS) 0.6 MG Tab  Active   febuxostat (ULORIC) 40 MG Tab  Active   GLUCOSAMINE HCL-MSM PO  Active   Multiple Vitamins-Minerals (LUTEIN-ZEAXANTHIN PO)  Active   therapeutic multivitamin-minerals (THERAGRAN-M) Tab  Active   TURMERIC PO  Active   valsartan (DIOVAN) 160 MG Tab  Active                ALLERGIES  Allergies   Allergen Reactions   • Codeine Vomiting   • Demerol Vomiting     Injectable type   • Shellfish Allergy Shortness of Breath     Soft shell   • Ciprofloxacin Rash     rash   • Iodine      PATIENT ALLERGIC TO SHELLFISH, NOT SURE IF SHE IS ALLERGIC TO IODINE       PHYSICAL EXAM  Vitals:    22 1540   BP: (!) 169/103   Pulse: 93   Resp: 18   Temp: 36.7 °C (98 °F)   SpO2: 96%       Physical Exam  Constitutional:       Appearance: She is well-developed.   HENT:      Head: Normocephalic and atraumatic.   Eyes:      Conjunctiva/sclera: Conjunctivae normal.   Cardiovascular:      Rate and Rhythm: Normal rate and regular rhythm.   Pulmonary:      Effort: Pulmonary effort is normal.      Breath sounds: Normal breath sounds.   Abdominal:      General: Bowel sounds are normal. There is no distension.      Palpations: Abdomen is soft.      Tenderness: There is abdominal tenderness. There is  no rebound.      Comments: Lower quadrant tenderness most pronounced in left lower quadrant and periumbilically.  No associated rebound or guarding.  There is no associated tenderness of the epigastrium or any tenderness of the right upper quadrant.   Musculoskeletal:      Cervical back: Normal range of motion and neck supple.   Skin:     General: Skin is warm and dry.      Findings: No rash.   Neurological:      Mental Status: She is alert and oriented to person, place, and time.   Psychiatric:         Behavior: Behavior normal.           DIAGNOSTIC STUDIES / PROCEDURES    EKG  See below    LABS  Results for orders placed or performed during the hospital encounter of 02/17/22   CBC WITH DIFFERENTIAL   Result Value Ref Range    WBC 7.3 4.8 - 10.8 K/uL    RBC 4.25 4.20 - 5.40 M/uL    Hemoglobin 13.6 12.0 - 16.0 g/dL    Hematocrit 42.1 37.0 - 47.0 %    MCV 99.1 (H) 81.4 - 97.8 fL    MCH 32.0 27.0 - 33.0 pg    MCHC 32.3 (L) 33.6 - 35.0 g/dL    RDW 42.2 35.9 - 50.0 fL    Platelet Count 208 164 - 446 K/uL    MPV 10.8 9.0 - 12.9 fL    Neutrophils-Polys 74.70 (H) 44.00 - 72.00 %    Lymphocytes 10.60 (L) 22.00 - 41.00 %    Monocytes 7.80 0.00 - 13.40 %    Eosinophils 5.90 0.00 - 6.90 %    Basophils 0.70 0.00 - 1.80 %    Immature Granulocytes 0.30 0.00 - 0.90 %    Nucleated RBC 0.00 /100 WBC    Neutrophils (Absolute) 5.48 2.00 - 7.15 K/uL    Lymphs (Absolute) 0.78 (L) 1.00 - 4.80 K/uL    Monos (Absolute) 0.57 0.00 - 0.85 K/uL    Eos (Absolute) 0.43 0.00 - 0.51 K/uL    Baso (Absolute) 0.05 0.00 - 0.12 K/uL    Immature Granulocytes (abs) 0.02 0.00 - 0.11 K/uL    NRBC (Absolute) 0.00 K/uL   COMP METABOLIC PANEL   Result Value Ref Range    Sodium 140 135 - 145 mmol/L    Potassium 4.2 3.6 - 5.5 mmol/L    Chloride 101 96 - 112 mmol/L    Co2 28 20 - 33 mmol/L    Anion Gap 11.0 7.0 - 16.0    Glucose 120 (H) 65 - 99 mg/dL    Bun 20 8 - 22 mg/dL    Creatinine 0.87 0.50 - 1.40 mg/dL    Calcium 10.3 (H) 8.4 - 10.2 mg/dL    AST(SGOT) 21  12 - 45 U/L    ALT(SGPT) 12 2 - 50 U/L    Alkaline Phosphatase 65 30 - 99 U/L    Total Bilirubin 0.7 0.1 - 1.5 mg/dL    Albumin 4.2 3.2 - 4.9 g/dL    Total Protein 7.6 6.0 - 8.2 g/dL    Globulin 3.4 1.9 - 3.5 g/dL    A-G Ratio 1.2 g/dL   LIPASE   Result Value Ref Range    Lipase 264 (H) 7 - 58 U/L   URINALYSIS    Specimen: Urine   Result Value Ref Range    Color Yellow     Character Hazy (A)     Specific Gravity 1.020 <1.035    Ph 6.0 5.0 - 8.0    Glucose Negative Negative mg/dL    Ketones 15 (A) Negative mg/dL    Protein Negative Negative mg/dL    Bilirubin Negative Negative    Nitrite Negative Negative    Leukocyte Esterase Trace (A) Negative    Occult Blood Small (A) Negative    Micro Urine Req Microscopic    ESTIMATED GFR   Result Value Ref Range    GFR If African American >60 >60 mL/min/1.73 m 2    GFR If Non African American >60 >60 mL/min/1.73 m 2   URINE MICROSCOPIC (W/UA)   Result Value Ref Range    WBC 5-10 (A) /hpf    RBC 0-2 /hpf    Bacteria Moderate (A) None /hpf    Epithelial Cells Rare Few /hpf    Mucous Threads Few /hpf   EKG   Result Value Ref Range    Report       [facility] of [facility] Emergency Dept.    Test Date:  2022  Pt Name:    SATINDER LARES                  Department: [facility]  MRN:        [id number]                      Room:  Gender:     Female                       Technician: [id number]  :        [id number]                   Requested By:ER TRIAGE PROTOCOL  Order #:    [id number]                    Reading MD:    Measurements  Intervals                                Axis  Rate:       86                           P:          54  HI:         192                          QRS:        -54  QRSD:       86                           T:          33  QT:         368  QTc:        440    Interpretive Statements  SINUS RHYTHM  MULTIFORM VENTRICULAR PREMATURE COMPLEXES  PROBABLE LEFT ATRIAL ABNORMALITY  LEFT ANTERIOR FASCICULAR BLOCK  No previous ECG available for comparison            RADIOLOGY  US-RUQ   Final Result      1.  There is dependent gallbladder sludge versus dependent small stones.   2.  There is biliary dilatation, unchanged.   3.  Multiple hepatic cysts with questionable echogenic debris within one of the cysts.      CT-ABDOMEN-PELVIS WITH   Final Result      1.  Terminal ileum and base of the cecum is thick-walled which may be infectious or inflammatory. The more proximal ileum is dilated consistent with small bowel obstruction.   2.  Prominence of the common duct is unchanged. Periampullary stricture is not excluded.   3.  Mild heterogeneity of the pancreatic tail with minimal surrounding inflammation can be seen in the setting of acute pancreatitis.   4.  Hepatic cyst and small hypodense hepatic lesions which also most likely represent small cysts.   5.  Atherosclerotic plaque.   6.  Moderate hiatal hernia.   7.  Small amount of free fluid in the pelvis.              COURSE & MEDICAL DECISION MAKING  Pertinent Labs & Imaging studies reviewed. (See chart for details)    Patient here with lower abdominal tenderness, her symptoms appear most consistent with likely functional bowel pain however given that the pain has become more persistent and is now most pronounced on exam in her left lower quadrant periumbilically I am concerned about possible diverticulitis.  Patient lipase had already resulted prior to my seeing patient, it is elevated in the 200s however patient does not have any associated epigastric pain right upper quadrant pain or tenderness in the upper abdomen to suggest pancreatitis is the causative etiology of her symptoms.  Along the same line of reasoning I believe gallstones is highly unlikely to be causative as well.  Patient CT reveals findings consistent with both acute pancreatitis and possible developing small bowel obstruction.  She also has consistent with colitis.  Discussed the case with general surgery, they would like patient to be started on  empiric antibiotics.  Given patient also with findings assistant with enlarged CBD check ultrasound.  Ultrasound shows sludge and gallstones without evidence of cholecystitis.  Possible gallstone pancreatitis is the cause of patient's symptoms.  Patient will be admitted for ongoing care.  I have discussed the case with hospitalist who is agreed to admit.     The patient will return for worsening symptoms and is stable at the time of discharge. The patient verbalizes understanding and will comply.    FINAL IMPRESSION  1.  Pancreatitis, small bowel obstruction, colitis    Electronically signed by: Rubén Currie M.D., 2/17/2022 4:53 PM

## 2022-02-18 NOTE — CARE PLAN
The patient is Stable - Low risk of patient condition declining or worsening    Shift Goals  Clinical Goals: pt will remain at stated comfort goal of 3/10 pain this shift.  Patient Goals: sleep comfortably    Progress made toward(s) clinical / shift goals:        Problem: Knowledge Deficit - Standard  Goal: Patient and family/care givers will demonstrate understanding of plan of care, disease process/condition, diagnostic tests and medications  Outcome: Progressing       Patient is not progressing towards the following goals:

## 2022-02-18 NOTE — ASSESSMENT & PLAN NOTE
Resume home losartan with hold parameters.  We will start as needed hydralazine and labetalol for extreme hypertension

## 2022-02-18 NOTE — ASSESSMENT & PLAN NOTE
Multifactorial secondary to acute pancreatitis and intestinal obstruction.  Supportive care with rehydration modified diet, encourage ambulation.

## 2022-02-19 VITALS
DIASTOLIC BLOOD PRESSURE: 69 MMHG | OXYGEN SATURATION: 92 % | WEIGHT: 130.51 LBS | HEART RATE: 73 BPM | SYSTOLIC BLOOD PRESSURE: 138 MMHG | BODY MASS INDEX: 22.28 KG/M2 | HEIGHT: 64 IN | TEMPERATURE: 97.9 F | RESPIRATION RATE: 15 BRPM

## 2022-02-19 PROBLEM — K85.90 ACUTE PANCREATITIS: Status: RESOLVED | Noted: 2022-02-17 | Resolved: 2022-02-19

## 2022-02-19 PROBLEM — R93.3 ABNORMAL CT SCAN, SMALL BOWEL: Status: RESOLVED | Noted: 2022-02-17 | Resolved: 2022-02-19

## 2022-02-19 PROBLEM — R10.9 ABDOMINAL PAIN: Status: RESOLVED | Noted: 2022-02-17 | Resolved: 2022-02-19

## 2022-02-19 LAB
ANION GAP SERPL CALC-SCNC: 11 MMOL/L (ref 7–16)
BUN SERPL-MCNC: 9 MG/DL (ref 8–22)
CALCIUM SERPL-MCNC: 8.9 MG/DL (ref 8.4–10.2)
CHLORIDE SERPL-SCNC: 106 MMOL/L (ref 96–112)
CO2 SERPL-SCNC: 25 MMOL/L (ref 20–33)
CREAT SERPL-MCNC: 0.69 MG/DL (ref 0.5–1.4)
CREAT UR-MCNC: 46.73 MG/DL
D DIMER PPP IA.FEU-MCNC: 0.72 UG/ML (FEU) (ref 0–0.5)
ERYTHROCYTE [DISTWIDTH] IN BLOOD BY AUTOMATED COUNT: 42.8 FL (ref 35.9–50)
EST. AVERAGE GLUCOSE BLD GHB EST-MCNC: 100 MG/DL
GLUCOSE SERPL-MCNC: 107 MG/DL (ref 65–99)
HBA1C MFR BLD: 5.1 % (ref 4–5.6)
HCT VFR BLD AUTO: 36.4 % (ref 37–47)
HGB BLD-MCNC: 11.5 G/DL (ref 12–16)
MCH RBC QN AUTO: 31.6 PG (ref 27–33)
MCHC RBC AUTO-ENTMCNC: 31.6 G/DL (ref 33.6–35)
MCV RBC AUTO: 100 FL (ref 81.4–97.8)
MICROALBUMIN UR-MCNC: <1.2 MG/DL
MICROALBUMIN/CREAT UR: NORMAL MG/G (ref 0–30)
PLATELET # BLD AUTO: 187 K/UL (ref 164–446)
PMV BLD AUTO: 11.4 FL (ref 9–12.9)
POTASSIUM SERPL-SCNC: 3.5 MMOL/L (ref 3.6–5.5)
RBC # BLD AUTO: 3.64 M/UL (ref 4.2–5.4)
SODIUM SERPL-SCNC: 142 MMOL/L (ref 135–145)
WBC # BLD AUTO: 4.1 K/UL (ref 4.8–10.8)

## 2022-02-19 PROCEDURE — 83036 HEMOGLOBIN GLYCOSYLATED A1C: CPT

## 2022-02-19 PROCEDURE — 85379 FIBRIN DEGRADATION QUANT: CPT

## 2022-02-19 PROCEDURE — 700102 HCHG RX REV CODE 250 W/ 637 OVERRIDE(OP): Performed by: HOSPITALIST

## 2022-02-19 PROCEDURE — 700105 HCHG RX REV CODE 258: Performed by: INTERNAL MEDICINE

## 2022-02-19 PROCEDURE — 700111 HCHG RX REV CODE 636 W/ 250 OVERRIDE (IP): Performed by: HOSPITALIST

## 2022-02-19 PROCEDURE — 700102 HCHG RX REV CODE 250 W/ 637 OVERRIDE(OP): Performed by: INTERNAL MEDICINE

## 2022-02-19 PROCEDURE — A9270 NON-COVERED ITEM OR SERVICE: HCPCS | Performed by: HOSPITALIST

## 2022-02-19 PROCEDURE — 36415 COLL VENOUS BLD VENIPUNCTURE: CPT

## 2022-02-19 PROCEDURE — 82570 ASSAY OF URINE CREATININE: CPT

## 2022-02-19 PROCEDURE — A9270 NON-COVERED ITEM OR SERVICE: HCPCS | Performed by: INTERNAL MEDICINE

## 2022-02-19 PROCEDURE — 80048 BASIC METABOLIC PNL TOTAL CA: CPT

## 2022-02-19 PROCEDURE — 85027 COMPLETE CBC AUTOMATED: CPT

## 2022-02-19 PROCEDURE — 82043 UR ALBUMIN QUANTITATIVE: CPT

## 2022-02-19 PROCEDURE — 99239 HOSP IP/OBS DSCHRG MGMT >30: CPT | Performed by: INTERNAL MEDICINE

## 2022-02-19 RX ORDER — VALSARTAN 160 MG/1
160 TABLET ORAL DAILY
Qty: 30 TABLET | Refills: 1 | Status: ON HOLD
Start: 2022-02-19 | End: 2022-04-11

## 2022-02-19 RX ORDER — POTASSIUM CHLORIDE 20 MEQ/1
40 TABLET, EXTENDED RELEASE ORAL ONCE
Status: COMPLETED | OUTPATIENT
Start: 2022-02-19 | End: 2022-02-19

## 2022-02-19 RX ORDER — AMOXICILLIN AND CLAVULANATE POTASSIUM 875; 125 MG/1; MG/1
1 TABLET, FILM COATED ORAL 2 TIMES DAILY
Qty: 14 TABLET | Refills: 0 | Status: SHIPPED | OUTPATIENT
Start: 2022-02-19 | End: 2022-02-26

## 2022-02-19 RX ADMIN — POTASSIUM CHLORIDE 40 MEQ: 1500 TABLET, EXTENDED RELEASE ORAL at 08:06

## 2022-02-19 RX ADMIN — ENOXAPARIN SODIUM 60 MG: 60 INJECTION SUBCUTANEOUS at 05:30

## 2022-02-19 RX ADMIN — VALSARTAN 160 MG: 80 TABLET, FILM COATED ORAL at 05:30

## 2022-02-19 RX ADMIN — SODIUM CHLORIDE, POTASSIUM CHLORIDE, SODIUM LACTATE AND CALCIUM CHLORIDE: 600; 310; 30; 20 INJECTION, SOLUTION INTRAVENOUS at 05:30

## 2022-02-19 ASSESSMENT — ENCOUNTER SYMPTOMS
ABDOMINAL PAIN: 0
FEVER: 0
VOMITING: 0
NAUSEA: 0
CHILLS: 0

## 2022-02-19 ASSESSMENT — PAIN DESCRIPTION - PAIN TYPE
TYPE: ACUTE PAIN
TYPE: ACUTE PAIN

## 2022-02-19 NOTE — DISCHARGE SUMMARY
Discharge Summary    CHIEF COMPLAINT ON ADMISSION  Chief Complaint   Patient presents with   • Abdominal Pain       Reason for Admission  Abdominal Pain,      Admission Date  2/17/2022    CODE STATUS  Full Code    HPI & HOSPITAL COURSE  This is a 86 y.o. female here with epigastric abdominal pain.     86 y.o. female with a past medical history of hypertension and history of deep vein thrombosis on anticoagulation with apixaban who presented 2/17/2022 with severe epigastric abdominal pain.  CT abdomen pelvis showed terminal ileum and base of the cecum is thick-walled, more proximal ileum is dilated consistent with small bowel obstruction, mild heterogeneity of the pancreatic tail with minimal surrounding inflammation possibly acute pancreatitis, and hepatic cysts.  Noted to have elevated lipase.  General surgery was consulted.  Patient admitted and started on IV fluids and pain control for her acute pancreatitis.  Right upper quadrant ultrasound showed dependent gallbladder sludge versus small stones, unchanged biliary dilatation and multiple hepatic cysts.  Patient placed on bowel rest with improvement in her abdominal pain.  MRCP showed cholelithiasis and common bile duct 11.4 mm that is unchanged since 2015.  No overt cause of pancreatitis noted, lipids within normal limits, she does not drink alcohol, possibly due to gallstones however no elevation in liver enzymes or bilirubin.  Since patient had mild pancreatitis, discussed with surgery no current need for cholecystectomy.  Patient is having bowel movements this morning and her abdominal pain has resolved.  General surgery has cleared the patient for discharge with 7 days of Augmentin and outpatient follow-up, recommending GI referral for colonoscopy.  Patient also noted to have left ear pain, no signs of acute otitis media on exam, however if early infection Augmentin will cover.  Patient is to follow-up with her primary care physician in 1 to 2 weeks for  hospital follow-up.  Patient's vital signs are stable and she is tolerating a diet, she is ready for discharge home.    Therefore, she is discharged in good and stable condition to home with close outpatient follow-up.    The patient met 2-midnight criteria for an inpatient stay at the time of discharge.    Discharge Date  2/19/2022    FOLLOW UP ITEMS POST DISCHARGE  Follow-up with primary care physician    DISCHARGE DIAGNOSES  Principal Problem (Resolved):    Abdominal pain POA: Yes  Active Problems:    Essential hypertension, benign POA: Yes    COPD (chronic obstructive pulmonary disease) (HCC) POA: Yes    History of deep venous thrombosis POA: Yes    Abnormal CT scan of the abdomen concerning for prominence of the common bile duct POA: Yes  Resolved Problems:    Acute pancreatitis POA: Yes    Abnormal CT scan, small bowel concerning for intestinal obstruction POA: Yes      FOLLOW UP  No future appointments.  Willa Nix M.D.  [address] [facility]  [address]  [place] [id number]  [phone]    In 1 week      Primary care       Follow up with primary care physician, surgery recommending a referral to gastroenterology for colonoscopy.       MEDICATIONS ON DISCHARGE     Medication List      START taking these medications      Instructions   amoxicillin-clavulanate 875-125 MG Tabs  Commonly known as: AUGMENTIN   Take 1 Tablet by mouth 2 times a day for 7 days.  Dose: 1 Tablet        CONTINUE taking these medications      Instructions   apixaban 5mg Tabs  Commonly known as: ELIQUIS   Take 1 tablet by mouth 2 times a day.  Dose: 5 mg     CALCIUM-MAGNESIUM-ZINC PO   Take 1 tablet by mouth every day.  Dose: 1 Tablet     D3 PO   Take 1 capsule by mouth every day.  Dose: 1 Capsule     GLUCOSAMINE HCL-MSM PO   Take 2 Tablets by mouth every day. Move Free Ultra supplement  Dose: 2 Tablet     LUTEIN-ZEAXANTHIN PO   Take 1 tablet by mouth every day.  Dose: 1 Tablet     therapeutic multivitamin-minerals Tabs   Take 1 tablet by  mouth every day.  Dose: 1 Tablet     TURMERIC PO   Take 2 Capsules by mouth every day.  Dose: 2 Capsule     valsartan 160 MG Tabs  Commonly known as: DIOVAN   Take 160 mg by mouth every day.  Dose: 160 mg     VITAMIN C PO   Take 1 tablet by mouth every day.  Dose: 1 Tablet            Allergies  Allergies   Allergen Reactions   • Codeine Vomiting   • Demerol Vomiting     Injectable type   • Shellfish Allergy Shortness of Breath     Soft shell   • Ciprofloxacin Rash     rash   • Ibuprofen      Patient developed gastric ulcer/GI bleed from ibuprofen use   • Iodine      PATIENT ALLERGIC TO SHELLFISH, NOT SURE IF SHE IS ALLERGIC TO IODINE       DIET  Orders Placed This Encounter   Procedures   • Diet Order Diet: Low Fiber(GI Soft)     Standing Status:   Standing     Number of Occurrences:   1     Order Specific Question:   Diet:     Answer:   Low Fiber(GI Soft) [2]       ACTIVITY  As tolerated.  Weight bearing as tolerated    CONSULTATIONS  General surgery    PROCEDURES  N/A    LABORATORY  Lab Results   Component Value Date    SODIUM 142 02/19/2022    POTASSIUM 3.5 (L) 02/19/2022    CHLORIDE 106 02/19/2022    CO2 25 02/19/2022    GLUCOSE 107 (H) 02/19/2022    BUN 9 02/19/2022    CREATININE 0.69 02/19/2022        Lab Results   Component Value Date    WBC 4.1 (L) 02/19/2022    HEMOGLOBIN 11.5 (L) 02/19/2022    HEMATOCRIT 36.4 (L) 02/19/2022    PLATELETCT 187 02/19/2022        Total time of the discharge process exceeds 45 minutes.

## 2022-02-19 NOTE — PROGRESS NOTES
Surgery Progress Note               Author: Jonna Soni M.D. Date & Time created: 2/19/2022  9:08 AM     Interval History:  Tolerated full liquid diet without difficulty. No N/V. No pain.   Having some left ear pain and notes the right side of her nose feels cold when the left side does not. Reports Dr. Boucher looked in her ear last night and didn't see any infection.     Review of Systems:  Review of Systems   Constitutional: Negative for chills and fever.   Gastrointestinal: Negative for abdominal pain, nausea and vomiting.       Physical Exam:  Physical Exam  Constitutional:       Appearance: Normal appearance.   HENT:      Head: Normocephalic and atraumatic.      Right Ear: External ear normal.      Left Ear: External ear normal.      Nose: Nose normal.      Mouth/Throat:      Mouth: Mucous membranes are moist.   Eyes:      Extraocular Movements: Extraocular movements intact.   Cardiovascular:      Rate and Rhythm: Normal rate and regular rhythm.      Pulses: Normal pulses.   Pulmonary:      Effort: Pulmonary effort is normal.   Abdominal:      General: Abdomen is flat. There is no distension.      Tenderness: There is no abdominal tenderness.   Skin:     General: Skin is warm and dry.      Capillary Refill: Capillary refill takes less than 2 seconds.   Neurological:      General: No focal deficit present.      Mental Status: She is alert and oriented to person, place, and time.   Psychiatric:         Mood and Affect: Mood normal.         Behavior: Behavior normal.         Labs:          Recent Labs     02/17/22  1601 02/18/22  0130 02/19/22  0432   SODIUM 140 140 142   POTASSIUM 4.2 3.7 3.5*   CHLORIDE 101 102 106   CO2 28 26 25   BUN 20 17 9   CREATININE 0.87 0.75 0.69   MAGNESIUM  --  1.9  --    CALCIUM 10.3* 9.3 8.9     Recent Labs     02/17/22  1601 02/18/22  0130 02/19/22  0432   ALTSGPT 12 10  --    ASTSGOT 21 17  --    ALKPHOSPHAT 65 51  --    TBILIRUBIN 0.7 0.6  --    LIPASE 264*  --   --     GLUCOSE 120* 102* 107*     Recent Labs     22  1601 22  0130 22  0432   RBC 4.25 3.71* 3.64*   HEMOGLOBIN 13.6 11.9* 11.5*   HEMATOCRIT 42.1 36.3* 36.4*   PLATELETCT 208 192 187     Recent Labs     22  1601 22  0130 22  0432   WBC 7.3 5.6 4.1*   NEUTSPOLYS 74.70* 67.40  --    LYMPHOCYTES 10.60* 13.80*  --    MONOCYTES 7.80 9.50  --    EOSINOPHILS 5.90 8.20*  --    BASOPHILS 0.70 0.90  --    ASTSGOT 21 17  --    ALTSGPT 12 10  --    ALKPHOSPHAT 65 51  --    TBILIRUBIN 0.7 0.6  --      Hemodynamics:  Temp (24hrs), Av.8 °C (98.2 °F), Min:36.7 °C (98 °F), Max:36.9 °C (98.4 °F)  Temperature: 36.7 °C (98 °F)  Pulse  Av.8  Min: 75  Max: 93   Blood Pressure : 124/65     Respiratory:    Respiration: 17, Pulse Oximetry: 94 %           Fluids:    Intake/Output Summary (Last 24 hours) at 2022 0908  Last data filed at 2022 0000  Gross per 24 hour   Intake 1470 ml   Output 0 ml   Net 1470 ml        GI/Nutrition:  Orders Placed This Encounter   Procedures   • Diet Order Diet: Low Fiber(GI Soft)     Standing Status:   Standing     Number of Occurrences:   1     Order Specific Question:   Diet:     Answer:   Low Fiber(GI Soft) [2]     Medical Decision Making, by Problem:  Active Hospital Problems    Diagnosis    • *Abdominal pain [R10.9]    • Acute pancreatitis [K85.90]    • Abnormal CT scan of the abdomen concerning for prominence of the common bile duct [R93.89]    • Abnormal CT scan, small bowel concerning for intestinal obstruction [R93.3]    • History of deep venous thrombosis [Z86.718]    • COPD (chronic obstructive pulmonary disease) (HCC) [J44.9]    • Essential hypertension, benign [I10]        Plan:  Advance to GI soft diet. If tolerates ok to d/c home.   Follow up with Dr. Nix in 1 week.   Recommend colonoscopy as an outpatient to further evaluate cecum and terminal ileum in at least 6 weeks after this episode.   Recommend Augmentin 875mg PO BID for additional 7  days after discharge.     Quality-Core Measures

## 2022-02-19 NOTE — CARE PLAN
The patient is Stable - Low risk of patient condition declining or worsening    Shift Goals  Clinical Goals: tolerate diet, comfort, remain free of falls or injury  Patient Goals: comfort, rest    Progress made toward(s) clinical / shift goals:  Pt tolerating diet this shift, comfort measures provided and pt denies further needs at this time.   Problem: Knowledge Deficit - Standard  Goal: Patient and family/care givers will demonstrate understanding of plan of care, disease process/condition, diagnostic tests and medications  Outcome: Progressing     Problem: Nutrition  Goal: Patient's nutritional and fluid intake will be adequate or improve  Outcome: Progressing     Problem: Mobility  Goal: Patient's capacity to carry out activities will improve  Outcome: Progressing     Problem: Self Care  Goal: Patient will have the ability to perform ADLs independently or with assistance (bathe, groom, dress, toilet and feed)  Outcome: Progressing       Patient is not progressing towards the following goals:

## 2022-02-19 NOTE — DISCHARGE INSTRUCTIONS
Discharge Instructions    Discharged to home by car with relative. Discharged via wheelchair, hospital escort: Yes.  Special equipment needed: Not Applicable    Be sure to schedule a follow-up appointment with your primary care doctor or any specialists as instructed.     Discharge Plan:   Diet Plan: Discussed  Activity Level: Discussed  Confirmed Follow up Appointment: Patient to Call and Schedule Appointment  Confirmed Symptoms Management: Discussed  Medication Reconciliation Updated: Yes  Influenza Vaccine Indication: Not indicated: Previously immunized this influenza season and > 8 years of age    I understand that a diet low in cholesterol, fat, and sodium is recommended for good health. Unless I have been given specific instructions below for another diet, I accept this instruction as my diet prescription.   Other diet: Resume home diet     Special Instructions: None    · Is patient discharged on Warfarin / Coumadin?   No     Depression / Suicide Risk    As you are discharged from this [facility] facility, it is important to learn how to keep safe from harming yourself.    Recognize the warning signs:  · Abrupt changes in personality, positive or negative- including increase in energy   · Giving away possessions  · Change in eating patterns- significant weight changes-  positive or negative  · Change in sleeping patterns- unable to sleep or sleeping all the time   · Unwillingness or inability to communicate  · Depression  · Unusual sadness, discouragement and loneliness  · Talk of wanting to die  · Neglect of personal appearance   · Rebelliousness- reckless behavior  · Withdrawal from people/activities they love  · Confusion- inability to concentrate     If you or a loved one observes any of these behaviors or has concerns about self-harm, here's what you can do:  · Talk about it- your feelings and reasons for harming yourself  · Remove any means that you might use to hurt yourself (examples: pills, rope,  extension cords, firearm)  · Get professional help from the community (Mental Health, Substance Abuse, psychological counseling)  · Do not be alone:Call your Safe Contact- someone whom you trust who will be there for you.  · Call your local CRISIS HOTLINE 397-1041 or 586-136-4492  · Call your local Children's Mobile Crisis Response Team [place] (968) 339-7804 or www.Zounds  · Call the toll free National Suicide Prevention Hotlines   · National Suicide Prevention Lifeline 237-833-TGDP (5137)  · [employer] Line Network 800-SUICIDE (669-0660)      Acute Pancreatitis    Acute pancreatitis happens when the pancreas gets swollen. The pancreas is a large gland in the body that helps to control blood sugar. It also makes enzymes that help to digest food.  This condition can last a few days and cause serious problems. The lungs, heart, and kidneys may stop working.  What are the causes?  Causes include:  · Alcohol abuse.  · Drug abuse.  · Gallstones.  · A tumor in the pancreas.  Other causes include:  · Some medicines.  · Some chemicals.  · Diabetes.  · An infection.  · Damage caused by an accident.  · The poison (venom) from a scorpion bite.  · Belly (abdominal) surgery.  · The body's defense system (immune system) attacking the pancreas (autoimmune pancreatitis).  · Genes that are passed from parent to child (inherited).  In some cases, the cause is not known.  What are the signs or symptoms?  · Pain in the upper belly that may be felt in the back. The pain may be very bad.  · Swelling of the belly.  · Feeling sick to your stomach (nauseous) and throwing up (vomiting).  · Fever.  How is this treated?  You will likely have to stay in the hospital. Treatment may include:  · Pain medicine.  · Fluid through an IV tube.  · Placing a tube in the stomach to take out the stomach contents. This may help you stop throwing up.  · Not eating for 3-4 days.  · Antibiotic medicines, if you have an  infection.  · Treating any other problems that may be the cause.  · Steroid medicines, if your problem is caused by your defense system attacking your body's own tissues.  · Surgery.  Follow these instructions at home:  Eating and drinking    · Follow instructions from your doctor about what to eat and drink.  · Eat foods that do not have a lot of fat in them.  · Eat small meals often. Do not eat big meals.  · Drink enough fluid to keep your pee (urine) pale yellow.  · Do not drink alcohol if it caused your condition.  Medicines  · Take over-the-counter and prescription medicines only as told by your doctor.  · Ask your doctor if the medicine prescribed to you:  ? Requires you to avoid driving or using heavy machinery.  ? Can cause trouble pooping (constipation). You may need to take steps to prevent or treat trouble pooping:  § Take over-the-counter or prescription medicines.  § Eat foods that are high in fiber. These include beans, whole grains, and fresh fruits and vegetables.  § Limit foods that are high in fat and sugar. These include fried or sweet foods.  General instructions  · Do not use any products that contain nicotine or tobacco, such as cigarettes, e-cigarettes, and chewing tobacco. If you need help quitting, ask your doctor.  · Get plenty of rest.  · Check your blood sugar at home as told by your doctor.  · Keep all follow-up visits as told by your doctor. This is important.  Contact a doctor if:  · You do not get better as quickly as expected.  · You have new symptoms.  · Your symptoms get worse.  · You have pain or weakness that lasts a long time.  · You keep feeling sick to your stomach.  · You get better and then you have pain again.  · You have a fever.  Get help right away if:  · You cannot eat or keep fluids down.  · Your pain gets very bad.  · Your skin or the white part of your eyes turns yellow.  · You have sudden swelling in your belly.  · You throw up.  · You feel dizzy or you pass out  (faint).  · Your blood sugar is high (over 300 mg/dL).  Summary  · Acute pancreatitis happens when the pancreas gets swollen.  · This condition is often caused by alcohol abuse, drug abuse, or gallstones.  · You will likely have to stay in the hospital for treatment.  This information is not intended to replace advice given to you by your health care provider. Make sure you discuss any questions you have with your health care provider.  Document Released: 06/05/2009 Document Revised: 10/07/2019 Document Reviewed: 10/07/2019  Elsevier Patient Education © 2020 [employer]

## 2022-02-19 NOTE — CARE PLAN
The patient is Stable - Low risk of patient condition declining or worsening    Shift Goals  Clinical Goals: pt will remain free of falls this shift  Patient Goals: sleep comfortably    Progress made toward(s) clinical / shift goals:        Problem: Knowledge Deficit - Standard  Goal: Patient and family/care givers will demonstrate understanding of plan of care, disease process/condition, diagnostic tests and medications  Outcome: Progressing     Problem: Mobility  Goal: Patient's capacity to carry out activities will improve  Outcome: Progressing     Problem: Self Care  Goal: Patient will have the ability to perform ADLs independently or with assistance (bathe, groom, dress, toilet and feed)  Outcome: Progressing       Patient is not progressing towards the following goals:

## 2022-02-19 NOTE — PROGRESS NOTES
Received report from night shift RN, assumed care of pt. MONSE Puckett. Pt reports slight headache 1/10, denies need for intervention at this time. Denies nausea or SOB. Tolerating full liquid diet. Updated on plan of care for the day, answered any questions. Safety precautions in place, pt educated to call for assistance.

## 2022-02-19 NOTE — PROGRESS NOTES
Pt is awake in bed. VSS. Pt has no c/o pain at this time. Pt is tolerating full liquid diet with no n/v. RN updated pt on POC for the evening. All questions answered. Fall precautions in place.

## 2022-02-20 NOTE — PROGRESS NOTES
Pt discharged home in good and stable condition. Reviewed all discharge instructions and answered any questions with pt and family. IV discontinued. Escorted downstairs via WC at 1640.

## 2022-02-21 ENCOUNTER — PATIENT OUTREACH (OUTPATIENT)
Dept: HEALTH INFORMATION MANAGEMENT | Facility: OTHER | Age: 87
End: 2022-02-21
Payer: MEDICARE

## 2022-02-21 SDOH — ECONOMIC STABILITY: TRANSPORTATION INSECURITY
IN THE PAST 12 MONTHS, HAS LACK OF TRANSPORTATION KEPT YOU FROM MEETINGS, WORK, OR FROM GETTING THINGS NEEDED FOR DAILY LIVING?: NO

## 2022-02-21 SDOH — ECONOMIC STABILITY: FOOD INSECURITY: WITHIN THE PAST 12 MONTHS, THE FOOD YOU BOUGHT JUST DIDN'T LAST AND YOU DIDN'T HAVE MONEY TO GET MORE.: NEVER TRUE

## 2022-02-21 SDOH — ECONOMIC STABILITY: FOOD INSECURITY: WITHIN THE PAST 12 MONTHS, YOU WORRIED THAT YOUR FOOD WOULD RUN OUT BEFORE YOU GOT MONEY TO BUY MORE.: NEVER TRUE

## 2022-02-21 SDOH — ECONOMIC STABILITY: INCOME INSECURITY: HOW HARD IS IT FOR YOU TO PAY FOR THE VERY BASICS LIKE FOOD, HOUSING, MEDICAL CARE, AND HEATING?: NOT HARD AT ALL

## 2022-02-21 SDOH — ECONOMIC STABILITY: TRANSPORTATION INSECURITY
IN THE PAST 12 MONTHS, HAS THE LACK OF TRANSPORTATION KEPT YOU FROM MEDICAL APPOINTMENTS OR FROM GETTING MEDICATIONS?: NO

## 2022-02-21 NOTE — PROGRESS NOTES
2/21/22- PATRICIA Pepper contacted pt via TC post d/c to introduce CCM services. Completed SDOH screening and outpatient assessment. Pt is established with PCP and will have son make follow up visits as needed. Pt declined assistance with scheduling, CCM services and referral to [facility]. Pt mentions good support from family and friends. No medical equipment used at home. Pt is confident in ability to manage care post d/c. No issues keeping appointments or financial barriers to care. Completed AVS review/ medication/ questions. Pt denies need for resources such as food, transportation or housing. CCM contact info left with pt. Encouraged pt to contact if needed.     Community Health Worker Intake  • Social determinates of health intake completed.   • Identified barriers to none.  • Contact information provided to Licha Pope /Yes  • Has PCP appointment scheduled for / Pt will have son make follow up visits for her as needed. Pt declined assistance with scheduling  • Scheduled Food Delivery/Home Visit/Outpatient Visit: No  • Accepted/Declined Meds-To-Beds. No  • Inpatient/Outpatient assessment completed. Outpatient   • Did the patient receive medications post discharge: Yes    Plan: D/c pt from CCM services as all needs met.

## 2022-02-22 LAB
BACTERIA BLD CULT: NORMAL
BACTERIA BLD CULT: NORMAL
SIGNIFICANT IND 70042: NORMAL
SIGNIFICANT IND 70042: NORMAL
SITE SITE: NORMAL
SITE SITE: NORMAL
SOURCE SOURCE: NORMAL
SOURCE SOURCE: NORMAL

## 2022-03-03 NOTE — DOCUMENTATION QUERY
"                                                                         [facility]                                                                       Query Response Note      PATIENT:               SATINDER LARES  ACCT #:                  [id number]  MRN:                     [id number]  :                      1935  ADMIT DATE:       2022 4:48 PM  DISCH DATE:        2022 4:40 PM  RESPONDING  PROVIDER #:        [id number]           QUERY TEXT:    Pt has documented concerning for intestinal obstruction noted in ED note, H&P and Discharge Summary. Please clarify status of this condition:    NOTE:  If an appropriate response is not listed below, please respond with a new note.       The patient's Clinical Indicators include:  Clinical Indicators: Admitted through ED with abdominal pain. Anorexia and having small pellet-like stools.  Bowel sounds present. No distention. H&P abdominal pain secondary to acute pancreatitis and intestinal obstruction.  Discharge Summary documents: \" abnormal CT scan small bowel concerning for intestinal obstruction.\"    Treatment and Monitoring: IV fluids, Rocephin IV, flagyl IV, Labs, CT scan of abdomen: \" Proximal ileum is dilated consistent with small bowel obstruction.\"    Risk Factors:Acute pancreatitis, COPD, History of DVT.    [email or web address]  Options provided:   -- Intestinal obstruction is ruled in   -- Intestinal obstruction is  ruled out   -- Unable to determine      Query created by: Valeri Edouard on 3/3/2022 5:15 AM    RESPONSE TEXT:    Intestinal obstruction is ruled in          Electronically signed by:  FLAVIO ROMERO MD 3/3/2022 9:56 AM              "

## 2022-03-25 ENCOUNTER — APPOINTMENT (OUTPATIENT)
Dept: RADIOLOGY | Facility: MEDICAL CENTER | Age: 87
DRG: 329 | End: 2022-03-25
Payer: MEDICARE

## 2022-03-25 ENCOUNTER — HOSPITAL ENCOUNTER (INPATIENT)
Facility: MEDICAL CENTER | Age: 87
LOS: 17 days | DRG: 329 | End: 2022-04-11
Attending: EMERGENCY MEDICINE | Admitting: INTERNAL MEDICINE
Payer: MEDICARE

## 2022-03-25 ENCOUNTER — APPOINTMENT (OUTPATIENT)
Dept: RADIOLOGY | Facility: MEDICAL CENTER | Age: 87
DRG: 329 | End: 2022-03-25
Attending: INTERNAL MEDICINE
Payer: MEDICARE

## 2022-03-25 ENCOUNTER — APPOINTMENT (OUTPATIENT)
Dept: RADIOLOGY | Facility: MEDICAL CENTER | Age: 87
DRG: 329 | End: 2022-03-25
Attending: EMERGENCY MEDICINE
Payer: MEDICARE

## 2022-03-25 DIAGNOSIS — C17.2 ADENOCARCINOMA OF ILEUM (HCC): ICD-10-CM

## 2022-03-25 DIAGNOSIS — R19.00 INTRAABDOMINAL MASS: ICD-10-CM

## 2022-03-25 DIAGNOSIS — R10.9 ABDOMINAL PAIN, UNSPECIFIED ABDOMINAL LOCATION: ICD-10-CM

## 2022-03-25 DIAGNOSIS — R93.89 ABNORMAL CT SCAN: ICD-10-CM

## 2022-03-25 DIAGNOSIS — K21.9 GASTROESOPHAGEAL REFLUX DISEASE WITHOUT ESOPHAGITIS: ICD-10-CM

## 2022-03-25 DIAGNOSIS — K56.609 SBO (SMALL BOWEL OBSTRUCTION) (HCC): ICD-10-CM

## 2022-03-25 DIAGNOSIS — N17.9 AKI (ACUTE KIDNEY INJURY) (HCC): ICD-10-CM

## 2022-03-25 DIAGNOSIS — R53.1 GENERALIZED WEAKNESS: ICD-10-CM

## 2022-03-25 DIAGNOSIS — K50.019 TERMINAL ILEITIS WITH COMPLICATION (HCC): ICD-10-CM

## 2022-03-25 LAB
ABO + RH BLD: NORMAL
ABO GROUP BLD: NORMAL
ALBUMIN SERPL BCP-MCNC: 4 G/DL (ref 3.2–4.9)
ALBUMIN/GLOB SERPL: 1.1 G/DL
ALP SERPL-CCNC: 52 U/L (ref 30–99)
ALT SERPL-CCNC: 10 U/L (ref 2–50)
ANION GAP SERPL CALC-SCNC: 12 MMOL/L (ref 7–16)
APPEARANCE UR: CLEAR
APTT PPP: 35.3 SEC (ref 24.7–36)
AST SERPL-CCNC: 19 U/L (ref 12–45)
BASOPHILS # BLD AUTO: 0.3 % (ref 0–1.8)
BASOPHILS # BLD: 0.04 K/UL (ref 0–0.12)
BILIRUB SERPL-MCNC: 0.8 MG/DL (ref 0.1–1.5)
BILIRUB UR QL STRIP.AUTO: ABNORMAL
BLD GP AB SCN SERPL QL: NORMAL
BUN SERPL-MCNC: 30 MG/DL (ref 8–22)
CALCIUM SERPL-MCNC: 10.6 MG/DL (ref 8.4–10.2)
CHLORIDE SERPL-SCNC: 101 MMOL/L (ref 96–112)
CO2 SERPL-SCNC: 28 MMOL/L (ref 20–33)
COLOR UR: YELLOW
CREAT SERPL-MCNC: 1.06 MG/DL (ref 0.5–1.4)
EKG IMPRESSION: NORMAL
EOSINOPHIL # BLD AUTO: 0.07 K/UL (ref 0–0.51)
EOSINOPHIL NFR BLD: 0.6 % (ref 0–6.9)
ERYTHROCYTE [DISTWIDTH] IN BLOOD BY AUTOMATED COUNT: 45.1 FL (ref 35.9–50)
GFR SERPLBLD CREATININE-BSD FMLA CKD-EPI: 51 ML/MIN/1.73 M 2
GLOBULIN SER CALC-MCNC: 3.5 G/DL (ref 1.9–3.5)
GLUCOSE SERPL-MCNC: 114 MG/DL (ref 65–99)
GLUCOSE UR STRIP.AUTO-MCNC: NEGATIVE MG/DL
HCT VFR BLD AUTO: 39.1 % (ref 37–47)
HGB BLD-MCNC: 12.4 G/DL (ref 12–16)
IMM GRANULOCYTES # BLD AUTO: 0.06 K/UL (ref 0–0.11)
IMM GRANULOCYTES NFR BLD AUTO: 0.5 % (ref 0–0.9)
INR PPP: 1.47 (ref 0.87–1.13)
KETONES UR STRIP.AUTO-MCNC: 40 MG/DL
LACTATE BLD-SCNC: 1.4 MMOL/L (ref 0.5–2)
LACTATE BLD-SCNC: 1.9 MMOL/L (ref 0.5–2)
LACTATE BLD-SCNC: 2 MMOL/L (ref 0.5–2)
LEUKOCYTE ESTERASE UR QL STRIP.AUTO: NEGATIVE
LIPASE SERPL-CCNC: 104 U/L (ref 7–58)
LYMPHOCYTES # BLD AUTO: 0.59 K/UL (ref 1–4.8)
LYMPHOCYTES NFR BLD: 5.1 % (ref 22–41)
MCH RBC QN AUTO: 31.7 PG (ref 27–33)
MCHC RBC AUTO-ENTMCNC: 31.7 G/DL (ref 33.6–35)
MCV RBC AUTO: 100 FL (ref 81.4–97.8)
MICRO URNS: ABNORMAL
MONOCYTES # BLD AUTO: 0.83 K/UL (ref 0–0.85)
MONOCYTES NFR BLD AUTO: 7.1 % (ref 0–13.4)
NEUTROPHILS # BLD AUTO: 10.04 K/UL (ref 2–7.15)
NEUTROPHILS NFR BLD: 86.4 % (ref 44–72)
NITRITE UR QL STRIP.AUTO: NEGATIVE
NRBC # BLD AUTO: 0 K/UL
NRBC BLD-RTO: 0 /100 WBC
PH UR STRIP.AUTO: 5 [PH] (ref 5–8)
PLATELET # BLD AUTO: 226 K/UL (ref 164–446)
PMV BLD AUTO: 11.2 FL (ref 9–12.9)
POTASSIUM SERPL-SCNC: 4.4 MMOL/L (ref 3.6–5.5)
PROCALCITONIN SERPL-MCNC: 0.09 NG/ML
PROT SERPL-MCNC: 7.5 G/DL (ref 6–8.2)
PROT UR QL STRIP: NEGATIVE MG/DL
PROTHROMBIN TIME: 16.7 SEC (ref 12–14.6)
RBC # BLD AUTO: 3.91 M/UL (ref 4.2–5.4)
RBC UR QL AUTO: NEGATIVE
RH BLD: NORMAL
SODIUM SERPL-SCNC: 141 MMOL/L (ref 135–145)
SP GR UR STRIP.AUTO: >=1.03
TROPONIN T SERPL-MCNC: 20 NG/L (ref 6–19)
WBC # BLD AUTO: 11.6 K/UL (ref 4.8–10.8)

## 2022-03-25 PROCEDURE — 36415 COLL VENOUS BLD VENIPUNCTURE: CPT

## 2022-03-25 PROCEDURE — 96365 THER/PROPH/DIAG IV INF INIT: CPT

## 2022-03-25 PROCEDURE — 700105 HCHG RX REV CODE 258: Performed by: EMERGENCY MEDICINE

## 2022-03-25 PROCEDURE — 700111 HCHG RX REV CODE 636 W/ 250 OVERRIDE (IP): Performed by: EMERGENCY MEDICINE

## 2022-03-25 PROCEDURE — 80053 COMPREHEN METABOLIC PANEL: CPT

## 2022-03-25 PROCEDURE — 770006 HCHG ROOM/CARE - MED/SURG/GYN SEMI*

## 2022-03-25 PROCEDURE — 700111 HCHG RX REV CODE 636 W/ 250 OVERRIDE (IP): Performed by: INTERNAL MEDICINE

## 2022-03-25 PROCEDURE — 93005 ELECTROCARDIOGRAM TRACING: CPT | Performed by: EMERGENCY MEDICINE

## 2022-03-25 PROCEDURE — 85610 PROTHROMBIN TIME: CPT

## 2022-03-25 PROCEDURE — 81003 URINALYSIS AUTO W/O SCOPE: CPT

## 2022-03-25 PROCEDURE — 700101 HCHG RX REV CODE 250: Performed by: INTERNAL MEDICINE

## 2022-03-25 PROCEDURE — 71045 X-RAY EXAM CHEST 1 VIEW: CPT

## 2022-03-25 PROCEDURE — 86900 BLOOD TYPING SEROLOGIC ABO: CPT

## 2022-03-25 PROCEDURE — 83605 ASSAY OF LACTIC ACID: CPT | Mod: 91

## 2022-03-25 PROCEDURE — 99223 1ST HOSP IP/OBS HIGH 75: CPT | Mod: AI | Performed by: INTERNAL MEDICINE

## 2022-03-25 PROCEDURE — 85025 COMPLETE CBC W/AUTO DIFF WBC: CPT

## 2022-03-25 PROCEDURE — 87040 BLOOD CULTURE FOR BACTERIA: CPT | Mod: 91

## 2022-03-25 PROCEDURE — 99285 EMERGENCY DEPT VISIT HI MDM: CPT

## 2022-03-25 PROCEDURE — 86901 BLOOD TYPING SEROLOGIC RH(D): CPT

## 2022-03-25 PROCEDURE — 74177 CT ABD & PELVIS W/CONTRAST: CPT | Mod: ME

## 2022-03-25 PROCEDURE — 84484 ASSAY OF TROPONIN QUANT: CPT

## 2022-03-25 PROCEDURE — 700105 HCHG RX REV CODE 258: Performed by: INTERNAL MEDICINE

## 2022-03-25 PROCEDURE — 84145 PROCALCITONIN (PCT): CPT

## 2022-03-25 PROCEDURE — 85730 THROMBOPLASTIN TIME PARTIAL: CPT

## 2022-03-25 PROCEDURE — 87086 URINE CULTURE/COLONY COUNT: CPT

## 2022-03-25 PROCEDURE — 83690 ASSAY OF LIPASE: CPT

## 2022-03-25 PROCEDURE — 86850 RBC ANTIBODY SCREEN: CPT

## 2022-03-25 PROCEDURE — 700117 HCHG RX CONTRAST REV CODE 255: Performed by: EMERGENCY MEDICINE

## 2022-03-25 RX ORDER — SODIUM CHLORIDE, SODIUM LACTATE, POTASSIUM CHLORIDE, CALCIUM CHLORIDE 600; 310; 30; 20 MG/100ML; MG/100ML; MG/100ML; MG/100ML
1000 INJECTION, SOLUTION INTRAVENOUS ONCE
Status: COMPLETED | OUTPATIENT
Start: 2022-03-25 | End: 2022-03-26

## 2022-03-25 RX ORDER — DEXTROSE MONOHYDRATE, SODIUM CHLORIDE, AND POTASSIUM CHLORIDE 50; 1.49; 9 G/1000ML; G/1000ML; G/1000ML
INJECTION, SOLUTION INTRAVENOUS CONTINUOUS
Status: DISCONTINUED | OUTPATIENT
Start: 2022-03-25 | End: 2022-04-01

## 2022-03-25 RX ORDER — LABETALOL HYDROCHLORIDE 5 MG/ML
10 INJECTION, SOLUTION INTRAVENOUS EVERY 4 HOURS PRN
Status: DISCONTINUED | OUTPATIENT
Start: 2022-03-25 | End: 2022-04-11 | Stop reason: HOSPADM

## 2022-03-25 RX ORDER — HYDROMORPHONE HYDROCHLORIDE 1 MG/ML
0.5 INJECTION, SOLUTION INTRAMUSCULAR; INTRAVENOUS; SUBCUTANEOUS
Status: DISCONTINUED | OUTPATIENT
Start: 2022-03-25 | End: 2022-04-11 | Stop reason: HOSPADM

## 2022-03-25 RX ORDER — VALSARTAN 80 MG/1
160 TABLET ORAL DAILY
Status: DISCONTINUED | OUTPATIENT
Start: 2022-03-26 | End: 2022-04-11 | Stop reason: HOSPADM

## 2022-03-25 RX ADMIN — SODIUM CHLORIDE, POTASSIUM CHLORIDE, SODIUM LACTATE AND CALCIUM CHLORIDE 1000 ML: 600; 310; 30; 20 INJECTION, SOLUTION INTRAVENOUS at 20:05

## 2022-03-25 RX ADMIN — POTASSIUM CHLORIDE, DEXTROSE MONOHYDRATE AND SODIUM CHLORIDE: 150; 5; 900 INJECTION, SOLUTION INTRAVENOUS at 23:58

## 2022-03-25 RX ADMIN — IOHEXOL 100 ML: 350 INJECTION, SOLUTION INTRAVENOUS at 19:44

## 2022-03-25 RX ADMIN — METRONIDAZOLE 500 MG: 500 INJECTION, SOLUTION INTRAVENOUS at 22:08

## 2022-03-25 RX ADMIN — PIPERACILLIN AND TAZOBACTAM 3.38 G: 3; .375 INJECTION, POWDER, LYOPHILIZED, FOR SOLUTION INTRAVENOUS; PARENTERAL at 20:05

## 2022-03-25 RX ADMIN — CEFTRIAXONE SODIUM 2 G: 2 INJECTION, POWDER, FOR SOLUTION INTRAMUSCULAR; INTRAVENOUS at 23:25

## 2022-03-25 ASSESSMENT — LIFESTYLE VARIABLES
HAVE YOU EVER FELT YOU SHOULD CUT DOWN ON YOUR DRINKING: NO
EVER FELT BAD OR GUILTY ABOUT YOUR DRINKING: NO
ALCOHOL_USE: NO
EVER HAD A DRINK FIRST THING IN THE MORNING TO STEADY YOUR NERVES TO GET RID OF A HANGOVER: NO
CONSUMPTION TOTAL: NEGATIVE
TOTAL SCORE: 0
AVERAGE NUMBER OF DAYS PER WEEK YOU HAVE A DRINK CONTAINING ALCOHOL: 0
SUBSTANCE_ABUSE: 0
HOW MANY TIMES IN THE PAST YEAR HAVE YOU HAD 5 OR MORE DRINKS IN A DAY: 0
HAVE PEOPLE ANNOYED YOU BY CRITICIZING YOUR DRINKING: NO
TOTAL SCORE: 0
ON A TYPICAL DAY WHEN YOU DRINK ALCOHOL HOW MANY DRINKS DO YOU HAVE: 0
TOTAL SCORE: 0

## 2022-03-25 ASSESSMENT — ENCOUNTER SYMPTOMS
EYE REDNESS: 0
ABDOMINAL PAIN: 1
NAUSEA: 1
VOMITING: 1
FALLS: 0
CHILLS: 0
BACK PAIN: 1
FEVER: 0
BLOOD IN STOOL: 0
DIZZINESS: 1
SHORTNESS OF BREATH: 0

## 2022-03-25 ASSESSMENT — COGNITIVE AND FUNCTIONAL STATUS - GENERAL
DAILY ACTIVITIY SCORE: 24
SUGGESTED CMS G CODE MODIFIER DAILY ACTIVITY: CH
SUGGESTED CMS G CODE MODIFIER MOBILITY: CI
MOVING FROM LYING ON BACK TO SITTING ON SIDE OF FLAT BED: A LITTLE
MOBILITY SCORE: 23

## 2022-03-25 ASSESSMENT — PATIENT HEALTH QUESTIONNAIRE - PHQ9
1. LITTLE INTEREST OR PLEASURE IN DOING THINGS: NOT AT ALL
2. FEELING DOWN, DEPRESSED, IRRITABLE, OR HOPELESS: NOT AT ALL
SUM OF ALL RESPONSES TO PHQ9 QUESTIONS 1 AND 2: 0

## 2022-03-25 ASSESSMENT — FIBROSIS 4 INDEX: FIB4 SCORE: 2.47

## 2022-03-26 ENCOUNTER — APPOINTMENT (OUTPATIENT)
Dept: RADIOLOGY | Facility: MEDICAL CENTER | Age: 87
DRG: 329 | End: 2022-03-26
Attending: INTERNAL MEDICINE
Payer: MEDICARE

## 2022-03-26 PROBLEM — R73.9 HYPERGLYCEMIA: Status: ACTIVE | Noted: 2022-03-26

## 2022-03-26 LAB
ANION GAP SERPL CALC-SCNC: 12 MMOL/L (ref 7–16)
BASOPHILS # BLD AUTO: 0.3 % (ref 0–1.8)
BASOPHILS # BLD: 0.03 K/UL (ref 0–0.12)
BUN SERPL-MCNC: 24 MG/DL (ref 8–22)
CALCIUM SERPL-MCNC: 9.2 MG/DL (ref 8.4–10.2)
CHLORIDE SERPL-SCNC: 103 MMOL/L (ref 96–112)
CO2 SERPL-SCNC: 25 MMOL/L (ref 20–33)
CREAT SERPL-MCNC: 0.82 MG/DL (ref 0.5–1.4)
EOSINOPHIL # BLD AUTO: 0.18 K/UL (ref 0–0.51)
EOSINOPHIL NFR BLD: 2 % (ref 0–6.9)
ERYTHROCYTE [DISTWIDTH] IN BLOOD BY AUTOMATED COUNT: 45.3 FL (ref 35.9–50)
GFR SERPLBLD CREATININE-BSD FMLA CKD-EPI: 69 ML/MIN/1.73 M 2
GLUCOSE SERPL-MCNC: 137 MG/DL (ref 65–99)
HCT VFR BLD AUTO: 32.3 % (ref 37–47)
HGB BLD-MCNC: 10.3 G/DL (ref 12–16)
IMM GRANULOCYTES # BLD AUTO: 0.04 K/UL (ref 0–0.11)
IMM GRANULOCYTES NFR BLD AUTO: 0.4 % (ref 0–0.9)
LYMPHOCYTES # BLD AUTO: 0.54 K/UL (ref 1–4.8)
LYMPHOCYTES NFR BLD: 5.9 % (ref 22–41)
MAGNESIUM SERPL-MCNC: 1.9 MG/DL (ref 1.5–2.5)
MCH RBC QN AUTO: 31.7 PG (ref 27–33)
MCHC RBC AUTO-ENTMCNC: 31.9 G/DL (ref 33.6–35)
MCV RBC AUTO: 99.4 FL (ref 81.4–97.8)
MONOCYTES # BLD AUTO: 0.69 K/UL (ref 0–0.85)
MONOCYTES NFR BLD AUTO: 7.5 % (ref 0–13.4)
NEUTROPHILS # BLD AUTO: 7.71 K/UL (ref 2–7.15)
NEUTROPHILS NFR BLD: 83.9 % (ref 44–72)
NRBC # BLD AUTO: 0 K/UL
NRBC BLD-RTO: 0 /100 WBC
PLATELET # BLD AUTO: 197 K/UL (ref 164–446)
PMV BLD AUTO: 11.8 FL (ref 9–12.9)
POTASSIUM SERPL-SCNC: 4 MMOL/L (ref 3.6–5.5)
RBC # BLD AUTO: 3.25 M/UL (ref 4.2–5.4)
SODIUM SERPL-SCNC: 140 MMOL/L (ref 135–145)
WBC # BLD AUTO: 9.2 K/UL (ref 4.8–10.8)

## 2022-03-26 PROCEDURE — 700111 HCHG RX REV CODE 636 W/ 250 OVERRIDE (IP): Performed by: INTERNAL MEDICINE

## 2022-03-26 PROCEDURE — 700101 HCHG RX REV CODE 250: Performed by: INTERNAL MEDICINE

## 2022-03-26 PROCEDURE — 85025 COMPLETE CBC W/AUTO DIFF WBC: CPT

## 2022-03-26 PROCEDURE — 83735 ASSAY OF MAGNESIUM: CPT

## 2022-03-26 PROCEDURE — 80048 BASIC METABOLIC PNL TOTAL CA: CPT

## 2022-03-26 PROCEDURE — 770006 HCHG ROOM/CARE - MED/SURG/GYN SEMI*

## 2022-03-26 PROCEDURE — 99233 SBSQ HOSP IP/OBS HIGH 50: CPT | Performed by: INTERNAL MEDICINE

## 2022-03-26 PROCEDURE — 36415 COLL VENOUS BLD VENIPUNCTURE: CPT

## 2022-03-26 RX ORDER — MAGNESIUM SULFATE 1 G/100ML
1 INJECTION INTRAVENOUS ONCE
Status: COMPLETED | OUTPATIENT
Start: 2022-03-26 | End: 2022-03-26

## 2022-03-26 RX ADMIN — POTASSIUM CHLORIDE, DEXTROSE MONOHYDRATE AND SODIUM CHLORIDE: 150; 5; 900 INJECTION, SOLUTION INTRAVENOUS at 21:24

## 2022-03-26 RX ADMIN — METRONIDAZOLE 500 MG: 500 INJECTION, SOLUTION INTRAVENOUS at 05:15

## 2022-03-26 RX ADMIN — METRONIDAZOLE 500 MG: 500 INJECTION, SOLUTION INTRAVENOUS at 21:26

## 2022-03-26 RX ADMIN — METRONIDAZOLE 500 MG: 500 INJECTION, SOLUTION INTRAVENOUS at 15:34

## 2022-03-26 RX ADMIN — POTASSIUM CHLORIDE, DEXTROSE MONOHYDRATE AND SODIUM CHLORIDE: 150; 5; 900 INJECTION, SOLUTION INTRAVENOUS at 10:17

## 2022-03-26 RX ADMIN — MAGNESIUM SULFATE 1 G: 1 INJECTION INTRAVENOUS at 08:28

## 2022-03-26 ASSESSMENT — PAIN DESCRIPTION - PAIN TYPE
TYPE: ACUTE PAIN

## 2022-03-26 ASSESSMENT — ENCOUNTER SYMPTOMS
FEVER: 0
DIZZINESS: 0
SHORTNESS OF BREATH: 0
NERVOUS/ANXIOUS: 0
COUGH: 0
CHILLS: 0
DOUBLE VISION: 0
FALLS: 0
HEADACHES: 0
VOMITING: 0
PALPITATIONS: 0
HEARTBURN: 0
BLURRED VISION: 0
BACK PAIN: 0
ABDOMINAL PAIN: 1

## 2022-03-26 ASSESSMENT — LIFESTYLE VARIABLES: SUBSTANCE_ABUSE: 0

## 2022-03-26 NOTE — CONSULTS
Surgical History and Physical    Date: 3/26/2022    Requesting Physician: Dr Holden    Consulting Physician: Pedro Mcneil M.D.    Reason for consultation: Small bowel obstruction    CC: Abdominal pain with nausea and vomiting    HPI: This is a 86 y.o. female who is presenting with abdominal pain and nausea and vomiting.  Prior history of abdominal surgery includes appendectomy. No Prior history of radiation exposure.  No Prior history of inflammatory bowel disease including Crohn's or Ulcerative Colitis.  The patient's last meal was yesterday.  The patient's last bowel movement was prior to admission and was described as dark.  The patient denies any history of foreign body ingestion.    Past Medical History:   Diagnosis Date   • Arthritis    • COPD (chronic obstructive pulmonary disease) (HCC)    • DVT (deep venous thrombosis) (HCC)    • EMPHYSEMA    • Gastroesophageal reflux disease without esophagitis 3/13/2019   • Hypertension    • Hypertriglyceridemia 2019   • Prediabetes 2019       Past Surgical History:   Procedure Laterality Date   • AR LAP,APPENDECTOMY  2021    Procedure: APPENDECTOMY, LAPAROSCOPIC;  Surgeon: Pedro Mcneil M.D.;  Location: [facility];  Service: Gastroenterology   • CATARACT PHACO WITH IOL  2014    Performed by Jarred Almazan M.D. at [facility] ORS   • CATARACT PHACO WITH IOL  2014    Performed by Jarred Almazan M.D. at [facility] ORS   • KNEE ARTHROPLASTY TOTAL  2014    Performed by Jose Hahn M.D. at [facility] ORS   • AR  DELIVERY ONLY     • HEMORRHOIDECTOMY         Current Facility-Administered Medications   Medication Dose Route Frequency Provider Last Rate Last Admin   • dextrose 5 % and 0.9 % NaCl with KCl 20 mEq infusion   Intravenous Continuous Dolores Sanchez M.D. 100 mL/hr at 22 1017 New Bag at 22 1017   • HYDROmorphone (Dilaudid) injection 0.5 mg   0.5 mg Intravenous Q3HRS PRN Dolores Sanchez M.D.       • labetalol (NORMODYNE/TRANDATE) injection 10 mg  10 mg Intravenous Q4HRS PRN Dolores Sanchez M.D.       • cefTRIAXone (Rocephin) 2 g in  mL IVPB  2 g Intravenous Q24HRS Dolores Sanchez M.D.   Stopped at 22 2355   • metroNIDAZOLE (FLAGYL) IVPB 500 mg  500 mg Intravenous Q8HRS Dolores Sanchez M.D.   Stopped at 22 0615   • [Held by provider] valsartan (DIOVAN) tablet 160 mg  160 mg Oral DAILY Dolores Sanchez M.D.           Social History     Socioeconomic History   • Marital status:      Spouse name: Not on file   • Number of children: Not on file   • Years of education: Not on file   • Highest education level: Not on file   Occupational History   • Not on file   Tobacco Use   • Smoking status: Former Smoker     Packs/day: 1.00     Years: 20.00     Pack years: 20.00     Types: Cigarettes     Quit date: 1975     Years since quittin.2   • Smokeless tobacco: Never Used   Vaping Use   • Vaping Use: Never used   Substance and Sexual Activity   • Alcohol use: Not Currently     Alcohol/week: 1.5 oz     Types: 3 Standard drinks or equivalent per week     Comment: RARE   • Drug use: No   • Sexual activity: Not Currently     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     Comment: , retired    Other Topics Concern   • Not on file   Social History Narrative   • Not on file     Social Determinants of Health     Financial Resource Strain: Low Risk    • Difficulty of Paying Living Expenses: Not hard at all   Food Insecurity: No Food Insecurity   • Worried About Running Out of Food in the Last Year: Never true   • Ran Out of Food in the Last Year: Never true   Transportation Needs: No Transportation Needs   • Lack of Transportation (Medical): No   • Lack of Transportation (Non-Medical): No   Physical Activity: Not on file   Stress: Not on file   Social Connections: Not on file   Intimate Partner Violence: Not on file   Housing Stability: Not  "on file       Family History   Problem Relation Age of Onset   • Alzheimer's Disease Mother    • Heart Disease Father    • Other Father         AAA   • Heart Attack Sister    • Diabetes Brother        Allergies:  Codeine, Demerol, Shellfish allergy, Ciprofloxacin, Ibuprofen, and Iodine    Review of Systems:  Constitutional: Negative for fever, chills or weight loss  HENT: Negative for nosebleeds   Eyes: Negative for changes in vision or photophobia  Respiratory: Negative for cough, shortness of breath or wheezing  Cardiovascular: Negative for chest pain or palpitations  Gastrointestinal: Positive for nausea, vomiting, negative for diarrhea, positive for blood in stool and melena.   Genitourinary: Negative for dysuria or urinary incontinence   Musculoskeletal: Positive for back pain and negative for joint pain.   Skin: Negative for itching and rash.  Neurological: Positive for dizziness, negative for lightheadedness or loss of consciousness  Endo/Heme/Allergies: Does not bruise/bleed easily.   Psychiatric/Behavioral: Negative for substance abuse. The patient is not nervous/anxious and does not have insomnia.    Physical Exam:  /52   Pulse 93   Temp 36.6 °C (97.9 °F) (Temporal)   Resp 16   Ht 1.626 m (5' 4\")   Wt 57.2 kg (126 lb 1.7 oz)   SpO2 92%     Constitutional: oriented to person, place, and time.  appears well-developed and well-nourished. No distress.   Head: Normocephalic and atraumatic.   Neck: Normal range of motion. Neck supple. No JVD present. No tracheal deviation present. No thyromegaly present.   Cardiovascular: Normal rate, regular rhythm, normal heart sounds and intact distal pulses.  Exam reveals no gallop and no friction rub.  No murmur heard.  Pulmonary/Chest: Effort increased on Oxygen by NC. No stridor. No respiratory distress.   Abdominal: No Evidence of abdominal wall or groin hernia. Soft, nontender, nondistended without rebound or guarding.  Minimal tenderness to palpation in the " right lower quadrant.  Musculoskeletal: Normal range of motion.  exhibits no edema and no tenderness.   Neurological: he is alert and oriented to person, place, and time. Coordination normal.   Skin: Skin is warm and dry. No rash noted. he is not diaphoretic. No erythema. No pallor.   Psychiatric: normal mood and affect.  Behavior is normal.       Labs:  Recent Labs     03/25/22  1528 03/26/22  0201   WBC 11.6* 9.2   RBC 3.91* 3.25*   HEMOGLOBIN 12.4 10.3*   HEMATOCRIT 39.1 32.3*   .0* 99.4*   MCH 31.7 31.7   MCHC 31.7* 31.9*   RDW 45.1 45.3   PLATELETCT 226 197   MPV 11.2 11.8     Recent Labs     03/25/22  1528 03/26/22  0201   SODIUM 141 140   POTASSIUM 4.4 4.0   CHLORIDE 101 103   CO2 28 25   GLUCOSE 114* 137*   BUN 30* 24*   CREATININE 1.06 0.82   CALCIUM 10.6* 9.2     Recent Labs     03/25/22  1528   APTT 35.3   INR 1.47*     Recent Labs     03/25/22  1528   ASTSGOT 19   ALTSGPT 10   TBILIRUBIN 0.8   ALKPHOSPHAT 52   GLOBULIN 3.5   INR 1.47*         Radiology:  DX-ABDOMEN FOR TUBE PLACEMENT   Final Result         Gastric drainage tube with tip projecting over the expected area of the stomach.      DX-ABDOMEN FOR TUBE PLACEMENT   Final Result         Gastric drainage tube loops in the esophagus with tip projecting over the expected area of the upper esophagus.      CT-ABDOMEN-PELVIS WITH   Final Result      1.  There are similar changes of bowel wall thickening involving the terminal ileum and base of the cecum adjacent to postoperative change from prior appendectomy which could be infectious or inflammatory.   2.  There is mild proximal obstruction involving the ileum and distal jejunum in the lower abdomen and left mid abdomen with small bowel loops dilated up to 4 cm.   3.  There is no gross free air or pneumatosis.   4.  Moderate size hiatal hernia again seen.   5.  Minimally prominent biliary tree unchanged and physiologic, unchanged dating back to 2015.   6.  Simple hepatic cysts are unchanged.          DX-CHEST-PORTABLE (1 VIEW)   Final Result      1.  There is no acute cardiopulmonary process.          Assessment: This is a 86 y.o.     Active Hospital Problems    Diagnosis    • Hyperglycemia [R73.9]    • SBO (small bowel obstruction) ([facility]) [K56.609]    • NATIVIDAD (acute kidney injury) (Formerly [facility]) [N17.9]    • Anemia [D64.9]    • History of deep venous thrombosis [Z86.718]    • COPD (chronic obstructive pulmonary disease) ([facility]) [J44.9]    • Essential hypertension, benign [I10]        Plan:   1) IVF/NPO/NGT decompression  2) Maintain K+>4  3) Minimize exposure to narcotics  4)  No plans for surgical intervention at this point. We will continue to monitor.  Patient would likely benefit from inpatient evaluation by gastroenterology for her findings of terminal ileitis if she is able to tolerate a bowel prep after return of bowel function  Pedro Mcneil MD PhD  [facility]  Colon and Rectal Surgeon  [phone]

## 2022-03-26 NOTE — PROGRESS NOTES
4 Eyes Skin Assessment Completed by SIDRA Flower and SIDRA Cordova.    Head WDL  Ears WDL  Nose WDL  Mouth WDL  Neck WDL  Breast/Chest WDL  Shoulder Blades WDL  Spine WDL  (R) Arm/Elbow/Hand WDL  (L) Arm/Elbow/Hand WDL  Abdomen WDL  Groin WDL  Scrotum/Coccyx/Buttocks WDL  (R) Leg WDL  (L) Leg WDL discoloration to Left calf/ankle  (R) Heel/Foot/Toe WDL  (L) Heel/Foot/Toe WDL          Devices In Places OG/NG and Nasal Cannula      Interventions In Place N/A    Possible Skin Injury No    Pictures Uploaded Into Epic N/A  Wound Consult Placed N/A  RN Wound Prevention Protocol Ordered No

## 2022-03-26 NOTE — ASSESSMENT & PLAN NOTE
No signs of bleeding at this time, monitor    3/31: Patient underwent exploratory laparotomy yesterday.  Continue to monitor CBC.    4/5: Patient had bright red blood per rectum today, we have held anticoagulation.  This could be due to the patient's anastomosis site.  We appreciate further recommendations by surgery.    4/6: Patient mentions she is still having some blood per rectum. We will continue to monitor. Patient is hemodynamically stable for now.    4/7: Hemoglobin seems to be stable, patient mentioning that she is not having any more bloody stools.  I spoke to surgery who recommended starting DVT prophylaxis tomorrow if no evidence of further bleeding.    4/8: Hemoglobin seems to be stable.  We have started DVT prophylaxis today.  We are pending ultrasound of the lower extremities to see if she will require full dose anticoagulation.    4/9: Hemoglobin is stable.  We will not start full anticoagulation at this time.    4/10: Hemoglobin seems to be stable.  This morning the hemoglobin is 7.9, however yesterday morning it was 8.1.  Patient is hemodynamically stable.  No signs of bleeding.  Patient denies blood in stools.  This could be due to having regular blood draws.  We will not do regular blood draws anymore and monitor the patient's clinical status.

## 2022-03-26 NOTE — ASSESSMENT & PLAN NOTE
Patient with recent terminal ileitis and small bowel obstruction last month  Treated with conservative therapy and antibiotics however now with repeat symptoms and SBO  Surgery consulted by ERP: Dr. Mcneil was called, recommended NPO, NG, IVF, pain mgmt  CTX, flagyl    3/26: Pending surgical evaluation, we appreciate further recommendations.    3/27: Patient seems to have BMs now, we are awaiting further recommendations by general surgery. We have also consulted GI ( Dr Clya), we appreciate further recommendations.    3/28: Patient was vomiting again this morning with abdominal pain.  We have placed the NG tube back and have ordered KUB.  We appreciate further recommendations by general surgery and GI. (Dr Belle from GI will be evaluating the patient today).    3/29: Patient to have small bowel follow through study today, we appreciate further recommendations by GI and General Surgery    3/30: Patient still unable to tolerate PO, we will start TPN, we appreciate further recommendations by surgery and GI.  --UPDATE: I spoke to surgery, they would like to hold TPN for now, as she might undergo surgery today, we will order PICC line.    3/31: Patient underwent exploratory laparotomy yesterday by general surgery, the patient also underwent ileocecectomy with finding of ileocecal mass, pending pathology.  We appreciate further recommendations by general surgery.  We will start TPN today.    -- Pathology positive for adenocarcinoma, oncology was consulted, and patient will require outpatient follow-up with oncology.

## 2022-03-26 NOTE — CARE PLAN
The patient is Stable - Low risk of patient condition declining or worsening    Shift Goals  Clinical Goals: NG will be palced, pain contolled  Patient Goals: pain control    Progress made toward(s) clinical / shift goals:  NG placed, 150ml out. No c/o nausea or pain    Patient is not progressing towards the following goals: n/a

## 2022-03-26 NOTE — CARE PLAN
Problem: Gastrointestinal Irritability  Goal: Nausea and vomiting will be absent or improve  Outcome: Progressing     Problem: Bowel Elimination  Goal: Establish and maintain regular bowel function  Outcome: Progressing     Problem: Gastrointestinal Irritability  Goal: Nausea and vomiting will be absent or improve  Outcome: Progressing   The patient is Watcher - Medium risk of patient condition declining or worsening    Shift Goals  Clinical Goals: NG DC and advance diet by 1400  Patient Goals: feel comfortable    Progress made toward(s) clinical / shift goals:  No, Licha was not able to have a BM, diet was no advance either.    Patient is not progressing towards the following goals:

## 2022-03-26 NOTE — ASSESSMENT & PLAN NOTE
"As per previous hospitalist: \"Diagnosed 5/19/2020 6 hour car trip. Still on anticoagulation.\"    3/27: Will probably resume once surgery clears her NPO    4/5: We had restarted anticoagulation yesterday, however this morning the patient had bloody bowel movement.  So we have held anticoagulation for now.    4/7: I spoke to surgery today, if no evidence of further bleeding tomorrow we will start DVT prophylaxis    4/8: I spoke at length with the patient today, it seems that she was diagnosed last year around May with a DVT after being immobile for several days.  This would mean that she had a provoked DVT.  She has had more than 6 months of treatment at this time will hold anticoagulation.  Of note she now does have a diagnosis of malignancy.  We will order ultrasound of the lower extremities if positive will restart anticoagulation if negative will not continue full dose anticoagulation.  Patient agrees at this time.    4/9: Ultrasound of the lower extremities negative for DVT.  We will not start full anticoagulation at this time.  "

## 2022-03-26 NOTE — PROGRESS NOTES
Pt admitted to unit at 2200 from ED. VSSA. Up to bathroom with stand by assist. Son and daughter-in-law at bedside to assist with admit data base. NG tube placed, placement verified by xray. Tube placed to Low intermittent suction. No complaints of pain at this time.

## 2022-03-26 NOTE — ASSESSMENT & PLAN NOTE
Mild bump in Cr 0.6-->1.06  Likely in setting of decreased PO intake/pre-renal  IVF resuscitation  Continue to monitor    3/29: Improved. Cr is 0.55 today.

## 2022-03-26 NOTE — ASSESSMENT & PLAN NOTE
Home regimen: Valsartan 160 mg daily  Inpatient regimen: PRN medication, resume home medication once surgery clears for PO    3/29: Patient still NPO, however BP seems to be controlled, PRN medication for now

## 2022-03-26 NOTE — PROGRESS NOTES
Report received from night shift RN. Assume care. Pt. AAOx4 pt is bed,  Assessment completed. VSS. Denies pain, able to wiggle toes and dorsi/plantar flex feet, good CMS and pulses to elisha LE, denies numbness and tingling. Dressing in place DCI, Pt was update for the care for the day. White board updated, All question answered. Pt has call light within reach,  bed is in the lowest position. Pt has no other needs at this time.

## 2022-03-26 NOTE — H&P
"Hospital Medicine History & Physical Note    Date of Service  3/25/2022    Primary Care Physician  No primary care provider on file.    Consultants  general surgery    Specialist Names: Dr. Mcneil    Code Status  Full Code    Chief Complaint  Chief Complaint   Patient presents with   • Abdominal Pain     Reports low abd pain with \"vomiting dark purple\" and diarrhea, \"I think the last couple was black\". Reports she was seen at PCP office this AM and placed on 2L O2 there states O2 was 88% at PCP office.    • Blood in Vomit       History of Presenting Illness  Licha Pope is a 86 y.o. female with a history of hypertension, COPD, GERD, DVT dx 5/19/21 after long car trip (still on anticoagulation) who presented 3/25/2022 with abdominal pain.    Was recently admitted for terminal ileitis, SBO and pancreatitis on 2/17/2022, she was treated conservatively with IV fluids and pain control.  Right upper quadrant showed dependent gallbladder sludge versus small stones.  MRCP showed cholelithiasis and common bile duct 11.4 mm unchanged since 2015.  She was discharged with antibiotics and recommended to follow-up with GI, surgery and PCP after discharge.  She saw surgeon, she recommended cholecystectomy but wanted her to see GI doctor. GI doctor recommended cholecystectomy.  Has appt with surgery next Wed.      Patient never really felt like her abdominal pain improved however was stable until 2 days ago.  Abdominal pain became very severe however intermittent and would come in waves.  Patient became very fatigued and was sleeping most the day.  She had chills and was very weak.  Abdominal pain was diffuse, intermittent, described as sharp pain, 10/10 pain, went to PCP and doctor sent her to ER.  Did have some n/v, had some dark red or purple, size of quarter.  Passing gas.  Yesterday had BM.  Did have some dark stools, unsure if black was \"half asleep\".  Last night had lower back pain, now improved.     In ER she was " afebrile, mildly tachycardic (heart rate 111), normal blood pressure and mild hypoxia requiring 1 L nasal cannula.  Labs remarkable for WBC 11.6 with a left shift, creatinine 1.06, calcium 10.6, lipase 104, troponin XX, lactic acid normal.  CT abdomen pelvis with contrast showed similar changes of bowel wall thickening involving the terminal ileum and base of the cecum adjacent to postoperative change from prior appendectomy which could be infectious or inflammatory.  There is mild proximal obstruction involving the ileum and distal jejunum in the lower abdomen left mid abdomen with small bowel loops dilated up to 4 cm.  There is no gross free air or pneumatosis.  Moderate size hiatal hernia again seen.  Minimal prominent biliary tree unchanged and physiologic, unchanged a back to 2015.  Simple hepatic cyst unchanged.  She was given IV fluids, Zosyn and general surgery was consulted.  Dr. Mcneil to see patient.  Recommended NG, IV fluids.    I discussed the plan of care with patient and family.    Review of Systems  Review of Systems   Constitutional: Positive for malaise/fatigue. Negative for chills and fever.   HENT: Positive for hearing loss.    Eyes: Negative for redness.   Respiratory: Negative for shortness of breath.    Cardiovascular: Negative for chest pain.   Gastrointestinal: Positive for abdominal pain, nausea and vomiting. Negative for blood in stool.   Genitourinary: Negative for dysuria.   Musculoskeletal: Positive for back pain. Negative for falls.   Neurological: Positive for dizziness.   Psychiatric/Behavioral: Negative for substance abuse.       Past Medical History   has a past medical history of Arthritis, COPD (chronic obstructive pulmonary disease) ([facility]), DVT (deep venous thrombosis) ([facility]), EMPHYSEMA, Gastroesophageal reflux disease without esophagitis (3/13/2019), Hypertension, Hypertriglyceridemia (5/13/2019), and Prediabetes (5/13/2019).    Surgical History   has a past surgical history  that includes hemorrhoidectomy (); pr  delivery only (); knee arthroplasty total (2014); cataract phaco with iol (2014); cataract phaco with iol (2014); and pr lap,appendectomy (2021).     Family History  family history includes Alzheimer's Disease in her mother; Diabetes in her brother; Heart Attack in her sister; Heart Disease in her father; Other in her father.   Family history reviewed with patient. There is no family history that is pertinent to the chief complaint.     Social History   reports that she quit smoking about 47 years ago. Her smoking use included cigarettes. She has a 20.00 pack-year smoking history. She has never used smokeless tobacco. She reports previous alcohol use of about 1.5 oz of alcohol per week. She reports that she does not use drugs.    Allergies  Allergies   Allergen Reactions   • Codeine Vomiting   • Demerol Vomiting     Injectable type   • Shellfish Allergy Shortness of Breath     Soft shell   • Ciprofloxacin Rash     rash   • Ibuprofen      Patient developed gastric ulcer/GI bleed from ibuprofen use   • Iodine      PATIENT ALLERGIC TO SHELLFISH, NOT SURE IF SHE IS ALLERGIC TO IODINE       Medications  Prior to Admission Medications   Prescriptions Last Dose Informant Patient Reported? Taking?   Ascorbic Acid (VITAMIN C PO)  Patient Yes No   Sig: Take 1 tablet by mouth every day.   CALCIUM-MAGNESIUM-ZINC PO  Patient Yes No   Sig: Take 1 tablet by mouth every day.   Cholecalciferol (D3 PO)  Patient Yes No   Sig: Take 1 capsule by mouth every day.   GLUCOSAMINE HCL-MSM PO  Patient Yes No   Sig: Take 2 Tablets by mouth every day. Move Free Ultra supplement   Multiple Vitamins-Minerals (LUTEIN-ZEAXANTHIN PO)  Patient Yes No   Sig: Take 1 tablet by mouth every day.   TURMERIC PO  Patient Yes No   Sig: Take 2 Capsules by mouth every day.   apixaban (ELIQUIS) 5mg Tab   No No   Sig: Take 1 tablet by mouth 2 times a day.   therapeutic  multivitamin-minerals (THERAGRAN-M) Tab  Patient Yes No   Sig: Take 1 tablet by mouth every day.   valsartan (DIOVAN) 160 MG Tab   No No   Sig: Take 1 Tablet by mouth every day.      Facility-Administered Medications: None       Physical Exam  Temp:  [37.1 °C (98.7 °F)-37.1 °C (98.8 °F)] 37.1 °C (98.8 °F)  Pulse:  [] 98  Resp:  [15-20] 15  BP: (120)/(71) 120/71  SpO2:  [91 %-95 %] 91 %  Blood Pressure : 120/71   Temperature: 37.1 °C (98.8 °F)   Pulse: 98   Respiration: 15   Pulse Oximetry: 91 %       Physical Exam  Constitutional:       General: She is not in acute distress.     Appearance: She is not toxic-appearing or diaphoretic.   HENT:      Head: Normocephalic and atraumatic.      Nose: Nose normal.      Mouth/Throat:      Mouth: Mucous membranes are moist.   Eyes:      General: No scleral icterus.     Conjunctiva/sclera: Conjunctivae normal.   Cardiovascular:      Rate and Rhythm: Normal rate and regular rhythm.      Heart sounds: No murmur heard.    No friction rub. No gallop.   Pulmonary:      Effort: Pulmonary effort is normal.      Breath sounds: Normal breath sounds.   Abdominal:      General: Bowel sounds are normal. There is distension.      Palpations: Abdomen is soft.      Tenderness: There is abdominal tenderness in the right lower quadrant and left lower quadrant. There is no guarding or rebound.   Musculoskeletal:      Cervical back: Normal range of motion.      Right lower leg: No edema.      Left lower leg: No edema.   Skin:     Coloration: Skin is not jaundiced.      Findings: No rash.   Neurological:      Mental Status: She is alert and oriented to person, place, and time. Mental status is at baseline.   Psychiatric:         Mood and Affect: Mood normal.         Laboratory:  Recent Labs     03/25/22  1528   WBC 11.6*   RBC 3.91*   HEMOGLOBIN 12.4   HEMATOCRIT 39.1   .0*   MCH 31.7   MCHC 31.7*   RDW 45.1   PLATELETCT 226   MPV 11.2     Recent Labs     03/25/22  1528   SODIUM 141    POTASSIUM 4.4   CHLORIDE 101   CO2 28   GLUCOSE 114*   BUN 30*   CREATININE 1.06   CALCIUM 10.6*     Recent Labs     03/25/22  1528   ALTSGPT 10   ASTSGOT 19   ALKPHOSPHAT 52   TBILIRUBIN 0.8   LIPASE 104*   GLUCOSE 114*     Recent Labs     03/25/22  1528   APTT 35.3   INR 1.47*     No results for input(s): NTPROBNP in the last 72 hours.      Recent Labs     03/25/22  1528   TROPONINT 20*       Imaging:  CT-ABDOMEN-PELVIS WITH   Final Result      1.  There are similar changes of bowel wall thickening involving the terminal ileum and base of the cecum adjacent to postoperative change from prior appendectomy which could be infectious or inflammatory.   2.  There is mild proximal obstruction involving the ileum and distal jejunum in the lower abdomen and left mid abdomen with small bowel loops dilated up to 4 cm.   3.  There is no gross free air or pneumatosis.   4.  Moderate size hiatal hernia again seen.   5.  Minimally prominent biliary tree unchanged and physiologic, unchanged dating back to 2015.   6.  Simple hepatic cysts are unchanged.         DX-CHEST-PORTABLE (1 VIEW)   Final Result      1.  There is no acute cardiopulmonary process.          EKG:  I have personally reviewed the images and compared with prior images.    Assessment/Plan:  I anticipate this patient will require at least two midnights for appropriate medical management, necessitating inpatient admission.    * SBO (small bowel obstruction) (HCC)- (present on admission)  Assessment & Plan  Patient with recent terminal ileitis and small bowel obstruction last month  Treated with conservative therapy and antibiotics however now with repeat symptoms and SBO  Surgery consulted by ERP: Dr. Mcneil was called, recommended NPO, NG, IVF, pain mgmt  CTX, flagyl      History of deep venous thrombosis- (present on admission)  Assessment & Plan  Diagnosed 5/19/2020 6 hour car trip  Repeat ultrasound 7/2021 -  Still on anticoagulation  Holding for now, if  calling this provoked DVT likely does not need anticoagulation anymore    COPD (chronic obstructive pulmonary disease) (HCC)- (present on admission)  Assessment & Plan  Not on home oxygen  Not having COPD exacerbation    Essential hypertension, benign- (present on admission)  Assessment & Plan  Home regimen: Valsartan 160 mg daily  Inpatient regimen: continue home regimen      VTE prophylaxis: SCDs/TEDs

## 2022-03-26 NOTE — ED NOTES
Assumed care. Pt denies any pain and comfortable.  2nd set of blood cultures drawn x2 and taken to lab.

## 2022-03-26 NOTE — ED NOTES
Medication reconciliation process completed by interviewing the patient at bedside.   No antibiotics in the last 30 days.  Allergies have been verified.    Luana Malave, BCPS

## 2022-03-26 NOTE — ED PROVIDER NOTES
"ED Provider Note    CHIEF COMPLAINT  Chief Complaint   Patient presents with   • Abdominal Pain     Reports low abd pain with \"vomiting dark purple\" and diarrhea, \"I think the last couple was black\". Reports she was seen at PCP office this AM and placed on 2L O2 there states O2 was 88% at PCP office.    • Blood in Vomit       HPI  Licha Pope is a 86 y.o. female who presents to the emergency department complaining of abdominal pain.  The patient has severe intermittent epigastric abdominal pain.  Is been associate with dark emesis.  Not sure if it is bloody.  Not sure she had bloody stool.  Pain seems to come and go severe does not radiate.  She was admitted to the hospital last month for pancreatitis and gallstones.  Pain is similar however much more severe.  She did not have a cholecystectomy.  Patient denies any chest pain cough or shortness of breath.  She denies any diarrhea.  No dysuria materia Elonel or frequency.    She has had a fairly recent abdominal operation.  She had an appendectomy in June of last year.  Patient is on a blood thinner for DVT.  Denies any other acute concerns or complaints.    REVIEW OF SYSTEMS  See HPI for further details. All other systems are negative.    PAST MEDICAL HISTORY  Past Medical History:   Diagnosis Date   • Arthritis    • COPD (chronic obstructive pulmonary disease) (HCC)    • DVT (deep venous thrombosis) (HCC)    • EMPHYSEMA    • Gastroesophageal reflux disease without esophagitis 3/13/2019   • Hypertension    • Hypertriglyceridemia 5/13/2019   • Prediabetes 5/13/2019       FAMILY HISTORY  Family History   Problem Relation Age of Onset   • Alzheimer's Disease Mother    • Heart Disease Father    • Other Father         AAA   • Heart Attack Sister    • Diabetes Brother        SOCIAL HISTORY  Social History     Socioeconomic History   • Marital status:    Tobacco Use   • Smoking status: Former Smoker     Packs/day: 1.00     Years: 20.00     Pack years: 20.00    "  Types: Cigarettes     Quit date: 1975     Years since quittin.2   • Smokeless tobacco: Never Used   Vaping Use   • Vaping Use: Never used   Substance and Sexual Activity   • Alcohol use: Not Currently     Alcohol/week: 1.5 oz     Types: 3 Standard drinks or equivalent per week     Comment: RARE   • Drug use: No   • Sexual activity: Not Currently     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     Comment: , retired      Social Determinants of Health     Financial Resource Strain: Low Risk    • Difficulty of Paying Living Expenses: Not hard at all   Food Insecurity: No Food Insecurity   • Worried About Running Out of Food in the Last Year: Never true   • Ran Out of Food in the Last Year: Never true   Transportation Needs: No Transportation Needs   • Lack of Transportation (Medical): No   • Lack of Transportation (Non-Medical): No       SURGICAL HISTORY  Past Surgical History:   Procedure Laterality Date   • MA LAP,APPENDECTOMY  2021    Procedure: APPENDECTOMY, LAPAROSCOPIC;  Surgeon: Pedro Mcneil M.D.;  Location: [facility];  Service: Gastroenterology   • CATARACT PHACO WITH IOL  2014    Performed by Jarred Almazan M.D. at [facility] ORS   • CATARACT PHACO WITH IOL  2014    Performed by Jarred Almazan M.D. at [facility] ORS   • KNEE ARTHROPLASTY TOTAL  2014    Performed by Jose Hahn M.D. at [facility] ORS   • MA  DELIVERY ONLY     • HEMORRHOIDECTOMY         CURRENT MEDICATIONS  Home Medications    **Home medications have not yet been reviewed for this encounter**         ALLERGIES  Allergies   Allergen Reactions   • Codeine Vomiting   • Demerol Vomiting     Injectable type   • Shellfish Allergy Shortness of Breath     Soft shell   • Ciprofloxacin Rash     rash   • Ibuprofen      Patient developed gastric ulcer/GI bleed from ibuprofen use   • Iodine      PATIENT ALLERGIC TO SHELLFISH, NOT SURE IF SHE IS ALLERGIC TO IODINE  "      PHYSICAL EXAM  VITAL SIGNS: /71   Pulse (!) 111   Temp 37.1 °C (98.7 °F) (Temporal)   Resp 20   Ht 1.626 m (5' 4\")   Wt 57.2 kg (126 lb 1.7 oz)   SpO2 95% Comment: Taken of O2 to see RA sat.  BMI 21.65 kg/m²    Constitutional: Awake alert ill-appearing mild distress  HENT: Normocephalic, Atraumatic, Bilateral external ears normal, Oropharynx dry mucous membranes  Eyes: PERRL, EOMI, Conjunctiva normal, No discharge.   Neck: Normal range of motion,  Cardiovascular: Normal heart rate, Normal rhythm, No murmurs, No rubs, No gallops.   Thorax & Lungs: Normal breath sounds, No respiratory distress, No wheezing, No chest tenderness.   Abdomen: Bowel sounds normal, Soft, No tenderness to the epigastrium no peritonitis  Skin: Warm, Dry, No erythema, No rash.   Rectal exam performed with RN as a chaperone shows no stool in the vault.  Guaiac negative  Back: No tenderness, No CVA tenderness.  Musculoskeletal: Good range of motion in all major joints.  No asymmetric edema  Neurologic: Alert, No focal deficits noted.   Psychiatric: Affect normal,    Results for orders placed or performed during the hospital encounter of 03/25/22   COD (ADULT)   Result Value Ref Range    ABO Grouping Only B     Rh Grouping Only POS     Antibody Screen-Cod NEG    CBC WITH DIFFERENTIAL   Result Value Ref Range    WBC 11.6 (H) 4.8 - 10.8 K/uL    RBC 3.91 (L) 4.20 - 5.40 M/uL    Hemoglobin 12.4 12.0 - 16.0 g/dL    Hematocrit 39.1 37.0 - 47.0 %    .0 (H) 81.4 - 97.8 fL    MCH 31.7 27.0 - 33.0 pg    MCHC 31.7 (L) 33.6 - 35.0 g/dL    RDW 45.1 35.9 - 50.0 fL    Platelet Count 226 164 - 446 K/uL    MPV 11.2 9.0 - 12.9 fL    Neutrophils-Polys 86.40 (H) 44.00 - 72.00 %    Lymphocytes 5.10 (L) 22.00 - 41.00 %    Monocytes 7.10 0.00 - 13.40 %    Eosinophils 0.60 0.00 - 6.90 %    Basophils 0.30 0.00 - 1.80 %    Immature Granulocytes 0.50 0.00 - 0.90 %    Nucleated RBC 0.00 /100 WBC    Neutrophils (Absolute) 10.04 (H) 2.00 - 7.15 K/uL "    Lymphs (Absolute) 0.59 (L) 1.00 - 4.80 K/uL    Monos (Absolute) 0.83 0.00 - 0.85 K/uL    Eos (Absolute) 0.07 0.00 - 0.51 K/uL    Baso (Absolute) 0.04 0.00 - 0.12 K/uL    Immature Granulocytes (abs) 0.06 0.00 - 0.11 K/uL    NRBC (Absolute) 0.00 K/uL   COMP METABOLIC PANEL   Result Value Ref Range    Sodium 141 135 - 145 mmol/L    Potassium 4.4 3.6 - 5.5 mmol/L    Chloride 101 96 - 112 mmol/L    Co2 28 20 - 33 mmol/L    Anion Gap 12.0 7.0 - 16.0    Glucose 114 (H) 65 - 99 mg/dL    Bun 30 (H) 8 - 22 mg/dL    Creatinine 1.06 0.50 - 1.40 mg/dL    Calcium 10.6 (H) 8.4 - 10.2 mg/dL    AST(SGOT) 19 12 - 45 U/L    ALT(SGPT) 10 2 - 50 U/L    Alkaline Phosphatase 52 30 - 99 U/L    Total Bilirubin 0.8 0.1 - 1.5 mg/dL    Albumin 4.0 3.2 - 4.9 g/dL    Total Protein 7.5 6.0 - 8.2 g/dL    Globulin 3.5 1.9 - 3.5 g/dL    A-G Ratio 1.1 g/dL   LIPASE   Result Value Ref Range    Lipase 104 (H) 7 - 58 U/L   PROTHROMBIN TIME   Result Value Ref Range    PT 16.7 (H) 12.0 - 14.6 sec    INR 1.47 (H) 0.87 - 1.13   APTT   Result Value Ref Range    APTT 35.3 24.7 - 36.0 sec   ABO Rh Confirm   Result Value Ref Range    ABO Rh Confirm B POS    ESTIMATED GFR   Result Value Ref Range    GFR (CKD-EPI) 51 (A) >60 mL/min/1.73 m 2   LACTIC ACID   Result Value Ref Range    Lactic Acid 2.0 0.5 - 2.0 mmol/L   LACTIC ACID   Result Value Ref Range    Lactic Acid 1.9 0.5 - 2.0 mmol/L   URINALYSIS    Specimen: Urine   Result Value Ref Range    Color Yellow     Character Clear     Specific Gravity >=1.030 <1.035    Ph 5.0 5.0 - 8.0    Glucose Negative Negative mg/dL    Ketones 40 (A) Negative mg/dL    Protein Negative Negative mg/dL    Bilirubin Small (A) Negative    Nitrite Negative Negative    Leukocyte Esterase Negative Negative    Occult Blood Negative Negative    Micro Urine Req see below    TROPONIN   Result Value Ref Range    Troponin T 20 (H) 6 - 19 ng/L   EKG (NOW)   Result Value Ref Range    Report       [facility]  Emergency Dept.    Test Date:  2022  Pt Name:    SATINDER LARES                  Department: [facility]  MRN:        [id number]                      Room:       -ROOM 9  Gender:     Female                       Technician: MABEL  :        [id number]                   Requested By:ALFONSO MIRELES  Order #:    [id number]                    Reading MD:    Measurements  Intervals                                Axis  Rate:       102                          P:          54  SD:         212                          QRS:        -63  QRSD:       86                           T:          39  QT:         332  QTc:        433    Interpretive Statements  SINUS TACHYCARDIA  BORDERLINE AV CONDUCTION DELAY  PROBABLE LEFT ATRIAL ABNORMALITY  PROBABLE INFERIOR INFARCT, AGE INDETERMINATE  Compared to ECG 2022 15:50:49  Myocardial infarct finding now present  Sinus rhythm no longer present  Ventricular premature complex(es) no longer present  Left anterior fascicular bl ock no longer present        RADIOLOGY/PROCEDURES  CT-ABDOMEN-PELVIS WITH   Final Result      1.  There are similar changes of bowel wall thickening involving the terminal ileum and base of the cecum adjacent to postoperative change from prior appendectomy which could be infectious or inflammatory.   2.  There is mild proximal obstruction involving the ileum and distal jejunum in the lower abdomen and left mid abdomen with small bowel loops dilated up to 4 cm.   3.  There is no gross free air or pneumatosis.   4.  Moderate size hiatal hernia again seen.   5.  Minimally prominent biliary tree unchanged and physiologic, unchanged dating back to .   6.  Simple hepatic cysts are unchanged.         DX-CHEST-PORTABLE (1 VIEW)   Final Result      1.  There is no acute cardiopulmonary process.            COURSE & MEDICAL DECISION MAKING  Pertinent Labs & Imaging studies reviewed. (See chart for details)    Patient presents with abdominal pain, nausea, vomiting and possibly  bloody emesis.  She states she had similar symptoms in the past with pancreatitis and gallbladder problems.    She has been hospitalized for similar complaints.  A broad differential diagnosis was considered including but not limited to bowel obstruction, pancreatitis biliary disease peptic ulcer disease perforated peptic ulcer gastritis UTI sepsis ischemic bowel.     Patient's chart from previous visit was reviewed her baseline labs previous work-up images and consult notes.    Patient treated with IV fluids, she refuses pain medicines or nausea medicines.  She treated with IV antibiotics for possible biliary sepsis.  Because of her prodromal diagnosis I elected to go straight to CT especially in the absence of gallstones on previous imaging.      CT shows a bowel obstruction.  She also has ileitis.  I spoke with the surgeon Dr. Mcneil he will see her in consultation wants her to have an NG tube for decompression and fluids and he will see her in consultation.    Patient does have some ileitis on the CT.  I have ordered antibiotics.  The patient be hospitalized for further work-up and treatment.  Discussed case with the hospitalist and care transfer at that time.  She is hospitalized in guarded condition.        FINAL IMPRESSION   1. SBO (small bowel obstruction) ([facility])     2. NATIVIDAD (acute kidney injury) ([facility])     3. Abnormal CT scan of the abdomen concerning for prominence of the common bile duct     4. Terminal ileitis with complication ([facility])           Electronically signed by: Fredrick Mahoney M.D., 3/25/2022 6:27 PM

## 2022-03-26 NOTE — PROGRESS NOTES
"Hospital Medicine Daily Progress Note    Date of Service  3/26/2022    Chief Complaint  Licha Pope is a 86 y.o. female admitted 3/25/2022 with abdominal pain.    Hospital Course  As per chart review  \"86 y.o. female with a history of hypertension, COPD, GERD, DVT dx 5/19/21 after long car trip (still on anticoagulation) who presented 3/25/2022 with abdominal pain.     Was recently admitted for terminal ileitis, SBO and pancreatitis on 2/17/2022, she was treated conservatively with IV fluids and pain control.  Right upper quadrant showed dependent gallbladder sludge versus small stones.  MRCP showed cholelithiasis and common bile duct 11.4 mm unchanged since 2015.  She was discharged with antibiotics and recommended to follow-up with GI, surgery and PCP after discharge.  She saw surgeon, she recommended cholecystectomy but wanted her to see GI doctor. GI doctor recommended cholecystectomy.  Has appt with surgery next Wed.       Patient never really felt like her abdominal pain improved however was stable until 2 days ago.  Abdominal pain became very severe however intermittent and would come in waves.  Patient became very fatigued and was sleeping most the day.  She had chills and was very weak.  Abdominal pain was diffuse, intermittent, described as sharp pain, 10/10 pain, went to PCP and doctor sent her to ER.  Did have some n/v, had some dark red or purple, size of quarter.  Passing gas.  Yesterday had BM.  Did have some dark stools, unsure if black was \"half asleep\".  Last night had lower back pain, now improved.      In ER she was afebrile, mildly tachycardic (heart rate 111), normal blood pressure and mild hypoxia requiring 1 L nasal cannula.  Labs remarkable for WBC 11.6 with a left shift, creatinine 1.06, calcium 10.6, lipase 104, troponin XX, lactic acid normal.  CT abdomen pelvis with contrast showed similar changes of bowel wall thickening involving the terminal ileum and base of the cecum adjacent " "to postoperative change from prior appendectomy which could be infectious or inflammatory.  There is mild proximal obstruction involving the ileum and distal jejunum in the lower abdomen left mid abdomen with small bowel loops dilated up to 4 cm.  There is no gross free air or pneumatosis.  Moderate size hiatal hernia again seen.  Minimal prominent biliary tree unchanged and physiologic, unchanged a back to 2015.  Simple hepatic cyst unchanged.  She was given IV fluids, Zosyn and general surgery was consulted.  Dr. Mcneil to see patient.  Recommended NG, IV fluids.\"    Interval Problem Update  3/26: Patient seen at bedside this morning.  Patient is hard of hearing.  Patient with NG tube in place.  Still complaining of abdominal pain some tenderness on examination.  General surgery has been consulted, we appreciate further recommendations.  Continue antibiotics for now.    I have personally seen and examined the patient at bedside. I discussed the plan of care with patient, bedside RN and charge RN.    Consultants/Specialty  general surgery    Code Status  DNAR/DNI    Disposition  Patient is not medically cleared for discharge.   Anticipate discharge to pending clinical evolution.    Review of Systems  Review of Systems   Constitutional: Positive for malaise/fatigue. Negative for chills and fever.   HENT: Positive for hearing loss. Negative for nosebleeds.    Eyes: Negative for blurred vision and double vision.   Respiratory: Negative for cough and shortness of breath.    Cardiovascular: Negative for chest pain and palpitations.   Gastrointestinal: Positive for abdominal pain. Negative for heartburn and vomiting.   Genitourinary: Negative for dysuria and urgency.   Musculoskeletal: Negative for back pain and falls.   Skin: Negative for itching and rash.   Neurological: Negative for dizziness and headaches.   Psychiatric/Behavioral: Negative for substance abuse. The patient is not nervous/anxious.    All other systems " reviewed and are negative.       Physical Exam  Temp:  [36.6 °C (97.9 °F)-37.6 °C (99.6 °F)] 36.6 °C (97.9 °F)  Pulse:  [] 93  Resp:  [15-20] 16  BP: (116-136)/(52-74) 116/52  SpO2:  [89 %-96 %] 92 %    Physical Exam  Vitals and nursing note reviewed.   Constitutional:       General: She is not in acute distress.     Appearance: She is ill-appearing.   HENT:      Head: Normocephalic and atraumatic.      Right Ear: External ear normal.      Left Ear: External ear normal.      Mouth/Throat:      Pharynx: No oropharyngeal exudate or posterior oropharyngeal erythema.   Eyes:      General:         Right eye: No discharge.         Left eye: No discharge.   Cardiovascular:      Rate and Rhythm: Normal rate and regular rhythm.      Pulses: Normal pulses.      Heart sounds: No murmur heard.    No gallop.   Pulmonary:      Effort: Pulmonary effort is normal. No respiratory distress.      Breath sounds: Normal breath sounds. No wheezing or rhonchi.   Abdominal:      General: There is distension.      Tenderness: There is abdominal tenderness.      Comments: With NG tube   Musculoskeletal:         General: No swelling or tenderness. Normal range of motion.      Cervical back: Normal range of motion and neck supple. No tenderness.   Skin:     General: Skin is warm and dry.   Neurological:      General: No focal deficit present.      Mental Status: She is alert and oriented to person, place, and time. Mental status is at baseline.      Motor: No weakness.   Psychiatric:         Mood and Affect: Mood normal.         Behavior: Behavior normal.         Thought Content: Thought content normal.         Fluids    Intake/Output Summary (Last 24 hours) at 3/26/2022 1032  Last data filed at 3/26/2022 0615  Gross per 24 hour   Intake 928.33 ml   Output 151 ml   Net 777.33 ml       Laboratory  Recent Labs     03/25/22  1528 03/26/22  0201   WBC 11.6* 9.2   RBC 3.91* 3.25*   HEMOGLOBIN 12.4 10.3*   HEMATOCRIT 39.1 32.3*   .0*  99.4*   MCH 31.7 31.7   MCHC 31.7* 31.9*   RDW 45.1 45.3   PLATELETCT 226 197   MPV 11.2 11.8     Recent Labs     03/25/22  1528 03/26/22  0201   SODIUM 141 140   POTASSIUM 4.4 4.0   CHLORIDE 101 103   CO2 28 25   GLUCOSE 114* 137*   BUN 30* 24*   CREATININE 1.06 0.82   CALCIUM 10.6* 9.2     Recent Labs     03/25/22  1528   APTT 35.3   INR 1.47*               Imaging  DX-ABDOMEN FOR TUBE PLACEMENT   Final Result         Gastric drainage tube with tip projecting over the expected area of the stomach.      DX-ABDOMEN FOR TUBE PLACEMENT   Final Result         Gastric drainage tube loops in the esophagus with tip projecting over the expected area of the upper esophagus.      CT-ABDOMEN-PELVIS WITH   Final Result      1.  There are similar changes of bowel wall thickening involving the terminal ileum and base of the cecum adjacent to postoperative change from prior appendectomy which could be infectious or inflammatory.   2.  There is mild proximal obstruction involving the ileum and distal jejunum in the lower abdomen and left mid abdomen with small bowel loops dilated up to 4 cm.   3.  There is no gross free air or pneumatosis.   4.  Moderate size hiatal hernia again seen.   5.  Minimally prominent biliary tree unchanged and physiologic, unchanged dating back to 2015.   6.  Simple hepatic cysts are unchanged.         DX-CHEST-PORTABLE (1 VIEW)   Final Result      1.  There is no acute cardiopulmonary process.           Assessment/Plan  * SBO (small bowel obstruction) (HCC)- (present on admission)  Assessment & Plan  Patient with recent terminal ileitis and small bowel obstruction last month  Treated with conservative therapy and antibiotics however now with repeat symptoms and SBO  Surgery consulted by ERP: Dr. Mcneil was called, recommended NPO, NG, IVF, pain mgmt  CTX, flagyl    3/26: Pending surgical evaluation, we appreciate further recommendations.      Hyperglycemia  Assessment & Plan  Patient had recent A1c at  5.1  Monitor  No need to start insulin treatment at this time    NATIVIDAD (acute kidney injury) (HCC)  Assessment & Plan  Mild bump in Cr 0.6-->1.06  Likely in setting of decreased PO intake/pre-renal  IVF resuscitation  Continue to monitor    3/26: Improved. Cr is 0.82 today.    Anemia- (present on admission)  Assessment & Plan  No signs of bleeding at this time, monitor    History of deep venous thrombosis- (present on admission)  Assessment & Plan  Diagnosed 5/19/2020 6 hour car trip  Still on anticoagulation    COPD (chronic obstructive pulmonary disease) (HCC)- (present on admission)  Assessment & Plan  Not on home oxygen  Not having COPD exacerbation    Essential hypertension, benign- (present on admission)  Assessment & Plan  Home regimen: Valsartan 160 mg daily  Inpatient regimen: continue home regimen         VTE prophylaxis: SCDs/TEDs    I have performed a physical exam and reviewed and updated ROS and Plan today (3/26/2022). In review of yesterday's note (3/25/2022), there are no changes except as documented above.

## 2022-03-27 PROBLEM — R53.1 GENERALIZED WEAKNESS: Status: ACTIVE | Noted: 2022-03-27

## 2022-03-27 LAB
ALBUMIN SERPL BCP-MCNC: 3.4 G/DL (ref 3.2–4.9)
ALBUMIN/GLOB SERPL: 1.2 G/DL
ALP SERPL-CCNC: 42 U/L (ref 30–99)
ALT SERPL-CCNC: 7 U/L (ref 2–50)
ANION GAP SERPL CALC-SCNC: 9 MMOL/L (ref 7–16)
AST SERPL-CCNC: 13 U/L (ref 12–45)
BASOPHILS # BLD AUTO: 0.9 % (ref 0–1.8)
BASOPHILS # BLD: 0.03 K/UL (ref 0–0.12)
BILIRUB SERPL-MCNC: 0.6 MG/DL (ref 0.1–1.5)
BUN SERPL-MCNC: 13 MG/DL (ref 8–22)
CALCIUM SERPL-MCNC: 8.7 MG/DL (ref 8.4–10.2)
CHLORIDE SERPL-SCNC: 107 MMOL/L (ref 96–112)
CO2 SERPL-SCNC: 26 MMOL/L (ref 20–33)
CREAT SERPL-MCNC: 0.66 MG/DL (ref 0.5–1.4)
EOSINOPHIL # BLD AUTO: 0.37 K/UL (ref 0–0.51)
EOSINOPHIL NFR BLD: 10.9 % (ref 0–6.9)
ERYTHROCYTE [DISTWIDTH] IN BLOOD BY AUTOMATED COUNT: 46.4 FL (ref 35.9–50)
GFR SERPLBLD CREATININE-BSD FMLA CKD-EPI: 85 ML/MIN/1.73 M 2
GLOBULIN SER CALC-MCNC: 2.9 G/DL (ref 1.9–3.5)
GLUCOSE SERPL-MCNC: 150 MG/DL (ref 65–99)
HCT VFR BLD AUTO: 34.9 % (ref 37–47)
HGB BLD-MCNC: 11 G/DL (ref 12–16)
IMM GRANULOCYTES # BLD AUTO: 0 K/UL (ref 0–0.11)
IMM GRANULOCYTES NFR BLD AUTO: 0 % (ref 0–0.9)
LYMPHOCYTES # BLD AUTO: 0.58 K/UL (ref 1–4.8)
LYMPHOCYTES NFR BLD: 17.1 % (ref 22–41)
MAGNESIUM SERPL-MCNC: 2.1 MG/DL (ref 1.5–2.5)
MCH RBC QN AUTO: 31.8 PG (ref 27–33)
MCHC RBC AUTO-ENTMCNC: 31.5 G/DL (ref 33.6–35)
MCV RBC AUTO: 100.9 FL (ref 81.4–97.8)
MONOCYTES # BLD AUTO: 0.62 K/UL (ref 0–0.85)
MONOCYTES NFR BLD AUTO: 18.2 % (ref 0–13.4)
NEUTROPHILS # BLD AUTO: 1.8 K/UL (ref 2–7.15)
NEUTROPHILS NFR BLD: 52.9 % (ref 44–72)
NRBC # BLD AUTO: 0 K/UL
NRBC BLD-RTO: 0 /100 WBC
PLATELET # BLD AUTO: 201 K/UL (ref 164–446)
PMV BLD AUTO: 11.7 FL (ref 9–12.9)
POTASSIUM SERPL-SCNC: 4.1 MMOL/L (ref 3.6–5.5)
PROT SERPL-MCNC: 6.3 G/DL (ref 6–8.2)
RBC # BLD AUTO: 3.46 M/UL (ref 4.2–5.4)
SODIUM SERPL-SCNC: 142 MMOL/L (ref 135–145)
WBC # BLD AUTO: 3.4 K/UL (ref 4.8–10.8)

## 2022-03-27 PROCEDURE — 770006 HCHG ROOM/CARE - MED/SURG/GYN SEMI*

## 2022-03-27 PROCEDURE — 700105 HCHG RX REV CODE 258: Performed by: INTERNAL MEDICINE

## 2022-03-27 PROCEDURE — 85025 COMPLETE CBC W/AUTO DIFF WBC: CPT

## 2022-03-27 PROCEDURE — 36415 COLL VENOUS BLD VENIPUNCTURE: CPT

## 2022-03-27 PROCEDURE — 83735 ASSAY OF MAGNESIUM: CPT

## 2022-03-27 PROCEDURE — 80053 COMPREHEN METABOLIC PANEL: CPT

## 2022-03-27 PROCEDURE — 99233 SBSQ HOSP IP/OBS HIGH 50: CPT | Performed by: INTERNAL MEDICINE

## 2022-03-27 PROCEDURE — 700101 HCHG RX REV CODE 250: Performed by: INTERNAL MEDICINE

## 2022-03-27 PROCEDURE — 700111 HCHG RX REV CODE 636 W/ 250 OVERRIDE (IP): Performed by: INTERNAL MEDICINE

## 2022-03-27 RX ADMIN — METRONIDAZOLE 500 MG: 500 INJECTION, SOLUTION INTRAVENOUS at 14:36

## 2022-03-27 RX ADMIN — POTASSIUM CHLORIDE, DEXTROSE MONOHYDRATE AND SODIUM CHLORIDE: 150; 5; 900 INJECTION, SOLUTION INTRAVENOUS at 09:58

## 2022-03-27 RX ADMIN — METRONIDAZOLE 500 MG: 500 INJECTION, SOLUTION INTRAVENOUS at 05:12

## 2022-03-27 RX ADMIN — CEFTRIAXONE SODIUM 2 G: 2 INJECTION, POWDER, FOR SOLUTION INTRAMUSCULAR; INTRAVENOUS at 04:24

## 2022-03-27 RX ADMIN — POTASSIUM CHLORIDE, DEXTROSE MONOHYDRATE AND SODIUM CHLORIDE: 150; 5; 900 INJECTION, SOLUTION INTRAVENOUS at 21:47

## 2022-03-27 RX ADMIN — METRONIDAZOLE 500 MG: 500 INJECTION, SOLUTION INTRAVENOUS at 21:47

## 2022-03-27 ASSESSMENT — COGNITIVE AND FUNCTIONAL STATUS - GENERAL
HELP NEEDED FOR BATHING: A LITTLE
TOILETING: A LITTLE
DRESSING REGULAR UPPER BODY CLOTHING: A LITTLE
DAILY ACTIVITIY SCORE: 20
DRESSING REGULAR LOWER BODY CLOTHING: A LITTLE
WALKING IN HOSPITAL ROOM: A LITTLE
MOVING TO AND FROM BED TO CHAIR: A LITTLE
MOVING FROM LYING ON BACK TO SITTING ON SIDE OF FLAT BED: A LITTLE
SUGGESTED CMS G CODE MODIFIER DAILY ACTIVITY: CJ
MOBILITY SCORE: 19
CLIMB 3 TO 5 STEPS WITH RAILING: A LITTLE
SUGGESTED CMS G CODE MODIFIER MOBILITY: CK
STANDING UP FROM CHAIR USING ARMS: A LITTLE

## 2022-03-27 ASSESSMENT — PAIN DESCRIPTION - PAIN TYPE
TYPE: ACUTE PAIN
TYPE: ACUTE PAIN

## 2022-03-27 ASSESSMENT — ENCOUNTER SYMPTOMS
DOUBLE VISION: 0
HEARTBURN: 0
FALLS: 0
PALPITATIONS: 0
SHORTNESS OF BREATH: 0
BLURRED VISION: 0
DIZZINESS: 0
NERVOUS/ANXIOUS: 0
CHILLS: 0
HEADACHES: 0
FEVER: 0
COUGH: 0
ABDOMINAL PAIN: 1
VOMITING: 0
BACK PAIN: 0

## 2022-03-27 ASSESSMENT — LIFESTYLE VARIABLES: SUBSTANCE_ABUSE: 0

## 2022-03-27 NOTE — PROGRESS NOTES
0810 Pt ambulated with hand held assist to bathroom with this RN. NGT fell out as pt was brushing teeth. MD Watson notified. Per MD Watson, NG does not need to be replaced at this time. Pt is passing flatus, x2 BM yesterday per patient. Hypoactive bowel sounds x4 quadrants.

## 2022-03-27 NOTE — PROGRESS NOTES
"Hospital Medicine Daily Progress Note    Date of Service  3/27/2022    Chief Complaint  Licha Pope is a 86 y.o. female admitted 3/25/2022 with abdominal pain.    Hospital Course  As per chart review  \"86 y.o. female with a history of hypertension, COPD, GERD, DVT dx 5/19/21 after long car trip (still on anticoagulation) who presented 3/25/2022 with abdominal pain.     Was recently admitted for terminal ileitis, SBO and pancreatitis on 2/17/2022, she was treated conservatively with IV fluids and pain control.  Right upper quadrant showed dependent gallbladder sludge versus small stones.  MRCP showed cholelithiasis and common bile duct 11.4 mm unchanged since 2015.  She was discharged with antibiotics and recommended to follow-up with GI, surgery and PCP after discharge.  She saw surgeon, she recommended cholecystectomy but wanted her to see GI doctor. GI doctor recommended cholecystectomy.  Has appt with surgery next Wed.       Patient never really felt like her abdominal pain improved however was stable until 2 days ago.  Abdominal pain became very severe however intermittent and would come in waves.  Patient became very fatigued and was sleeping most the day.  She had chills and was very weak.  Abdominal pain was diffuse, intermittent, described as sharp pain, 10/10 pain, went to PCP and doctor sent her to ER.  Did have some n/v, had some dark red or purple, size of quarter.  Passing gas.  Yesterday had BM.  Did have some dark stools, unsure if black was \"half asleep\".  Last night had lower back pain, now improved.      In ER she was afebrile, mildly tachycardic (heart rate 111), normal blood pressure and mild hypoxia requiring 1 L nasal cannula.  Labs remarkable for WBC 11.6 with a left shift, creatinine 1.06, calcium 10.6, lipase 104, troponin XX, lactic acid normal.  CT abdomen pelvis with contrast showed similar changes of bowel wall thickening involving the terminal ileum and base of the cecum adjacent " "to postoperative change from prior appendectomy which could be infectious or inflammatory.  There is mild proximal obstruction involving the ileum and distal jejunum in the lower abdomen left mid abdomen with small bowel loops dilated up to 4 cm.  There is no gross free air or pneumatosis.  Moderate size hiatal hernia again seen.  Minimal prominent biliary tree unchanged and physiologic, unchanged a back to 2015.  Simple hepatic cyst unchanged.  She was given IV fluids, Zosyn and general surgery was consulted.  Dr. Mcneil to see patient.  Recommended NG, IV fluids.\"    Interval Problem Update  3/26: Patient seen at bedside this morning.  Patient is hard of hearing.  Patient with NG tube in place.  Still complaining of abdominal pain some tenderness on examination.  General surgery has been consulted, we appreciate further recommendations.  Continue antibiotics for now.    3/27: Patient seen at bedside this morning.  It seems that the patient's NG tube came off, however the patient it seems that now is having regular bowel movements.  She still complaining of abdominal pain.  We have consulted GI, we appreciate further recommendations.  We also appreciate further recommendations by general surgery.  We will continue n.p.o. until surgery reevaluate the patient.  We will continue with antibiotics for now.  --The patient told me she used to see Dr Tarun Lewis at [facility], so I have consulted Dr Clay, we appreciate further recommendations.    I have personally seen and examined the patient at bedside. I discussed the plan of care with patient, bedside RN and charge RN.    Consultants/Specialty  general surgery    Code Status  DNAR/DNI    Disposition  Patient is not medically cleared for discharge.   Anticipate discharge to pending clinical evolution.    Review of Systems  Review of Systems   Constitutional: Positive for malaise/fatigue. Negative for chills and fever.   HENT: Positive for hearing loss. Negative for " nosebleeds.    Eyes: Negative for blurred vision and double vision.   Respiratory: Negative for cough and shortness of breath.    Cardiovascular: Negative for chest pain and palpitations.   Gastrointestinal: Positive for abdominal pain. Negative for heartburn and vomiting.   Genitourinary: Negative for dysuria and urgency.   Musculoskeletal: Negative for back pain and falls.   Skin: Negative for itching and rash.   Neurological: Negative for dizziness and headaches.   Psychiatric/Behavioral: Negative for substance abuse. The patient is not nervous/anxious.    All other systems reviewed and are negative.       Physical Exam  Temp:  [37.2 °C (98.9 °F)-37.5 °C (99.5 °F)] 37.2 °C (98.9 °F)  Pulse:  [82-94] 88  Resp:  [18] 18  BP: (116-130)/(60-67) 116/61  SpO2:  [85 %-95 %] 90 %    Physical Exam  Vitals and nursing note reviewed.   Constitutional:       General: She is not in acute distress.     Appearance: She is ill-appearing.   HENT:      Head: Normocephalic and atraumatic.      Right Ear: External ear normal.      Left Ear: External ear normal.      Mouth/Throat:      Pharynx: No oropharyngeal exudate or posterior oropharyngeal erythema.   Eyes:      General:         Right eye: No discharge.         Left eye: No discharge.   Cardiovascular:      Rate and Rhythm: Normal rate and regular rhythm.      Pulses: Normal pulses.      Heart sounds: No murmur heard.    No gallop.   Pulmonary:      Effort: Pulmonary effort is normal. No respiratory distress.      Breath sounds: Normal breath sounds. No wheezing or rhonchi.   Abdominal:      General: There is distension.      Tenderness: There is abdominal tenderness.      Comments: With NG tube   Musculoskeletal:         General: No swelling or tenderness. Normal range of motion.      Cervical back: Normal range of motion and neck supple. No tenderness.   Skin:     General: Skin is warm and dry.   Neurological:      General: No focal deficit present.      Mental Status: She is  alert and oriented to person, place, and time. Mental status is at baseline.      Motor: No weakness.   Psychiatric:         Mood and Affect: Mood normal.         Behavior: Behavior normal.         Thought Content: Thought content normal.         Fluids    Intake/Output Summary (Last 24 hours) at 3/27/2022 1241  Last data filed at 3/27/2022 0612  Gross per 24 hour   Intake 1200 ml   Output 20 ml   Net 1180 ml       Laboratory  Recent Labs     03/25/22  1528 03/26/22  0201 03/27/22  0133   WBC 11.6* 9.2 3.4*   RBC 3.91* 3.25* 3.46*   HEMOGLOBIN 12.4 10.3* 11.0*   HEMATOCRIT 39.1 32.3* 34.9*   .0* 99.4* 100.9*   MCH 31.7 31.7 31.8   MCHC 31.7* 31.9* 31.5*   RDW 45.1 45.3 46.4   PLATELETCT 226 197 201   MPV 11.2 11.8 11.7     Recent Labs     03/25/22  1528 03/26/22  0201 03/27/22  0133   SODIUM 141 140 142   POTASSIUM 4.4 4.0 4.1   CHLORIDE 101 103 107   CO2 28 25 26   GLUCOSE 114* 137* 150*   BUN 30* 24* 13   CREATININE 1.06 0.82 0.66   CALCIUM 10.6* 9.2 8.7     Recent Labs     03/25/22  1528   APTT 35.3   INR 1.47*               Imaging  DX-ABDOMEN FOR TUBE PLACEMENT   Final Result         Gastric drainage tube with tip projecting over the expected area of the stomach.      DX-ABDOMEN FOR TUBE PLACEMENT   Final Result         Gastric drainage tube loops in the esophagus with tip projecting over the expected area of the upper esophagus.      CT-ABDOMEN-PELVIS WITH   Final Result      1.  There are similar changes of bowel wall thickening involving the terminal ileum and base of the cecum adjacent to postoperative change from prior appendectomy which could be infectious or inflammatory.   2.  There is mild proximal obstruction involving the ileum and distal jejunum in the lower abdomen and left mid abdomen with small bowel loops dilated up to 4 cm.   3.  There is no gross free air or pneumatosis.   4.  Moderate size hiatal hernia again seen.   5.  Minimally prominent biliary tree unchanged and physiologic,  unchanged dating back to 2015.   6.  Simple hepatic cysts are unchanged.         DX-CHEST-PORTABLE (1 VIEW)   Final Result      1.  There is no acute cardiopulmonary process.           Assessment/Plan  * SBO (small bowel obstruction) (HCC)- (present on admission)  Assessment & Plan  Patient with recent terminal ileitis and small bowel obstruction last month  Treated with conservative therapy and antibiotics however now with repeat symptoms and SBO  Surgery consulted by ERP: Dr. Mcneil was called, recommended NPO, NG, IVF, pain mgmt  CTX, flagyl    3/26: Pending surgical evaluation, we appreciate further recommendations.    3/27: Patient seems to have BMs now, we are awaiting further recommendations by general surgery. We have also consulted GI ( Dr Clay), we appreciate further recommendations.      Generalized weakness  Assessment & Plan  PT/OT    Hyperglycemia  Assessment & Plan  Patient had recent A1c at 5.1  Monitor  No need to start insulin treatment at this time    NATIVIDAD (acute kidney injury) (HCC)  Assessment & Plan  Mild bump in Cr 0.6-->1.06  Likely in setting of decreased PO intake/pre-renal  IVF resuscitation  Continue to monitor    3/27: Improved. Cr is 0.66 today.    Anemia- (present on admission)  Assessment & Plan  No signs of bleeding at this time, monitor    History of deep venous thrombosis- (present on admission)  Assessment & Plan  Diagnosed 5/19/2020 6 hour car trip  Still on anticoagulation    3/27: Will probably resume once surgery clears her NPO    COPD (chronic obstructive pulmonary disease) (HCC)- (present on admission)  Assessment & Plan  Not on home oxygen  Not having COPD exacerbation    Essential hypertension, benign- (present on admission)  Assessment & Plan  Home regimen: Valsartan 160 mg daily  Inpatient regimen: PRN medication, resume home medication once surgery clears for PO    3/27: Patient still NPO, however BP seems to be controlled       VTE prophylaxis: SCDs/TEDs    I have  performed a physical exam and reviewed and updated ROS and Plan today (3/27/2022). In review of yesterday's note (3/26/2022), there are no changes except as documented above.

## 2022-03-27 NOTE — CARE PLAN
The patient is Stable - Low risk of patient condition declining or worsening    Shift Goals  Clinical Goals: Pt will ambulate at least 50ft by the end of this shift  Patient Goals: pt will sleep    Progress made toward(s) clinical / shift goals:  Pt is progressing, has ambulated 40ft so far and agrees to take a walk in the hallway when her son arrives. Pt reports BM yesterday and states she is passing flatus today.     Patient is not progressing towards the following goals:

## 2022-03-27 NOTE — CARE PLAN
The patient is Stable - Low risk of patient condition declining or worsening    Shift Goals  Clinical Goals: pt will ambulate in penny x1  Patient Goals: pt will sleep    Progress made toward(s) clinical / shift goals:  pt with small BM at beginning at shift. No c/o nausea or abdominal pain. Requiring 0.5L NC. Pt using IS. Ambulated in penny x1.    Patient is not progressing towards the following goals:n/a

## 2022-03-28 ENCOUNTER — APPOINTMENT (OUTPATIENT)
Dept: RADIOLOGY | Facility: MEDICAL CENTER | Age: 87
DRG: 329 | End: 2022-03-28
Attending: INTERNAL MEDICINE
Payer: MEDICARE

## 2022-03-28 LAB
ALBUMIN SERPL BCP-MCNC: 3.1 G/DL (ref 3.2–4.9)
ALBUMIN/GLOB SERPL: 1.2 G/DL
ALP SERPL-CCNC: 38 U/L (ref 30–99)
ALT SERPL-CCNC: 6 U/L (ref 2–50)
ANION GAP SERPL CALC-SCNC: 6 MMOL/L (ref 7–16)
AST SERPL-CCNC: 13 U/L (ref 12–45)
BACTERIA UR CULT: NORMAL
BASOPHILS # BLD AUTO: 0.6 % (ref 0–1.8)
BASOPHILS # BLD: 0.02 K/UL (ref 0–0.12)
BILIRUB SERPL-MCNC: 0.4 MG/DL (ref 0.1–1.5)
BUN SERPL-MCNC: 8 MG/DL (ref 8–22)
CALCIUM SERPL-MCNC: 8.4 MG/DL (ref 8.4–10.2)
CHLORIDE SERPL-SCNC: 111 MMOL/L (ref 96–112)
CO2 SERPL-SCNC: 25 MMOL/L (ref 20–33)
CREAT SERPL-MCNC: 0.56 MG/DL (ref 0.5–1.4)
EOSINOPHIL # BLD AUTO: 0.38 K/UL (ref 0–0.51)
EOSINOPHIL NFR BLD: 11.3 % (ref 0–6.9)
ERYTHROCYTE [DISTWIDTH] IN BLOOD BY AUTOMATED COUNT: 45.8 FL (ref 35.9–50)
GFR SERPLBLD CREATININE-BSD FMLA CKD-EPI: 89 ML/MIN/1.73 M 2
GLOBULIN SER CALC-MCNC: 2.6 G/DL (ref 1.9–3.5)
GLUCOSE SERPL-MCNC: 139 MG/DL (ref 65–99)
HCT VFR BLD AUTO: 33.7 % (ref 37–47)
HGB BLD-MCNC: 10.5 G/DL (ref 12–16)
IMM GRANULOCYTES # BLD AUTO: 0.01 K/UL (ref 0–0.11)
IMM GRANULOCYTES NFR BLD AUTO: 0.3 % (ref 0–0.9)
LYMPHOCYTES # BLD AUTO: 0.41 K/UL (ref 1–4.8)
LYMPHOCYTES NFR BLD: 12.2 % (ref 22–41)
MAGNESIUM SERPL-MCNC: 1.9 MG/DL (ref 1.5–2.5)
MCH RBC QN AUTO: 31.5 PG (ref 27–33)
MCHC RBC AUTO-ENTMCNC: 31.2 G/DL (ref 33.6–35)
MCV RBC AUTO: 101.2 FL (ref 81.4–97.8)
MONOCYTES # BLD AUTO: 0.45 K/UL (ref 0–0.85)
MONOCYTES NFR BLD AUTO: 13.4 % (ref 0–13.4)
NEUTROPHILS # BLD AUTO: 2.1 K/UL (ref 2–7.15)
NEUTROPHILS NFR BLD: 62.2 % (ref 44–72)
NRBC # BLD AUTO: 0 K/UL
NRBC BLD-RTO: 0 /100 WBC
PLATELET # BLD AUTO: 194 K/UL (ref 164–446)
PMV BLD AUTO: 11.6 FL (ref 9–12.9)
POTASSIUM SERPL-SCNC: 4 MMOL/L (ref 3.6–5.5)
PROT SERPL-MCNC: 5.7 G/DL (ref 6–8.2)
RBC # BLD AUTO: 3.33 M/UL (ref 4.2–5.4)
SIGNIFICANT IND 70042: NORMAL
SITE SITE: NORMAL
SODIUM SERPL-SCNC: 142 MMOL/L (ref 135–145)
SOURCE SOURCE: NORMAL
WBC # BLD AUTO: 3.4 K/UL (ref 4.8–10.8)

## 2022-03-28 PROCEDURE — 85025 COMPLETE CBC W/AUTO DIFF WBC: CPT

## 2022-03-28 PROCEDURE — 80053 COMPREHEN METABOLIC PANEL: CPT

## 2022-03-28 PROCEDURE — 99233 SBSQ HOSP IP/OBS HIGH 50: CPT | Performed by: INTERNAL MEDICINE

## 2022-03-28 PROCEDURE — 94760 N-INVAS EAR/PLS OXIMETRY 1: CPT

## 2022-03-28 PROCEDURE — 83735 ASSAY OF MAGNESIUM: CPT

## 2022-03-28 PROCEDURE — 700105 HCHG RX REV CODE 258: Performed by: INTERNAL MEDICINE

## 2022-03-28 PROCEDURE — 770006 HCHG ROOM/CARE - MED/SURG/GYN SEMI*

## 2022-03-28 PROCEDURE — 97165 OT EVAL LOW COMPLEX 30 MIN: CPT

## 2022-03-28 PROCEDURE — 74018 RADEX ABDOMEN 1 VIEW: CPT

## 2022-03-28 PROCEDURE — 36415 COLL VENOUS BLD VENIPUNCTURE: CPT

## 2022-03-28 PROCEDURE — 97161 PT EVAL LOW COMPLEX 20 MIN: CPT

## 2022-03-28 PROCEDURE — C9113 INJ PANTOPRAZOLE SODIUM, VIA: HCPCS | Performed by: INTERNAL MEDICINE

## 2022-03-28 PROCEDURE — 700101 HCHG RX REV CODE 250: Performed by: INTERNAL MEDICINE

## 2022-03-28 PROCEDURE — 700111 HCHG RX REV CODE 636 W/ 250 OVERRIDE (IP): Performed by: INTERNAL MEDICINE

## 2022-03-28 RX ORDER — PANTOPRAZOLE SODIUM 40 MG/10ML
40 INJECTION, POWDER, LYOPHILIZED, FOR SOLUTION INTRAVENOUS 2 TIMES DAILY
Status: DISCONTINUED | OUTPATIENT
Start: 2022-03-28 | End: 2022-04-09

## 2022-03-28 RX ORDER — MAGNESIUM SULFATE 1 G/100ML
1 INJECTION INTRAVENOUS ONCE
Status: COMPLETED | OUTPATIENT
Start: 2022-03-28 | End: 2022-03-28

## 2022-03-28 RX ADMIN — MAGNESIUM SULFATE 1 G: 1 INJECTION INTRAVENOUS at 08:33

## 2022-03-28 RX ADMIN — POTASSIUM CHLORIDE, DEXTROSE MONOHYDRATE AND SODIUM CHLORIDE: 150; 5; 900 INJECTION, SOLUTION INTRAVENOUS at 14:27

## 2022-03-28 RX ADMIN — METRONIDAZOLE 500 MG: 500 INJECTION, SOLUTION INTRAVENOUS at 14:23

## 2022-03-28 RX ADMIN — METRONIDAZOLE 500 MG: 500 INJECTION, SOLUTION INTRAVENOUS at 22:03

## 2022-03-28 RX ADMIN — METRONIDAZOLE 500 MG: 500 INJECTION, SOLUTION INTRAVENOUS at 05:30

## 2022-03-28 RX ADMIN — PANTOPRAZOLE SODIUM 40 MG: 40 INJECTION, POWDER, FOR SOLUTION INTRAVENOUS at 17:24

## 2022-03-28 RX ADMIN — CEFTRIAXONE SODIUM 2 G: 2 INJECTION, POWDER, FOR SOLUTION INTRAMUSCULAR; INTRAVENOUS at 06:44

## 2022-03-28 ASSESSMENT — ENCOUNTER SYMPTOMS
VOMITING: 0
BLURRED VISION: 0
CHILLS: 0
FEVER: 0
SHORTNESS OF BREATH: 0
ABDOMINAL PAIN: 1
NERVOUS/ANXIOUS: 0
HEARTBURN: 0
SHORTNESS OF BREATH: 0
BLOOD IN STOOL: 0
COUGH: 0
FALLS: 0
PALPITATIONS: 0
DIZZINESS: 0
DOUBLE VISION: 0
BACK PAIN: 0
HEADACHES: 0
ABDOMINAL PAIN: 1
COUGH: 0
VOMITING: 1

## 2022-03-28 ASSESSMENT — PAIN DESCRIPTION - PAIN TYPE
TYPE: ACUTE PAIN

## 2022-03-28 ASSESSMENT — GAIT ASSESSMENTS
DEVIATION: BRADYKINETIC;SHUFFLED GAIT
DISTANCE (FEET): 100
DISTANCE (FEET): 100
GAIT LEVEL OF ASSIST: MINIMAL ASSIST

## 2022-03-28 ASSESSMENT — PATIENT HEALTH QUESTIONNAIRE - PHQ9
1. LITTLE INTEREST OR PLEASURE IN DOING THINGS: NOT AT ALL
2. FEELING DOWN, DEPRESSED, IRRITABLE, OR HOPELESS: NOT AT ALL
2. FEELING DOWN, DEPRESSED, IRRITABLE, OR HOPELESS: NOT AT ALL
SUM OF ALL RESPONSES TO PHQ9 QUESTIONS 1 AND 2: 0
1. LITTLE INTEREST OR PLEASURE IN DOING THINGS: NOT AT ALL
SUM OF ALL RESPONSES TO PHQ9 QUESTIONS 1 AND 2: 0

## 2022-03-28 ASSESSMENT — COGNITIVE AND FUNCTIONAL STATUS - GENERAL
PERSONAL GROOMING: A LITTLE
MOVING FROM LYING ON BACK TO SITTING ON SIDE OF FLAT BED: A LITTLE
TURNING FROM BACK TO SIDE WHILE IN FLAT BAD: A LITTLE
STANDING UP FROM CHAIR USING ARMS: A LITTLE
MOBILITY SCORE: 18
TOILETING: A LITTLE
DRESSING REGULAR UPPER BODY CLOTHING: A LITTLE
DAILY ACTIVITIY SCORE: 18
HELP NEEDED FOR BATHING: A LITTLE
WALKING IN HOSPITAL ROOM: A LITTLE
EATING MEALS: A LITTLE
SUGGESTED CMS G CODE MODIFIER DAILY ACTIVITY: CK
CLIMB 3 TO 5 STEPS WITH RAILING: A LITTLE
DRESSING REGULAR LOWER BODY CLOTHING: A LITTLE
SUGGESTED CMS G CODE MODIFIER MOBILITY: CK
MOVING TO AND FROM BED TO CHAIR: A LITTLE

## 2022-03-28 ASSESSMENT — ACTIVITIES OF DAILY LIVING (ADL): TOILETING: INDEPENDENT

## 2022-03-28 ASSESSMENT — LIFESTYLE VARIABLES: SUBSTANCE_ABUSE: 0

## 2022-03-28 NOTE — PROGRESS NOTES
1030- Insert NG tube per physicians instruction. X-ray verified placement. NG tube attach to low intermittent suction. Dark brown output noted on tube and canister.     Physician instruct writer to call Dr. Mcneil from surgery. Dr. Mcneil replied and said that he is off from [facility] today to contact Dr. Johnson. Hospitalist message and said that he was able to talk to Dr. Mcneil and said to keep NG tube in place and strict NPO. He will follow up later. Dr. Johnson also called back and just informed him that hospitalist was able to talk to surgeon already.

## 2022-03-28 NOTE — PROGRESS NOTES
Received report from SIDRA Flower.  Assumed Pt. Care at 0720  Pt. A&Ox4  No sign of cardiac and respiratory distress.  Pain is 3/10 on her abdomen. Abdomen is distended. Hypotonic bowel sound heard on all four quadrant. Patient vomited a small brown mucosy emesis. She said she had more earlier but more solid. She also said that her BM is also the same color.   Pt. Is lying in bed resting.  Bed at lowest position.   Bed alarm is on. Call light and personal belonging within reach.   Encourage to call for assistance.    Informed Dr. Reynoso of the assessment. He said to insert NG and call surgery.

## 2022-03-28 NOTE — THERAPY
"Occupational Therapy   Initial Evaluation     Patient Name: Licha Pope  Age:  86 y.o., Sex:  female  Medical Record #: [id number]  Today's Date: 3/28/2022     Precautions  Precautions: Fall Risk,Nasogastric Tube    Assessment  Patient is 86 y.o. female with a diagnosis of abdominal pain, SBO.  Currently has NG tube in place for hopeful nonsurgical intervention.  Pt currently unsteady on her feet, demos generalized weakness limiting mobility and ADL's.  She does better with FWW currently.  At this time not safe for home, but anticipate she will progress well as she starts feeling better.  PT would benefit from HH therapy intervention at home and will need closer supervision and assist from family for IADL's initially.  OT will follow while in house.        Plan    Recommend Occupational Therapy 3 times per week until therapy goals are met for the following treatments:  Adaptive Equipment, Neuro Re-Education / Balance, Self Care/Activities of Daily Living, Therapeutic Activities, and Therapeutic Exercises.    DC Equipment Recommendations: Unable to determine at this time  Discharge Recommendations: Recommend home health for continued occupational therapy services (with closer supervision/assist from family)     Subjective    \"I feel weak.\"     Objective     03/28/22 1140   Prior Living Situation   Prior Services Home-Independent   Housing / Facility [address] [place] [address]   Steps Into Home   (ramp)   Steps In Home 0   Bathroom Set up Walk In Shower;Shower Chair   Equipment Owned Front-Wheel Walker;Single Point Cane;Tub / Shower Seat   Lives with - Patient's Self Care Capacity Alone and Able to Care For Self   Comments son and his family live next door- son is retired and home most of the time, grandson is homeschooled and can be avail to assist if needed as well   Prior Level of ADL Function   Self Feeding Independent   Grooming / Hygiene Independent   Bathing Independent   Dressing Independent   Toileting Independent "   Prior Level of IADL Function   Medication Management Independent   Laundry Independent   Kitchen Mobility Independent   Finances Independent   Home Management Independent   Shopping Independent  (son reports she can shop, but they have started helping her recently)   Prior Level Of Mobility Independent Without Device in Community;Independent Without Device in Home   Driving / Transportation Driving Independent  (family has been hhelping more recently, pt driving shorter distances now)   Occupation (Pre-Hospital Vocational) Retired Due To Age   Leisure Interests Unable To Determine At This Time   Comments Spouse passed away in December after suffering from brain cancer   Precautions   Precautions Fall Risk;Nasogastric Tube   Vitals   Vitals Comments Pt did not have O2 on during therapy eval   Pain 0 - 10 Group   Location Abdomen;Nose;Throat   Description Aching;Sore   Therapist Pain Assessment During Activity;Nurse Notified  (just had NG tube inserted)   Cognition    Cognition / Consciousness X   Speech/ Communication Hard of Hearing   Comments pleasant, cooperative, Hard of hearing   Active ROM Upper Body   Active ROM Upper Body  WDL   Strength Upper Body   Upper Body Strength  WDL   Balance Assessment   Sitting Balance (Static) Good   Sitting Balance (Dynamic) Fair +   Standing Balance (Static) Fair   Standing Balance (Dynamic) Fair -   Weight Shift Sitting Fair   Weight Shift Standing Fair   Comments with FWW- pt initially reliant on hand vivian assist, did better with FWW for now   Bed Mobility    Supine to Sit Standby Assist  (HOB up and use of rail)   Sit to Supine Contact Guard Assist   Scooting Standby Assist   ADL Assessment   Grooming Minimal Assist   Upper Body Dressing Minimal Assist   Lower Body Dressing Minimal Assist   Toileting   (NT)   How much help from another person does the patient currently need...   Putting on and taking off regular lower body clothing? 3   Bathing (including washing, rinsing,  and drying)? 3   Toileting, which includes using a toilet, bedpan, or urinal? 3   Putting on and taking off regular upper body clothing? 3   Taking care of personal grooming such as brushing teeth? 3   Eating meals? 3   6 Clicks Daily Activity Score 18   Functional Mobility   Sit to Stand Contact Guard Assist   Bed, Chair, Wheelchair Transfer Contact Guard Assist   Transfer Method Stand Step   Mobility out into penny   Distance (Feet) 100   # of Times Distance was Traveled 1   Comments Zac HHA or CGA with FWW   Visual Perception   Visual Perception  WDL   Edema / Skin Assessment   Comments NG tube in place   Activity Tolerance   Sitting Edge of Bed 5 min x2   Standing 5 min   Patient / Family Goals   Patient / Family Goal #1 homw   Short Term Goals   Short Term Goal # 1 Pt will dress supervised in 3 visits   Short Term Goal # 2 Pt will stand 10 min at sink supervised to groom in 3 visits   Short Term Goal # 3 Pt will toilet supervised in 3 visits

## 2022-03-28 NOTE — PROGRESS NOTES
"Hospital Medicine Daily Progress Note    Date of Service  3/28/2022    Chief Complaint  Licha Pope is a 86 y.o. female admitted 3/25/2022 with abdominal pain.    Hospital Course  As per chart review  \"86 y.o. female with a history of hypertension, COPD, GERD, DVT dx 5/19/21 after long car trip (still on anticoagulation) who presented 3/25/2022 with abdominal pain.     Was recently admitted for terminal ileitis, SBO and pancreatitis on 2/17/2022, she was treated conservatively with IV fluids and pain control.  Right upper quadrant showed dependent gallbladder sludge versus small stones.  MRCP showed cholelithiasis and common bile duct 11.4 mm unchanged since 2015.  She was discharged with antibiotics and recommended to follow-up with GI, surgery and PCP after discharge.  She saw surgeon, she recommended cholecystectomy but wanted her to see GI doctor. GI doctor recommended cholecystectomy.  Has appt with surgery next Wed.       Patient never really felt like her abdominal pain improved however was stable until 2 days ago.  Abdominal pain became very severe however intermittent and would come in waves.  Patient became very fatigued and was sleeping most the day.  She had chills and was very weak.  Abdominal pain was diffuse, intermittent, described as sharp pain, 10/10 pain, went to PCP and doctor sent her to ER.  Did have some n/v, had some dark red or purple, size of quarter.  Passing gas.  Yesterday had BM.  Did have some dark stools, unsure if black was \"half asleep\".  Last night had lower back pain, now improved.      In ER she was afebrile, mildly tachycardic (heart rate 111), normal blood pressure and mild hypoxia requiring 1 L nasal cannula.  Labs remarkable for WBC 11.6 with a left shift, creatinine 1.06, calcium 10.6, lipase 104, troponin XX, lactic acid normal.  CT abdomen pelvis with contrast showed similar changes of bowel wall thickening involving the terminal ileum and base of the cecum adjacent " "to postoperative change from prior appendectomy which could be infectious or inflammatory.  There is mild proximal obstruction involving the ileum and distal jejunum in the lower abdomen left mid abdomen with small bowel loops dilated up to 4 cm.  There is no gross free air or pneumatosis.  Moderate size hiatal hernia again seen.  Minimal prominent biliary tree unchanged and physiologic, unchanged a back to 2015.  Simple hepatic cyst unchanged.  She was given IV fluids, Zosyn and general surgery was consulted.  Dr. Mcneil to see patient.  Recommended NG, IV fluids.\"    Interval Problem Update  3/26: Patient seen at bedside this morning.  Patient is hard of hearing.  Patient with NG tube in place.  Still complaining of abdominal pain some tenderness on examination.  General surgery has been consulted, we appreciate further recommendations.  Continue antibiotics for now.    3/27: Patient seen at bedside this morning.  It seems that the patient's NG tube came off, however the patient it seems that now is having regular bowel movements.  She still complaining of abdominal pain.  We have consulted GI, we appreciate further recommendations.  We also appreciate further recommendations by general surgery.  We will continue n.p.o. until surgery reevaluate the patient.  We will continue with antibiotics for now.  --The patient told me she used to see Dr Tarun Lewis at [facility], so I have consulted Dr Clay, we appreciate further recommendations.    3/28: Patient seen at bedside this morning.  The patient was vomiting this morning, she still complaining of abdominal pain.  We have placed the NG tube back on, we have ordered KUB.  We appreciate further recommendations by general surgery.  Also it seems that the patient had been seeing Dr. Ash earlier this month and she would like to continue seeing that group.  We have consulted Dr. Belle from GI, we appreciate further recommendations.  We will keep the patient n.p.o. for " now until surgical evaluation.  We will continue with antibiotics.    I have personally seen and examined the patient at bedside. I discussed the plan of care with patient, bedside RN and charge RN.    Consultants/Specialty  general surgery    Code Status  DNAR/DNI    Disposition  Patient is not medically cleared for discharge.   Anticipate discharge to pending clinical evolution.    Review of Systems  Review of Systems   Constitutional: Positive for malaise/fatigue. Negative for chills and fever.   HENT: Positive for hearing loss. Negative for nosebleeds.    Eyes: Negative for blurred vision and double vision.   Respiratory: Negative for cough and shortness of breath.    Cardiovascular: Negative for chest pain and palpitations.   Gastrointestinal: Positive for abdominal pain. Negative for heartburn and vomiting.   Genitourinary: Negative for dysuria and urgency.   Musculoskeletal: Negative for back pain and falls.   Skin: Negative for itching and rash.   Neurological: Negative for dizziness and headaches.   Psychiatric/Behavioral: Negative for substance abuse. The patient is not nervous/anxious.    All other systems reviewed and are negative.       Physical Exam  Temp:  [36.6 °C (97.9 °F)-37.4 °C (99.4 °F)] 37.4 °C (99.4 °F)  Pulse:  [76-88] 87  Resp:  [18] 18  BP: (116-131)/(60-68) 120/60  SpO2:  [90 %-97 %] 92 %    Physical Exam  Vitals and nursing note reviewed.   Constitutional:       General: She is not in acute distress.     Appearance: She is ill-appearing.   HENT:      Head: Normocephalic and atraumatic.      Right Ear: External ear normal.      Left Ear: External ear normal.      Mouth/Throat:      Pharynx: No oropharyngeal exudate or posterior oropharyngeal erythema.   Eyes:      General:         Right eye: No discharge.         Left eye: No discharge.   Cardiovascular:      Rate and Rhythm: Normal rate and regular rhythm.      Pulses: Normal pulses.      Heart sounds: No murmur heard.    No gallop.    Pulmonary:      Effort: Pulmonary effort is normal. No respiratory distress.      Breath sounds: Normal breath sounds. No wheezing or rhonchi.   Abdominal:      General: There is distension.      Tenderness: There is abdominal tenderness.      Comments: With NG tube   Musculoskeletal:         General: No swelling or tenderness. Normal range of motion.      Cervical back: Normal range of motion and neck supple. No tenderness.   Skin:     General: Skin is warm and dry.   Neurological:      General: No focal deficit present.      Mental Status: She is alert and oriented to person, place, and time. Mental status is at baseline.      Motor: No weakness.   Psychiatric:         Mood and Affect: Mood normal.         Behavior: Behavior normal.         Thought Content: Thought content normal.         Fluids    Intake/Output Summary (Last 24 hours) at 3/28/2022 1115  Last data filed at 3/28/2022 0900  Gross per 24 hour   Intake 1220 ml   Output --   Net 1220 ml       Laboratory  Recent Labs     03/26/22  0201 03/27/22  0133 03/28/22  0514   WBC 9.2 3.4* 3.4*   RBC 3.25* 3.46* 3.33*   HEMOGLOBIN 10.3* 11.0* 10.5*   HEMATOCRIT 32.3* 34.9* 33.7*   MCV 99.4* 100.9* 101.2*   MCH 31.7 31.8 31.5   MCHC 31.9* 31.5* 31.2*   RDW 45.3 46.4 45.8   PLATELETCT 197 201 194   MPV 11.8 11.7 11.6     Recent Labs     03/26/22  0201 03/27/22  0133 03/28/22  0514   SODIUM 140 142 142   POTASSIUM 4.0 4.1 4.0   CHLORIDE 103 107 111   CO2 25 26 25   GLUCOSE 137* 150* 139*   BUN 24* 13 8   CREATININE 0.82 0.66 0.56   CALCIUM 9.2 8.7 8.4     Recent Labs     03/25/22  1528   APTT 35.3   INR 1.47*               Imaging  DX-ABDOMEN FOR TUBE PLACEMENT   Final Result         Gastric drainage tube with tip projecting over the expected area of the stomach.      DX-ABDOMEN FOR TUBE PLACEMENT   Final Result         Gastric drainage tube loops in the esophagus with tip projecting over the expected area of the upper esophagus.      CT-ABDOMEN-PELVIS WITH    Final Result      1.  There are similar changes of bowel wall thickening involving the terminal ileum and base of the cecum adjacent to postoperative change from prior appendectomy which could be infectious or inflammatory.   2.  There is mild proximal obstruction involving the ileum and distal jejunum in the lower abdomen and left mid abdomen with small bowel loops dilated up to 4 cm.   3.  There is no gross free air or pneumatosis.   4.  Moderate size hiatal hernia again seen.   5.  Minimally prominent biliary tree unchanged and physiologic, unchanged dating back to 2015.   6.  Simple hepatic cysts are unchanged.         DX-CHEST-PORTABLE (1 VIEW)   Final Result      1.  There is no acute cardiopulmonary process.      DX-ABDOMEN FOR TUBE PLACEMENT    (Results Pending)   [id number] VIEW    (Results Pending)        Assessment/Plan  * SBO (small bowel obstruction) (HCC)- (present on admission)  Assessment & Plan  Patient with recent terminal ileitis and small bowel obstruction last month  Treated with conservative therapy and antibiotics however now with repeat symptoms and SBO  Surgery consulted by ERP: Dr. Mcneil was called, recommended NPO, NG, IVF, pain mgmt  CTX, flagyl    3/26: Pending surgical evaluation, we appreciate further recommendations.    3/27: Patient seems to have BMs now, we are awaiting further recommendations by general surgery. We have also consulted GI ( Dr Clay), we appreciate further recommendations.    3/28: Patient was vomiting again this morning with abdominal pain.  We have placed the NG tube back and have ordered KUB.  We appreciate further recommendations by general surgery and GI. (Dr Belle from GI will be evaluating the patient today).      Generalized weakness  Assessment & Plan  PT/OT    Hyperglycemia  Assessment & Plan  Patient had recent A1c at 5.1  Monitor  No need to start insulin treatment at this time    NATIVIDAD (acute kidney injury) (HCC)  Assessment & Plan  Mild bump in  Cr 0.6-->1.06  Likely in setting of decreased PO intake/pre-renal  IVF resuscitation  Continue to monitor    3/27: Improved. Cr is 0.66 today.    Abdominal pain  Assessment & Plan  Initial concern for sbo  Also concern for ileitis  General surgery and GI following patient, we appreciate further recommendations    Anemia- (present on admission)  Assessment & Plan  No signs of bleeding at this time, monitor    History of deep venous thrombosis- (present on admission)  Assessment & Plan  Diagnosed 5/19/2020 6 hour car trip  Still on anticoagulation    3/27: Will probably resume once surgery clears her NPO    COPD (chronic obstructive pulmonary disease) (HCC)- (present on admission)  Assessment & Plan  Not on home oxygen  Not having COPD exacerbation    Essential hypertension, benign- (present on admission)  Assessment & Plan  Home regimen: Valsartan 160 mg daily  Inpatient regimen: PRN medication, resume home medication once surgery clears for PO    3/27: Patient still NPO, however BP seems to be controlled       VTE prophylaxis: SCDs/TEDs    I have performed a physical exam and reviewed and updated ROS and Plan today (3/28/2022). In review of yesterday's note (3/27/2022), there are no changes except as documented above.

## 2022-03-28 NOTE — THERAPY
Physical Therapy   Initial Evaluation     Patient Name: Licha Pope  Age:  86 y.o., Sex:  female  Medical Record #: [id number]  Today's Date: 3/28/2022     Precautions  Precautions: Fall Risk;Nasogastric Tube    Assessment  Patient is 86 y.o. female with a diagnosis of SBO presenting with deconditioning and impaired balance.  Pt required use of FWW for increased support when ambulating and is not demonstrating [place] at this time. Therefore recommend continued acute PT prior to DC home. Pt has supportive family who live next door.       Plan    Recommend Physical Therapy 4 times per week until therapy goals are met for the following treatments:  Bed Mobility, Gait Training, Neuro Re-Education / Balance, Therapeutic Activities, and Therapeutic Exercises    DC Equipment Recommendations: Unable to determine at this time  Discharge Recommendations: Recommend home health for continued physical therapy services        03/28/22 1141   Prior Living Situation   Housing / Facility [address]   Steps Into Home   (ramp)   Steps In Home 0   Equipment Owned Front-Wheel Walker;Single Point Cane;Tub / Shower Seat   Lives with - Patient's Self Care Capacity Alone and Able to Care For Self   Comments supportive son and his family lives next door   Prior Level of Functional Mobility   Bed Mobility Independent   Transfer Status Independent   Ambulation Independent   Assistive Devices Used None   Stairs Independent   Cognition    Cognition / Consciousness WDL   Active ROM Lower Body    Active ROM Lower Body  WDL   Strength Lower Body   Lower Body Strength  WDL   Balance Assessment   Sitting Balance (Static) Fair   Sitting Balance (Dynamic) Fair   Standing Balance (Static) Fair -   Standing Balance (Dynamic) Poor +   Weight Shift Sitting Fair   Weight Shift Standing Fair   Gait Analysis   Gait Level Of Assist Minimal Assist   Assistive Device None   Distance (Feet) 100   # of Times Distance was Traveled 1   Deviation  Bradykinetic;Shuffled Gait   Comments Pt ambulated 30 ft with FWW SBA, improved balance with FWW   Bed Mobility    Supine to Sit Supervised  (using railing)   Sit to Supine Supervised   Rolling Supervised  (with rail)   Functional Mobility   Sit to Stand Contact Guard Assist   Activity Tolerance   Standing fatigues easily   Short Term Goals    Short Term Goal # 1 Pt will be able to perform bed mobility and sup <> Jeff without use of bed rail and with a flat bed in 6 visits.   Short Term Goal # 2 Pt will be able to perform sit <> stand and transfer Jeff in 6 visits.   Short Term Goal # 3 Pt will be able to ambualte 200ft with FWW Jeff in 6 visits.

## 2022-03-28 NOTE — CONSULTS
Gastroenterology Consult Note:    Donovan Belle M.D.  Date & Time note created:    3/28/2022   10:23 AM     Referring MD:  Dr. Del Rio    Patient ID:  Name:             Licha Pope   YOB: 1935  Age:                 86 y.o.  female   MRN:               [id number]                                                             Reason for Consult:      Partial small bowel obstruction  Mild upper GI bleeding with hematemesis and possible melena    History of Present Illness:    Licha Pope had a laparoscopic cholecystectomy in 6/2021.    On 2/17/2022 she was admitted with abdominal pain.  Descriptions of the pain varies between RLQ pain, lower abdominal pain and epigastric pain.  WBC was 7300 with normal liver tests and elevated lipase at 264 (upper limit 58).    APCT: Thick TI and cecum with dilated proximal ileum at 3.6 cm.  Minimal pancreatitis.  US abdomen: CBD 11.3 cm.  Gallbladder sludge or small stones.  MRCP: CBD 11.4 cm.  No IHBD.  Gallstones.  7 mm and 9 mm pancreatic cysts.    Discharged on 2/19.  Cholecystectomy was recommended by surgery, and subsequently her gastroenterologist Dr. Ash.    After discharge, she continued to have some abdominal pain.  When the pain became severe, she came to the ER and was hospitalized on 3/25 with severe colicky abdominal pain, nausea and vomiting.  She reported a small amount of dark red hematemesis and possible melena.    APCT: Mildly thick TI and cecal base with mild SBO, with a few dilated distal ileal loops up to 4 cm diameter.  Mild biliary dilation, stable since 2015.    She was treated with NG suctioning, intravenous hydration, Rocephin and Flagyl, and improved.    On 3/27 about 1 PM the NG tube was found out of the patient.  She was observed without further NG suctioning, but developed recurrent vomiting and worse abdominal pain last night, and the NG tube was reinserted this morning.    Initial Labs  CBC: WBC 11.6, and then normalized.  Hgb  12.4 and declined to 10.5.  .  INR: 1.5, PTT 35.  CMP: GFR 51, and improved to 89.  Normal liver tests.  Lipase 104 (upper limit 58).      Assessment:     Small bowel obstruction  The SBO improved with NG suctioning, but recurred when suctioning was stopped.  If the SBO resolves, then consider evaluation with CT enterography and colonoscopy.    Mild upper GI bleeding  On admission she had vomiting with mild dark red hematemesis and possible melena.  For now, reserve endoscopy for significant active GI bleeding.  Treat with Protonix 40 mg twice daily.    Gallstone pancreatitis in 2/2022  Treat with cholecystectomy and IOC at the optimal timing.    Chronically dilated CBD with normal liver tests and normal intrahepatic biliary tree    Eliquis treatment for prior DVT  Eliquis is currently being held.    COPD  Hypertension      Recommendations:     Protonix 40 mg twice daily  Observe the course of the small bowel obstruction.      Labs:  Recent Labs     03/26/22 0201 03/27/22 0133 03/28/22  0514   WBC 9.2 3.4* 3.4*   RBC 3.25* 3.46* 3.33*   HEMOGLOBIN 10.3* 11.0* 10.5*   HEMATOCRIT 32.3* 34.9* 33.7*   MCV 99.4* 100.9* 101.2*   MCH 31.7 31.8 31.5   MCHC 31.9* 31.5* 31.2*   RDW 45.3 46.4 45.8   PLATELETCT 197 201 194   MPV 11.8 11.7 11.6     Recent Labs     03/25/22  1528   APTT 35.3   INR 1.47*     Recent Labs     03/26/22  0201 03/27/22 0133 03/28/22  0514   SODIUM 140 142 142   POTASSIUM 4.0 4.1 4.0   CHLORIDE 103 107 111   CO2 25 26 25   GLUCOSE 137* 150* 139*   BUN 24* 13 8   CREATININE 0.82 0.66 0.56   CALCIUM 9.2 8.7 8.4     Recent Labs     03/25/22  1528 03/26/22  0201 03/27/22 0133 03/28/22  0514   ALTSGPT 10  --  7 6   ASTSGOT 19  --  13 13   ALKPHOSPHAT 52  --  42 38   TBILIRUBIN 0.8  --  0.6 0.4   LIPASE 104*  --   --   --    GLUCOSE 114* 137* 150* 139*       No results found for: BLOODCULTU, BLDCULT, BCHOLD     GI/Nutrition:  No orders of the defined types were placed in this encounter.      Past  Medical History:   Past Medical History:   Diagnosis Date   • Arthritis    • COPD (chronic obstructive pulmonary disease) (HCC)    • DVT (deep venous thrombosis) (HCC)    • EMPHYSEMA    • Gastroesophageal reflux disease without esophagitis 3/13/2019   • Hypertension    • Hypertriglyceridemia 2019   • Prediabetes 2019       Past Surgical History:  Past Surgical History:   Procedure Laterality Date   • [place] LAP,APPENDECTOMY  2021    Procedure: APPENDECTOMY, LAPAROSCOPIC;  Surgeon: Pedro Mcneil M.D.;  Location: [facility];  Service: Gastroenterology   • CATARACT PHACO WITH IOL  2014    Performed by Jarred Almazan M.D. at [facility] ORS   • CATARACT PHACO WITH IOL  2014    Performed by Jarred Almazan M.D. at [facility] ORS   • KNEE ARTHROPLASTY TOTAL  2014    Performed by Jose Hahn M.D. at [facility] ORS   • [place]  DELIVERY ONLY     • HEMORRHOIDECTOMY         Medication Allergy:  Allergies   Allergen Reactions   • Codeine Vomiting   • Demerol Vomiting     Injectable type   • Shellfish Allergy Shortness of Breath     Soft shell   • Ciprofloxacin Rash     rash   • Ibuprofen      Patient developed gastric ulcer/GI bleed from ibuprofen use   • Iodine      PATIENT ALLERGIC TO SHELLFISH, NOT SURE IF SHE IS ALLERGIC TO IODINE       Hospital Medications:        Current Outpatient Medications:  No current facility-administered medications on file prior to encounter.     Current Outpatient Medications on File Prior to Encounter   Medication Sig Dispense Refill   • apixaban (ELIQUIS) 5mg Tab Take 1 tablet by mouth 2 times a day. 60 tablet 0   • CALCIUM-MAGNESIUM-ZINC PO Take 1 tablet by mouth every day.     • therapeutic multivitamin-minerals (THERAGRAN-M) Tab Take 1 tablet by mouth every day.     • Multiple Vitamins-Minerals (LUTEIN-ZEAXANTHIN PO) Take 1 tablet by mouth every day.     • TURMERIC PO Take 2,000 mg by mouth  "every day.     • Ascorbic Acid (VITAMIN C PO) Take 1,000 mg by mouth every day.     • Cholecalciferol (D3 PO) Take 5,000 Units by mouth every day.     • GLUCOSAMINE HCL-MSM PO Take 2 Tablets by mouth every day. Move Free Ultra supplement         Physical Exam:  Vitals/ General Appearance:   Weight/BMI: Body mass index is 22.4 kg/m².  /69   Pulse 73   Temp 36.6 °C (97.9 °F) (Temporal)   Resp 15   Ht 1.626 m (5' 4\")   Wt 59.2 kg (130 lb 8.2 oz)   SpO2 92%   Vitals:    02/18/22 1607 02/18/22 2300 02/19/22 0500 02/19/22 1000   BP: 131/62 132/66 124/65 138/69   Pulse: 75 77 79 73   Resp: 17 17 17 15   Temp: 36.9 °C (98.4 °F) 36.8 °C (98.2 °F) 36.7 °C (98 °F) 36.6 °C (97.9 °F)   TempSrc: Temporal Temporal Temporal Temporal   SpO2: 91% 90% 94% 92%   Weight:       Height:         Oxygen Therapy:       Physical Exam  Vitals reviewed.   HENT:      Head: Normocephalic and atraumatic.   Eyes:      Pupils: Pupils are equal, round, and reactive to light.   Neck:      Thyroid: No thyromegaly.   Cardiovascular:      Heart sounds: Normal heart sounds. No murmur heard.    No friction rub.   Pulmonary:      Breath sounds: No wheezing or rales.   Abdominal:      Palpations: There is no mass.      Tenderness: There is abdominal tenderness. There is guarding.      Comments: Moderate abdominal distention.  Mild diffuse abdominal tenderness with voluntary guarding.   Musculoskeletal:      Cervical back: Neck supple.   Lymphadenopathy:      Cervical: No cervical adenopathy.   Skin:     Findings: No erythema or rash.   Neurological:      Mental Status: She is alert.      Cranial Nerves: No cranial nerve deficit.   Psychiatric:         Mood and Affect: Affect normal.         Cognition and Memory: Memory normal.         Review of Systems:      Review of Systems   Respiratory: Negative for cough and shortness of breath.    Cardiovascular: Negative for chest pain.   Gastrointestinal: Positive for abdominal pain and vomiting. Negative " for blood in stool and melena.             Family History:  Family History   Problem Relation Age of Onset   • Alzheimer's Disease Mother    • Heart Disease Father    • Other Father         AAA   • Heart Attack Sister    • Diabetes Brother        Social History:  Social History     Socioeconomic History   • Marital status:      Spouse name: Not on file   • Number of children: Not on file   • Years of education: Not on file   • Highest education level: Not on file   Occupational History   • Not on file   Tobacco Use   • Smoking status: Former Smoker     Packs/day: 1.00     Years: 20.00     Pack years: 20.00     Types: Cigarettes     Quit date: 1975     Years since quittin.2   • Smokeless tobacco: Never Used   Vaping Use   • Vaping Use: Never used   Substance and Sexual Activity   • Alcohol use: Not Currently     Alcohol/week: 1.5 oz     Types: 3 Standard drinks or equivalent per week     Comment: RARE   • Drug use: No   • Sexual activity: Not Currently     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     Comment: , retired    Other Topics Concern   • Not on file   Social History Narrative   • Not on file     Social Determinants of Health     Financial Resource Strain: Low Risk    • Difficulty of Paying Living Expenses: Not hard at all   Food Insecurity: No Food Insecurity   • Worried About Running Out of Food in the Last Year: Never true   • Ran Out of Food in the Last Year: Never true   Transportation Needs: No Transportation Needs   • Lack of Transportation (Medical): No   • Lack of Transportation (Non-Medical): No   Physical Activity: Not on file   Stress: Not on file   Social Connections: Not on file   Intimate Partner Violence: Not on file   Housing Stability: Not on file       MDM (Data Review):     Records reviewed and summarized in current documentation    Problem List:     Patient Active Problem List    Diagnosis Date Noted   • Generalized weakness 2022   • Hyperglycemia 2022   • SBO (small  bowel obstruction) ([facility]) 03/25/2022   • NATIVIDAD (acute kidney injury) ([facility]) 03/25/2022   • Abnormal CT scan of the abdomen concerning for prominence of the common bile duct 02/17/2022   • Foot pain, left 07/07/2021   • Chronic anticoagulation 07/03/2021   • Hematoma 06/29/2021   • Anemia 06/27/2021   • History of deep venous thrombosis 06/22/2021   • Prediabetes 05/13/2019   • Hypertriglyceridemia 05/13/2019   • Gastroesophageal reflux disease without esophagitis 03/13/2019   • COPD (chronic obstructive pulmonary disease) ([facility]) 04/17/2018   • Senile nuclear sclerosis 12/02/2014   • Cataract, right 11/18/2014   • Primary localized osteoarthrosis, lower leg 01/27/2014   • Arthritis 01/13/2014   • Nonspecific abnormal electrocardiogram (ECG) (EKG) 01/13/2014   • Essential hypertension, benign 01/13/2014         Thank your for the opportunity to assist in the care of your patient.  Please call for any questions or concerns.    Donovan Belle M.D.

## 2022-03-28 NOTE — ASSESSMENT & PLAN NOTE
Initial concern for sbo  Also concern for ileitis  General surgery and GI following patient, we appreciate further recommendations

## 2022-03-28 NOTE — CARE PLAN
The patient is Stable - Low risk of patient condition declining or worsening    Shift Goals  Clinical Goals: pt will ambulate x1 and use IS x2  Patient Goals: sleep    Progress made toward(s) clinical / shift goals:  ambulated and used IS. BM x1 overnight. Small emesis x1    Patient is not progressing towards the following goals:

## 2022-03-28 NOTE — PROGRESS NOTES
"Progress Note:  3/28/2022, 3:04 PM    S: The patient developed worsening abdominal pain and distention requiring replacement of the NG tube overnight.  She is also n.p.o.  She felt better after the replacement of the NG tube and had a bowel movement just a few minutes ago.  She reports feeling better now.  Afebrile.    O:  /60   Pulse 87   Temp 37.4 °C (99.4 °F) (Temporal)   Resp 18   Ht 1.626 m (5' 4\")   Wt 57.2 kg (126 lb 1.7 oz)   SpO2 92%     NAD, awake, alert  Breathing is nonlabored  Abdomen is soft, does not appear to be distended.  Nontender to very deep palpation      A:   Active Hospital Problems    Diagnosis    • Generalized weakness [R53.1]    • Hyperglycemia [R73.9]    • SBO (small bowel obstruction) ([facility]) [K56.609]    • NATIVIDAD (acute kidney injury) ([facility]) [N17.9]    • Abdominal pain [R10.9]    • Anemia [D64.9]    • History of deep venous thrombosis [Z86.718]    • COPD (chronic obstructive pulmonary disease) ([facility]) [J44.9]    • Essential hypertension, benign [I10]          P:   -Continue NG tube and n.p.o. status at this time  -No indication for urgent surgical intervention at this time  -We will continue to manage nonoperatively given benign abdominal exam and passing bowel movements.  If she is still having high NG tube output, will recommend small bowel follow-through tomorrow.  If she worsens clinically we will need to discuss operative intervention for exploration and possible adhesiolysis.  This was all discussed with the patient and her son who was at bedside.  All questions answered.  They verbalized understanding.  They were pleased with the plan as stated above at this time.    Mando Johnson M.D.  [facility]  [phone]    "

## 2022-03-29 ENCOUNTER — APPOINTMENT (OUTPATIENT)
Dept: RADIOLOGY | Facility: MEDICAL CENTER | Age: 87
DRG: 329 | End: 2022-03-29
Attending: INTERNAL MEDICINE
Payer: MEDICARE

## 2022-03-29 ENCOUNTER — APPOINTMENT (OUTPATIENT)
Dept: RADIOLOGY | Facility: MEDICAL CENTER | Age: 87
DRG: 329 | End: 2022-03-29
Attending: SURGERY
Payer: MEDICARE

## 2022-03-29 LAB
ANION GAP SERPL CALC-SCNC: 6 MMOL/L (ref 7–16)
BASOPHILS # BLD AUTO: 0.5 % (ref 0–1.8)
BASOPHILS # BLD: 0.03 K/UL (ref 0–0.12)
BUN SERPL-MCNC: 6 MG/DL (ref 8–22)
CALCIUM SERPL-MCNC: 8.4 MG/DL (ref 8.4–10.2)
CHLORIDE SERPL-SCNC: 114 MMOL/L (ref 96–112)
CO2 SERPL-SCNC: 26 MMOL/L (ref 20–33)
CREAT SERPL-MCNC: 0.55 MG/DL (ref 0.5–1.4)
EOSINOPHIL # BLD AUTO: 0.59 K/UL (ref 0–0.51)
EOSINOPHIL NFR BLD: 10.7 % (ref 0–6.9)
ERYTHROCYTE [DISTWIDTH] IN BLOOD BY AUTOMATED COUNT: 45.3 FL (ref 35.9–50)
GFR SERPLBLD CREATININE-BSD FMLA CKD-EPI: 89 ML/MIN/1.73 M 2
GLUCOSE SERPL-MCNC: 148 MG/DL (ref 65–99)
HCT VFR BLD AUTO: 33.2 % (ref 37–47)
HGB BLD-MCNC: 10.5 G/DL (ref 12–16)
IMM GRANULOCYTES # BLD AUTO: 0.02 K/UL (ref 0–0.11)
IMM GRANULOCYTES NFR BLD AUTO: 0.4 % (ref 0–0.9)
LYMPHOCYTES # BLD AUTO: 0.5 K/UL (ref 1–4.8)
LYMPHOCYTES NFR BLD: 9.1 % (ref 22–41)
MCH RBC QN AUTO: 31.8 PG (ref 27–33)
MCHC RBC AUTO-ENTMCNC: 31.6 G/DL (ref 33.6–35)
MCV RBC AUTO: 100.6 FL (ref 81.4–97.8)
MONOCYTES # BLD AUTO: 0.49 K/UL (ref 0–0.85)
MONOCYTES NFR BLD AUTO: 8.9 % (ref 0–13.4)
NEUTROPHILS # BLD AUTO: 3.88 K/UL (ref 2–7.15)
NEUTROPHILS NFR BLD: 70.4 % (ref 44–72)
NRBC # BLD AUTO: 0 K/UL
NRBC BLD-RTO: 0 /100 WBC
PLATELET # BLD AUTO: 219 K/UL (ref 164–446)
PMV BLD AUTO: 11.6 FL (ref 9–12.9)
POTASSIUM SERPL-SCNC: 3.9 MMOL/L (ref 3.6–5.5)
RBC # BLD AUTO: 3.3 M/UL (ref 4.2–5.4)
SODIUM SERPL-SCNC: 146 MMOL/L (ref 135–145)
WBC # BLD AUTO: 5.5 K/UL (ref 4.8–10.8)

## 2022-03-29 PROCEDURE — 74250 X-RAY XM SM INT 1CNTRST STD: CPT

## 2022-03-29 PROCEDURE — 700111 HCHG RX REV CODE 636 W/ 250 OVERRIDE (IP): Performed by: INTERNAL MEDICINE

## 2022-03-29 PROCEDURE — 700111 HCHG RX REV CODE 636 W/ 250 OVERRIDE (IP): Performed by: HOSPITALIST

## 2022-03-29 PROCEDURE — 36415 COLL VENOUS BLD VENIPUNCTURE: CPT

## 2022-03-29 PROCEDURE — 770006 HCHG ROOM/CARE - MED/SURG/GYN SEMI*

## 2022-03-29 PROCEDURE — 85025 COMPLETE CBC W/AUTO DIFF WBC: CPT

## 2022-03-29 PROCEDURE — 700105 HCHG RX REV CODE 258: Performed by: INTERNAL MEDICINE

## 2022-03-29 PROCEDURE — 80048 BASIC METABOLIC PNL TOTAL CA: CPT

## 2022-03-29 PROCEDURE — 700101 HCHG RX REV CODE 250: Performed by: INTERNAL MEDICINE

## 2022-03-29 PROCEDURE — 700117 HCHG RX CONTRAST REV CODE 255: Performed by: SURGERY

## 2022-03-29 PROCEDURE — 99233 SBSQ HOSP IP/OBS HIGH 50: CPT | Performed by: INTERNAL MEDICINE

## 2022-03-29 PROCEDURE — C9113 INJ PANTOPRAZOLE SODIUM, VIA: HCPCS | Performed by: INTERNAL MEDICINE

## 2022-03-29 RX ORDER — ONDANSETRON 2 MG/ML
4 INJECTION INTRAMUSCULAR; INTRAVENOUS EVERY 8 HOURS PRN
Status: DISCONTINUED | OUTPATIENT
Start: 2022-03-29 | End: 2022-04-11 | Stop reason: HOSPADM

## 2022-03-29 RX ORDER — METOCLOPRAMIDE HYDROCHLORIDE 5 MG/ML
10 INJECTION INTRAMUSCULAR; INTRAVENOUS EVERY 6 HOURS PRN
Status: COMPLETED | OUTPATIENT
Start: 2022-03-29 | End: 2022-03-29

## 2022-03-29 RX ORDER — METOCLOPRAMIDE HYDROCHLORIDE 5 MG/ML
10 INJECTION INTRAMUSCULAR; INTRAVENOUS EVERY 4 HOURS PRN
Status: DISCONTINUED | OUTPATIENT
Start: 2022-03-29 | End: 2022-04-11 | Stop reason: HOSPADM

## 2022-03-29 RX ADMIN — METRONIDAZOLE 500 MG: 500 INJECTION, SOLUTION INTRAVENOUS at 05:36

## 2022-03-29 RX ADMIN — IOHEXOL 200 ML: 300 INJECTION, SOLUTION INTRAVENOUS at 12:25

## 2022-03-29 RX ADMIN — ONDANSETRON 4 MG: 2 INJECTION INTRAMUSCULAR; INTRAVENOUS at 16:55

## 2022-03-29 RX ADMIN — METRONIDAZOLE 500 MG: 500 INJECTION, SOLUTION INTRAVENOUS at 15:18

## 2022-03-29 RX ADMIN — PANTOPRAZOLE SODIUM 40 MG: 40 INJECTION, POWDER, FOR SOLUTION INTRAVENOUS at 16:55

## 2022-03-29 RX ADMIN — POTASSIUM CHLORIDE, DEXTROSE MONOHYDRATE AND SODIUM CHLORIDE: 150; 5; 900 INJECTION, SOLUTION INTRAVENOUS at 01:57

## 2022-03-29 RX ADMIN — POTASSIUM CHLORIDE, DEXTROSE MONOHYDRATE AND SODIUM CHLORIDE: 150; 5; 900 INJECTION, SOLUTION INTRAVENOUS at 15:18

## 2022-03-29 RX ADMIN — METRONIDAZOLE 500 MG: 500 INJECTION, SOLUTION INTRAVENOUS at 21:34

## 2022-03-29 RX ADMIN — PANTOPRAZOLE SODIUM 40 MG: 40 INJECTION, POWDER, FOR SOLUTION INTRAVENOUS at 05:31

## 2022-03-29 RX ADMIN — METOCLOPRAMIDE 10 MG: 5 INJECTION, SOLUTION INTRAMUSCULAR; INTRAVENOUS at 20:58

## 2022-03-29 RX ADMIN — CEFTRIAXONE SODIUM 2 G: 2 INJECTION, POWDER, FOR SOLUTION INTRAMUSCULAR; INTRAVENOUS at 05:30

## 2022-03-29 ASSESSMENT — ENCOUNTER SYMPTOMS
VOMITING: 0
BACK PAIN: 0
COUGH: 0
DOUBLE VISION: 0
SHORTNESS OF BREATH: 0
BLOOD IN STOOL: 0
FALLS: 0
HEADACHES: 0
DIZZINESS: 0
CHILLS: 0
ABDOMINAL PAIN: 1
HEARTBURN: 0
PALPITATIONS: 0
FEVER: 0
BLURRED VISION: 0
NERVOUS/ANXIOUS: 0

## 2022-03-29 ASSESSMENT — PATIENT HEALTH QUESTIONNAIRE - PHQ9
2. FEELING DOWN, DEPRESSED, IRRITABLE, OR HOPELESS: NOT AT ALL
1. LITTLE INTEREST OR PLEASURE IN DOING THINGS: NOT AT ALL
SUM OF ALL RESPONSES TO PHQ9 QUESTIONS 1 AND 2: 0
1. LITTLE INTEREST OR PLEASURE IN DOING THINGS: NOT AT ALL
SUM OF ALL RESPONSES TO PHQ9 QUESTIONS 1 AND 2: 0
2. FEELING DOWN, DEPRESSED, IRRITABLE, OR HOPELESS: NOT AT ALL

## 2022-03-29 ASSESSMENT — LIFESTYLE VARIABLES: SUBSTANCE_ABUSE: 0

## 2022-03-29 ASSESSMENT — PAIN DESCRIPTION - PAIN TYPE
TYPE: ACUTE PAIN
TYPE: ACUTE PAIN

## 2022-03-29 NOTE — PROGRESS NOTES
"Hospital Medicine Daily Progress Note    Date of Service  3/29/2022    Chief Complaint  Licha Pope is a 86 y.o. female admitted 3/25/2022 with abdominal pain.    Hospital Course  As per chart review  \"86 y.o. female with a history of hypertension, COPD, GERD, DVT dx 5/19/21 after long car trip (still on anticoagulation) who presented 3/25/2022 with abdominal pain.     Was recently admitted for terminal ileitis, SBO and pancreatitis on 2/17/2022, she was treated conservatively with IV fluids and pain control.  Right upper quadrant showed dependent gallbladder sludge versus small stones.  MRCP showed cholelithiasis and common bile duct 11.4 mm unchanged since 2015.  She was discharged with antibiotics and recommended to follow-up with GI, surgery and PCP after discharge.  She saw surgeon, she recommended cholecystectomy but wanted her to see GI doctor. GI doctor recommended cholecystectomy.  Has appt with surgery next Wed.       Patient never really felt like her abdominal pain improved however was stable until 2 days ago.  Abdominal pain became very severe however intermittent and would come in waves.  Patient became very fatigued and was sleeping most the day.  She had chills and was very weak.  Abdominal pain was diffuse, intermittent, described as sharp pain, 10/10 pain, went to PCP and doctor sent her to ER.  Did have some n/v, had some dark red or purple, size of quarter.  Passing gas.  Yesterday had BM.  Did have some dark stools, unsure if black was \"half asleep\".  Last night had lower back pain, now improved.      In ER she was afebrile, mildly tachycardic (heart rate 111), normal blood pressure and mild hypoxia requiring 1 L nasal cannula.  Labs remarkable for WBC 11.6 with a left shift, creatinine 1.06, calcium 10.6, lipase 104, troponin XX, lactic acid normal.  CT abdomen pelvis with contrast showed similar changes of bowel wall thickening involving the terminal ileum and base of the cecum adjacent " "to postoperative change from prior appendectomy which could be infectious or inflammatory.  There is mild proximal obstruction involving the ileum and distal jejunum in the lower abdomen left mid abdomen with small bowel loops dilated up to 4 cm.  There is no gross free air or pneumatosis.  Moderate size hiatal hernia again seen.  Minimal prominent biliary tree unchanged and physiologic, unchanged a back to 2015.  Simple hepatic cyst unchanged.  She was given IV fluids, Zosyn and general surgery was consulted.  Dr. Mcneil to see patient.  Recommended NG, IV fluids.\"    Interval Problem Update  3/26: Patient seen at bedside this morning.  Patient is hard of hearing.  Patient with NG tube in place.  Still complaining of abdominal pain some tenderness on examination.  General surgery has been consulted, we appreciate further recommendations.  Continue antibiotics for now.    3/27: Patient seen at bedside this morning.  It seems that the patient's NG tube came off, however the patient it seems that now is having regular bowel movements.  She still complaining of abdominal pain.  We have consulted GI, we appreciate further recommendations.  We also appreciate further recommendations by general surgery.  We will continue n.p.o. until surgery reevaluate the patient.  We will continue with antibiotics for now.  --The patient told me she used to see Dr Tarun Lewis at [facility], so I have consulted Dr Clay, we appreciate further recommendations.    3/28: Patient seen at bedside this morning.  The patient was vomiting this morning, she still complaining of abdominal pain.  We have placed the NG tube back on, we have ordered KUB.  We appreciate further recommendations by general surgery.  Also it seems that the patient had been seeing Dr. Ash earlier this month and she would like to continue seeing that group.  We have consulted Dr. Belle from GI, we appreciate further recommendations.  We will keep the patient n.p.o. for " now until surgical evaluation.  We will continue with antibiotics.    3/29: Patient seen at bedside this morning.  No family was present at bedside directly on my evaluation.  I tried calling the patient's son to give him an update however there was no answer.  Patient still n.p.o., with NG tube to suction.  General surgery and GI following the patient, we appreciate further recommendations.  The patient will undergo small bowel follow-through study today.  I asked the patient and it seems that she has not had a proper meal since last Thursday, if by tomorrow we are unable to feed the patient will consider TPN while further management is being done.    I have personally seen and examined the patient at bedside. I discussed the plan of care with patient, bedside RN and charge RN.    Consultants/Specialty  general surgery    Code Status  DNAR/DNI    Disposition  Patient is not medically cleared for discharge.   Anticipate discharge to pending clinical evolution.    Review of Systems  Review of Systems   Constitutional: Positive for malaise/fatigue. Negative for chills and fever.   HENT: Positive for hearing loss. Negative for nosebleeds.    Eyes: Negative for blurred vision and double vision.   Respiratory: Negative for cough and shortness of breath.    Cardiovascular: Negative for chest pain and palpitations.   Gastrointestinal: Positive for abdominal pain. Negative for heartburn and vomiting.   Genitourinary: Negative for dysuria and urgency.   Musculoskeletal: Negative for back pain and falls.   Skin: Negative for itching and rash.   Neurological: Negative for dizziness and headaches.   Psychiatric/Behavioral: Negative for substance abuse. The patient is not nervous/anxious.    All other systems reviewed and are negative.       Physical Exam  Temp:  [36.7 °C (98 °F)-37.1 °C (98.7 °F)] 36.7 °C (98 °F)  Pulse:  [77-87] 77  Resp:  [14-16] 16  BP: (139-153)/(73-93) 145/73  SpO2:  [89 %-96 %] 95 %    Physical  Exam  Vitals and nursing note reviewed.   Constitutional:       General: She is not in acute distress.     Appearance: She is ill-appearing.   HENT:      Head: Normocephalic and atraumatic.      Right Ear: External ear normal.      Left Ear: External ear normal.      Mouth/Throat:      Pharynx: No oropharyngeal exudate or posterior oropharyngeal erythema.   Eyes:      General:         Right eye: No discharge.         Left eye: No discharge.   Cardiovascular:      Rate and Rhythm: Normal rate and regular rhythm.      Pulses: Normal pulses.      Heart sounds: No murmur heard.    No gallop.   Pulmonary:      Effort: Pulmonary effort is normal. No respiratory distress.      Breath sounds: Normal breath sounds. No wheezing or rhonchi.   Abdominal:      General: There is distension.      Tenderness: There is abdominal tenderness.      Comments: With NG tube   Musculoskeletal:         General: No swelling or tenderness. Normal range of motion.      Cervical back: Normal range of motion and neck supple. No tenderness.   Skin:     General: Skin is warm and dry.   Neurological:      General: No focal deficit present.      Mental Status: She is alert and oriented to person, place, and time. Mental status is at baseline.      Motor: No weakness.   Psychiatric:         Mood and Affect: Mood normal.         Behavior: Behavior normal.         Thought Content: Thought content normal.         Fluids    Intake/Output Summary (Last 24 hours) at 3/29/2022 1249  Last data filed at 3/29/2022 0549  Gross per 24 hour   Intake --   Output 500 ml   Net -500 ml       Laboratory  Recent Labs     03/27/22  0133 03/28/22  0514 03/29/22  0512   WBC 3.4* 3.4* 5.5   RBC 3.46* 3.33* 3.30*   HEMOGLOBIN 11.0* 10.5* 10.5*   HEMATOCRIT 34.9* 33.7* 33.2*   .9* 101.2* 100.6*   MCH 31.8 31.5 31.8   MCHC 31.5* 31.2* 31.6*   RDW 46.4 45.8 45.3   PLATELETCT 201 194 219   MPV 11.7 11.6 11.6     Recent Labs     03/27/22  0133 03/28/22  0514  03/29/22  0512   SODIUM 142 142 146*   POTASSIUM 4.1 4.0 3.9   CHLORIDE 107 111 114*   CO2 26 25 26   GLUCOSE 150* 139* 148*   BUN 13 8 6*   CREATININE 0.66 0.56 0.55   CALCIUM 8.7 8.4 8.4                   Imaging  DX-ABDOMEN FOR TUBE PLACEMENT   Final Result         Gastric drainage tube with tip projecting over the expected area of the stomach.      CN-DOYYAKE-9 VIEW   Final Result      Long segmental small bowel dilatation concerning for distal small bowel obstruction      DX-ABDOMEN FOR TUBE PLACEMENT   Final Result      Enteric tube terminates over the stomach.      Small bowel loop dilatation concerning for small bowel obstruction      DX-ABDOMEN FOR TUBE PLACEMENT   Final Result         Gastric drainage tube with tip projecting over the expected area of the stomach.      DX-ABDOMEN FOR TUBE PLACEMENT   Final Result         Gastric drainage tube loops in the esophagus with tip projecting over the expected area of the upper esophagus.      CT-ABDOMEN-PELVIS WITH   Final Result      1.  There are similar changes of bowel wall thickening involving the terminal ileum and base of the cecum adjacent to postoperative change from prior appendectomy which could be infectious or inflammatory.   2.  There is mild proximal obstruction involving the ileum and distal jejunum in the lower abdomen and left mid abdomen with small bowel loops dilated up to 4 cm.   3.  There is no gross free air or pneumatosis.   4.  Moderate size hiatal hernia again seen.   5.  Minimally prominent biliary tree unchanged and physiologic, unchanged dating back to 2015.   6.  Simple hepatic cysts are unchanged.         DX-CHEST-PORTABLE (1 VIEW)   Final Result      1.  There is no acute cardiopulmonary process.      DX-SMALL BOWEL SERIES    (Results Pending)        Assessment/Plan  * SBO (small bowel obstruction) (HCC)- (present on admission)  Assessment & Plan  Patient with recent terminal ileitis and small bowel obstruction last month  Treated  with conservative therapy and antibiotics however now with repeat symptoms and SBO  Surgery consulted by ERP: Dr. Mcneil was called, recommended NPO, NG, IVF, pain mgmt  CTX, flagyl    3/26: Pending surgical evaluation, we appreciate further recommendations.    3/27: Patient seems to have BMs now, we are awaiting further recommendations by general surgery. We have also consulted GI ( Dr Clay), we appreciate further recommendations.    3/28: Patient was vomiting again this morning with abdominal pain.  We have placed the NG tube back and have ordered KUB.  We appreciate further recommendations by general surgery and GI. (Dr Belle from GI will be evaluating the patient today).    3/29: Patient to have small bowel follow through study today, we appreciate further recommendations by GI and General Surgery    Generalized weakness  Assessment & Plan  PT/OT    Hyperglycemia  Assessment & Plan  Patient had recent A1c at 5.1  Monitor  No need to start insulin treatment at this time    NATIVIDAD (acute kidney injury) (HCC)  Assessment & Plan  Mild bump in Cr 0.6-->1.06  Likely in setting of decreased PO intake/pre-renal  IVF resuscitation  Continue to monitor    3/29: Improved. Cr is 0.55 today.    Abdominal pain  Assessment & Plan  Initial concern for sbo  Also concern for ileitis  General surgery and GI following patient, we appreciate further recommendations    Anemia- (present on admission)  Assessment & Plan  No signs of bleeding at this time, monitor    History of deep venous thrombosis- (present on admission)  Assessment & Plan  Diagnosed 5/19/2020 6 hour car trip  Still on anticoagulation    3/27: Will probably resume once surgery clears her NPO    COPD (chronic obstructive pulmonary disease) (HCC)- (present on admission)  Assessment & Plan  Not on home oxygen  Not having COPD exacerbation    Essential hypertension, benign- (present on admission)  Assessment & Plan  Home regimen: Valsartan 160 mg daily  Inpatient  regimen: PRN medication, resume home medication once surgery clears for PO    3/29: Patient still NPO, however BP seems to be controlled, PRN medication for now       VTE prophylaxis: SCDs/TEDs    I have performed a physical exam and reviewed and updated ROS and Plan today (3/29/2022). In review of yesterday's note (3/28/2022), there are no changes except as documented above.

## 2022-03-29 NOTE — FACE TO FACE
Face to Face Supporting Documentation - Home Health    The encounter with this patient was in whole or in part the primary reason for home health admission.    Date of encounter:   Patient:                    MRN:                       YOB: 2022  Licha Pope  [id number]  1935     Home health to see patient for:  Skilled Nursing care for assessment, interventions & education, Physical Therapy evaluation and treatment and Occupational therapy evaluation and treatment    Skilled need for:  Recent Deterioration of Health Status intestinal obstruction, generalized weakness    Skilled nursing interventions to include:  Comment: as per PT/OT    Homebound status evidenced by:  Needs the assistance of another person in order to leave the home. Leaving home requires a considerable and taxing effort. There is a normal inability to leave the home.    Community Physician to provide follow up care: No primary care provider on file.     Optional Interventions? Yes, Details: as per PT/OT      I certify the face to face encounter for this home health care referral meets the CMS requirements and the encounter/clinical assessment with the patient was, in whole, or in part, for the medical condition(s) listed above, which is the primary reason for home health care. Based on my clinical findings: the service(s) are medically necessary, support the need for home health care, and the homebound criteria are met.  I certify that this patient has had a face to face encounter by myself.  Mando Del Rio M.D. - JESUSI: [phone]     room air

## 2022-03-29 NOTE — CARE PLAN
The patient is Stable - Low risk of patient condition declining or worsening    Shift Goals  Clinical Goals: Ambulate patient at least 50 feet.  Patient Goals: Rest and comfort this shift    Progress made toward(s) clinical / shift goals:  Patient walk to the bathroom to void and have a BM multiple times. She also walk with CNA in the unit. NGT was not in suction since this morning because patient is doing a Bowel series.       Problem: Knowledge Deficit - Standard  Goal: Patient and family/care givers will demonstrate understanding of plan of care, disease process/condition, diagnostic tests and medications  Outcome: Progressing     Problem: Fluid Volume  Goal: Fluid volume balance will be maintained  Outcome: Progressing     Problem: Urinary Elimination  Goal: Establish and maintain regular urinary output  Outcome: Progressing

## 2022-03-29 NOTE — DISCHARGE PLANNING
Anticipated Discharge Disposition:   Possible home with HH    Action:   Chart review complete     Discussed patient during rounds. Per MD, possible surgery needed. PT/OT recommends HH however will anticipate new recommendations after surgery if needed.      RN CM will continue to follow.      Barriers to Discharge:   Medical clearance  Possible HH acceptance      Plan:   HCM will continue to follow and assist with discharge needs.

## 2022-03-29 NOTE — CARE PLAN
The patient is Stable - Low risk of patient condition declining or worsening    Shift Goals  Clinical Goals: Patient will be free from falls. Monitor NGT output  Patient Goals: Rest and comfort    Progress made toward(s) clinical / shift goals:  Patient has been vomiting this morning prior to NGT placement. Patient said that she felt better after NGT placed. Output is still dark brown. Patient had 1 small bowel movement today. No reported falls this shift. Able to work with PT and OT and has been walking to the bathroom with assistance.      Problem: Knowledge Deficit - Standard  Goal: Patient and family/care givers will demonstrate understanding of plan of care, disease process/condition, diagnostic tests and medications  Outcome: Progressing     Problem: Bowel Elimination  Goal: Establish and maintain regular bowel function  Outcome: Progressing     Problem: Gastrointestinal Irritability  Goal: Nausea and vomiting will be absent or improve  Outcome: Progressing

## 2022-03-29 NOTE — PROGRESS NOTES
Gastroenterology Progress Note     Author: Donovan Belle M.D.     Date & Time Created: 3/29/2022 9:19 AM    Patient ID:  Name:             Licha Pope   YOB: 1935  Age:                 86 y.o.  female   MRN:               [id number]    Chief Complaint:     Partial small bowel obstruction  Mild upper GI bleeding with hematemesis and possible melena      Current Illness:  Licha Pope had a laparoscopic cholecystectomy in 6/2021.     On 2/17/2022 she was admitted with abdominal pain.  Descriptions of the pain varies between RLQ pain, lower abdominal pain and epigastric pain.  WBC was 7300 with normal liver tests and elevated lipase at 264 (upper limit 58).     APCT: Thick TI and cecum with dilated proximal ileum at 3.6 cm.  Minimal pancreatitis.  US abdomen: CBD 11.3 cm.  Gallbladder sludge or small stones.  MRCP: CBD 11.4 cm.  No IHBD.  Gallstones.  7 mm and 9 mm pancreatic cysts.     Discharged on 2/19.  Cholecystectomy was recommended by surgery, and subsequently her gastroenterologist Dr. Ash.     After discharge, she continued to have some abdominal pain.  When the pain became severe, she came to the ER and was hospitalized on 3/25 with severe colicky abdominal pain, nausea and vomiting.  She reported a small amount of dark red hematemesis and possible melena.     APCT: Mildly thick TI and cecal base with mild SBO, with a few dilated distal ileal loops up to 4 cm diameter.  Mild biliary dilation, stable since 2015.     She was treated with NG suctioning, intravenous hydration, Rocephin and Flagyl, and improved.     On 3/27 about 1 PM the NG tube was found out of the patient.  She was observed without further NG suctioning, but developed recurrent vomiting and worse abdominal pain last night, and the NG tube was reinserted this morning.     Initial Labs  CBC: WBC 11.6, and then normalized.  Hgb 12.4 and declined to 10.5.  .  INR: 1.5, PTT 35.  CMP: GFR 51, and improved to 89.   Normal liver tests.  Lipase 104 (upper limit 58).    Interval History:  3/29/2022  Stable course.  Mild intermittent upper abdominal pain.  NG tube in place.  500 mL of NG return and emesis yesterday.  Small bowel follow-through planned today.      Assessment:  Small bowel obstruction  The SBO improved with NG suctioning, but recurred when suctioning was stopped.    SBFT planned for today.  Consider CT enterography and colonoscopy if the SBO resolves..     Mild upper GI bleeding  On admission she had vomiting with mild dark red hematemesis and possible melena.  For now, reserve endoscopy for significant active GI bleeding.  Treat with Protonix 40 mg twice daily.     Gallstone pancreatitis in 2/2022  Treat with cholecystectomy and IOC at the optimal timing.     Chronically dilated CBD with normal liver tests and normal intrahepatic biliary tree     Eliquis treatment for prior DVT  Eliquis is currently being held.     COPD  Hypertension      Recommendations:  Await the SBFT.      Labs:  Recent Labs     03/27/22 0133 03/28/22 0514 03/29/22 0512   WBC 3.4* 3.4* 5.5   RBC 3.46* 3.33* 3.30*   HEMOGLOBIN 11.0* 10.5* 10.5*   HEMATOCRIT 34.9* 33.7* 33.2*   .9* 101.2* 100.6*   MCH 31.8 31.5 31.8   MCHC 31.5* 31.2* 31.6*   RDW 46.4 45.8 45.3   PLATELETCT 201 194 219   MPV 11.7 11.6 11.6          Recent Labs     03/27/22 0133 03/28/22  0514 03/29/22  0512   SODIUM 142 142 146*   POTASSIUM 4.1 4.0 3.9   CHLORIDE 107 111 114*   CO2 26 25 26   GLUCOSE 150* 139* 148*   BUN 13 8 6*   CREATININE 0.66 0.56 0.55   CALCIUM 8.7 8.4 8.4     Recent Labs     03/27/22 0133 03/28/22  0514 03/29/22  0512   ALTSGPT 7 6  --    ASTSGOT 13 13  --    ALKPHOSPHAT 42 38  --    TBILIRUBIN 0.6 0.4  --    GLUCOSE 150* 139* 148*       No results found for: BLOODCULTU, BLDCULT, BCHOLD     GI/Nutrition:  Orders Placed This Encounter   Procedures   • Diet NPO Restrict to: Strict     Standing Status:   Standing     Number of Occurrences:   1      "Order Specific Question:   Diet NPO Restrict to:     Answer:   Strict [1]       Hospital Medications:  pantoprazole, 40 mg, Intravenous, BID  cefTRIAXone (ROCEPHIN) IV, 2 g, Intravenous, Q24HRS  metroNIDAZOLE (FLAGYL) IV, 500 mg, Intravenous, Q8HRS  [Held by provider] valsartan, 160 mg, Oral, DAILY      benzocaine-menthol, HYDROmorphone, labetalol    Fluids:    Intake/Output Summary (Last 24 hours) at 3/29/2022 09  Last data filed at 3/29/2022 0549  Gross per 24 hour   Intake --   Output 500 ml   Net -500 ml          Past Medical History:   Past Medical History:   Diagnosis Date   • Arthritis    • COPD (chronic obstructive pulmonary disease) (HCC)    • DVT (deep venous thrombosis) (HCC)    • EMPHYSEMA    • Gastroesophageal reflux disease without esophagitis 3/13/2019   • Hypertension    • Hypertriglyceridemia 2019   • Prediabetes 2019       Past Surgical History:  Past Surgical History:   Procedure Laterality Date   • LA LAP,APPENDECTOMY  2021    Procedure: APPENDECTOMY, LAPAROSCOPIC;  Surgeon: Pedro Mcneil M.D.;  Location: [facility];  Service: Gastroenterology   • CATARACT PHACO WITH IOL  2014    Performed by Jarred Almazan M.D. at [facility] ORS   • CATARACT PHACO WITH IOL  2014    Performed by Jarred Almazan M.D. at [facility]   • KNEE ARTHROPLASTY TOTAL  2014    Performed by Jose Hahn M.D. at [facility] ORS   • LA  DELIVERY ONLY     • HEMORRHOIDECTOMY         Physical Exam:  Vitals/ General Appearance:   Weight/BMI: Body mass index is 21.65 kg/m².  /76   Pulse 87   Temp 37 °C (98.6 °F) (Oral)   Resp 14   Ht 1.626 m (5' 4\")   Wt 57.2 kg (126 lb 1.7 oz)   SpO2 96%   Vitals:    22 0700 22 1622 22 2231 22 0400   BP:  145/73 153/93 139/76   Pulse: 87 87 80 87   Resp:  14 14 14   Temp:  37.1 °C (98.7 °F) 37 °C (98.6 °F) 37 °C (98.6 °F)   TempSrc:  Axillary Oral Oral "   SpO2: 92% 89% 94% 96%   Weight:       Height:         Oxygen Therapy:  Pulse Oximetry: 96 %, O2 (LPM): 1, O2 Delivery Device: None - Room Air    Physical Exam  Vitals reviewed.   Constitutional:       General: She is not in acute distress.  HENT:      Head: Normocephalic and atraumatic.   Cardiovascular:      Rate and Rhythm: Regular rhythm.      Heart sounds: Normal heart sounds. No murmur heard.    No gallop.   Pulmonary:      Comments: Normal breath sounds anteriorly.  Abdominal:      Palpations: Abdomen is soft. There is no mass.      Tenderness: There is abdominal tenderness (Moderate abdominal distention.  Mild epigastric and right abdominal tenderness with voluntary guarding.).   Psychiatric:         Judgment: Judgment normal.         Review of Systems:  Review of Systems   Respiratory: Negative for cough and shortness of breath.    Cardiovascular: Negative for chest pain.   Gastrointestinal: Positive for abdominal pain. Negative for blood in stool, melena and vomiting.       Medical Decision Making, by Problem:  Active Hospital Problems    Diagnosis    • *SBO (small bowel obstruction) ([facility]) [K56.609]    • Generalized weakness [R53.1]    • Hyperglycemia [R73.9]    • NATIVIDAD (acute kidney injury) ([facility]) [N17.9]    • Abdominal pain [R10.9]    • Anemia [D64.9]    • History of deep venous thrombosis [Z86.718]    • COPD (chronic obstructive pulmonary disease) ([facility]) [J44.9]    • Essential hypertension, benign [I10]          Donovan Belle M.D.

## 2022-03-29 NOTE — CARE PLAN
The patient is Stable - Low risk of patient condition declining or worsening    Shift Goals  Clinical Goals: monitor NGT output  Patient Goals: sleep comfortably    Progress made toward(s) clinical / shift goals:  NGT kept monitored. Pt didn't c/o any nausea and pain as of this time. Pt seen sleeping in between rounds. Will continue to monitor    Patient is not progressing towards the following goals:N/A      Problem: Bowel Elimination  Goal: Establish and maintain regular bowel function  Outcome: Progressing     Problem: Gastrointestinal Irritability  Goal: Nausea and vomiting will be absent or improve  Outcome: Progressing     Problem: Fluid Volume  Goal: Fluid volume balance will be maintained  Outcome: Progressing     Problem: Urinary Elimination  Goal: Establish and maintain regular urinary output  Outcome: Progressing     Problem: Mobility  Goal: Patient's capacity to carry out activities will improve  Outcome: Progressing     Problem: Pain - Standard  Goal: Alleviation of pain or a reduction in pain to the patient’s comfort goal  Outcome: Progressing

## 2022-03-29 NOTE — RESPIRATORY CARE
" COPD EDUCATION by COPD CLINICAL EDUCATOR  3/29/2022 at 12:57 PM by Karen England, RRT     Patient reviewed by COPD education team. Patient does not have a history or diagnosis of COPD and is a former smoker.  Therefore, patient is not interested in the COPD program.    COPD Screen  COPD Risk Screening  Do you have a history of COPD?: No    COPD Assessment  COPD Clinical Specialists ONLY  COPD Education Initiated: No--Quick Screen (Pt seen 2/18/2022, admit SBO pancreastitis, refused education, denies COPD as she has never been dx with it does not use any inhalers and did not want a bedside PFT having abdominal pain and NGT may need surgery)  DME Company: None  Physician Name: Tariq Coyne  Pulmonologist Name: None - Patient may follow up OP with Pulmonary is not sure, has not had any troubles breathing  Referrals Initiated: Yes  Pulmonary Rehab: No  Smoking Cessation: N/A  Hospice: No  Home Health Care:  (TBD)  [facility] Outreach: N/A  [facility]: N/A  [facility] Health: No  Private In-Home Care Agency: N/A  Is this a COPD exacerbation patient?: No  (OP) Pulmonary Function Testing: Yes (Encourage pt to follow up and have a PFT)    PFT Results    No results found for: PFT    Meds to Beds  Would the patient like to opt in for Bedside Medication Delivery at Discharge?: Yes, interested     MY COPD ACTION PLAN     It is recommended that patients and physicians /healthcare providers complete this action plan together. This plan should be discussed at each physician visit and updated as needed.    The green, yellow and red zones show groups of symptoms of COPD. This list of symptoms is not comprehensive, and you may experience other symptoms. In the \"Actions\" column, your healthcare provider has recommended actions for you to take based on your symptoms.    Patient Name: Licha Pope   YOB: 1935   Last Updated on:     Green Zone:  I am doing well today Actions   •  Usual " "activitiy and exercise level •  Take daily medications   •  Usual amounts of cough and phlegm/mucus •  Use oxygen as prescribed   •  Sleep well at night •  Continue regular exercise/diet plan   •  Appetite is good •  At all times avoid cigarette smoke, inhaled irritants     Daily Medications (these medications are taken every day):                Yellow Zone:  I am having a bad day or a COPD flare Actions   •  More breathless than usual •  Continue daily medications   •  I have less energy for my daily activities •  Use quick relief inhaler as ordered   •  Increased or thicker phlegm/mucus •  Use oxygen as prescribed   •  Using quick relief inhaler/nebulizer more often •  Get plenty of rest   •  Swelling of ankles more than usual •  Use pursed lip breathing   •  More coughing than usual •  At all times avoid cigarette smoke, inhaled irritants   •  I feel like I have a \"chest cold\"   •  Poor sleep and my symptoms woke me up   •  My appetite is not good   •  My medicine is not helping    •  Call provider immediately if symptoms don’t improve     Continue daily medications, add rescue medications:               Medications to be used during a flare up, (as Discussed with Provider):              Red Zone:  I need urgent medical care Actions   •  Severe shortness of breath even at rest •  Call 911 or seek medical care immediately   •  Not able to do any activity because of breathing    •  Fever or shaking chills    •  Feeling confused or very drowsy     •  Chest pains    •  Coughing up blood              "

## 2022-03-29 NOTE — PROGRESS NOTES
Received report from day shift nurse. Assumed pt care at 1915. Pt is A&Ox4, resting comfortably in bed. Pt on r.a.. No signs of SOB/respiratory distress. NGT to low intermittent suction.Labs noted, VSS. Pt c/o no pain at this moment. Fall precautions in place. Bed at lowest position. Call light and personal belongings within reach. Continue to monitor

## 2022-03-29 NOTE — PROGRESS NOTES
Received report from SIDRA Acuna.  Assumed Pt. Care at 0730  Pt. A&Ox4  No sign of cardiac and respiratory distress.  Denies any pain at this time. Patient said that she doesn't felt good. NG tube still in place. No output on the canister at this time. 100 ml overnight.   Pt. Is in bed resting.  Bed at lowest position.  Call light and personal belonging within reach.   Encourage to call for assistance.

## 2022-03-29 NOTE — PROGRESS NOTES
"Progress Note:  3/29/2022, 8:41 AM    S: No acute events.  Less NG output but the patient says that she has not passed gas.  2 bowel movements recorded.  Ambulated in the hallway    O:  /76   Pulse 87   Temp 37 °C (98.6 °F) (Oral)   Resp 14   Ht 1.626 m (5' 4\")   Wt 57.2 kg (126 lb 1.7 oz)   SpO2 96%     NAD, awake, alert  Breathing is nonlabored  Abdomen is soft, protuberant, mildly tender to palpation particularly in the lower quadrants without guarding      A:   Active Hospital Problems    Diagnosis    • Generalized weakness [R53.1]    • Hyperglycemia [R73.9]    • SBO (small bowel obstruction) ([facility]) [K56.609]    • NATIVIDAD (acute kidney injury) ([facility]) [N17.9]    • Abdominal pain [R10.9]    • Anemia [D64.9]    • History of deep venous thrombosis [Z86.718]    • COPD (chronic obstructive pulmonary disease) ([facility]) [J44.9]    • Essential hypertension, benign [I10]          P:   -Will order small bowel follow-through today.  Further plans pending completion of this test.  Continue NG tube to low wall suction for now.  The patient may take the contrast orally or through the NG tube as per radiology preference    Mando Johnson M.D.  [facility]  [phone]    "

## 2022-03-30 ENCOUNTER — ANESTHESIA (OUTPATIENT)
Dept: SURGERY | Facility: MEDICAL CENTER | Age: 87
DRG: 329 | End: 2022-03-30
Payer: MEDICARE

## 2022-03-30 ENCOUNTER — APPOINTMENT (OUTPATIENT)
Dept: RADIOLOGY | Facility: MEDICAL CENTER | Age: 87
DRG: 329 | End: 2022-03-30
Attending: INTERNAL MEDICINE
Payer: MEDICARE

## 2022-03-30 ENCOUNTER — ANESTHESIA EVENT (OUTPATIENT)
Dept: SURGERY | Facility: MEDICAL CENTER | Age: 87
DRG: 329 | End: 2022-03-30
Payer: MEDICARE

## 2022-03-30 PROBLEM — E87.0 HYPERNATREMIA: Status: ACTIVE | Noted: 2022-03-30

## 2022-03-30 LAB
ALBUMIN SERPL BCP-MCNC: 3.2 G/DL (ref 3.2–4.9)
ALBUMIN/GLOB SERPL: 1.3 G/DL
ALP SERPL-CCNC: 37 U/L (ref 30–99)
ALT SERPL-CCNC: <5 U/L (ref 2–50)
ANION GAP SERPL CALC-SCNC: 7 MMOL/L (ref 7–16)
AST SERPL-CCNC: 12 U/L (ref 12–45)
BACTERIA BLD CULT: NORMAL
BACTERIA BLD CULT: NORMAL
BASOPHILS # BLD AUTO: 0.7 % (ref 0–1.8)
BASOPHILS # BLD: 0.04 K/UL (ref 0–0.12)
BILIRUB SERPL-MCNC: 0.4 MG/DL (ref 0.1–1.5)
BUN SERPL-MCNC: 8 MG/DL (ref 8–22)
CALCIUM SERPL-MCNC: 8.7 MG/DL (ref 8.4–10.2)
CHLORIDE SERPL-SCNC: 115 MMOL/L (ref 96–112)
CO2 SERPL-SCNC: 27 MMOL/L (ref 20–33)
CREAT SERPL-MCNC: 0.65 MG/DL (ref 0.5–1.4)
EOSINOPHIL # BLD AUTO: 0.1 K/UL (ref 0–0.51)
EOSINOPHIL NFR BLD: 1.7 % (ref 0–6.9)
ERYTHROCYTE [DISTWIDTH] IN BLOOD BY AUTOMATED COUNT: 45.6 FL (ref 35.9–50)
FLUAV RNA SPEC QL NAA+PROBE: NEGATIVE
FLUBV RNA SPEC QL NAA+PROBE: NEGATIVE
GFR SERPLBLD CREATININE-BSD FMLA CKD-EPI: 85 ML/MIN/1.73 M 2
GLOBULIN SER CALC-MCNC: 2.5 G/DL (ref 1.9–3.5)
GLUCOSE SERPL-MCNC: 123 MG/DL (ref 65–99)
HCT VFR BLD AUTO: 33.2 % (ref 37–47)
HGB BLD-MCNC: 10.6 G/DL (ref 12–16)
IMM GRANULOCYTES # BLD AUTO: 0.02 K/UL (ref 0–0.11)
IMM GRANULOCYTES NFR BLD AUTO: 0.3 % (ref 0–0.9)
LYMPHOCYTES # BLD AUTO: 0.56 K/UL (ref 1–4.8)
LYMPHOCYTES NFR BLD: 9.6 % (ref 22–41)
MAGNESIUM SERPL-MCNC: 2.2 MG/DL (ref 1.5–2.5)
MCH RBC QN AUTO: 31.9 PG (ref 27–33)
MCHC RBC AUTO-ENTMCNC: 31.9 G/DL (ref 33.6–35)
MCV RBC AUTO: 100 FL (ref 81.4–97.8)
MONOCYTES # BLD AUTO: 0.54 K/UL (ref 0–0.85)
MONOCYTES NFR BLD AUTO: 9.2 % (ref 0–13.4)
NEUTROPHILS # BLD AUTO: 4.6 K/UL (ref 2–7.15)
NEUTROPHILS NFR BLD: 78.5 % (ref 44–72)
NRBC # BLD AUTO: 0 K/UL
NRBC BLD-RTO: 0 /100 WBC
PATHOLOGY CONSULT NOTE: NORMAL
PHOSPHATE SERPL-MCNC: 2.6 MG/DL (ref 2.5–4.5)
PLATELET # BLD AUTO: 245 K/UL (ref 164–446)
PMV BLD AUTO: 11.4 FL (ref 9–12.9)
POTASSIUM SERPL-SCNC: 3.7 MMOL/L (ref 3.6–5.5)
PROT SERPL-MCNC: 5.7 G/DL (ref 6–8.2)
RBC # BLD AUTO: 3.32 M/UL (ref 4.2–5.4)
RSV RNA SPEC QL NAA+PROBE: NEGATIVE
SARS-COV-2 RNA RESP QL NAA+PROBE: NOTDETECTED
SIGNIFICANT IND 70042: NORMAL
SIGNIFICANT IND 70042: NORMAL
SITE SITE: NORMAL
SITE SITE: NORMAL
SODIUM SERPL-SCNC: 144 MMOL/L (ref 135–145)
SODIUM SERPL-SCNC: 149 MMOL/L (ref 135–145)
SOURCE SOURCE: NORMAL
SOURCE SOURCE: NORMAL
SPECIMEN SOURCE: NORMAL
WBC # BLD AUTO: 5.9 K/UL (ref 4.8–10.8)

## 2022-03-30 PROCEDURE — 83735 ASSAY OF MAGNESIUM: CPT

## 2022-03-30 PROCEDURE — C1765 ADHESION BARRIER: HCPCS | Performed by: SURGERY

## 2022-03-30 PROCEDURE — 160048 HCHG OR STATISTICAL LEVEL 1-5: Performed by: SURGERY

## 2022-03-30 PROCEDURE — 88342 IMHCHEM/IMCYTCHM 1ST ANTB: CPT

## 2022-03-30 PROCEDURE — 02HV33Z INSERTION OF INFUSION DEVICE INTO SUPERIOR VENA CAVA, PERCUTANEOUS APPROACH: ICD-10-PCS | Performed by: INTERNAL MEDICINE

## 2022-03-30 PROCEDURE — 160042 HCHG SURGERY MINUTES - EA ADDL 1 MIN LEVEL 5: Performed by: SURGERY

## 2022-03-30 PROCEDURE — 64488 TAP BLOCK BI INJECTION: CPT | Performed by: SURGERY

## 2022-03-30 PROCEDURE — 500800 HCHG LAPAROSCOPIC J/L HOOK: Performed by: SURGERY

## 2022-03-30 PROCEDURE — 700105 HCHG RX REV CODE 258: Performed by: ANESTHESIOLOGY

## 2022-03-30 PROCEDURE — 88305 TISSUE EXAM BY PATHOLOGIST: CPT | Mod: 59

## 2022-03-30 PROCEDURE — 700111 HCHG RX REV CODE 636 W/ 250 OVERRIDE (IP): Performed by: ANESTHESIOLOGY

## 2022-03-30 PROCEDURE — 700101 HCHG RX REV CODE 250: Performed by: ANESTHESIOLOGY

## 2022-03-30 PROCEDURE — 88309 TISSUE EXAM BY PATHOLOGIST: CPT

## 2022-03-30 PROCEDURE — 160002 HCHG RECOVERY MINUTES (STAT): Performed by: SURGERY

## 2022-03-30 PROCEDURE — 501838 HCHG SUTURE GENERAL: Performed by: SURGERY

## 2022-03-30 PROCEDURE — 500868 HCHG NEEDLE, SURGI(VARES): Performed by: SURGERY

## 2022-03-30 PROCEDURE — 302699 HCHG ABDOMINAL BINDER: Performed by: SURGERY

## 2022-03-30 PROCEDURE — C9803 HOPD COVID-19 SPEC COLLECT: HCPCS | Performed by: SURGERY

## 2022-03-30 PROCEDURE — 501429 HCHG STAPLER, ENDO GIA UNIV: Performed by: SURGERY

## 2022-03-30 PROCEDURE — 0DBW4ZX EXCISION OF PERITONEUM, PERCUTANEOUS ENDOSCOPIC APPROACH, DIAGNOSTIC: ICD-10-PCS | Performed by: SURGERY

## 2022-03-30 PROCEDURE — 700101 HCHG RX REV CODE 250: Performed by: INTERNAL MEDICINE

## 2022-03-30 PROCEDURE — 500378 HCHG DRAIN, J-VAC ROUND 19FR: Performed by: SURGERY

## 2022-03-30 PROCEDURE — 0241U HCHG SARS-COV-2 COVID-19 NFCT DS RESP RNA 4 TRGT MIC: CPT

## 2022-03-30 PROCEDURE — 0DBH0ZZ EXCISION OF CECUM, OPEN APPROACH: ICD-10-PCS | Performed by: SURGERY

## 2022-03-30 PROCEDURE — 80053 COMPREHEN METABOLIC PANEL: CPT

## 2022-03-30 PROCEDURE — 0DBB0ZZ EXCISION OF ILEUM, OPEN APPROACH: ICD-10-PCS | Performed by: SURGERY

## 2022-03-30 PROCEDURE — 700101 HCHG RX REV CODE 250: Performed by: SURGERY

## 2022-03-30 PROCEDURE — 88341 IMHCHEM/IMCYTCHM EA ADD ANTB: CPT | Mod: 91

## 2022-03-30 PROCEDURE — 501570 HCHG TROCAR, SEPARATOR: Performed by: SURGERY

## 2022-03-30 PROCEDURE — 36415 COLL VENOUS BLD VENIPUNCTURE: CPT

## 2022-03-30 PROCEDURE — 160009 HCHG ANES TIME/MIN: Performed by: SURGERY

## 2022-03-30 PROCEDURE — 500002 HCHG ADHESIVE, DERMABOND: Performed by: SURGERY

## 2022-03-30 PROCEDURE — 3E0T3BZ INTRODUCTION OF ANESTHETIC AGENT INTO PERIPHERAL NERVES AND PLEXI, PERCUTANEOUS APPROACH: ICD-10-PCS | Performed by: ANESTHESIOLOGY

## 2022-03-30 PROCEDURE — 501452 HCHG STAPLES, GIA MULTIFIRE 60/80: Performed by: SURGERY

## 2022-03-30 PROCEDURE — 99233 SBSQ HOSP IP/OBS HIGH 50: CPT | Performed by: INTERNAL MEDICINE

## 2022-03-30 PROCEDURE — 501433 HCHG STAPLER, GIA MULTIFIRE 60/80: Performed by: SURGERY

## 2022-03-30 PROCEDURE — 85025 COMPLETE CBC W/AUTO DIFF WBC: CPT

## 2022-03-30 PROCEDURE — 700111 HCHG RX REV CODE 636 W/ 250 OVERRIDE (IP): Performed by: INTERNAL MEDICINE

## 2022-03-30 PROCEDURE — 160035 HCHG PACU - 1ST 60 MINS PHASE I: Performed by: SURGERY

## 2022-03-30 PROCEDURE — 160031 HCHG SURGERY MINUTES - 1ST 30 MINS LEVEL 5: Performed by: SURGERY

## 2022-03-30 PROCEDURE — 770006 HCHG ROOM/CARE - MED/SURG/GYN SEMI*

## 2022-03-30 PROCEDURE — 160036 HCHG PACU - EA ADDL 30 MINS PHASE I: Performed by: SURGERY

## 2022-03-30 PROCEDURE — 501571 HCHG TROCAR, SEPARATOR 12X100: Performed by: SURGERY

## 2022-03-30 PROCEDURE — 84100 ASSAY OF PHOSPHORUS: CPT

## 2022-03-30 PROCEDURE — 84295 ASSAY OF SERUM SODIUM: CPT

## 2022-03-30 PROCEDURE — 502703 HCHG DEVICE, LIGASURE V SEALER: Performed by: SURGERY

## 2022-03-30 PROCEDURE — 501463 HCHG STAPLES, UNIV. ROTIC: Performed by: SURGERY

## 2022-03-30 PROCEDURE — C9113 INJ PANTOPRAZOLE SODIUM, VIA: HCPCS | Performed by: INTERNAL MEDICINE

## 2022-03-30 PROCEDURE — 700105 HCHG RX REV CODE 258: Performed by: INTERNAL MEDICINE

## 2022-03-30 PROCEDURE — 500802 HCHG LAPRASONIC ULTRA SHEAR: Performed by: SURGERY

## 2022-03-30 PROCEDURE — C1751 CATH, INF, PER/CENT/MIDLINE: HCPCS

## 2022-03-30 RX ORDER — GLYCOPYRROLATE 0.2 MG/ML
INJECTION INTRAMUSCULAR; INTRAVENOUS PRN
Status: DISCONTINUED | OUTPATIENT
Start: 2022-03-30 | End: 2022-03-31 | Stop reason: SURG

## 2022-03-30 RX ORDER — MIDAZOLAM HYDROCHLORIDE 1 MG/ML
1 INJECTION INTRAMUSCULAR; INTRAVENOUS
Status: DISCONTINUED | OUTPATIENT
Start: 2022-03-30 | End: 2022-03-30 | Stop reason: HOSPADM

## 2022-03-30 RX ORDER — OXYCODONE HCL 5 MG/5 ML
5 SOLUTION, ORAL ORAL
Status: DISCONTINUED | OUTPATIENT
Start: 2022-03-30 | End: 2022-03-30 | Stop reason: HOSPADM

## 2022-03-30 RX ORDER — SODIUM CHLORIDE, SODIUM GLUCONATE, SODIUM ACETATE, POTASSIUM CHLORIDE AND MAGNESIUM CHLORIDE 526; 502; 368; 37; 30 MG/100ML; MG/100ML; MG/100ML; MG/100ML; MG/100ML
INJECTION, SOLUTION INTRAVENOUS
Status: DISCONTINUED | OUTPATIENT
Start: 2022-03-30 | End: 2022-03-31 | Stop reason: SURG

## 2022-03-30 RX ORDER — NALOXONE HYDROCHLORIDE 0.4 MG/ML
INJECTION, SOLUTION INTRAMUSCULAR; INTRAVENOUS; SUBCUTANEOUS PRN
Status: DISCONTINUED | OUTPATIENT
Start: 2022-03-30 | End: 2022-03-31 | Stop reason: SURG

## 2022-03-30 RX ORDER — CALCIUM CHLORIDE 100 MG/ML
INJECTION INTRAVENOUS; INTRAVENTRICULAR PRN
Status: DISCONTINUED | OUTPATIENT
Start: 2022-03-30 | End: 2022-03-31 | Stop reason: SURG

## 2022-03-30 RX ORDER — PHENYLEPHRINE HCL IN 0.9% NACL 0.5 MG/5ML
SYRINGE (ML) INTRAVENOUS PRN
Status: DISCONTINUED | OUTPATIENT
Start: 2022-03-30 | End: 2022-03-31 | Stop reason: SURG

## 2022-03-30 RX ORDER — IPRATROPIUM BROMIDE AND ALBUTEROL SULFATE 2.5; .5 MG/3ML; MG/3ML
3 SOLUTION RESPIRATORY (INHALATION)
Status: DISCONTINUED | OUTPATIENT
Start: 2022-03-30 | End: 2022-03-30 | Stop reason: HOSPADM

## 2022-03-30 RX ORDER — HYDROMORPHONE HYDROCHLORIDE 1 MG/ML
0.4 INJECTION, SOLUTION INTRAMUSCULAR; INTRAVENOUS; SUBCUTANEOUS
Status: DISCONTINUED | OUTPATIENT
Start: 2022-03-30 | End: 2022-03-30 | Stop reason: HOSPADM

## 2022-03-30 RX ORDER — CEFOTETAN DISODIUM 2 G/20ML
INJECTION, POWDER, FOR SOLUTION INTRAMUSCULAR; INTRAVENOUS PRN
Status: DISCONTINUED | OUTPATIENT
Start: 2022-03-30 | End: 2022-03-31 | Stop reason: SURG

## 2022-03-30 RX ORDER — 0.9 % SODIUM CHLORIDE 0.9 %
0.5 VIAL (ML) INJECTION PRN
Status: DISCONTINUED | OUTPATIENT
Start: 2022-03-30 | End: 2022-03-31

## 2022-03-30 RX ORDER — LIDOCAINE HYDROCHLORIDE 40 MG/ML
SOLUTION TOPICAL PRN
Status: DISCONTINUED | OUTPATIENT
Start: 2022-03-30 | End: 2022-03-31 | Stop reason: SURG

## 2022-03-30 RX ORDER — ONDANSETRON 2 MG/ML
4 INJECTION INTRAMUSCULAR; INTRAVENOUS
Status: DISCONTINUED | OUTPATIENT
Start: 2022-03-30 | End: 2022-03-30 | Stop reason: HOSPADM

## 2022-03-30 RX ORDER — ONDANSETRON 2 MG/ML
INJECTION INTRAMUSCULAR; INTRAVENOUS PRN
Status: DISCONTINUED | OUTPATIENT
Start: 2022-03-30 | End: 2022-03-31 | Stop reason: SURG

## 2022-03-30 RX ORDER — DIPHENHYDRAMINE HYDROCHLORIDE 50 MG/ML
12.5 INJECTION INTRAMUSCULAR; INTRAVENOUS
Status: DISCONTINUED | OUTPATIENT
Start: 2022-03-30 | End: 2022-03-30 | Stop reason: HOSPADM

## 2022-03-30 RX ORDER — BUPIVACAINE HYDROCHLORIDE 5 MG/ML
INJECTION, SOLUTION EPIDURAL; INTRACAUDAL PRN
Status: DISCONTINUED | OUTPATIENT
Start: 2022-03-30 | End: 2022-03-31 | Stop reason: SURG

## 2022-03-30 RX ORDER — BUPIVACAINE HYDROCHLORIDE AND EPINEPHRINE 5; 5 MG/ML; UG/ML
INJECTION, SOLUTION EPIDURAL; INTRACAUDAL; PERINEURAL
Status: DISCONTINUED | OUTPATIENT
Start: 2022-03-30 | End: 2022-03-30 | Stop reason: HOSPADM

## 2022-03-30 RX ORDER — KETOROLAC TROMETHAMINE 30 MG/ML
INJECTION, SOLUTION INTRAMUSCULAR; INTRAVENOUS PRN
Status: DISCONTINUED | OUTPATIENT
Start: 2022-03-30 | End: 2022-03-31 | Stop reason: SURG

## 2022-03-30 RX ORDER — OXYCODONE HCL 5 MG/5 ML
10 SOLUTION, ORAL ORAL
Status: DISCONTINUED | OUTPATIENT
Start: 2022-03-30 | End: 2022-03-30 | Stop reason: HOSPADM

## 2022-03-30 RX ORDER — HALOPERIDOL 5 MG/ML
1 INJECTION INTRAMUSCULAR
Status: DISCONTINUED | OUTPATIENT
Start: 2022-03-30 | End: 2022-03-30 | Stop reason: HOSPADM

## 2022-03-30 RX ORDER — HETASTARCH 6 G/100ML
INJECTION, SOLUTION INTRAVENOUS PRN
Status: DISCONTINUED | OUTPATIENT
Start: 2022-03-30 | End: 2022-03-31 | Stop reason: SURG

## 2022-03-30 RX ORDER — SODIUM CHLORIDE, SODIUM GLUCONATE, SODIUM ACETATE, POTASSIUM CHLORIDE AND MAGNESIUM CHLORIDE 526; 502; 368; 37; 30 MG/100ML; MG/100ML; MG/100ML; MG/100ML; MG/100ML
500 INJECTION, SOLUTION INTRAVENOUS CONTINUOUS
Status: DISCONTINUED | OUTPATIENT
Start: 2022-03-30 | End: 2022-03-30 | Stop reason: HOSPADM

## 2022-03-30 RX ORDER — HYDROMORPHONE HYDROCHLORIDE 1 MG/ML
1 INJECTION, SOLUTION INTRAMUSCULAR; INTRAVENOUS; SUBCUTANEOUS
Status: DISCONTINUED | OUTPATIENT
Start: 2022-03-30 | End: 2022-03-30 | Stop reason: HOSPADM

## 2022-03-30 RX ORDER — MEPERIDINE HYDROCHLORIDE 25 MG/ML
12.5 INJECTION INTRAMUSCULAR; INTRAVENOUS; SUBCUTANEOUS
Status: DISCONTINUED | OUTPATIENT
Start: 2022-03-30 | End: 2022-03-30 | Stop reason: HOSPADM

## 2022-03-30 RX ORDER — HYDROMORPHONE HYDROCHLORIDE 1 MG/ML
0.2 INJECTION, SOLUTION INTRAMUSCULAR; INTRAVENOUS; SUBCUTANEOUS
Status: DISCONTINUED | OUTPATIENT
Start: 2022-03-30 | End: 2022-03-30 | Stop reason: HOSPADM

## 2022-03-30 RX ORDER — LIDOCAINE HYDROCHLORIDE 20 MG/ML
INJECTION, SOLUTION EPIDURAL; INFILTRATION; INTRACAUDAL; PERINEURAL PRN
Status: DISCONTINUED | OUTPATIENT
Start: 2022-03-30 | End: 2022-03-31 | Stop reason: SURG

## 2022-03-30 RX ORDER — ROCURONIUM BROMIDE 10 MG/ML
INJECTION, SOLUTION INTRAVENOUS PRN
Status: DISCONTINUED | OUTPATIENT
Start: 2022-03-30 | End: 2022-03-31 | Stop reason: SURG

## 2022-03-30 RX ORDER — DEXAMETHASONE SODIUM PHOSPHATE 4 MG/ML
INJECTION, SOLUTION INTRA-ARTICULAR; INTRALESIONAL; INTRAMUSCULAR; INTRAVENOUS; SOFT TISSUE PRN
Status: DISCONTINUED | OUTPATIENT
Start: 2022-03-30 | End: 2022-03-31 | Stop reason: SURG

## 2022-03-30 RX ADMIN — SUGAMMADEX 200 MG: 100 INJECTION, SOLUTION INTRAVENOUS at 18:54

## 2022-03-30 RX ADMIN — ROCURONIUM BROMIDE 80 MG: 10 INJECTION, SOLUTION INTRAVENOUS at 16:15

## 2022-03-30 RX ADMIN — FAMOTIDINE 20 MG: 10 INJECTION, SOLUTION INTRAVENOUS at 16:19

## 2022-03-30 RX ADMIN — HETASTARCH 500 ML: 6 INJECTION, SOLUTION INTRAVENOUS at 16:59

## 2022-03-30 RX ADMIN — CEFOTETAN DISODIUM 2 G: 2 INJECTION, POWDER, FOR SOLUTION INTRAMUSCULAR; INTRAVENOUS at 16:17

## 2022-03-30 RX ADMIN — NALOXONE HYDROCHLORIDE 0.1 MG: 0.4 INJECTION, SOLUTION INTRAMUSCULAR; INTRAVENOUS; SUBCUTANEOUS at 19:09

## 2022-03-30 RX ADMIN — BUPIVACAINE HYDROCHLORIDE 15 ML: 5 INJECTION, SOLUTION EPIDURAL; INTRACAUDAL; PERINEURAL at 18:40

## 2022-03-30 RX ADMIN — CEFTRIAXONE SODIUM 2 G: 2 INJECTION, POWDER, FOR SOLUTION INTRAMUSCULAR; INTRAVENOUS at 05:34

## 2022-03-30 RX ADMIN — LIDOCAINE HYDROCHLORIDE 4 MG: 20 INJECTION, SOLUTION EPIDURAL; INFILTRATION; INTRACAUDAL; PERINEURAL at 16:15

## 2022-03-30 RX ADMIN — METRONIDAZOLE 500 MG: 500 INJECTION, SOLUTION INTRAVENOUS at 04:28

## 2022-03-30 RX ADMIN — FENTANYL CITRATE 100 MCG: 50 INJECTION, SOLUTION INTRAMUSCULAR; INTRAVENOUS at 17:02

## 2022-03-30 RX ADMIN — ONDANSETRON 4 MG: 2 INJECTION INTRAMUSCULAR; INTRAVENOUS at 18:42

## 2022-03-30 RX ADMIN — CALCIUM CHLORIDE 500 MG: 100 INJECTION, SOLUTION INTRAVENOUS; INTRAVENTRICULAR at 18:59

## 2022-03-30 RX ADMIN — GLYCOPYRROLATE 0.2 MG: 0.2 INJECTION INTRAMUSCULAR; INTRAVENOUS at 18:58

## 2022-03-30 RX ADMIN — BUPIVACAINE HYDROCHLORIDE 15 ML: 5 INJECTION, SOLUTION EPIDURAL; INTRACAUDAL; PERINEURAL at 18:34

## 2022-03-30 RX ADMIN — LIDOCAINE HYDROCHLORIDE 4 ML: 40 SOLUTION TOPICAL at 16:15

## 2022-03-30 RX ADMIN — Medication 100 MCG: at 16:22

## 2022-03-30 RX ADMIN — PROPOFOL 100 MG: 10 INJECTION, EMULSION INTRAVENOUS at 16:15

## 2022-03-30 RX ADMIN — SUGAMMADEX 200 MG: 100 INJECTION, SOLUTION INTRAVENOUS at 18:42

## 2022-03-30 RX ADMIN — ROCURONIUM BROMIDE 10 MG: 10 INJECTION, SOLUTION INTRAVENOUS at 17:25

## 2022-03-30 RX ADMIN — KETOROLAC TROMETHAMINE 15 MG: 30 INJECTION, SOLUTION INTRAMUSCULAR at 18:42

## 2022-03-30 RX ADMIN — PANTOPRAZOLE SODIUM 40 MG: 40 INJECTION, POWDER, FOR SOLUTION INTRAVENOUS at 04:34

## 2022-03-30 RX ADMIN — POTASSIUM CHLORIDE, DEXTROSE MONOHYDRATE AND SODIUM CHLORIDE: 150; 5; 900 INJECTION, SOLUTION INTRAVENOUS at 14:25

## 2022-03-30 RX ADMIN — METRONIDAZOLE 500 MG: 500 INJECTION, SOLUTION INTRAVENOUS at 22:21

## 2022-03-30 RX ADMIN — SODIUM CHLORIDE, SODIUM GLUCONATE, SODIUM ACETATE, POTASSIUM CHLORIDE AND MAGNESIUM CHLORIDE: 526; 502; 368; 37; 30 INJECTION, SOLUTION INTRAVENOUS at 18:08

## 2022-03-30 RX ADMIN — NALOXONE HYDROCHLORIDE 0.1 MG: 0.4 INJECTION, SOLUTION INTRAMUSCULAR; INTRAVENOUS; SUBCUTANEOUS at 19:05

## 2022-03-30 RX ADMIN — METRONIDAZOLE 500 MG: 500 INJECTION, SOLUTION INTRAVENOUS at 14:15

## 2022-03-30 RX ADMIN — POTASSIUM CHLORIDE, DEXTROSE MONOHYDRATE AND SODIUM CHLORIDE: 150; 5; 900 INJECTION, SOLUTION INTRAVENOUS at 04:05

## 2022-03-30 RX ADMIN — GLYCOPYRROLATE 0.2 MG: 0.2 INJECTION INTRAMUSCULAR; INTRAVENOUS at 17:56

## 2022-03-30 RX ADMIN — SODIUM CHLORIDE, SODIUM GLUCONATE, SODIUM ACETATE, POTASSIUM CHLORIDE AND MAGNESIUM CHLORIDE: 526; 502; 368; 37; 30 INJECTION, SOLUTION INTRAVENOUS at 16:00

## 2022-03-30 RX ADMIN — FENTANYL CITRATE 25 MCG: 50 INJECTION, SOLUTION INTRAMUSCULAR; INTRAVENOUS at 20:53

## 2022-03-30 RX ADMIN — DEXAMETHASONE SODIUM PHOSPHATE 8 MG: 4 INJECTION, SOLUTION INTRAMUSCULAR; INTRAVENOUS at 16:19

## 2022-03-30 RX ADMIN — CALCIUM CHLORIDE 500 MG: 100 INJECTION, SOLUTION INTRAVENOUS; INTRAVENTRICULAR at 17:55

## 2022-03-30 RX ADMIN — SODIUM CHLORIDE, SODIUM GLUCONATE, SODIUM ACETATE, POTASSIUM CHLORIDE AND MAGNESIUM CHLORIDE: 526; 502; 368; 37; 30 INJECTION, SOLUTION INTRAVENOUS at 16:01

## 2022-03-30 RX ADMIN — ROCURONIUM BROMIDE 10 MG: 10 INJECTION, SOLUTION INTRAVENOUS at 17:55

## 2022-03-30 ASSESSMENT — ENCOUNTER SYMPTOMS
FEVER: 0
NERVOUS/ANXIOUS: 0
DOUBLE VISION: 0
HEADACHES: 0
FALLS: 0
BACK PAIN: 0
BLURRED VISION: 0
PALPITATIONS: 0
CHILLS: 0
SHORTNESS OF BREATH: 0
HEARTBURN: 0
ABDOMINAL PAIN: 1
COUGH: 0
DIZZINESS: 0
VOMITING: 0

## 2022-03-30 ASSESSMENT — LIFESTYLE VARIABLES: SUBSTANCE_ABUSE: 0

## 2022-03-30 ASSESSMENT — PAIN DESCRIPTION - PAIN TYPE
TYPE: ACUTE PAIN
TYPE: ACUTE PAIN

## 2022-03-30 NOTE — PROGRESS NOTES
Reviewed chart.  SBFT consistent with partial small bowel obstruction.  Surgery is being planned.  Will standby for now.  Thank you again for this consult.    GI will stand by for now.    Please call [facility] if additional GI questions arise ([phone]).  Thank you again for this consult.]

## 2022-03-30 NOTE — PROGRESS NOTES
"Progress Note:  3/30/2022, 9:08 AM    S: Small bowel follow-through yesterday.  Had some bowel function from below yesterday with gas and bowel movement at 1 PM, but vomited since then.  She says she felt better after she vomited.    O:  /68   Pulse 81   Temp 36.7 °C (98.1 °F) (Temporal)   Resp 16   Ht 1.626 m (5' 4\")   Wt 57.2 kg (126 lb 1.7 oz)   SpO2 96%     NAD, awake and alert  Abdomen is soft but mild to moderately distended without significant tenderness to palpation      A:   Active Hospital Problems    Diagnosis    • Generalized weakness [R53.1]    • Hyperglycemia [R73.9]    • SBO (small bowel obstruction) ([facility]) [K56.609]    • NATIVIDAD (acute kidney injury) ([facility]) [N17.9]    • Abdominal pain [R10.9]    • Anemia [D64.9]    • History of deep venous thrombosis [Z86.718]    • COPD (chronic obstructive pulmonary disease) ([facility]) [J44.9]    • Essential hypertension, benign [I10]          P:   -Awaiting formal results of small bowel follow-through, but clinically suspect will need to perform at least diagnostic laparoscopy with possible laparotomy for adhesiolysis, and possible bowel resection pending intraoperative findings.  This is not urgent as she is clinically stable.  Keep NG tube in place for now.  We will follow up on formal radiology results and make plans accordingly.  Patient was in agreement with the plan as laid out above.  In anticipation of possible surgery, we did discuss the operation including the risk, benefits, and alternatives.  Risks include but not limited to bleeding, infection, damage trying structures, need for further procedures, bowel injury, need for bowel resection, MI, stroke, DVT/PE, pneumonia, death.    Mando Johnson M.D.  [facility]  [phone]      Addendum:  3/30/2022, 3:04 PM  Case discussed again with the patient and her son.  Formal radiology results show partial small bowel obstruction with time to reach colon more than 9 hours and clinically she is not " progressing.  Recommend proceeding with surgery.  In addition, she has a history of choledocholithiasis requiring ERCP with stone extraction and stent placement.  She had outpatient surgery follow-up plan for interval cholecystectomy.  Depending on intraoperative findings and safety to proceed, tentatively planning cholecystectomy as part of this operation.  However, if there is a need to perform open surgery or there is significant bowel pathology, we will not perform cholecystectomy at this time due to added complexity.  I discussed this in detail with the patient's son as well as the patient.  They verbalized understanding wish to proceed.    Mando Johnson M.D.  [facility]  [phone]

## 2022-03-30 NOTE — DISCHARGE PLANNING
Anticipated Discharge Disposition: Home with     Action: LSW met with pt at bedside to discuss HH. Pt confirmed address on face sheet. Pt states her PCP is Dr. Prateek Mar. Pt states she has been on service with [facility] in the past. Pt gave consent to send referral to [facility].     LSW faxed choice form to DPA.     Barriers to Discharge: None    Plan: LSW to follow and assist as needed.

## 2022-03-30 NOTE — CARE PLAN
The patient is Stable - Low risk of patient condition declining or worsening    Shift Goals  Clinical Goals: free from aspiration, complete bowel series  Patient Goals: free from nausea and vomiting    Progress made toward(s) clinical / shift goals:   Pt was relieved after Metoclopromide 10 mg IV was given. Pt seen sleeping in between rounds. Will continue to monitor    Patient is not progressing towards the following goals:N/A      Problem: Knowledge Deficit - Standard  Goal: Patient and family/care givers will demonstrate understanding of plan of care, disease process/condition, diagnostic tests and medications  Outcome: Progressing     Problem: Bowel Elimination  Goal: Establish and maintain regular bowel function  Outcome: Progressing     Problem: Gastrointestinal Irritability  Goal: Nausea and vomiting will be absent or improve  Outcome: Progressing     Problem: Fluid Volume  Goal: Fluid volume balance will be maintained  Outcome: Progressing     Problem: Urinary Elimination  Goal: Establish and maintain regular urinary output  Outcome: Progressing     Problem: Mobility  Goal: Patient's capacity to carry out activities will improve  Outcome: Progressing

## 2022-03-30 NOTE — PROGRESS NOTES
Pt vomited during the image was taken at bedside, according to  Radiologist there will be another 2 hours for another image, informed MD Dr aSmuel about the current situation, he said to wait for the next 2 hours and he will order anti emetic.    2010H Informed Radiologist Jelly, she said she will give an update later

## 2022-03-30 NOTE — DISCHARGE PLANNING
Received Choice form at 1149  Agency/Facility Name: [facility]  Referral sent per Choice form @ [phone]  Agency/Facility Name: [facility]  Spoke To: Tori  Outcome: Per tori she is verfying PCP and will call this DPA back     1615  Agency/Facility Name: [facility]  Spoke To: tori  Outcome: Per tori she passed referral to administration and will call DPA back tomorrow on determination

## 2022-03-30 NOTE — PROGRESS NOTES
"Hospital Medicine Daily Progress Note    Date of Service  3/30/2022    Chief Complaint  Licha Pope is a 86 y.o. female admitted 3/25/2022 with abdominal pain.    Hospital Course  As per chart review  \"86 y.o. female with a history of hypertension, COPD, GERD, DVT dx 5/19/21 after long car trip (still on anticoagulation) who presented 3/25/2022 with abdominal pain.     Was recently admitted for terminal ileitis, SBO and pancreatitis on 2/17/2022, she was treated conservatively with IV fluids and pain control.  Right upper quadrant showed dependent gallbladder sludge versus small stones.  MRCP showed cholelithiasis and common bile duct 11.4 mm unchanged since 2015.  She was discharged with antibiotics and recommended to follow-up with GI, surgery and PCP after discharge.  She saw surgeon, she recommended cholecystectomy but wanted her to see GI doctor. GI doctor recommended cholecystectomy.  Has appt with surgery next Wed.       Patient never really felt like her abdominal pain improved however was stable until 2 days ago.  Abdominal pain became very severe however intermittent and would come in waves.  Patient became very fatigued and was sleeping most the day.  She had chills and was very weak.  Abdominal pain was diffuse, intermittent, described as sharp pain, 10/10 pain, went to PCP and doctor sent her to ER.  Did have some n/v, had some dark red or purple, size of quarter.  Passing gas.  Yesterday had BM.  Did have some dark stools, unsure if black was \"half asleep\".  Last night had lower back pain, now improved.      In ER she was afebrile, mildly tachycardic (heart rate 111), normal blood pressure and mild hypoxia requiring 1 L nasal cannula.  Labs remarkable for WBC 11.6 with a left shift, creatinine 1.06, calcium 10.6, lipase 104, troponin XX, lactic acid normal.  CT abdomen pelvis with contrast showed similar changes of bowel wall thickening involving the terminal ileum and base of the cecum adjacent " "to postoperative change from prior appendectomy which could be infectious or inflammatory.  There is mild proximal obstruction involving the ileum and distal jejunum in the lower abdomen left mid abdomen with small bowel loops dilated up to 4 cm.  There is no gross free air or pneumatosis.  Moderate size hiatal hernia again seen.  Minimal prominent biliary tree unchanged and physiologic, unchanged a back to 2015.  Simple hepatic cyst unchanged.  She was given IV fluids, Zosyn and general surgery was consulted.  Dr. Mcneil to see patient.  Recommended NG, IV fluids.\"    Interval Problem Update  3/26: Patient seen at bedside this morning.  Patient is hard of hearing.  Patient with NG tube in place.  Still complaining of abdominal pain some tenderness on examination.  General surgery has been consulted, we appreciate further recommendations.  Continue antibiotics for now.    3/27: Patient seen at bedside this morning.  It seems that the patient's NG tube came off, however the patient it seems that now is having regular bowel movements.  She still complaining of abdominal pain.  We have consulted GI, we appreciate further recommendations.  We also appreciate further recommendations by general surgery.  We will continue n.p.o. until surgery reevaluate the patient.  We will continue with antibiotics for now.  --The patient told me she used to see Dr Tarun Lewis at [facility], so I have consulted Dr Clay, we appreciate further recommendations.    3/28: Patient seen at bedside this morning.  The patient was vomiting this morning, she still complaining of abdominal pain.  We have placed the NG tube back on, we have ordered KUB.  We appreciate further recommendations by general surgery.  Also it seems that the patient had been seeing Dr. Ash earlier this month and she would like to continue seeing that group.  We have consulted Dr. Belle from GI, we appreciate further recommendations.  We will keep the patient n.p.o. for " now until surgical evaluation.  We will continue with antibiotics.    3/29: Patient seen at bedside this morning.  No family was present at bedside directly on my evaluation.  I tried calling the patient's son to give him an update however there was no answer.  Patient still n.p.o., with NG tube to suction.  General surgery and GI following the patient, we appreciate further recommendations.  The patient will undergo small bowel follow-through study today.  I asked the patient and it seems that she has not had a proper meal since last Thursday, if by tomorrow we are unable to feed the patient will consider TPN while further management is being done.    3/30: Patient seen at bedside this morning.  No family present at bedside, however I was able to talk to the patient's son over the phone and gave him an update.  Patient still n.p.o. with NG tube, I spoke to surgery and they will take her to the operating room later today.  We were going to start TPN today, however surgery would like to hold for now.  Patient with mild hyponatremia, this is most likely due to volume depletion as such she has not really eaten much for the last couple days.  We have increased the rate of the D5 NS, monitor and repeat sodium values.  We will order PICC line in preparation of possible TPN.  We appreciate further recommendations by general surgery.    I have personally seen and examined the patient at bedside. I discussed the plan of care with patient, bedside RN and charge RN.    Consultants/Specialty  general surgery    Code Status  DNAR/DNI    Disposition  Patient is not medically cleared for discharge.   Anticipate discharge to pending clinical evolution.    Review of Systems  Review of Systems   Constitutional: Positive for malaise/fatigue. Negative for chills and fever.   HENT: Positive for hearing loss. Negative for nosebleeds.    Eyes: Negative for blurred vision and double vision.   Respiratory: Negative for cough and shortness  of breath.    Cardiovascular: Negative for chest pain and palpitations.   Gastrointestinal: Positive for abdominal pain. Negative for heartburn and vomiting.   Genitourinary: Negative for dysuria and urgency.   Musculoskeletal: Negative for back pain and falls.   Skin: Negative for itching and rash.   Neurological: Negative for dizziness and headaches.   Psychiatric/Behavioral: Negative for substance abuse. The patient is not nervous/anxious.    All other systems reviewed and are negative.       Physical Exam  Temp:  [36.7 °C (98 °F)-37 °C (98.6 °F)] 36.9 °C (98.5 °F)  Pulse:  [74-81] 74  Resp:  [16] 16  BP: (134-152)/(68-83) 148/78  SpO2:  [94 %-96 %] 96 %    Physical Exam  Vitals and nursing note reviewed.   Constitutional:       General: She is not in acute distress.     Appearance: She is ill-appearing.   HENT:      Head: Normocephalic and atraumatic.      Right Ear: External ear normal.      Left Ear: External ear normal.      Mouth/Throat:      Pharynx: No oropharyngeal exudate or posterior oropharyngeal erythema.   Eyes:      General:         Right eye: No discharge.         Left eye: No discharge.   Cardiovascular:      Rate and Rhythm: Normal rate and regular rhythm.      Pulses: Normal pulses.      Heart sounds: No murmur heard.    No gallop.   Pulmonary:      Effort: Pulmonary effort is normal. No respiratory distress.      Breath sounds: Normal breath sounds. No wheezing or rhonchi.   Abdominal:      General: There is distension.      Tenderness: There is abdominal tenderness.      Comments: With NG tube   Musculoskeletal:         General: No swelling or tenderness. Normal range of motion.      Cervical back: Normal range of motion and neck supple. No tenderness.   Skin:     General: Skin is warm and dry.   Neurological:      General: No focal deficit present.      Mental Status: She is alert and oriented to person, place, and time. Mental status is at baseline.      Motor: No weakness.   Psychiatric:          Mood and Affect: Mood normal.         Behavior: Behavior normal.         Thought Content: Thought content normal.         Fluids    Intake/Output Summary (Last 24 hours) at 3/30/2022 1133  Last data filed at 3/30/2022 0528  Gross per 24 hour   Intake 0 ml   Output 2150 ml   Net -2150 ml       Laboratory  Recent Labs     03/28/22  0514 03/29/22  0512 03/30/22  0520   WBC 3.4* 5.5 5.9   RBC 3.33* 3.30* 3.32*   HEMOGLOBIN 10.5* 10.5* 10.6*   HEMATOCRIT 33.7* 33.2* 33.2*   .2* 100.6* 100.0*   MCH 31.5 31.8 31.9   MCHC 31.2* 31.6* 31.9*   RDW 45.8 45.3 45.6   PLATELETCT 194 219 245   MPV 11.6 11.6 11.4     Recent Labs     03/28/22  0514 03/29/22  0512 03/30/22  0520   SODIUM 142 146* 149*   POTASSIUM 4.0 3.9 3.7   CHLORIDE 111 114* 115*   CO2 25 26 27   GLUCOSE 139* 148* 123*   BUN 8 6* 8   CREATININE 0.56 0.55 0.65   CALCIUM 8.4 8.4 8.7                   Imaging  DX-SMALL BOWEL SERIES   Final Result      Partial small bowel obstruction with contrast transit time to the colon exceeding 9 hours      5 cm paraesophageal hernia      NG tube terminates over the gastric fundus      DX-ABDOMEN FOR TUBE PLACEMENT   Final Result         Gastric drainage tube with tip projecting over the expected area of the stomach.      QZ-NTVISDT-2 VIEW   Final Result      Long segmental small bowel dilatation concerning for distal small bowel obstruction      DX-ABDOMEN FOR TUBE PLACEMENT   Final Result      Enteric tube terminates over the stomach.      Small bowel loop dilatation concerning for small bowel obstruction      DX-ABDOMEN FOR TUBE PLACEMENT   Final Result         Gastric drainage tube with tip projecting over the expected area of the stomach.      DX-ABDOMEN FOR TUBE PLACEMENT   Final Result         Gastric drainage tube loops in the esophagus with tip projecting over the expected area of the upper esophagus.      CT-ABDOMEN-PELVIS WITH   Final Result      1.  There are similar changes of bowel wall thickening  involving the terminal ileum and base of the cecum adjacent to postoperative change from prior appendectomy which could be infectious or inflammatory.   2.  There is mild proximal obstruction involving the ileum and distal jejunum in the lower abdomen and left mid abdomen with small bowel loops dilated up to 4 cm.   3.  There is no gross free air or pneumatosis.   4.  Moderate size hiatal hernia again seen.   5.  Minimally prominent biliary tree unchanged and physiologic, unchanged dating back to 2015.   6.  Simple hepatic cysts are unchanged.         DX-CHEST-PORTABLE (1 VIEW)   Final Result      1.  There is no acute cardiopulmonary process.           Assessment/Plan  * SBO (small bowel obstruction) (HCC)- (present on admission)  Assessment & Plan  Patient with recent terminal ileitis and small bowel obstruction last month  Treated with conservative therapy and antibiotics however now with repeat symptoms and SBO  Surgery consulted by ERP: Dr. Mcneil was called, recommended NPO, NG, IVF, pain mgmt  CTX, flagyl    3/26: Pending surgical evaluation, we appreciate further recommendations.    3/27: Patient seems to have BMs now, we are awaiting further recommendations by general surgery. We have also consulted GI ( Dr Clay), we appreciate further recommendations.    3/28: Patient was vomiting again this morning with abdominal pain.  We have placed the NG tube back and have ordered KUB.  We appreciate further recommendations by general surgery and GI. (Dr Belle from GI will be evaluating the patient today).    3/29: Patient to have small bowel follow through study today, we appreciate further recommendations by GI and General Surgery    3/30: Patient still unable to tolerate PO, we will start TPN, we appreciate further recommendations by surgery and GI.  --UPDATE: I spoke to surgery, they would like to hold TPN for now, as she might undergo surgery today, we will order PICC line.    Hypernatremia  Assessment &  Plan  Mild, this could be due to volume depletion, patient has not had proper nutrition in a couple of days  Currently on D5 NS, we will increase the rate to 125 ml/hr  We were going to order TPN today, however surgery would like to hold, as patient will undergo surgery later today.  We will continue to monitor.    Generalized weakness  Assessment & Plan  PT/OT    Hyperglycemia  Assessment & Plan  Patient had recent A1c at 5.1  Monitor  No need to start insulin treatment at this time    NATIVIDAD (acute kidney injury) (HCC)  Assessment & Plan  Mild bump in Cr 0.6-->1.06  Likely in setting of decreased PO intake/pre-renal  IVF resuscitation  Continue to monitor    3/29: Improved. Cr is 0.55 today.    Abdominal pain  Assessment & Plan  Initial concern for sbo  Also concern for ileitis  General surgery and GI following patient, we appreciate further recommendations    Anemia- (present on admission)  Assessment & Plan  No signs of bleeding at this time, monitor    History of deep venous thrombosis- (present on admission)  Assessment & Plan  Diagnosed 5/19/2020 6 hour car trip  Still on anticoagulation    3/27: Will probably resume once surgery clears her NPO    COPD (chronic obstructive pulmonary disease) (HCC)- (present on admission)  Assessment & Plan  Not on home oxygen  Not having COPD exacerbation    Essential hypertension, benign- (present on admission)  Assessment & Plan  Home regimen: Valsartan 160 mg daily  Inpatient regimen: PRN medication, resume home medication once surgery clears for PO    3/29: Patient still NPO, however BP seems to be controlled, PRN medication for now       VTE prophylaxis: SCDs/TEDs    I have performed a physical exam and reviewed and updated ROS and Plan today (3/30/2022). In review of yesterday's note (3/29/2022), there are no changes except as documented above.

## 2022-03-30 NOTE — ANESTHESIA PREPROCEDURE EVALUATION
Case: [id number] Date/Time: 03/30/22 1515    Procedures:       LAPAROSCOPY      Possible  APPENDECTOMY, LAPAROSCOPIC      Possible  CHOLECYSTECTOMY, LAPAROSCOPIC    Location:  OR 06 / SURGERY [facility]    Surgeons: Mando Johnson M.D.          Relevant Problems   PULMONARY   (positive) COPD (chronic obstructive pulmonary disease) (HCC)      NEURO   (positive) History of deep venous thrombosis      CARDIAC   (positive) Essential hypertension, benign      GI   (positive) Gastroesophageal reflux disease without esophagitis         (positive) NATIVIDAD (acute kidney injury) (HCC)      Other   (positive) Arthritis       Physical Exam    Airway   Mallampati: IV  TM distance: >3 FB  Neck ROM: limited       Cardiovascular - normal exam  Rhythm: regular  Rate: normal  (-) murmur     Dental - normal exam           Pulmonary - normal exam  Breath sounds clear to auscultation     Abdominal    Neurological - normal exam         Other findings: Poor airway, acute bowel obstruction, Htn, COPD, GERD, anemia            Anesthesia Plan    ASA 3- EMERGENT       Plan - general       Airway plan will be ETT    (  Emergency case)      Induction: intravenous    Postoperative Plan: Postoperative administration of opioids is intended.    Pertinent diagnostic labs and testing reviewed    Informed Consent:    Anesthetic plan and risks discussed with patient.    Use of blood products discussed with: patient whom consented to blood products.

## 2022-03-30 NOTE — PROGRESS NOTES
Received report from day shift nurse. Assumed pt care at 1915. Pt is A&Ox4, resting comfortably in bed. Pt on 1 Lpm via nasal cannula NGT intact not attached to suction No signs of SOB/respiratory distress. Labs noted, VSS. Pt c/o no pain at this moment. Fall precautions in place. Bed at lowest position. Call light and personal belongings within reach. Continue to monitor

## 2022-03-30 NOTE — ANESTHESIA PROCEDURE NOTES
Airway    Date/Time: 3/30/2022 4:15 PM  Performed by: Jarrett Howard III, M.D.  Authorized by: Jarrett Howard III, M.D.     Location:  OR  Urgency:  Elective  Difficult Airway: No    Indications for Airway Management:  Anesthesia      Spontaneous Ventilation: absent    Sedation Level:  Deep  Preoxygenated: Yes    Patient Position:  Sniffing  Final Airway Type:  Endotracheal airway  Final Endotracheal Airway:  ETT  Cuffed: Yes    Technique Used for Successful ETT Placement:  Direct laryngoscopy    Insertion Site:  Oral  Blade Type:  Pratt  Laryngoscope Blade/Videolaryngoscope Blade Size:  2  ETT Size (mm):  7.5  Measured from:  Lips  ETT to Lips (cm):  21  Placement Verified by: auscultation and capnometry    Cormack-Lehane Classification:  Grade IV - neither glottis nor epiglottis seen  Number of Attempts at Approach:  1

## 2022-03-30 NOTE — CARE PLAN
The patient is Watcher - Medium risk of patient condition declining or worsening    Shift Goals  Clinical Goals: Ambulation, gastric decompression, nausea and pain control  Patient Goals: nausea control  Family Goals: NA    Progress made toward(s) clinical / shift goals:  Ambulation x 3 daily, pain controled, no nausea at this time.     Patient is not progressing towards the following goals: NA      Problem: Gastrointestinal Irritability  Goal: Nausea and vomiting will be absent or improve  Outcome: Not Progressing   Encouraging ambulation for patient 3 times daily     Problem: Gastrointestinal Irritability  Goal: Nausea and vomiting will be absent or improve  Outcome: Not Progressing     Problem: Urinary Elimination  Goal: Establish and maintain regular urinary output  Outcome: Progressing     Problem: Mobility  Goal: Patient's capacity to carry out activities will improve  Outcome: Progressing     Problem: Pain - Standard  Goal: Alleviation of pain or a reduction in pain to the patient’s comfort goal  Outcome: Progressing     Problem: Fall Risk  Goal: Patient will remain free from falls  Outcome: Progressing

## 2022-03-30 NOTE — PROGRESS NOTES
Received report from NOC RN.  Patient resting, no signs of distress.  NGT to L nare, connected to low suction,  brown liquid noted in suction canister.   Bed in low and locked position.  Hourly rounding in place.

## 2022-03-30 NOTE — ASSESSMENT & PLAN NOTE
Mild, this could be due to volume depletion, patient has not had proper nutrition in a couple of days  Currently on D5 NS, we will increase the rate to 125 ml/hr  We were going to order TPN today, however surgery would like to hold, as patient will undergo surgery later today.  We will continue to monitor.    3/31: We have ordered TPN today    4/1: Improving

## 2022-03-30 NOTE — PROGRESS NOTES
Patient vomited 300 mL green emesis. She said it just come out so fast that it soiled the whole bed. CNA assess cleaning the patient.

## 2022-03-30 NOTE — PROGRESS NOTES
Vascular Access Team    Date of Insertion: 03/30/2022  Arm Circumference: 25 cm  Internal length: 43 cm  External Length: 0 cm  Vein Occupancy: 33 %  Reason for PICC: TPN  Labs within procedure parameters.    Ultrasound images uploaded to PACS and viewable in the EMR - yes  Ultrasound images printed and placed in paper chart - no     [employer] Power PICC Lot #: [id number]  Expiration Date: 09/30/2022    Chart reviewed for provider order, indications and contraindications for peripherally inserted central catheter. Labs reviewed and are within procedure parameters. Nursing care plan includes knowledge deficit, potential for pain and anxiety, potential for infection. POC: patient teaching, comfort measures, and sterile technique using five step bundle to prevent CR-BSI. Risks and benefits of procedure explained to patient emphasizing risk of central bloodstream infection. Questions answered. Patient verbalized understanding. Consent signed by patient.     Maximum barrier precautions and aseptic technique used. 2 ml of 1% lidocaine injected intradermally to insertion site at LUE. 21 gauge micro-introducer needle used to access Basilic vein. Modified Seldinger technique used to insert 5 fr double lumen 43 cm catheter with brisk blood return noted through each lumen. Each lumen flushed with 10 cc normal saline using pulsatile method without resistance . PICC secured with Statlock at 0 cm jd. Biopatch and Tegaderm applied over insertion site. 3CG/EKG technology used with appropriate waveform electronically added to chart via [employer].      # of attempts: one      Time out and LDA documentation completed. Patient condition and procedure outcome reported to unit RN and /or ordering provider via this post-procedure note.     Patient educated re: PICC care. Written post-procedure instructions given to patient.

## 2022-03-31 ENCOUNTER — APPOINTMENT (OUTPATIENT)
Dept: RADIOLOGY | Facility: MEDICAL CENTER | Age: 87
DRG: 329 | End: 2022-03-31
Attending: INTERNAL MEDICINE
Payer: MEDICARE

## 2022-03-31 PROBLEM — R19.00 INTRAABDOMINAL MASS: Status: ACTIVE | Noted: 2022-03-31

## 2022-03-31 PROBLEM — J96.01 ACUTE HYPOXEMIC RESPIRATORY FAILURE (HCC): Status: ACTIVE | Noted: 2022-03-31

## 2022-03-31 LAB
ALBUMIN SERPL BCP-MCNC: 2.1 G/DL (ref 3.2–4.9)
ALBUMIN/GLOB SERPL: 1.1 G/DL
ALP SERPL-CCNC: 24 U/L (ref 30–99)
ALT SERPL-CCNC: <5 U/L (ref 2–50)
ANION GAP SERPL CALC-SCNC: 7 MMOL/L (ref 7–16)
AST SERPL-CCNC: 14 U/L (ref 12–45)
BASOPHILS # BLD AUTO: 0.3 % (ref 0–1.8)
BASOPHILS # BLD: 0.03 K/UL (ref 0–0.12)
BILIRUB SERPL-MCNC: 0.3 MG/DL (ref 0.1–1.5)
BUN SERPL-MCNC: 8 MG/DL (ref 8–22)
CALCIUM SERPL-MCNC: 7.5 MG/DL (ref 8.4–10.2)
CHLORIDE SERPL-SCNC: 116 MMOL/L (ref 96–112)
CHOLEST SERPL-MCNC: 63 MG/DL (ref 100–199)
CO2 SERPL-SCNC: 24 MMOL/L (ref 20–33)
CREAT SERPL-MCNC: 0.66 MG/DL (ref 0.5–1.4)
EOSINOPHIL # BLD AUTO: 0 K/UL (ref 0–0.51)
EOSINOPHIL NFR BLD: 0 % (ref 0–6.9)
ERYTHROCYTE [DISTWIDTH] IN BLOOD BY AUTOMATED COUNT: 46.2 FL (ref 35.9–50)
GFR SERPLBLD CREATININE-BSD FMLA CKD-EPI: 85 ML/MIN/1.73 M 2
GLOBULIN SER CALC-MCNC: 1.9 G/DL (ref 1.9–3.5)
GLUCOSE BLD STRIP.AUTO-MCNC: 103 MG/DL (ref 65–99)
GLUCOSE BLD STRIP.AUTO-MCNC: 131 MG/DL (ref 65–99)
GLUCOSE BLD STRIP.AUTO-MCNC: 172 MG/DL (ref 65–99)
GLUCOSE SERPL-MCNC: 177 MG/DL (ref 65–99)
HCT VFR BLD AUTO: 29.2 % (ref 37–47)
HGB BLD-MCNC: 8.9 G/DL (ref 12–16)
IMM GRANULOCYTES # BLD AUTO: 0.04 K/UL (ref 0–0.11)
IMM GRANULOCYTES NFR BLD AUTO: 0.4 % (ref 0–0.9)
LYMPHOCYTES # BLD AUTO: 0.49 K/UL (ref 1–4.8)
LYMPHOCYTES NFR BLD: 5.4 % (ref 22–41)
MCH RBC QN AUTO: 31.6 PG (ref 27–33)
MCHC RBC AUTO-ENTMCNC: 30.5 G/DL (ref 33.6–35)
MCV RBC AUTO: 103.5 FL (ref 81.4–97.8)
MONOCYTES # BLD AUTO: 0.53 K/UL (ref 0–0.85)
MONOCYTES NFR BLD AUTO: 5.8 % (ref 0–13.4)
NEUTROPHILS # BLD AUTO: 8 K/UL (ref 2–7.15)
NEUTROPHILS NFR BLD: 88.1 % (ref 44–72)
NRBC # BLD AUTO: 0 K/UL
NRBC BLD-RTO: 0 /100 WBC
PLATELET # BLD AUTO: 204 K/UL (ref 164–446)
PMV BLD AUTO: 11.5 FL (ref 9–12.9)
POTASSIUM SERPL-SCNC: 4.1 MMOL/L (ref 3.6–5.5)
PROT SERPL-MCNC: 4 G/DL (ref 6–8.2)
RBC # BLD AUTO: 2.82 M/UL (ref 4.2–5.4)
SODIUM SERPL-SCNC: 147 MMOL/L (ref 135–145)
TRIGL SERPL-MCNC: 54 MG/DL (ref 0–149)
WBC # BLD AUTO: 9.1 K/UL (ref 4.8–10.8)

## 2022-03-31 PROCEDURE — 97535 SELF CARE MNGMENT TRAINING: CPT

## 2022-03-31 PROCEDURE — 700105 HCHG RX REV CODE 258: Performed by: INTERNAL MEDICINE

## 2022-03-31 PROCEDURE — 82465 ASSAY BLD/SERUM CHOLESTEROL: CPT

## 2022-03-31 PROCEDURE — 700101 HCHG RX REV CODE 250: Performed by: INTERNAL MEDICINE

## 2022-03-31 PROCEDURE — 700111 HCHG RX REV CODE 636 W/ 250 OVERRIDE (IP): Performed by: INTERNAL MEDICINE

## 2022-03-31 PROCEDURE — 97110 THERAPEUTIC EXERCISES: CPT

## 2022-03-31 PROCEDURE — 700102 HCHG RX REV CODE 250 W/ 637 OVERRIDE(OP): Performed by: INTERNAL MEDICINE

## 2022-03-31 PROCEDURE — 97116 GAIT TRAINING THERAPY: CPT

## 2022-03-31 PROCEDURE — 51798 US URINE CAPACITY MEASURE: CPT

## 2022-03-31 PROCEDURE — 85025 COMPLETE CBC W/AUTO DIFF WBC: CPT

## 2022-03-31 PROCEDURE — 82962 GLUCOSE BLOOD TEST: CPT

## 2022-03-31 PROCEDURE — 770006 HCHG ROOM/CARE - MED/SURG/GYN SEMI*

## 2022-03-31 PROCEDURE — 84478 ASSAY OF TRIGLYCERIDES: CPT

## 2022-03-31 PROCEDURE — 80053 COMPREHEN METABOLIC PANEL: CPT

## 2022-03-31 PROCEDURE — C9113 INJ PANTOPRAZOLE SODIUM, VIA: HCPCS | Performed by: INTERNAL MEDICINE

## 2022-03-31 PROCEDURE — 71045 X-RAY EXAM CHEST 1 VIEW: CPT

## 2022-03-31 PROCEDURE — 99233 SBSQ HOSP IP/OBS HIGH 50: CPT | Performed by: INTERNAL MEDICINE

## 2022-03-31 PROCEDURE — 94760 N-INVAS EAR/PLS OXIMETRY 1: CPT

## 2022-03-31 RX ORDER — DEXTROSE MONOHYDRATE 100 MG/ML
250 INJECTION, SOLUTION INTRAVENOUS PRN
Status: DISCONTINUED | OUTPATIENT
Start: 2022-03-31 | End: 2022-04-11 | Stop reason: HOSPADM

## 2022-03-31 RX ADMIN — PANTOPRAZOLE SODIUM 40 MG: 40 INJECTION, POWDER, FOR SOLUTION INTRAVENOUS at 05:27

## 2022-03-31 RX ADMIN — INSULIN HUMAN 2 UNITS: 100 INJECTION, SOLUTION PARENTERAL at 22:44

## 2022-03-31 RX ADMIN — POTASSIUM CHLORIDE: 2 INJECTION, SOLUTION, CONCENTRATE INTRAVENOUS at 20:04

## 2022-03-31 RX ADMIN — POTASSIUM CHLORIDE, DEXTROSE MONOHYDRATE AND SODIUM CHLORIDE: 150; 5; 900 INJECTION, SOLUTION INTRAVENOUS at 05:25

## 2022-03-31 RX ADMIN — CEFTRIAXONE SODIUM 2 G: 2 INJECTION, POWDER, FOR SOLUTION INTRAMUSCULAR; INTRAVENOUS at 05:25

## 2022-03-31 RX ADMIN — POTASSIUM CHLORIDE, DEXTROSE MONOHYDRATE AND SODIUM CHLORIDE: 150; 5; 900 INJECTION, SOLUTION INTRAVENOUS at 19:04

## 2022-03-31 RX ADMIN — METRONIDAZOLE 500 MG: 500 INJECTION, SOLUTION INTRAVENOUS at 15:07

## 2022-03-31 RX ADMIN — PANTOPRAZOLE SODIUM 40 MG: 40 INJECTION, POWDER, FOR SOLUTION INTRAVENOUS at 19:04

## 2022-03-31 RX ADMIN — METRONIDAZOLE 500 MG: 500 INJECTION, SOLUTION INTRAVENOUS at 22:38

## 2022-03-31 RX ADMIN — METRONIDAZOLE 500 MG: 500 INJECTION, SOLUTION INTRAVENOUS at 06:06

## 2022-03-31 ASSESSMENT — ENCOUNTER SYMPTOMS
HEARTBURN: 0
COUGH: 0
DOUBLE VISION: 0
BLURRED VISION: 0
DIZZINESS: 0
VOMITING: 0
SHORTNESS OF BREATH: 0
FALLS: 0
PALPITATIONS: 0
ABDOMINAL PAIN: 1
HEADACHES: 0
NERVOUS/ANXIOUS: 0
FEVER: 0
BACK PAIN: 0
CHILLS: 0

## 2022-03-31 ASSESSMENT — COGNITIVE AND FUNCTIONAL STATUS - GENERAL
CLIMB 3 TO 5 STEPS WITH RAILING: A LOT
MOBILITY SCORE: 17
SUGGESTED CMS G CODE MODIFIER MOBILITY: CK
TURNING FROM BACK TO SIDE WHILE IN FLAT BAD: A LITTLE
WALKING IN HOSPITAL ROOM: A LITTLE
MOVING TO AND FROM BED TO CHAIR: A LITTLE
STANDING UP FROM CHAIR USING ARMS: A LITTLE
MOVING FROM LYING ON BACK TO SITTING ON SIDE OF FLAT BED: A LITTLE

## 2022-03-31 ASSESSMENT — GAIT ASSESSMENTS
DISTANCE (FEET): 100
GAIT LEVEL OF ASSIST: CONTACT GUARD ASSIST
DEVIATION: BRADYKINETIC;SHUFFLED GAIT
DISTANCE (FEET): 100
ASSISTIVE DEVICE: FRONT WHEEL WALKER

## 2022-03-31 ASSESSMENT — PAIN DESCRIPTION - PAIN TYPE
TYPE: SURGICAL PAIN
TYPE: SURGICAL PAIN

## 2022-03-31 ASSESSMENT — LIFESTYLE VARIABLES: SUBSTANCE_ABUSE: 0

## 2022-03-31 ASSESSMENT — PAIN SCALES - GENERAL: PAIN_LEVEL: 0

## 2022-03-31 NOTE — PROGRESS NOTES
Patient arrived back to room 210-1 from PACU instable condition after verbal report. Patient is still sleepy, but easily arousable. NGT placed back to LIS. Midline incision with Prevena VAC and abdominal binder. LL MAYUR drain with serosanguinous output. Patient's son, Aniceto, notified of patient's arrival back to the surgical unit. Patient resting at this time. Denies any pain.

## 2022-03-31 NOTE — OP REPORT
Operative Report    Date: 3/30/2022    PreOp Diagnosis:   1.  Small bowel obstruction    PostOp Diagnosis:   1.  Small bowel obstruction  2.  Ileocecal mass      Procedure(s):  1.  Diagnostic LAPAROSCOPY - Wound Class: Clean  2.  LAPAROTOMY, EXPLORATORY, ILEOCECECTOMY - Wound Class: Contaminated    Surgeon(s):  Mando Johnson M.D.    Anesthesiologist/Type of Anesthesia:  Anesthesiologist: Jarrett Howard III, M.D./General    Surgical Staff:  Circulator: China Savage R.N.  Scrub Person: Koko Borges    Specimens removed if any:  ID Type Source Tests Collected by Time Destination   A : peritoneal biopsy Tissue Abdominal PATHOLOGY SPECIMEN Mando Johnson M.D. 3/30/2022  4:49 PM    B : peritoneal debris Tissue Abdominal PATHOLOGY SPECIMEN Mando Johnson M.D. 3/30/2022  5:06 PM    C : ileocecum Tissue Abdominal PATHOLOGY SPECIMEN Mando Johnson M.D. 3/30/2022  5:17 PM      Drains: 19 Divehi Kelvin drain, intraperitoneal    Estimated Blood Loss: 100 mL    Findings: Small bowel dilation in keeping with obstructive changes and large mass associated with the ileocecum, attached to the anterolateral right lower quadrant abdominal wall.  Small surrounding peritoneal implants and mucinous appearing peritoneal debris    Complications: None noted    Outcome: Transferred to PACU in stable condition    Indications:  86-year-old female admitted with a small bowel obstruction who failed nonoperative management and for which diagnostic laparoscopy and possible laparotomy was recommended.  This procedure was discussed with her and her son in detail including the risk, benefits, and alternatives, she wished to proceed.    Procedure in detail: The patient was identified in the pre-operative holding area and brought to the operating room. Correct side and site were identified. Pre-operative antibiotics were administered prior to the procedure. GETA was smoothly induced. The patient was prepped and draped in the usual sterile  fashion.     After infiltration of local anesthetic, an longitudinal 5 mm incision was made superior to the umbilicus to accomodate a 5 mm trocar. The veress needle was placed intraperitoneally, and sterile saline drop test was performed. The abdomen was insufflated to 15 mmHg, which the patient tolerated well, with initially low insufflation pressures. A 5 mm trocar was placed, and a 5 mm 30 degree lapararoscope was used to survey the abdomen. No evidence of intraperitoneal trauma from Veress or trocar placement was found.    I then placed, under video assistance, two more 5 mm trocars, one in the left upper quadrant and one in the left lower quadrant.  A brief survey of the intraperitoneal cavity was performed.  The small bowel was dilated in its entirety, and terminated in the cecum where there was noted to be a large firm mass which was adherent to the abdominal wall in the right lower quadrant.  There was a small implant approximately 2 cm away from the portion of the distal ileum and cecum which was biopsied and sent for permanent pathology.  The decision was made to perform a laparotomy given findings on the diagnostic procedure.    The CO2 was desufflated and a lower midline incision extended to just above the umbilicus was made through skin, subcutaneous tissue, and then fascia using a 10 blade as well as electrocautery.  Hemostasis was ensured.  The terminal ileum was then identified and divided approximately 5 cm away from the ileocecal mass, using a 75 mm FADIA stapler.  Hemostasis was ensured.  The cecum was then identified and also divided approximately 5 cm from the ileocecal mass also using a 75 mm FADIA stapler.  Hemostasis was again observed.  The mass was then circumferentially dissected away from the abdominal wall, taking some of the peritoneum and underlying muscle.  The mesentery was divided using the LigaSure device and the specimen was then removed en bloc and passed off as permanent pathology.   Hemostasis was ensured.  A small portion of mucinous appearing peritoneal fluid was also passed off as permanent specimen.    The distal ileum and proximal cecum was then reapproximated in a side-to-side, functional end-to-end fashion using a 75 mm FADIA stapler fired through small enterotomies in the corner of the stapled ends of the bowel.  A pool sucker was inserted into the distal ileum and a large quantity of bilious, semisolid stool material was suctioned away decompressing the small bowel.  The open ends of the anastomosis were then closed using a another firing of the 75 mm FADIA stapler, and this staple line was reinforced using running 3-0 PDS.  The mesenteric defect was then closed using running 3-0 Vicryl.  Copious saline irrigation was performed.  A 19 Estonian Kelvin drain was then placed through the left lower quadrant 5 mm incision that was previously made, and sutured in in the standard fashion using 3-0 silk.  It was placed in the pelvis and along the right lower quadrant at the area of the anastomosis.  Hemostasis was again ensured.  A pack of seprafilm was placed under the incision and the fascia reapproximated using running #1 Vicryl suture.  The subcutaneous tissue was copiously irrigated and then the skin reapproximated using skin staples.  A 20 cm Prevena wound management system was placed over this and functioned appropriately.  An appropriate drain dressing was applied and the left upper quadrant 5 mm incision was closed with staples as well with an appropriate dressing.  This concluded the procedure, and the patient was awakened from general anesthetic, and was taken to the recovery room in stable condition.    Sponge and needle counts were correct at the end of the case.       Mando Johnson M.D.  [facility]  [phone]      3/30/2022 6:40 PM Mando Johnson M.D.

## 2022-03-31 NOTE — DIETARY
"Nutrition Support Assessment   Day 6 of admit.  Licha Pope is a 86 y.o. female with admitting DX of SBO (small bowel obstruction)     Current problem list:  1. SBO   2. Intraabdominal mass pending pathology  3. Acute hypoxemic respiratory failure  4. Hypernatremia  5. Generalized weakness  6. Hyperglycemia  7. NATIVIDAD (acute kidney injury)  8. Abdominal pain  9. Anemia  10. Hx of DVT  11. COPD  12. Essential HTN     Assessment:  Estimated Nutritional Needs: based on:   Height: 162.6 cm (5' 4\")  Weight: 57.2 kg (126 lb 1.7 oz)(stand up scale)  Body mass index is 21.65 kg/m²., BMI classification: low based on pt's age    Calculation/Equation:   MSJ: REE= 997 kcals x 1.6=5443    - 3330-9387 kcals (21-23 kcals/kg)    - 68-80 gms of protein (1.2-1.4 gms/kg)     Evaluation:   1. TPN initiated due to inability to tolerate PO intake and extended period of time without a proper meal. Per MD meal, last proper meal was last week on Thursday.   2. Per pharmacist's note, TPN is at 50% of goal. At goal TPN will provide 1532 kcals and 72 gms of protein per day. This will exceed/meet pt's estimated kcal/protein needs.   3. Pt with exploratory laparotomy with ileocecectomy yesterday. Ileocecal mass was found and pending pathology.   4. Labs: 3/31/22: sodium=147 (H), potassium=4.1, glucose=177 (H), cholesterol=63 (L). 3/30/22: phos=2.6, mag=2.2   5. IV fluids: D10% @ 250 mL/hour, D5% and 0.9% NaCL w/ KCl @ 125 mL/hour  6. Meds: SSI  7. Weight hx reviewed in Epic. Weight loss noted of 3% x 1 month and 11% x 9 months. Weight loss is not significant but is worth noting.      Malnutrition Risk: criteria not met     Recommendations/Plan:  1. TPN per pharmacy  2. Additional fluids per MD  3. Monitor weights  4. Per surgeon's note: Continue NG tube until return of bowel function, then remove and slowly advance diet as tolerated.    RD will follow.                "

## 2022-03-31 NOTE — THERAPY
"Occupational Therapy  Daily Treatment     Patient Name: Licha Pope  Age:  86 y.o., Sex:  female  Medical Record #: [id number]  Today's Date: 3/31/2022     Precautions  Precautions: Fall Risk  Comments: NG tube    Assessment    Pt agreeable to therapy, cleared to mobilize s/p expl laparotomy with ileoceccoctomy 3/30/22.  Pt is tolerating simple mobility and ADL transfers with Farhat at this time POD #1.  Anticipate she will progress well and should be able to d/c home with 24 hour assist from family and  therapy/nursing care.  OT will follow while in house.       Plan    Continue current treatment plan.    DC Equipment Recommendations: Unable to determine at this time  Discharge Recommendations: Recommend home health for continued occupational therapy services (with close supervision from family (either family to stay with her, or her to stay at son's home))    Subjective    \"I'm doing ok.\"     Objective       03/31/22 1025   Cognition    Cognition / Consciousness WDL   Speech/ Communication Hard of Hearing   Level of Consciousness Alert   Comments pleasant, cooperative, motivated   Other Treatments   Other Treatments Provided educ on ease with toilet transfers using BUE to lower self carefully,   Balance   Sitting Balance (Static) Fair +   Sitting Balance (Dynamic) Fair   Standing Balance (Static) Fair   Standing Balance (Dynamic) Fair   Weight Shift Sitting Fair   Weight Shift Standing Fair   Skilled Intervention Verbal Cuing;Tactile Cuing   Bed Mobility    Supine to Sit   (UIC prior to OT)   Scooting Minimal Assist  (scoot to edge of chair)   Comments UIC pre and post   Activities of Daily Living   Grooming Supervision;Seated  (setup)   Upper Body Dressing Minimal Assist   Lower Body Dressing   (NT - anticipate more assist needed now 2/2 abd incision)   Toileting Minimal Assist   Functional Mobility   Sit to Stand Contact Guard Assist   Bed, Chair, Wheelchair Transfer Contact Guard Assist   Toilet Transfers " Minimal Assist  (cues for use of B arms and support for controlled descent)   Transfer Method Stand Step   Mobility to BR and into penny   Distance (Feet) 100   # of Times Distance was Traveled 1   Skilled Intervention Verbal Cuing;Tactile Cuing   Comments safer with FWW for now   Activity Tolerance   Sitting in Chair > 1 hour in recliner, toilet 5 min   Standing 5- 6 min   Patient / Family Goals   Patient / Family Goal #1 home   Short Term Goals   Short Term Goal # 1 Pt will dress supervised in 3 visits   Goal Outcome # 1 Goal not met   Short Term Goal # 2 Pt will stand 10 min at sink supervised to groom in 3 visits   Goal Outcome # 2 Goal not met   Short Term Goal # 3 Pt will toilet supervised in 3 visits   Goal Outcome # 3 Progressing slower than expected   Interdisciplinary Plan of Care Collaboration   Collaboration Comments RN obtained approp mobility orders for therapy to resume since pt had abdominal surgery yesterday.  Son confirmed that he is able to provide 24 hour assist as needed.

## 2022-03-31 NOTE — ANESTHESIA TIME REPORT
Anesthesia Start and Stop Event Times     Date Time Event    3/30/2022 1545 Ready for Procedure     1600 Anesthesia Start     1923 Anesthesia Stop        Responsible Staff  03/30/22    Name Role Begin End    Jarrett Howard III, M.D. Anesth 1600 1923        Preop Diagnosis (Free Text):  Pre-op Diagnosis     bowel obstruction        Preop Diagnosis (Codes):    Premium Reason  K. Alert    Comments: Emergency case, Post op TAP block bilateral done under GA

## 2022-03-31 NOTE — CARE PLAN
Problem: Nutritional:  Goal: Nutrition support tolerated and meeting greater than 85% of estimated needs  Outcome: Progressing     See RD note.

## 2022-03-31 NOTE — PROGRESS NOTES
"Hospital Medicine Daily Progress Note    Date of Service  3/31/2022    Chief Complaint  Licha Pope is a 86 y.o. female admitted 3/25/2022 with abdominal pain.    Hospital Course  As per chart review  \"86 y.o. female with a history of hypertension, COPD, GERD, DVT dx 5/19/21 after long car trip (still on anticoagulation) who presented 3/25/2022 with abdominal pain.     Was recently admitted for terminal ileitis, SBO and pancreatitis on 2/17/2022, she was treated conservatively with IV fluids and pain control.  Right upper quadrant showed dependent gallbladder sludge versus small stones.  MRCP showed cholelithiasis and common bile duct 11.4 mm unchanged since 2015.  She was discharged with antibiotics and recommended to follow-up with GI, surgery and PCP after discharge.  She saw surgeon, she recommended cholecystectomy but wanted her to see GI doctor. GI doctor recommended cholecystectomy.  Has appt with surgery next Wed.       Patient never really felt like her abdominal pain improved however was stable until 2 days ago.  Abdominal pain became very severe however intermittent and would come in waves.  Patient became very fatigued and was sleeping most the day.  She had chills and was very weak.  Abdominal pain was diffuse, intermittent, described as sharp pain, 10/10 pain, went to PCP and doctor sent her to ER.  Did have some n/v, had some dark red or purple, size of quarter.  Passing gas.  Yesterday had BM.  Did have some dark stools, unsure if black was \"half asleep\".  Last night had lower back pain, now improved.      In ER she was afebrile, mildly tachycardic (heart rate 111), normal blood pressure and mild hypoxia requiring 1 L nasal cannula.  Labs remarkable for WBC 11.6 with a left shift, creatinine 1.06, calcium 10.6, lipase 104, troponin XX, lactic acid normal.  CT abdomen pelvis with contrast showed similar changes of bowel wall thickening involving the terminal ileum and base of the cecum adjacent " "to postoperative change from prior appendectomy which could be infectious or inflammatory.  There is mild proximal obstruction involving the ileum and distal jejunum in the lower abdomen left mid abdomen with small bowel loops dilated up to 4 cm.  There is no gross free air or pneumatosis.  Moderate size hiatal hernia again seen.  Minimal prominent biliary tree unchanged and physiologic, unchanged a back to 2015.  Simple hepatic cyst unchanged.  She was given IV fluids, Zosyn and general surgery was consulted.  Dr. Mcneil to see patient.  Recommended NG, IV fluids.\"    Interval Problem Update  3/26: Patient seen at bedside this morning.  Patient is hard of hearing.  Patient with NG tube in place.  Still complaining of abdominal pain some tenderness on examination.  General surgery has been consulted, we appreciate further recommendations.  Continue antibiotics for now.    3/27: Patient seen at bedside this morning.  It seems that the patient's NG tube came off, however the patient it seems that now is having regular bowel movements.  She still complaining of abdominal pain.  We have consulted GI, we appreciate further recommendations.  We also appreciate further recommendations by general surgery.  We will continue n.p.o. until surgery reevaluate the patient.  We will continue with antibiotics for now.  --The patient told me she used to see Dr Tarun Lewis at [facility], so I have consulted Dr Clay, we appreciate further recommendations.    3/28: Patient seen at bedside this morning.  The patient was vomiting this morning, she still complaining of abdominal pain.  We have placed the NG tube back on, we have ordered KUB.  We appreciate further recommendations by general surgery.  Also it seems that the patient had been seeing Dr. Ash earlier this month and she would like to continue seeing that group.  We have consulted Dr. Belle from GI, we appreciate further recommendations.  We will keep the patient n.p.o. for " now until surgical evaluation.  We will continue with antibiotics.    3/29: Patient seen at bedside this morning.  No family was present at bedside directly on my evaluation.  I tried calling the patient's son to give him an update however there was no answer.  Patient still n.p.o., with NG tube to suction.  General surgery and GI following the patient, we appreciate further recommendations.  The patient will undergo small bowel follow-through study today.  I asked the patient and it seems that she has not had a proper meal since last Thursday, if by tomorrow we are unable to feed the patient will consider TPN while further management is being done.    3/30: Patient seen at bedside this morning.  No family present at bedside, however I was able to talk to the patient's son over the phone and gave him an update.  Patient still n.p.o. with NG tube, I spoke to surgery and they will take her to the operating room later today.  We were going to start TPN today, however surgery would like to hold for now.  Patient with mild hyponatremia, this is most likely due to volume depletion as such she has not really eaten much for the last couple days.  We have increased the rate of the D5 NS, monitor and repeat sodium values.  We will order PICC line in preparation of possible TPN.  We appreciate further recommendations by general surgery.    3/31: Patient seen at bedside this morning.  No family members present at bedside, however again I was able to talk to the patient's son over the phone to update him on the patient's clinical status.  The patient underwent exploratory laparotomy with ileocecectomy yesterday by general surgery.  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  We will start the patient on TPN today.  The patient was seen at bedside, laying in bed currently comfortably with NG tube in place.  Patient also with Prevena status post exploratory laparotomy.  Patient requiring oxygen,  this is most likely postsurgical, we have ordered chest x-ray.  As per nursing no other overnight events reported.    I have personally seen and examined the patient at bedside. I discussed the plan of care with patient, bedside RN and charge RN.    Consultants/Specialty  general surgery    Code Status  DNAR/DNI    Disposition  Patient is not medically cleared for discharge.   Anticipate discharge to pending clinical evolution.    Review of Systems  Review of Systems   Constitutional: Positive for malaise/fatigue. Negative for chills and fever.   HENT: Positive for hearing loss. Negative for nosebleeds.    Eyes: Negative for blurred vision and double vision.   Respiratory: Negative for cough and shortness of breath.    Cardiovascular: Negative for chest pain and palpitations.   Gastrointestinal: Positive for abdominal pain. Negative for heartburn and vomiting.   Genitourinary: Negative for dysuria and urgency.   Musculoskeletal: Negative for back pain and falls.   Skin: Negative for itching and rash.   Neurological: Negative for dizziness and headaches.   Psychiatric/Behavioral: Negative for substance abuse. The patient is not nervous/anxious.    All other systems reviewed and are negative.       Physical Exam  Temp:  [36.2 °C (97.1 °F)-37.2 °C (99 °F)] 37.2 °C (99 °F)  Pulse:  [62-90] 90  Resp:  [14-18] 14  BP: ()/(54-87) 126/69  SpO2:  [96 %-99 %] 99 %    Physical Exam  Vitals and nursing note reviewed.   Constitutional:       General: She is not in acute distress.     Appearance: She is ill-appearing.   HENT:      Head: Normocephalic and atraumatic.      Right Ear: External ear normal.      Left Ear: External ear normal.      Mouth/Throat:      Pharynx: No oropharyngeal exudate or posterior oropharyngeal erythema.   Eyes:      General:         Right eye: No discharge.         Left eye: No discharge.   Cardiovascular:      Rate and Rhythm: Normal rate and regular rhythm.      Pulses: Normal pulses.       Heart sounds: No murmur heard.    No gallop.   Pulmonary:      Effort: Pulmonary effort is normal. No respiratory distress.      Breath sounds: Normal breath sounds. No wheezing or rhonchi.   Abdominal:      General: There is distension.      Tenderness: There is abdominal tenderness.      Comments: With NG tube    Prevena in place s/p surgery   Musculoskeletal:         General: No swelling or tenderness. Normal range of motion.      Cervical back: Normal range of motion and neck supple. No tenderness.   Skin:     General: Skin is warm and dry.   Neurological:      General: No focal deficit present.      Mental Status: She is alert and oriented to person, place, and time. Mental status is at baseline.      Motor: No weakness.   Psychiatric:         Mood and Affect: Mood normal.         Behavior: Behavior normal.         Thought Content: Thought content normal.         Fluids    Intake/Output Summary (Last 24 hours) at 3/31/2022 1158  Last data filed at 3/31/2022 1100  Gross per 24 hour   Intake 2500 ml   Output 1840 ml   Net 660 ml       Laboratory  Recent Labs     03/29/22  0512 03/30/22  0520 03/31/22  0447   WBC 5.5 5.9 9.1   RBC 3.30* 3.32* 2.82*   HEMOGLOBIN 10.5* 10.6* 8.9*   HEMATOCRIT 33.2* 33.2* 29.2*   .6* 100.0* 103.5*   MCH 31.8 31.9 31.6   MCHC 31.6* 31.9* 30.5*   RDW 45.3 45.6 46.2   PLATELETCT 219 245 204   MPV 11.6 11.4 11.5     Recent Labs     03/29/22  0512 03/30/22  0520 03/30/22  2240 03/31/22  0447   SODIUM 146* 149* 144 147*   POTASSIUM 3.9 3.7  --  4.1   CHLORIDE 114* 115*  --  116*   CO2 26 27  --  24   GLUCOSE 148* 123*  --  177*   BUN 6* 8  --  8   CREATININE 0.55 0.65  --  0.66   CALCIUM 8.4 8.7  --  7.5*             Recent Labs     03/31/22  0447   TRIGLYCERIDE 54       Imaging  IR-PICC LINE PLACEMENT W/ GUIDANCE > AGE 5   Final Result                  Ultrasound-guided PICC placement performed by qualified nursing staff as    above.          DX-SMALL BOWEL SERIES   Final Result       Partial small bowel obstruction with contrast transit time to the colon exceeding 9 hours      5 cm paraesophageal hernia      NG tube terminates over the gastric fundus      DX-ABDOMEN FOR TUBE PLACEMENT   Final Result         Gastric drainage tube with tip projecting over the expected area of the stomach.      [id number] VIEW   Final Result      Long segmental small bowel dilatation concerning for distal small bowel obstruction      DX-ABDOMEN FOR TUBE PLACEMENT   Final Result      Enteric tube terminates over the stomach.      Small bowel loop dilatation concerning for small bowel obstruction      DX-ABDOMEN FOR TUBE PLACEMENT   Final Result         Gastric drainage tube with tip projecting over the expected area of the stomach.      DX-ABDOMEN FOR TUBE PLACEMENT   Final Result         Gastric drainage tube loops in the esophagus with tip projecting over the expected area of the upper esophagus.      CT-ABDOMEN-PELVIS WITH   Final Result      1.  There are similar changes of bowel wall thickening involving the terminal ileum and base of the cecum adjacent to postoperative change from prior appendectomy which could be infectious or inflammatory.   2.  There is mild proximal obstruction involving the ileum and distal jejunum in the lower abdomen and left mid abdomen with small bowel loops dilated up to 4 cm.   3.  There is no gross free air or pneumatosis.   4.  Moderate size hiatal hernia again seen.   5.  Minimally prominent biliary tree unchanged and physiologic, unchanged dating back to 2015.   6.  Simple hepatic cysts are unchanged.         DX-CHEST-PORTABLE (1 VIEW)   Final Result      1.  There is no acute cardiopulmonary process.           Assessment/Plan  * SBO (small bowel obstruction) (HCC)- (present on admission)  Assessment & Plan  Patient with recent terminal ileitis and small bowel obstruction last month  Treated with conservative therapy and antibiotics however now with repeat symptoms and  SBO  Surgery consulted by ERP: Dr. Mcneil was called, recommended NPO, NG, IVF, pain mgmt  CTX, flagyl    3/26: Pending surgical evaluation, we appreciate further recommendations.    3/27: Patient seems to have BMs now, we are awaiting further recommendations by general surgery. We have also consulted GI ( Dr Clay), we appreciate further recommendations.    3/28: Patient was vomiting again this morning with abdominal pain.  We have placed the NG tube back and have ordered KUB.  We appreciate further recommendations by general surgery and GI. (Dr Belle from GI will be evaluating the patient today).    3/29: Patient to have small bowel follow through study today, we appreciate further recommendations by GI and General Surgery    3/30: Patient still unable to tolerate PO, we will start TPN, we appreciate further recommendations by surgery and GI.  --UPDATE: I spoke to surgery, they would like to hold TPN for now, as she might undergo surgery today, we will order PICC line.    3/31: Patient underwent exploratory laparotomy yesterday by general surgery, the patient also underwent ileocecectomy with finding of ileocecal mass, pending pathology.  We appreciate further recommendations by general surgery.  We will start TPN today.    Intraabdominal mass  Assessment & Plan  Patient underwent exploratory laparotomy.  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        Acute hypoxemic respiratory failure (HCC)  Assessment & Plan  Patient currently requiring oxygen plantation.  This could be postsurgical.  Incentive spirometer encouraged.    Hypernatremia  Assessment & Plan  Mild, this could be due to volume depletion, patient has not had proper nutrition in a couple of days  Currently on D5 NS, we will increase the rate to 125 ml/hr  We were going to order TPN today, however surgery would like to hold, as patient will undergo surgery later today.  We will continue to monitor.    3/31: We have ordered TPN  today    Generalized weakness  Assessment & Plan  PT/OT    Hyperglycemia  Assessment & Plan  Patient had recent A1c at 5.1  Monitor  No need to start insulin treatment at this time    NATIVIDAD (acute kidney injury) (HCC)  Assessment & Plan  Mild bump in Cr 0.6-->1.06  Likely in setting of decreased PO intake/pre-renal  IVF resuscitation  Continue to monitor    3/29: Improved. Cr is 0.55 today.    Abdominal pain  Assessment & Plan  Initial concern for sbo  Also concern for ileitis  General surgery and GI following patient, we appreciate further recommendations    Anemia- (present on admission)  Assessment & Plan  No signs of bleeding at this time, monitor    3/31: Patient underwent exploratory laparotomy yesterday.  Continue to monitor CBC.    History of deep venous thrombosis- (present on admission)  Assessment & Plan  Diagnosed 5/19/2020 6 hour car trip  Still on anticoagulation    3/27: Will probably resume once surgery clears her NPO    COPD (chronic obstructive pulmonary disease) (HCC)- (present on admission)  Assessment & Plan  Not on home oxygen  Not having COPD exacerbation    Essential hypertension, benign- (present on admission)  Assessment & Plan  Home regimen: Valsartan 160 mg daily  Inpatient regimen: PRN medication, resume home medication once surgery clears for PO    3/29: Patient still NPO, however BP seems to be controlled, PRN medication for now       VTE prophylaxis: SCDs/TEDs    I have performed a physical exam and reviewed and updated ROS and Plan today (3/31/2022). In review of yesterday's note (3/30/2022), there are no changes except as documented above.

## 2022-03-31 NOTE — CARE PLAN
The patient is Stable - Low risk of patient condition declining or worsening    Shift Goals  Clinical Goals: Ambulation, gastric decompression, nausea and pain control  Patient Goals: nausea control  Family Goals: NA    Progress made toward(s) clinical / shift goals:    Problem: Knowledge Deficit - Standard  Goal: Patient and family/care givers will demonstrate understanding of plan of care, disease process/condition, diagnostic tests and medications  Outcome: Progressing     Problem: Bowel Elimination  Goal: Establish and maintain regular bowel function  Outcome: Progressing     Problem: Gastrointestinal Irritability  Goal: Nausea and vomiting will be absent or improve  Outcome: Progressing     Problem: Fluid Volume  Goal: Fluid volume balance will be maintained  Outcome: Progressing     Problem: Urinary Elimination  Goal: Establish and maintain regular urinary output  Outcome: Progressing     Problem: Mobility  Goal: Patient's capacity to carry out activities will improve  Outcome: Progressing     Problem: Fall Risk  Goal: Patient will remain free from falls  Outcome: Progressing     Problem: Pain - Standard  Goal: Alleviation of pain or a reduction in pain to the patient’s comfort goal  Outcome: Progressing     Problem: Knowledge Deficit - COPD  Goal: Patient/significant other demonstrates understanding of disease process, utilization of the Action Plan, medications and discharge instruction  Outcome: Progressing     Problem: Risk for Infection - COPD  Goal: Patient will remain free from signs and symptoms of infection  Outcome: Progressing     Problem: Nutrition - Advanced  Goal: Patient will display progressive weight gain toward goal have adequate food and fluid intake  Outcome: Progressing     Problem: Ineffective Airway Clearance  Goal: Patient will maintain patent airway with clear/clearing breath sounds  Outcome: Progressing     Problem: Impaired Gas Exchange  Goal: Patient will demonstrate improved  ventilation and adequate oxygenation and participate in treatment regimen within the level of ability/situation.  Outcome: Progressing       Patient is not progressing towards the following goals:

## 2022-03-31 NOTE — CARE PLAN
The patient is Watcher - Medium risk of patient condition declining or worsening    Shift Goals  Clinical Goals: ambulation, pain control, urinary elimination  Patient Goals: rest  Family Goals: rest, pain control    Progress made toward(s) clinical / shift goals:  PT/OT evaluation, pain controlled with current regimen. Ambulation 3 x daily, up to chair, monitoring I/Os    Patient is not progressing towards the following goals: NA      Problem: Bowel Elimination  Goal: Establish and maintain regular bowel function  Outcome: Not Progressing  Ambulation 3 x daily, up to chair     Problem: Urinary Elimination  Goal: Establish and maintain regular urinary output  Outcome: Not Progressing    Monitor I/O

## 2022-03-31 NOTE — PROGRESS NOTES
Patient unable to void after morning olsen removal. Bladder scan showed 39mL, paged Dr. Watson for updates.  Orders to recheck bladder scan in 2 hours and notified MD with updates.  1800: Bladder scan 139mL, notified MD. Monitor again in 2 hours  1830: Patient voided 100 mL yellow urine.

## 2022-03-31 NOTE — ANESTHESIA POSTPROCEDURE EVALUATION
Patient: Licha Pope    Procedure Summary     Date: 03/30/22 Room / Location:  OR 06 / SURGERY [facility]    Anesthesia Start: 1600 Anesthesia Stop: 1923    Procedures:       LAPAROSCOPY (Bilateral )      LAPAROTOMY, EXPLORATORY, ILEOCECECTOMY (Bilateral ) Diagnosis: (ileoceal mass)    Surgeons: Mando Johnson M.D. Responsible Provider: Jarrett Howard III, M.D.    Anesthesia Type: general ASA Status: 3 - Emergent          Final Anesthesia Type: general  Last vitals  BP   Blood Pressure : 110/70    Temp   36.5 °C (97.7 °F)    Pulse   73   Resp   16    SpO2   98 %      Anesthesia Post Evaluation    Patient location during evaluation: PACU  Patient participation: complete - patient participated  Level of consciousness: awake and alert  Pain score: 0    Airway patency: patent  Anesthetic complications: no  Cardiovascular status: hemodynamically stable  Respiratory status: acceptable  Hydration status: euvolemic    PONV: none          No complications documented.     Nurse Pain Score: 0 (NPRS)

## 2022-03-31 NOTE — PROGRESS NOTES
"Progress Note:  3/31/2022, 9:12 AM    S: No acute postoperative events.  Reports feeling no pain today.  Much less bloated and no nausea or vomiting this morning    O:  /69   Pulse 90   Temp 37.2 °C (99 °F) (Axillary)   Resp 14   Ht 1.626 m (5' 4\")   Wt 57.2 kg (126 lb 1.7 oz)   SpO2 99%     NAD, awake, alert  Breathing is nonlabored  NG tube is in place  Abdomen is soft, probably tender, Prevena in place and functioning, abdominal binder in place.  MAYUR with serosanguineous output      A:   Active Hospital Problems    Diagnosis    • Hypernatremia [E87.0]    • Generalized weakness [R53.1]    • Hyperglycemia [R73.9]    • SBO (small bowel obstruction) ([facility]) [K56.609]    • NATIVIDAD (acute kidney injury) ([facility]) [N17.9]    • Abdominal pain [R10.9]    • Anemia [D64.9]    • History of deep venous thrombosis [Z86.718]    • COPD (chronic obstructive pulmonary disease) ([facility]) [J44.9]    • Essential hypertension, benign [I10]      Postop day 1 status post exploratory laparotomy, ileocecectomy and primary anastomosis.  Findings of ileocecal mass    P:   -Continue NG tube until return of bowel function, then remove and slowly advance diet as tolerated.  -Continue MAYUR drain for now  -Findings in OR discussed with patient and her family.  Awaiting pathology    Mando Johnson M.D.  [facility]  [phone]    "

## 2022-03-31 NOTE — OR NURSING
1918 Patient to PACU from OR after report received  Oral airway removed upon arrival   Patient has a 18 Irish NG tube in right nares light green output.  Grenade drain on lower left abdomen pink with pink drainage no clots  Midline incision with wound vac (pervena 125)  Good suction no air leaks   16 Irish olsen catheter with dark yellow urine. Leg secure device placed to secure olsen     1930 patient resting comfortable  NG tube to low intermittent suction     1945 patient opens eyes to name and trys to respond to commands     2015 patient more verbal trying to ask questions     2030 patient says yes when asked about pain will continue to monitor     2045 patient still saying yes to pain she is moaning and grimacing  Fentanyl 25 mcgs given IV for pain     2100 patient more comfortable no moaning or grimacing     2128 Report called to georgie VALENTE on [facility] patient meets criteria to move back to [facility]     2138 patient transferred to [facility] room 10 bed 1

## 2022-03-31 NOTE — DISCHARGE PLANNING
Agency/Facility Name: [facility]  Outcome: DPA called and no answer, Will call back     1020  Agency/Facility Name: [facility]  Spoke To:  [place]  Outcome: Per lubna pt accepted, DPA will need to send DC summary once DCd

## 2022-03-31 NOTE — PROGRESS NOTES
Pharmacy TPN Day # 1       3/31/2022    Dosing Weight   55 kg TPN currently providing 50% of goal      TPN goal: 1532 kcal/day including 1.3 gm/kg/day Protein      TPN indication: NPO x 7 days, SBO       Pertinent PMH: Patient with recent terminal ileitis and SBO last month.  Patient treated with conservative therapy and ABX now with repeat symtoms and SBO.  Patient has been NPO x 7 days.  Temp (24hrs), Av.6 °C (97.8 °F), Min:36.2 °C (97.1 °F), Max:37.2 °C (99 °F)  .  Recent Labs     22  0512 22  0520 22  2240 22  0447   SODIUM 146* 149* 144 147*   POTASSIUM 3.9 3.7  --  4.1   CHLORIDE 114* 115*  --  116*   CO2 26 27  --  24   BUN 6* 8  --  8   CREATININE 0.55 0.65  --  0.66   GLUCOSE 148* 123*  --  177*   CALCIUM 8.4 8.7  --  7.5*   ASTSGOT  --  12  --  14   ALTSGPT  --  <5  --  <5   ALBUMIN  --  3.2  --  2.1*   TBILIRUBIN  --  0.4  --  0.3   PHOSPHORUS  --  2.6  --   --    MAGNESIUM  --  2.2  --   --      Accu-Checks  No results for input(s): POCGLUCOSE in the last 72 hours.    Vitals:    22 0230 22 0354 22 0500 22 0830   BP: (!) 98/54 117/66 110/70 126/69   Weight:       Height:           Intake/Output Summary (Last 24 hours) at 3/31/2022 1152  Last data filed at 3/31/2022 1100  Gross per 24 hour   Intake 2500 ml   Output 1840 ml   Net 660 ml       Orders Placed This Encounter   Procedures   • Diet NPO Restrict to: Strict     Standing Status:   Standing     Number of Occurrences:   1     Order Specific Question:   Diet NPO Restrict to:     Answer:   Strict [1]         TPN for past 72 hours (Show up to 3 orders; newest on the left.)     Start date and time   2022      TPN Adult Central Line [[id number]]    Order Status  Active       Base    Clinisol 15%  36 g    dextrose 70%  127 g    fat emulsion (soy) 20%  19 g       Additives    potassium phosphate  14 mmol    potassium chloride  80 mEq    sodium acetate  225 mEq    magnesium sulfate  12 mEq     calcium GLUConate  9.4 mEq    M.T.E.-4  1 mL    M.V.I. Adult  10 mL       QS Base    sterile water  1,284.25 mL       Energy Contribution    Proteins  --    Dextrose  431.8 kcal    Lipids  190 kcal    Total  621.8 kcal       Electrolyte Ion Calculated Amount    Sodium  225 mEq    Potassium  100.53 mEq    Calcium  9.4 mEq    Magnesium  12 mEq    Aluminum  --    Phosphate  14 mmol    Chloride  80 mEq    Acetate  255.48 mEq       Other    Total Protein  36 g    Total Protein/kg  0.63 g/kg    Total Amino Acid  --    Total Amino Acid/kg  --    Glucose Infusion Rate  1.54 mg/kg/min    Osmolarity (Estimated)  --    Volume  1,992 mL    Rate  83 mL/hr    Dosing Weight  57.2 kg    Infusion Site  Central            This formula provides:  % kcal as lipids = 25  Grams protein/kg = 0.65  Non-protein calories = 622  Kcals/kg = 14  Total daily calories = 766    Comments:  Will begin patient's TPN at 50% goal Kcals  And advance TPN to goal as patient tolerates and labs indicate.  Labs in am per protocol.      Christopher Don [facility]

## 2022-03-31 NOTE — ANESTHESIA PROCEDURE NOTES
Peripheral Block    Date/Time: 3/30/2022 6:30 PM  Performed by: Jarrett Howard III, M.D.  Authorized by: Jarrett Howard III, M.D.     Patient Location:  OR  Start Time:  3/30/2022 6:30 PM  End Time:  3/30/2022 6:40 PM  Reason for Block: at surgeon's request and post-op pain management ONLY    patient identified, IV checked, site marked, risks and benefits discussed, surgical consent, monitors and equipment checked, pre-op evaluation and timeout performed    Patient Position:  Supine  Prep: ChloraPrep    Monitoring:  Heart rate, continuous pulse ox and cardiac monitor  Block Region:  Trunk  Trunk - Block Type:  Abdominal plane block - TAP block    Laterality:  Bilateral  Procedures: ultrasound guided  Image captured, interpreted and electronically stored.  Local Infiltration:  Lidocaine  Strength:  1 %  Dose:  3 ml  Block Type:  Single-shot  Needle Localization:  Ultrasound guidance  Injection Assessment:  Negative aspiration for heme, no paresthesia on injection, incremental injection and local visualized surrounding nerve on ultrasound  Evidence of intravascular injection: No     Done under GA

## 2022-03-31 NOTE — ANESTHESIA PROCEDURE NOTES
Peripheral IV    Date/Time: 3/30/2022 4:17 PM  Performed by: Jarrett Howard III, M.D.  Authorized by: Jarrett Howard III, M.D.     Size:  18 G (long Jelco)  Laterality:  Left (hand)  Local Anesthetic:  None  Site Prep:  Alcohol  Technique:  Direct puncture  Attempts:  1   Done under GA

## 2022-03-31 NOTE — THERAPY
Physical Therapy   Daily Treatment     Patient Name: Licha Pope  Age:  86 y.o., Sex:  female  Medical Record #: [id number]  Today's Date: 3/31/2022     Precautions  Precautions: Fall Risk  Comments: NG tube    Assessment    Pt seen for PT session with emphasis on standing tolerance and ambulation. Pt participated well, demonstrates slow but fairly steady gait with FWW from chair to bathroom then down hallway. Recommend continued inpatient PT as well as post acute HH services. Pt has family assistance at home as needed.     Plan    Continue current treatment plan.    DC Equipment Recommendations: Unable to determine at this time  Discharge Recommendations: Recommend home health for continued physical therapy services            03/31/22 1041   Cognition    Speech/ Communication Hard of Hearing   Level of Consciousness Alert   Comments pleasant and cooperative.   Supine Lower Body Exercise   Heel Slide 1 set of 10;Bilateral   Ankle Pumps 1 set of 10;Bilateral   Gluteal Isometrics 1 set of 10;Bilateral   Quadriceps Isometrics 1 set of 10;Bilateral   Comments pt instructed to perform ex while up in chair and in supine   Other Treatments   Other Treatments Provided Chair to toilet transfers with FWW.   Balance   Sitting Balance (Static) Fair +   Sitting Balance (Dynamic) Fair   Standing Balance (Static) Fair   Standing Balance (Dynamic) Fair   Weight Shift Sitting Fair   Weight Shift Standing Fair   Skilled Intervention Verbal Cuing   Gait Analysis   Gait Level Of Assist Contact Guard Assist   Assistive Device Front Wheel Walker   Distance (Feet) 100   # of Times Distance was Traveled 1   Deviation Bradykinetic;Shuffled Gait   # of Stairs Climbed 0   Weight Bearing Status no restrictions   Skilled Intervention Verbal Cuing;Tactile Cuing   Bed Mobility    Comments Up in chair pre and post session.   Functional Mobility   Sit to Stand Contact Guard Assist   Bed, Chair, Wheelchair Transfer Contact Guard Assist   Toilet  Transfers Contact Guard Assist  (cues for sequencing, hand placement and controlled descent)   Transfer Method Stand Step   Skilled Intervention Verbal Cuing;Tactile Cuing   How much help from another person does the patient currently need...   6 clicks Mobility Score 17   Activity Tolerance   Sitting in Chair > 1 hr   Standing 5 min   Short Term Goals    Short Term Goal # 1 Pt will be able to perform bed mobility and sup <> Sup A without use of bed rail and with a flat bed in 6 visits.   Goal Outcome # 1 goal not met   Short Term Goal # 2 Pt will be able to perform sit <> stand and transfer Sup A in 6 visits.   Goal Outcome # 2 Progressing as expected   Short Term Goal # 3 Pt will be able to ambulate 200ft with FWW Yaquelin in 6 visits.   Goal Outcome # 3 Progressing as expected

## 2022-03-31 NOTE — ASSESSMENT & PLAN NOTE
Patient currently requiring oxygen  This could be postsurgical.  Incentive spirometer encouraged.    4/8: Patient on room air today at the time of my evaluation.

## 2022-04-01 PROBLEM — E83.39 HYPOPHOSPHATEMIA: Status: ACTIVE | Noted: 2022-04-01

## 2022-04-01 LAB
ALBUMIN SERPL BCP-MCNC: 2.3 G/DL (ref 3.2–4.9)
ALBUMIN/GLOB SERPL: 1 G/DL
ALP SERPL-CCNC: 40 U/L (ref 30–99)
ALT SERPL-CCNC: 7 U/L (ref 2–50)
ANION GAP SERPL CALC-SCNC: 7 MMOL/L (ref 7–16)
AST SERPL-CCNC: 15 U/L (ref 12–45)
BASOPHILS # BLD AUTO: 0.3 % (ref 0–1.8)
BASOPHILS # BLD: 0.03 K/UL (ref 0–0.12)
BILIRUB SERPL-MCNC: 0.3 MG/DL (ref 0.1–1.5)
BUN SERPL-MCNC: 9 MG/DL (ref 8–22)
CALCIUM SERPL-MCNC: 7.7 MG/DL (ref 8.4–10.2)
CHLORIDE SERPL-SCNC: 114 MMOL/L (ref 96–112)
CO2 SERPL-SCNC: 25 MMOL/L (ref 20–33)
CREAT SERPL-MCNC: 0.62 MG/DL (ref 0.5–1.4)
EOSINOPHIL # BLD AUTO: 0.34 K/UL (ref 0–0.51)
EOSINOPHIL NFR BLD: 3.5 % (ref 0–6.9)
ERYTHROCYTE [DISTWIDTH] IN BLOOD BY AUTOMATED COUNT: 47.6 FL (ref 35.9–50)
GFR SERPLBLD CREATININE-BSD FMLA CKD-EPI: 86 ML/MIN/1.73 M 2
GLOBULIN SER CALC-MCNC: 2.4 G/DL (ref 1.9–3.5)
GLUCOSE BLD STRIP.AUTO-MCNC: 131 MG/DL (ref 65–99)
GLUCOSE BLD STRIP.AUTO-MCNC: 156 MG/DL (ref 65–99)
GLUCOSE BLD STRIP.AUTO-MCNC: 174 MG/DL (ref 65–99)
GLUCOSE SERPL-MCNC: 169 MG/DL (ref 65–99)
HCT VFR BLD AUTO: 31.5 % (ref 37–47)
HGB BLD-MCNC: 9.8 G/DL (ref 12–16)
IMM GRANULOCYTES # BLD AUTO: 0.09 K/UL (ref 0–0.11)
IMM GRANULOCYTES NFR BLD AUTO: 0.9 % (ref 0–0.9)
LYMPHOCYTES # BLD AUTO: 0.56 K/UL (ref 1–4.8)
LYMPHOCYTES NFR BLD: 5.8 % (ref 22–41)
MAGNESIUM SERPL-MCNC: 1.9 MG/DL (ref 1.5–2.5)
MCH RBC QN AUTO: 31.6 PG (ref 27–33)
MCHC RBC AUTO-ENTMCNC: 31.1 G/DL (ref 33.6–35)
MCV RBC AUTO: 101.6 FL (ref 81.4–97.8)
MONOCYTES # BLD AUTO: 0.75 K/UL (ref 0–0.85)
MONOCYTES NFR BLD AUTO: 7.8 % (ref 0–13.4)
NEUTROPHILS # BLD AUTO: 7.81 K/UL (ref 2–7.15)
NEUTROPHILS NFR BLD: 81.7 % (ref 44–72)
NRBC # BLD AUTO: 0 K/UL
NRBC BLD-RTO: 0 /100 WBC
PHOSPHATE SERPL-MCNC: 1.3 MG/DL (ref 2.5–4.5)
PLATELET # BLD AUTO: 232 K/UL (ref 164–446)
PMV BLD AUTO: 11.3 FL (ref 9–12.9)
POTASSIUM SERPL-SCNC: 3.8 MMOL/L (ref 3.6–5.5)
PROT SERPL-MCNC: 4.7 G/DL (ref 6–8.2)
RBC # BLD AUTO: 3.1 M/UL (ref 4.2–5.4)
SODIUM SERPL-SCNC: 146 MMOL/L (ref 135–145)
WBC # BLD AUTO: 9.6 K/UL (ref 4.8–10.8)

## 2022-04-01 PROCEDURE — 770006 HCHG ROOM/CARE - MED/SURG/GYN SEMI*

## 2022-04-01 PROCEDURE — 700101 HCHG RX REV CODE 250: Performed by: INTERNAL MEDICINE

## 2022-04-01 PROCEDURE — 94760 N-INVAS EAR/PLS OXIMETRY 1: CPT

## 2022-04-01 PROCEDURE — 700105 HCHG RX REV CODE 258: Performed by: INTERNAL MEDICINE

## 2022-04-01 PROCEDURE — 84100 ASSAY OF PHOSPHORUS: CPT

## 2022-04-01 PROCEDURE — C9113 INJ PANTOPRAZOLE SODIUM, VIA: HCPCS | Performed by: INTERNAL MEDICINE

## 2022-04-01 PROCEDURE — 85025 COMPLETE CBC W/AUTO DIFF WBC: CPT

## 2022-04-01 PROCEDURE — 700111 HCHG RX REV CODE 636 W/ 250 OVERRIDE (IP): Performed by: INTERNAL MEDICINE

## 2022-04-01 PROCEDURE — 99233 SBSQ HOSP IP/OBS HIGH 50: CPT | Performed by: INTERNAL MEDICINE

## 2022-04-01 PROCEDURE — 80053 COMPREHEN METABOLIC PANEL: CPT

## 2022-04-01 PROCEDURE — 36415 COLL VENOUS BLD VENIPUNCTURE: CPT

## 2022-04-01 PROCEDURE — 83735 ASSAY OF MAGNESIUM: CPT

## 2022-04-01 PROCEDURE — 82962 GLUCOSE BLOOD TEST: CPT

## 2022-04-01 RX ORDER — SODIUM CHLORIDE, SODIUM LACTATE, POTASSIUM CHLORIDE, CALCIUM CHLORIDE 600; 310; 30; 20 MG/100ML; MG/100ML; MG/100ML; MG/100ML
INJECTION, SOLUTION INTRAVENOUS CONTINUOUS
Status: DISCONTINUED | OUTPATIENT
Start: 2022-04-01 | End: 2022-04-01

## 2022-04-01 RX ADMIN — INSULIN HUMAN 2 UNITS: 100 INJECTION, SOLUTION PARENTERAL at 12:03

## 2022-04-01 RX ADMIN — PANTOPRAZOLE SODIUM 40 MG: 40 INJECTION, POWDER, FOR SOLUTION INTRAVENOUS at 05:50

## 2022-04-01 RX ADMIN — POTASSIUM PHOSPHATE, MONOBASIC AND POTASSIUM PHOSPHATE, DIBASIC 30 MMOL: 224; 236 INJECTION, SOLUTION, CONCENTRATE INTRAVENOUS at 06:41

## 2022-04-01 RX ADMIN — POTASSIUM CHLORIDE, DEXTROSE MONOHYDRATE AND SODIUM CHLORIDE: 150; 5; 900 INJECTION, SOLUTION INTRAVENOUS at 03:10

## 2022-04-01 RX ADMIN — INSULIN HUMAN 2 UNITS: 100 INJECTION, SOLUTION PARENTERAL at 05:59

## 2022-04-01 RX ADMIN — PANTOPRAZOLE SODIUM 40 MG: 40 INJECTION, POWDER, FOR SOLUTION INTRAVENOUS at 17:39

## 2022-04-01 RX ADMIN — CEFTRIAXONE SODIUM 2 G: 2 INJECTION, POWDER, FOR SOLUTION INTRAMUSCULAR; INTRAVENOUS at 05:50

## 2022-04-01 RX ADMIN — METRONIDAZOLE 500 MG: 500 INJECTION, SOLUTION INTRAVENOUS at 14:23

## 2022-04-01 RX ADMIN — POTASSIUM CHLORIDE: 2 INJECTION, SOLUTION, CONCENTRATE INTRAVENOUS at 19:27

## 2022-04-01 RX ADMIN — METRONIDAZOLE 500 MG: 500 INJECTION, SOLUTION INTRAVENOUS at 05:45

## 2022-04-01 ASSESSMENT — ENCOUNTER SYMPTOMS
NAUSEA: 0
FALLS: 0
SHORTNESS OF BREATH: 0
PALPITATIONS: 0
HEARTBURN: 0
BLURRED VISION: 0
HEADACHES: 0
BACK PAIN: 0
VOMITING: 0
DOUBLE VISION: 0
DIZZINESS: 0
ABDOMINAL PAIN: 1
FEVER: 0
CHILLS: 0
COUGH: 0
NERVOUS/ANXIOUS: 0

## 2022-04-01 ASSESSMENT — LIFESTYLE VARIABLES: SUBSTANCE_ABUSE: 0

## 2022-04-01 ASSESSMENT — PATIENT HEALTH QUESTIONNAIRE - PHQ9
2. FEELING DOWN, DEPRESSED, IRRITABLE, OR HOPELESS: NOT AT ALL
SUM OF ALL RESPONSES TO PHQ9 QUESTIONS 1 AND 2: 0
1. LITTLE INTEREST OR PLEASURE IN DOING THINGS: NOT AT ALL

## 2022-04-01 ASSESSMENT — PAIN DESCRIPTION - PAIN TYPE: TYPE: ACUTE PAIN;SURGICAL PAIN

## 2022-04-01 NOTE — PROGRESS NOTES
"Hospital Medicine Daily Progress Note    Date of Service  4/1/2022    Chief Complaint  Licha Pope is a 86 y.o. female admitted 3/25/2022 with abdominal pain.    Hospital Course  As per chart review  \"86 y.o. female with a history of hypertension, COPD, GERD, DVT dx 5/19/21 after long car trip (still on anticoagulation) who presented 3/25/2022 with abdominal pain.     Was recently admitted for terminal ileitis, SBO and pancreatitis on 2/17/2022, she was treated conservatively with IV fluids and pain control.  Right upper quadrant showed dependent gallbladder sludge versus small stones.  MRCP showed cholelithiasis and common bile duct 11.4 mm unchanged since 2015.  She was discharged with antibiotics and recommended to follow-up with GI, surgery and PCP after discharge.  She saw surgeon, she recommended cholecystectomy but wanted her to see GI doctor. GI doctor recommended cholecystectomy.  Has appt with surgery next Wed.       Patient never really felt like her abdominal pain improved however was stable until 2 days ago.  Abdominal pain became very severe however intermittent and would come in waves.  Patient became very fatigued and was sleeping most the day.  She had chills and was very weak.  Abdominal pain was diffuse, intermittent, described as sharp pain, 10/10 pain, went to PCP and doctor sent her to ER.  Did have some n/v, had some dark red or purple, size of quarter.  Passing gas.  Yesterday had BM.  Did have some dark stools, unsure if black was \"half asleep\".  Last night had lower back pain, now improved.      In ER she was afebrile, mildly tachycardic (heart rate 111), normal blood pressure and mild hypoxia requiring 1 L nasal cannula.  Labs remarkable for WBC 11.6 with a left shift, creatinine 1.06, calcium 10.6, lipase 104, troponin XX, lactic acid normal.  CT abdomen pelvis with contrast showed similar changes of bowel wall thickening involving the terminal ileum and base of the cecum adjacent to " "postoperative change from prior appendectomy which could be infectious or inflammatory.  There is mild proximal obstruction involving the ileum and distal jejunum in the lower abdomen left mid abdomen with small bowel loops dilated up to 4 cm.  There is no gross free air or pneumatosis.  Moderate size hiatal hernia again seen.  Minimal prominent biliary tree unchanged and physiologic, unchanged a back to 2015.  Simple hepatic cyst unchanged.  She was given IV fluids, Zosyn and general surgery was consulted.  Dr. Mcneil to see patient.  Recommended NG, IV fluids.\"    Interval Problem Update  3/26: Patient seen at bedside this morning.  Patient is hard of hearing.  Patient with NG tube in place.  Still complaining of abdominal pain some tenderness on examination.  General surgery has been consulted, we appreciate further recommendations.  Continue antibiotics for now.    3/27: Patient seen at bedside this morning.  It seems that the patient's NG tube came off, however the patient it seems that now is having regular bowel movements.  She still complaining of abdominal pain.  We have consulted GI, we appreciate further recommendations.  We also appreciate further recommendations by general surgery.  We will continue n.p.o. until surgery reevaluate the patient.  We will continue with antibiotics for now.  --The patient told me she used to see Dr Tarun Lewis at [facility], so I have consulted Dr Clay, we appreciate further recommendations.    3/28: Patient seen at bedside this morning.  The patient was vomiting this morning, she still complaining of abdominal pain.  We have placed the NG tube back on, we have ordered KUB.  We appreciate further recommendations by general surgery.  Also it seems that the patient had been seeing Dr. Ash earlier this month and she would like to continue seeing that group.  We have consulted Dr. Belle from GI, we appreciate further recommendations.  We will keep the patient n.p.o. for now " until surgical evaluation.  We will continue with antibiotics.    3/29: Patient seen at bedside this morning.  No family was present at bedside directly on my evaluation.  I tried calling the patient's son to give him an update however there was no answer.  Patient still n.p.o., with NG tube to suction.  General surgery and GI following the patient, we appreciate further recommendations.  The patient will undergo small bowel follow-through study today.  I asked the patient and it seems that she has not had a proper meal since last Thursday, if by tomorrow we are unable to feed the patient will consider TPN while further management is being done.    3/30: Patient seen at bedside this morning.  No family present at bedside, however I was able to talk to the patient's son over the phone and gave him an update.  Patient still n.p.o. with NG tube, I spoke to surgery and they will take her to the operating room later today.  We were going to start TPN today, however surgery would like to hold for now.  Patient with mild hyponatremia, this is most likely due to volume depletion as such she has not really eaten much for the last couple days.  We have increased the rate of the D5 NS, monitor and repeat sodium values.  We will order PICC line in preparation of possible TPN.  We appreciate further recommendations by general surgery.    3/31: Patient seen at bedside this morning.  No family members present at bedside, however again I was able to talk to the patient's son over the phone to update him on the patient's clinical status.  The patient underwent exploratory laparotomy with ileocecectomy yesterday by general surgery.  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  We will start the patient on TPN today.  The patient was seen at bedside, laying in bed currently comfortably with NG tube in place.  Patient also with Prevena status post exploratory laparotomy.  Patient requiring oxygen, this  is most likely postsurgical, we have ordered chest x-ray.  As per nursing no other overnight events reported.    4/1: Patient seen at bedside this morning.  No family present at bedside.  We are still pending pathology results.  Patient still not passing gas or having bowel movements.  We started TPN yesterday.  We will continue to monitor closely.    I have personally seen and examined the patient at bedside. I discussed the plan of care with patient, bedside RN and charge RN.    Consultants/Specialty  general surgery    Code Status  DNAR/DNI    Disposition  Patient is not medically cleared for discharge.   Anticipate discharge to pending clinical evolution.    Review of Systems  Review of Systems   Constitutional: Positive for malaise/fatigue. Negative for chills and fever.   HENT: Positive for hearing loss. Negative for nosebleeds.    Eyes: Negative for blurred vision and double vision.   Respiratory: Negative for cough and shortness of breath.    Cardiovascular: Negative for chest pain and palpitations.   Gastrointestinal: Positive for abdominal pain. Negative for heartburn and vomiting.   Genitourinary: Negative for dysuria and urgency.   Musculoskeletal: Negative for back pain and falls.   Skin: Negative for itching and rash.   Neurological: Negative for dizziness and headaches.   Psychiatric/Behavioral: Negative for substance abuse. The patient is not nervous/anxious.    All other systems reviewed and are negative.       Physical Exam  Temp:  [36.7 °C (98 °F)-37.4 °C (99.3 °F)] 37.4 °C (99.3 °F)  Pulse:  [] 100  Resp:  [16-20] 17  BP: (128-148)/(61-75) 148/75  SpO2:  [85 %-96 %] 92 %    Physical Exam  Vitals and nursing note reviewed.   Constitutional:       General: She is not in acute distress.     Appearance: She is ill-appearing.   HENT:      Head: Normocephalic and atraumatic.      Right Ear: External ear normal.      Left Ear: External ear normal.      Mouth/Throat:      Pharynx: No oropharyngeal  exudate or posterior oropharyngeal erythema.   Eyes:      General:         Right eye: No discharge.         Left eye: No discharge.   Cardiovascular:      Rate and Rhythm: Normal rate and regular rhythm.      Pulses: Normal pulses.      Heart sounds: No murmur heard.    No gallop.   Pulmonary:      Effort: Pulmonary effort is normal. No respiratory distress.      Breath sounds: Normal breath sounds. No wheezing or rhonchi.   Abdominal:      General: There is distension.      Tenderness: There is abdominal tenderness.      Comments: With NG tube    Prevena in place s/p surgery   Musculoskeletal:         General: No swelling or tenderness. Normal range of motion.      Cervical back: Normal range of motion and neck supple. No tenderness.   Skin:     General: Skin is warm and dry.   Neurological:      General: No focal deficit present.      Mental Status: She is alert and oriented to person, place, and time. Mental status is at baseline.      Motor: No weakness.   Psychiatric:         Mood and Affect: Mood normal.         Behavior: Behavior normal.         Thought Content: Thought content normal.         Fluids    Intake/Output Summary (Last 24 hours) at 4/1/2022 1242  Last data filed at 4/1/2022 1000  Gross per 24 hour   Intake 1380 ml   Output 440 ml   Net 940 ml       Laboratory  Recent Labs     03/30/22  0520 03/31/22 0447 04/01/22 0316   WBC 5.9 9.1 9.6   RBC 3.32* 2.82* 3.10*   HEMOGLOBIN 10.6* 8.9* 9.8*   HEMATOCRIT 33.2* 29.2* 31.5*   .0* 103.5* 101.6*   MCH 31.9 31.6 31.6   MCHC 31.9* 30.5* 31.1*   RDW 45.6 46.2 47.6   PLATELETCT 245 204 232   MPV 11.4 11.5 11.3     Recent Labs     03/30/22  0520 03/30/22  2240 03/31/22 0447 04/01/22 0316   SODIUM 149* 144 147* 146*   POTASSIUM 3.7  --  4.1 3.8   CHLORIDE 115*  --  116* 114*   CO2 27  --  24 25   GLUCOSE 123*  --  177* 169*   BUN 8  --  8 9   CREATININE 0.65  --  0.66 0.62   CALCIUM 8.7  --  7.5* 7.7*             Recent Labs     03/31/22 0447    TRIGLYCERIDE 54       Imaging  DX-CHEST-PORTABLE (1 VIEW)   Final Result      Decreased lung volumes with small left subpulmonic effusion and likely basilar atelectasis      IR-PICC LINE PLACEMENT W/ GUIDANCE > AGE 5   Final Result                  Ultrasound-guided PICC placement performed by qualified nursing staff as    above.          DX-SMALL BOWEL SERIES   Final Result      Partial small bowel obstruction with contrast transit time to the colon exceeding 9 hours      5 cm paraesophageal hernia      NG tube terminates over the gastric fundus      DX-ABDOMEN FOR TUBE PLACEMENT   Final Result         Gastric drainage tube with tip projecting over the expected area of the stomach.      [id number] VIEW   Final Result      Long segmental small bowel dilatation concerning for distal small bowel obstruction      DX-ABDOMEN FOR TUBE PLACEMENT   Final Result      Enteric tube terminates over the stomach.      Small bowel loop dilatation concerning for small bowel obstruction      DX-ABDOMEN FOR TUBE PLACEMENT   Final Result         Gastric drainage tube with tip projecting over the expected area of the stomach.      DX-ABDOMEN FOR TUBE PLACEMENT   Final Result         Gastric drainage tube loops in the esophagus with tip projecting over the expected area of the upper esophagus.      CT-ABDOMEN-PELVIS WITH   Final Result      1.  There are similar changes of bowel wall thickening involving the terminal ileum and base of the cecum adjacent to postoperative change from prior appendectomy which could be infectious or inflammatory.   2.  There is mild proximal obstruction involving the ileum and distal jejunum in the lower abdomen and left mid abdomen with small bowel loops dilated up to 4 cm.   3.  There is no gross free air or pneumatosis.   4.  Moderate size hiatal hernia again seen.   5.  Minimally prominent biliary tree unchanged and physiologic, unchanged dating back to 2015.   6.  Simple hepatic cysts are  unchanged.         DX-CHEST-PORTABLE (1 VIEW)   Final Result      1.  There is no acute cardiopulmonary process.           Assessment/Plan  * SBO (small bowel obstruction) (HCC)- (present on admission)  Assessment & Plan  Patient with recent terminal ileitis and small bowel obstruction last month  Treated with conservative therapy and antibiotics however now with repeat symptoms and SBO  Surgery consulted by ERP: Dr. Mcneil was called, recommended NPO, NG, IVF, pain mgmt  CTX, flagyl    3/26: Pending surgical evaluation, we appreciate further recommendations.    3/27: Patient seems to have BMs now, we are awaiting further recommendations by general surgery. We have also consulted GI ( Dr Clay), we appreciate further recommendations.    3/28: Patient was vomiting again this morning with abdominal pain.  We have placed the NG tube back and have ordered KUB.  We appreciate further recommendations by general surgery and GI. (Dr Belle from GI will be evaluating the patient today).    3/29: Patient to have small bowel follow through study today, we appreciate further recommendations by GI and General Surgery    3/30: Patient still unable to tolerate PO, we will start TPN, we appreciate further recommendations by surgery and GI.  --UPDATE: I spoke to surgery, they would like to hold TPN for now, as she might undergo surgery today, we will order PICC line.    3/31: Patient underwent exploratory laparotomy yesterday by general surgery, the patient also underwent ileocecectomy with finding of ileocecal mass, pending pathology.  We appreciate further recommendations by general surgery.  We will start TPN today.    Hypophosphatemia  Assessment & Plan  Replace as needed  monitor    Intraabdominal mass  Assessment & Plan  Patient underwent exploratory laparotomy.  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        Acute hypoxemic respiratory failure (HCC)  Assessment & Plan  Patient currently requiring oxygen  plantation.  This could be postsurgical.  Incentive spirometer encouraged.    Hypernatremia  Assessment & Plan  Mild, this could be due to volume depletion, patient has not had proper nutrition in a couple of days  Currently on D5 NS, we will increase the rate to 125 ml/hr  We were going to order TPN today, however surgery would like to hold, as patient will undergo surgery later today.  We will continue to monitor.    3/31: We have ordered TPN today    4/1: Improving    Generalized weakness  Assessment & Plan  PT/OT    Hyperglycemia  Assessment & Plan  Patient had recent A1c at 5.1  Monitor  No need to start insulin treatment at this time    NATIVIDAD (acute kidney injury) (HCC)  Assessment & Plan  Mild bump in Cr 0.6-->1.06  Likely in setting of decreased PO intake/pre-renal  IVF resuscitation  Continue to monitor    3/29: Improved. Cr is 0.55 today.    Abdominal pain  Assessment & Plan  Initial concern for sbo  Also concern for ileitis  General surgery and GI following patient, we appreciate further recommendations    Anemia- (present on admission)  Assessment & Plan  No signs of bleeding at this time, monitor    3/31: Patient underwent exploratory laparotomy yesterday.  Continue to monitor CBC.    History of deep venous thrombosis- (present on admission)  Assessment & Plan  Diagnosed 5/19/2020 6 hour car trip  Still on anticoagulation    3/27: Will probably resume once surgery clears her NPO    COPD (chronic obstructive pulmonary disease) (HCC)- (present on admission)  Assessment & Plan  Not on home oxygen  Not having COPD exacerbation    Essential hypertension, benign- (present on admission)  Assessment & Plan  Home regimen: Valsartan 160 mg daily  Inpatient regimen: PRN medication, resume home medication once surgery clears for PO    3/29: Patient still NPO, however BP seems to be controlled, PRN medication for now       VTE prophylaxis: SCDs/TEDs    I have performed a physical exam and reviewed and updated ROS and  Plan today (4/1/2022). In review of yesterday's note (3/31/2022), there are no changes except as documented above.

## 2022-04-01 NOTE — DISCHARGE PLANNING
Anticipated Discharge Disposition:   Home with HH vs PMR    Action:   Chart review complete     Discussed patient during rounds. Patient is not yet medically cleared.      PT/OT recommending HH. Patient has been approved by [facility].     Per Fredy's note, patient is potential IRF candidate. RN CM to place consult closer to anticipated discharge.     RN CM will continue to follow.      Barriers to Discharge:   Medical clearance  Possible IRF acceptance      Plan:   HCM will continue to follow and assist with discharge needs.

## 2022-04-01 NOTE — PROGRESS NOTES
Received report from night shift RN. Pt is awake and alert. No signs of distress. Pt denies any nausea.Pt denies any pain at this time.  Dressing to midline is cdi. Prevena in place. NG tube in place to r nare. kenisha drain in place. fall precautions in place. Bed in lowest and locked position. Call light within reach.

## 2022-04-01 NOTE — PROGRESS NOTES
"Surgical Progress Note               Author: Jonna Soni M.D. Date & Time created: 4/1/2022  9:01 AM     Interval History:  \"I feel fine.\" States she is having bowel movements but no flatus. Can't tell if she feels bloated. Not up out of bed much yet.     Review of Systems:  Review of Systems   Constitutional: Negative for chills and fever.   Gastrointestinal: Positive for abdominal pain. Negative for nausea and vomiting.       Physical Exam:  Physical Exam  Constitutional:       General: She is not in acute distress.     Appearance: Normal appearance. She is not ill-appearing.   HENT:      Head: Normocephalic and atraumatic.      Right Ear: External ear normal.      Left Ear: External ear normal.      Mouth/Throat:      Mouth: Mucous membranes are moist.   Eyes:      Extraocular Movements: Extraocular movements intact.   Cardiovascular:      Rate and Rhythm: Normal rate and regular rhythm.   Pulmonary:      Effort: Pulmonary effort is normal.   Abdominal:      General: Abdomen is flat. There is distension.      Tenderness: There is abdominal tenderness.      Comments: Prevena intact. Mild diffuse tenderness, more around incision. NGT 100cc.   MAYUR drain 250cc serosang.    Musculoskeletal:         General: Normal range of motion.   Skin:     General: Skin is warm and dry.      Capillary Refill: Capillary refill takes less than 2 seconds.   Neurological:      General: No focal deficit present.      Mental Status: She is alert and oriented to person, place, and time.   Psychiatric:         Mood and Affect: Mood normal.         Behavior: Behavior normal.         Labs:          Recent Labs     03/30/22  0520 03/30/22  2240 03/31/22  0447 04/01/22  0316   SODIUM 149* 144 147* 146*   POTASSIUM 3.7  --  4.1 3.8   CHLORIDE 115*  --  116* 114*   CO2 27  --  24 25   BUN 8  --  8 9   CREATININE 0.65  --  0.66 0.62   MAGNESIUM 2.2  --   --  1.9   PHOSPHORUS 2.6  --   --  1.3*   CALCIUM 8.7  --  7.5* 7.7*     Recent Labs "     22  0520 227 22  0316   ALTSGPT <5 <5 7   ASTSGOT 12 14 15   ALKPHOSPHAT 37 24* 40   TBILIRUBIN 0.4 0.3 0.3   GLUCOSE 123* 177* 169*     Recent Labs     22  0520 227 22  0316   RBC 3.32* 2.82* 3.10*   HEMOGLOBIN 10.6* 8.9* 9.8*   HEMATOCRIT 33.2* 29.2* 31.5*   PLATELETCT 245 204 232     Recent Labs     22  0522  0316   WBC 5.9 9.1 9.6   NEUTSPOLYS 78.50* 88.10* 81.70*   LYMPHOCYTES 9.60* 5.40* 5.80*   MONOCYTES 9.20 5.80 7.80   EOSINOPHILS 1.70 0.00 3.50   BASOPHILS 0.70 0.30 0.30   ASTSGOT 12 14 15   ALTSGPT <5 <5 7   ALKPHOSPHAT 37 24* 40   TBILIRUBIN 0.4 0.3 0.3     Hemodynamics:  Temp (24hrs), Av.9 °C (98.5 °F), Min:36.7 °C (98 °F), Max:37.2 °C (99 °F)  Temperature: 37.2 °C (99 °F) (Rn notified and aware)  Pulse  Av.3  Min: 62  Max: 111   Blood Pressure : 145/75     Respiratory:    Respiration: 17, Pulse Oximetry: 90 %        RUL Breath Sounds: Diminished, RML Breath Sounds: Diminished, RLL Breath Sounds: Diminished, FARAZ Breath Sounds: Diminished, LLL Breath Sounds: Diminished  Fluids:    Intake/Output Summary (Last 24 hours) at 2022 0901  Last data filed at 2022 0600  Gross per 24 hour   Intake 1380 ml   Output 450 ml   Net 930 ml        GI/Nutrition:  Orders Placed This Encounter   Procedures   • Diet NPO Restrict to: Strict     Standing Status:   Standing     Number of Occurrences:   1     Order Specific Question:   Diet NPO Restrict to:     Answer:   Strict [1]     Medical Decision Making, by Problem:  Active Hospital Problems    Diagnosis    • *SBO (small bowel obstruction) ([facility]) [K56.609]    • Acute hypoxemic respiratory failure (HCC) [J96.01]    • Intraabdominal mass [R19.00]    • Hypernatremia [E87.0]    • Generalized weakness [R53.1]    • Hyperglycemia [R73.9]    • NATIVIDAD (acute kidney injury) (HCC) [N17.9]    • Abdominal pain [R10.9]    • Anemia [D64.9]    • History of deep venous thrombosis [Z86.718]    •  COPD (chronic obstructive pulmonary disease) ([facility]) [J44.9]    • Essential hypertension, benign [I10]        Plan:  Continue NGT until patient passing flatus and less distended.   Continue MAYUR drain until after return of bowel function.   Follow up path results.   Encouraged sitting up in a chair and ambulating in room and hallway.     Quality-Core Measures

## 2022-04-01 NOTE — PROGRESS NOTES
Pharmacy TPN Day # 2     2022    Dosing Weight   55 kg TPN currently providing 50% of goal                                                  TPN goal: 1532 kcal/day including 1.3 gm/kg/day Protein                                                  TPN indication: SBO, NPO x 7 d     Pertinent PMH: Patient with recent terminal ileitis and SBO last month.  Patient treated with conservative therapy and ABX now with repeat symtoms and SBO.  Patient has been NPO x 7 days    Temp (24hrs), Av.9 °C (98.5 °F), Min:36.7 °C (98 °F), Max:37.2 °C (99 °F)  .  Recent Labs     22  0520 22  2240 22  0447 22  0316   SODIUM 149* 144 147* 146*   POTASSIUM 3.7  --  4.1 3.8   CHLORIDE 115*  --  116* 114*   CO2 27  --  24 25   BUN 8  --  8 9   CREATININE 0.65  --  0.66 0.62   GLUCOSE 123*  --  177* 169*   CALCIUM 8.7  --  7.5* 7.7*   ASTSGOT 12  --  14 15   ALTSGPT <5  --  <5 7   ALBUMIN 3.2  --  2.1* 2.3*   TBILIRUBIN 0.4  --  0.3 0.3   PHOSPHORUS 2.6  --   --  1.3*   MAGNESIUM 2.2  --   --  1.9     Accu-Checks  No results for input(s): POCGLUCOSE in the last 72 hours.    Vitals:    22 2041 22 0007 22 0441 22 0800   BP: 140/66 146/71 147/68 145/75   Weight:       Height:           Intake/Output Summary (Last 24 hours) at 2022 1204  Last data filed at 2022 1000  Gross per 24 hour   Intake 1380 ml   Output 440 ml   Net 940 ml       Orders Placed This Encounter   Procedures   • Diet NPO Restrict to: Strict     Standing Status:   Standing     Number of Occurrences:   1     Order Specific Question:   Diet NPO Restrict to:     Answer:   Strict [1]         TPN for past 72 hours (Show up to 3 orders; newest on the left. Changes between the two most recent orders are indicated.)     Start date and time   2022      TPN Adult Central Line [[id number]] TPN Adult Central Line [[id number]]    Order Status  Active Last Dose in Progress    Last Admin   Rate Verify at  04/01/2022 0751 by Kristen Haas R.N.       Base    Clinisol 15%  36 g 36 g    dextrose 70%  127 g 127 g    fat emulsion (soy) 20%  19 g 19 g       Additives    potassium phosphate  21 mmol 14 mmol    potassium chloride  80 mEq 80 mEq    sodium acetate  160 mEq 225 mEq    magnesium sulfate  12 mEq 12 mEq    calcium GLUConate  9.4 mEq 9.4 mEq    M.T.E.-4  1 mL 1 mL    M.V.I. Adult  10 mL 10 mL       QS Base    sterile water  1,314.42 mL 1,284.25 mL       Energy Contribution    Proteins  -- --    Dextrose  431.8 kcal 431.8 kcal    Lipids  190 kcal 190 kcal    Total  621.8 kcal 621.8 kcal       Electrolyte Ion Calculated Amount    Sodium  160 mEq 225 mEq    Potassium  110.8 mEq 100.53 mEq    Calcium  9.4 mEq 9.4 mEq    Magnesium  12 mEq 12 mEq    Aluminum  -- --    Phosphate  21 mmol 14 mmol    Chloride  80 mEq 80 mEq    Acetate  190.48 mEq 255.48 mEq       Other    Total Protein  36 g 36 g    Total Protein/kg  0.63 g/kg 0.63 g/kg    Total Amino Acid  -- --    Total Amino Acid/kg  -- --    Glucose Infusion Rate  1.54 mg/kg/min 1.54 mg/kg/min    Osmolarity (Estimated)  -- --    Volume  1,992 mL 1,992 mL    Rate  83 mL/hr 83 mL/hr    Dosing Weight  57.2 kg 57.2 kg    In[facility]            This formula provides:  % kcal as lipids = 25%  Grams protein/kg = 0.65  Non-protein calories = 622  Kcals/kg = 14  Total daily calories = 766    Comments:  Phos level decreased. Received KPhos 30 mmol outside of TPN this am. D5-NS at 125 ml/hr had continued and was discontinued this am.      Plan:  Staying at 50% of total kcal goal. Increase KPhos in bag, decrease sodium to 1/2 normal saline.      Irving May, PharmD

## 2022-04-01 NOTE — PROGRESS NOTES
Received bedside patient report from SIDRA Quinn. Patient resting comfortably in bed, no complaints at this time. Safety precautions in place. Will continue to monitor.

## 2022-04-01 NOTE — CARE PLAN
The patient is Stable - Low risk of patient condition declining or worsening    Shift Goals  Clinical Goals: TPN Initiation, Rest and sleep, Pain Control  Patient Goals: TPN Initiation, Rest and sleep, Pain Control  Family Goals: TPN Initiation, Rest and sleep, Pain Control    Progress made toward(s) clinical / shift goals:    Problem: Knowledge Deficit - Standard  Goal: Patient and family/care givers will demonstrate understanding of plan of care, disease process/condition, diagnostic tests and medications  Outcome: Progressing     Problem: Gastrointestinal Irritability  Goal: Nausea and vomiting will be absent or improve  Outcome: Progressing     Problem: Fluid Volume  Goal: Fluid volume balance will be maintained  Outcome: Progressing     Problem: Urinary Elimination  Goal: Establish and maintain regular urinary output  Outcome: Progressing     Problem: Self Care  Goal: Patient will have the ability to perform ADLs independently or with assistance (bathe, groom, dress, toilet and feed)  Outcome: Progressing     Problem: Risk for Aspiration  Goal: Patient's risk for aspiration will be absent or decrease  Outcome: Progressing     Problem: Impaired Gas Exchange  Goal: Patient will demonstrate improved ventilation and adequate oxygenation and participate in treatment regimen within the level of ability/situation.  Outcome: Progressing     Problem: Ineffective Airway Clearance  Goal: Patient will maintain patent airway with clear/clearing breath sounds  Outcome: Progressing     Problem: Nutrition - Advanced  Goal: Patient will display progressive weight gain toward goal have adequate food and fluid intake  Outcome: Progressing     Problem: Risk for Infection - COPD  Goal: Patient will remain free from signs and symptoms of infection  Outcome: Progressing     Problem: Knowledge Deficit - COPD  Goal: Patient/significant other demonstrates understanding of disease process, utilization of the Action Plan, medications and  discharge instruction  Outcome: Progressing     Problem: Fall Risk  Goal: Patient will remain free from falls  Outcome: Progressing     Problem: Pain - Standard  Goal: Alleviation of pain or a reduction in pain to the patient’s comfort goal  Outcome: Progressing     Problem: Mobility  Goal: Patient's capacity to carry out activities will improve  Outcome: Progressing    Patient is not progressing towards the following goals:    Problem: Bowel Elimination  Goal: Establish and maintain regular bowel function  Outcome: Progressing

## 2022-04-01 NOTE — DOCUMENTATION QUERY
[facility]                                                                       Query Response Note      PATIENT:               SATINDER LARES  ACCT #:                  [id number]  MRN:                     [id number]  :                      1935  ADMIT DATE:       3/25/2022 5:47 PM  DISCH DATE:          RESPONDING  PROVIDER #:        [id number]           QUERY TEXT:    Acute hypoxemic respiratory failure, this could be post surgical per progress note.  Patient has COPD and is not on home 02, recent laparotomy surgery, physical assessment documents no respiratory distress, post anesthesia evaluation indicates acceptable respiratory status.  Patient required 02 for a short time to maintain 02 saturation >90% and is now on RA with saturations of 90-96%.  Please further specify the acute respiratory failure with hypoxia that could be post surgical.     NOTE:  If an appropriate response is not listed below, please respond with a new note.    The patient's Clinical Indicators include:  - Findings:  Progress note 3/31/22:  acute hypoxemic respiratory failure, patient currently requiring oxygen plantation.  This could be post surgical.  pulmonary effort is normal, no respiratory distress  - CXR 3/31/22:  Decreased lung volumes with small left subpulmonic effusion and likely basilar atelectasis  - post OP RA saturation 85%, 02 saturation on 2-4L NC briefly 96-99%, now on RA with saturations 90-96%   - post anesthesia evaluation:  patent airway, acceptable respiratory status     - Treatments:  02, chest x ray, duoneb PRN     - Risk factors:  COPD/emphysema (not on home 02), laparotomy surgery    Thank You,  Abbie Johnson, RN  Clinical Documentation   [email or web address]  Connect via [employer]  Options provided:   -- Respiratory failure due to procedure   -- Respiratory failure due to COPD (please specify acuity)   -- Acute pulmonary  insufficiency following surgery   -- Other cause of respiratory failure, (please document other cause of respiratory failure)   -- hypoxemia only, respiratory failure is ruled out   -- Unable to determine      Query created by: Abbie Johnson on 4/1/2022 1:39 PM    RESPONSE TEXT:    Patient with acute hypoxemic respiratory failure, multifactorial. Patient with diagnosis of COPD and recent surgery. Thank you.          Electronically signed by:  JOSEP ALEJO MD 4/1/2022 1:47 PM

## 2022-04-02 ENCOUNTER — APPOINTMENT (OUTPATIENT)
Dept: RADIOLOGY | Facility: MEDICAL CENTER | Age: 87
DRG: 329 | End: 2022-04-02
Attending: INTERNAL MEDICINE
Payer: MEDICARE

## 2022-04-02 PROBLEM — C17.2: Status: ACTIVE | Noted: 2022-04-02

## 2022-04-02 LAB
ALBUMIN SERPL BCP-MCNC: 2.6 G/DL (ref 3.2–4.9)
ALBUMIN/GLOB SERPL: 1 G/DL
ALP SERPL-CCNC: 32 U/L (ref 30–99)
ALT SERPL-CCNC: 6 U/L (ref 2–50)
ANION GAP SERPL CALC-SCNC: 8 MMOL/L (ref 7–16)
AST SERPL-CCNC: 15 U/L (ref 12–45)
BASOPHILS # BLD AUTO: 0.3 % (ref 0–1.8)
BASOPHILS # BLD: 0.03 K/UL (ref 0–0.12)
BILIRUB SERPL-MCNC: 0.6 MG/DL (ref 0.1–1.5)
BUN SERPL-MCNC: 9 MG/DL (ref 8–22)
CA-I SERPL-SCNC: 1.12 MMOL/L (ref 1.1–1.3)
CALCIUM SERPL-MCNC: 8.2 MG/DL (ref 8.4–10.2)
CHLORIDE SERPL-SCNC: 101 MMOL/L (ref 96–112)
CO2 SERPL-SCNC: 28 MMOL/L (ref 20–33)
CREAT SERPL-MCNC: 0.56 MG/DL (ref 0.5–1.4)
EOSINOPHIL # BLD AUTO: 0.33 K/UL (ref 0–0.51)
EOSINOPHIL NFR BLD: 3 % (ref 0–6.9)
ERYTHROCYTE [DISTWIDTH] IN BLOOD BY AUTOMATED COUNT: 48 FL (ref 35.9–50)
GFR SERPLBLD CREATININE-BSD FMLA CKD-EPI: 89 ML/MIN/1.73 M 2
GLOBULIN SER CALC-MCNC: 2.7 G/DL (ref 1.9–3.5)
GLUCOSE BLD STRIP.AUTO-MCNC: 108 MG/DL (ref 65–99)
GLUCOSE BLD STRIP.AUTO-MCNC: 129 MG/DL (ref 65–99)
GLUCOSE BLD STRIP.AUTO-MCNC: 147 MG/DL (ref 65–99)
GLUCOSE SERPL-MCNC: 130 MG/DL (ref 65–99)
HCT VFR BLD AUTO: 37.4 % (ref 37–47)
HGB BLD-MCNC: 11.7 G/DL (ref 12–16)
IMM GRANULOCYTES # BLD AUTO: 0.15 K/UL (ref 0–0.11)
IMM GRANULOCYTES NFR BLD AUTO: 1.3 % (ref 0–0.9)
LYMPHOCYTES # BLD AUTO: 0.72 K/UL (ref 1–4.8)
LYMPHOCYTES NFR BLD: 6.5 % (ref 22–41)
MAGNESIUM SERPL-MCNC: 2.1 MG/DL (ref 1.5–2.5)
MCH RBC QN AUTO: 32.5 PG (ref 27–33)
MCHC RBC AUTO-ENTMCNC: 31.3 G/DL (ref 33.6–35)
MCV RBC AUTO: 103.9 FL (ref 81.4–97.8)
MONOCYTES # BLD AUTO: 0.66 K/UL (ref 0–0.85)
MONOCYTES NFR BLD AUTO: 5.9 % (ref 0–13.4)
NEUTROPHILS # BLD AUTO: 9.26 K/UL (ref 2–7.15)
NEUTROPHILS NFR BLD: 83 % (ref 44–72)
NRBC # BLD AUTO: 0 K/UL
NRBC BLD-RTO: 0 /100 WBC
PHOSPHATE SERPL-MCNC: 2.6 MG/DL (ref 2.5–4.5)
PLATELET # BLD AUTO: 208 K/UL (ref 164–446)
PMV BLD AUTO: 11.8 FL (ref 9–12.9)
POTASSIUM SERPL-SCNC: 4.5 MMOL/L (ref 3.6–5.5)
PROT SERPL-MCNC: 5.3 G/DL (ref 6–8.2)
RBC # BLD AUTO: 3.6 M/UL (ref 4.2–5.4)
SODIUM SERPL-SCNC: 137 MMOL/L (ref 135–145)
WBC # BLD AUTO: 11.2 K/UL (ref 4.8–10.8)

## 2022-04-02 PROCEDURE — 87040 BLOOD CULTURE FOR BACTERIA: CPT | Mod: 91

## 2022-04-02 PROCEDURE — C9113 INJ PANTOPRAZOLE SODIUM, VIA: HCPCS | Performed by: INTERNAL MEDICINE

## 2022-04-02 PROCEDURE — 770006 HCHG ROOM/CARE - MED/SURG/GYN SEMI*

## 2022-04-02 PROCEDURE — 84100 ASSAY OF PHOSPHORUS: CPT

## 2022-04-02 PROCEDURE — 71260 CT THORAX DX C+: CPT | Mod: MG

## 2022-04-02 PROCEDURE — 700117 HCHG RX CONTRAST REV CODE 255: Performed by: INTERNAL MEDICINE

## 2022-04-02 PROCEDURE — 82962 GLUCOSE BLOOD TEST: CPT | Mod: 91

## 2022-04-02 PROCEDURE — 82330 ASSAY OF CALCIUM: CPT

## 2022-04-02 PROCEDURE — 85025 COMPLETE CBC W/AUTO DIFF WBC: CPT

## 2022-04-02 PROCEDURE — 700101 HCHG RX REV CODE 250: Performed by: INTERNAL MEDICINE

## 2022-04-02 PROCEDURE — 83735 ASSAY OF MAGNESIUM: CPT

## 2022-04-02 PROCEDURE — 36415 COLL VENOUS BLD VENIPUNCTURE: CPT

## 2022-04-02 PROCEDURE — 700111 HCHG RX REV CODE 636 W/ 250 OVERRIDE (IP): Performed by: INTERNAL MEDICINE

## 2022-04-02 PROCEDURE — 700105 HCHG RX REV CODE 258: Performed by: INTERNAL MEDICINE

## 2022-04-02 PROCEDURE — 99233 SBSQ HOSP IP/OBS HIGH 50: CPT | Performed by: INTERNAL MEDICINE

## 2022-04-02 PROCEDURE — 80053 COMPREHEN METABOLIC PANEL: CPT

## 2022-04-02 RX ORDER — DIPHENHYDRAMINE HYDROCHLORIDE 50 MG/ML
12.5 INJECTION INTRAMUSCULAR; INTRAVENOUS
Status: COMPLETED | OUTPATIENT
Start: 2022-04-02 | End: 2022-04-02

## 2022-04-02 RX ADMIN — PIPERACILLIN AND TAZOBACTAM 4.5 G: 4; .5 INJECTION, POWDER, LYOPHILIZED, FOR SOLUTION INTRAVENOUS; PARENTERAL at 21:58

## 2022-04-02 RX ADMIN — DIPHENHYDRAMINE HYDROCHLORIDE 12.5 MG: 50 INJECTION, SOLUTION INTRAMUSCULAR; INTRAVENOUS at 09:25

## 2022-04-02 RX ADMIN — IOHEXOL 75 ML: 350 INJECTION, SOLUTION INTRAVENOUS at 09:58

## 2022-04-02 RX ADMIN — INSULIN HUMAN 2 UNITS: 100 INJECTION, SOLUTION PARENTERAL at 23:28

## 2022-04-02 RX ADMIN — ENOXAPARIN SODIUM 40 MG: 40 INJECTION SUBCUTANEOUS at 09:24

## 2022-04-02 RX ADMIN — INSULIN HUMAN 2 UNITS: 100 INJECTION, SOLUTION PARENTERAL at 05:14

## 2022-04-02 RX ADMIN — PIPERACILLIN AND TAZOBACTAM 4.5 G: 4; .5 INJECTION, POWDER, LYOPHILIZED, FOR SOLUTION INTRAVENOUS; PARENTERAL at 17:58

## 2022-04-02 RX ADMIN — PANTOPRAZOLE SODIUM 40 MG: 40 INJECTION, POWDER, FOR SOLUTION INTRAVENOUS at 05:01

## 2022-04-02 RX ADMIN — PANTOPRAZOLE SODIUM 40 MG: 40 INJECTION, POWDER, FOR SOLUTION INTRAVENOUS at 17:59

## 2022-04-02 RX ADMIN — POTASSIUM CHLORIDE: 2 INJECTION, SOLUTION, CONCENTRATE INTRAVENOUS at 19:02

## 2022-04-02 ASSESSMENT — COGNITIVE AND FUNCTIONAL STATUS - GENERAL
MOBILITY SCORE: 16
TURNING FROM BACK TO SIDE WHILE IN FLAT BAD: A LITTLE
CLIMB 3 TO 5 STEPS WITH RAILING: A LOT
DRESSING REGULAR LOWER BODY CLOTHING: A LOT
SUGGESTED CMS G CODE MODIFIER DAILY ACTIVITY: CK
EATING MEALS: A LITTLE
MOVING TO AND FROM BED TO CHAIR: A LITTLE
STANDING UP FROM CHAIR USING ARMS: A LOT
SUGGESTED CMS G CODE MODIFIER MOBILITY: CK
PERSONAL GROOMING: A LITTLE
WALKING IN HOSPITAL ROOM: A LITTLE
HELP NEEDED FOR BATHING: A LOT
DAILY ACTIVITIY SCORE: 14
DRESSING REGULAR UPPER BODY CLOTHING: A LOT
MOVING FROM LYING ON BACK TO SITTING ON SIDE OF FLAT BED: A LITTLE
TOILETING: A LOT

## 2022-04-02 ASSESSMENT — ENCOUNTER SYMPTOMS
SHORTNESS OF BREATH: 0
POLYDIPSIA: 0
CHILLS: 0
BACK PAIN: 0
EYE DISCHARGE: 0
FEVER: 0
WHEEZING: 0
COUGH: 0
ABDOMINAL PAIN: 0
INSOMNIA: 0
WEIGHT LOSS: 1
VOMITING: 0
DIZZINESS: 0
NAUSEA: 0
BLURRED VISION: 0
EYE PAIN: 0
HEADACHES: 0
NERVOUS/ANXIOUS: 0
MEMORY LOSS: 0
NAUSEA: 1
WEAKNESS: 1
FALLS: 0
ABDOMINAL PAIN: 1
HEARTBURN: 0
DOUBLE VISION: 0
CONSTIPATION: 1
PALPITATIONS: 0

## 2022-04-02 ASSESSMENT — PAIN DESCRIPTION - PAIN TYPE: TYPE: SURGICAL PAIN;ACUTE PAIN

## 2022-04-02 ASSESSMENT — LIFESTYLE VARIABLES: SUBSTANCE_ABUSE: 0

## 2022-04-02 NOTE — CARE PLAN
Problem: Nutritional:  Goal: Nutrition support tolerated and meeting greater than 85% of estimated needs  4/2/2022 0835 by Maylin Rawls R.D.  Outcome: Progressing slower than expected     TPN continues at 50% of goal per PharmD note 4/1/22.     RD continues to follow.

## 2022-04-02 NOTE — CARE PLAN
The patient is Stable - Low risk of patient condition declining or worsening    Shift Goals  Clinical Goals: Pain Control 3/10, Rest and sleep at least 6 hours  Patient Goals: Pain Control 3/10, Rest and sleep at least 6 hours  Family Goals: Rest and sleep at least 6 hours    Progress made toward(s) clinical / shift goals:    Problem: Knowledge Deficit - Standard  Goal: Patient and family/care givers will demonstrate understanding of plan of care, disease process/condition, diagnostic tests and medications  Outcome: Progressing     Problem: Gastrointestinal Irritability  Goal: Nausea and vomiting will be absent or improve  Outcome: Progressing     Problem: Fluid Volume  Goal: Fluid volume balance will be maintained  Outcome: Progressing     Problem: Urinary Elimination  Goal: Establish and maintain regular urinary output  Outcome: Progressing     Problem: Mobility  Goal: Patient's capacity to carry out activities will improve  Outcome: Progressing     Problem: Pain - Standard  Goal: Alleviation of pain or a reduction in pain to the patient’s comfort goal  Outcome: Progressing     Problem: Fall Risk  Goal: Patient will remain free from falls  Outcome: Progressing     Problem: Knowledge Deficit - COPD  Goal: Patient/significant other demonstrates understanding of disease process, utilization of the Action Plan, medications and discharge instruction  Outcome: Progressing     Problem: Risk for Infection - COPD  Goal: Patient will remain free from signs and symptoms of infection  Outcome: Progressing     Problem: Nutrition - Advanced  Goal: Patient will display progressive weight gain toward goal have adequate food and fluid intake  Outcome: Progressing     Problem: Ineffective Airway Clearance  Goal: Patient will maintain patent airway with clear/clearing breath sounds  Outcome: Progressing     Problem: Impaired Gas Exchange  Goal: Patient will demonstrate improved ventilation and adequate oxygenation and participate in  treatment regimen within the level of ability/situation.  Outcome: Progressing     Problem: Risk for Aspiration  Goal: Patient's risk for aspiration will be absent or decrease  Outcome: Progressing     Problem: Self Care  Goal: Patient will have the ability to perform ADLs independently or with assistance (bathe, groom, dress, toilet and feed)  Outcome: Progressing       Patient is not progressing towards the following goals:      Problem: Bowel Elimination  Goal: Establish and maintain regular bowel function  Outcome: Not Progressing

## 2022-04-02 NOTE — CARE PLAN
The patient is Stable - Low risk of patient condition declining or worsening    Shift Goals  Clinical Goals: Pain Control 3/10, Rest and sleep at least 6 hours  Patient Goals: Pain Control 3/10, Rest and sleep at least 6 hours  Family Goals: Rest and sleep at least 6 hours    Progress made toward(s) clinical / shift goals:  YES        Problem: Bowel Elimination  Goal: Establish and maintain regular bowel function  Outcome: Progressing     Problem: Gastrointestinal Irritability  Goal: Nausea and vomiting will be absent or improve  Outcome: Progressing

## 2022-04-02 NOTE — PROGRESS NOTES
Pt NG tube removed as per order, pt tolerated well, diet upgrade 100ml/daily PO, monitor for bloating and N/V. Pt to CT ABD w contrast, results pending , seen by hospitalist at bedside updated on status and planning, son at bedside.

## 2022-04-02 NOTE — PROGRESS NOTES
Surgical Progress Note               Author: Jonna Soni M.D. Date & Time created: 4/2/2022  9:08 AM     Interval History:  Up walking in hallway yesterday, up in room every 2 hours. Pain tolerable. No N/V. No NG output. No Flatus or BM yet.     Review of Systems:  Review of Systems   Constitutional: Negative for chills and fever.   Gastrointestinal: Negative for abdominal pain, nausea and vomiting.       Physical Exam:  Physical Exam  Constitutional:       General: She is not in acute distress.     Appearance: Normal appearance. She is not ill-appearing.   HENT:      Head: Normocephalic and atraumatic.      Right Ear: External ear normal.      Left Ear: External ear normal.      Mouth/Throat:      Mouth: Mucous membranes are moist.   Eyes:      Extraocular Movements: Extraocular movements intact.   Cardiovascular:      Rate and Rhythm: Normal rate and regular rhythm.   Pulmonary:      Effort: Pulmonary effort is normal.   Abdominal:      General: There is distension.      Tenderness: There is no abdominal tenderness.      Comments: Distention improved, soft, no tenderness today. MAYUR drain output serous. Prevena in place.    Skin:     General: Skin is warm and dry.      Capillary Refill: Capillary refill takes less than 2 seconds.   Neurological:      General: No focal deficit present.      Mental Status: She is alert and oriented to person, place, and time.   Psychiatric:         Mood and Affect: Mood normal.         Behavior: Behavior normal.         Labs:          Recent Labs     03/30/22 2240 03/31/22 0447 04/01/22 0316   SODIUM 144 147* 146*   POTASSIUM  --  4.1 3.8   CHLORIDE  --  116* 114*   CO2  --  24 25   BUN  --  8 9   CREATININE  --  0.66 0.62   MAGNESIUM  --   --  1.9   PHOSPHORUS  --   --  1.3*   CALCIUM  --  7.5* 7.7*     Recent Labs     03/31/22 0447 04/01/22  0316   ALTSGPT <5 7   ASTSGOT 14 15   ALKPHOSPHAT 24* 40   TBILIRUBIN 0.3 0.3   GLUCOSE 177* 169*     Recent Labs      22  0316   RBC 2.82* 3.10*   HEMOGLOBIN 8.9* 9.8*   HEMATOCRIT 29.2* 31.5*   PLATELETCT 204 232     Recent Labs     22  0316   WBC 9.1 9.6   NEUTSPOLYS 88.10* 81.70*   LYMPHOCYTES 5.40* 5.80*   MONOCYTES 5.80 7.80   EOSINOPHILS 0.00 3.50   BASOPHILS 0.30 0.30   ASTSGOT 14 15   ALTSGPT <5 7   ALKPHOSPHAT 24* 40   TBILIRUBIN 0.3 0.3     Hemodynamics:  Temp (24hrs), Av.7 °C (99.8 °F), Min:37.4 °C (99.3 °F), Max:38 °C (100.4 °F)  Temperature: 37.7 °C (99.9 °F)  Pulse  Av.2  Min: 62  Max: 111   Blood Pressure : 160/79     Respiratory:    Respiration: 16, Pulse Oximetry: 90 %        RUL Breath Sounds: Diminished, RML Breath Sounds: Diminished, RLL Breath Sounds: Diminished, FARAZ Breath Sounds: Diminished, LLL Breath Sounds: Diminished  Fluids:    Intake/Output Summary (Last 24 hours) at 2022 0908  Last data filed at 2022 0509  Gross per 24 hour   Intake --   Output 152 ml   Net -152 ml        GI/Nutrition:  Orders Placed This Encounter   Procedures   • Diet NPO Restrict to: Strict     Standing Status:   Standing     Number of Occurrences:   1     Order Specific Question:   Diet NPO Restrict to:     Answer:   Strict [1]     Medical Decision Making, by Problem:  Active Hospital Problems    Diagnosis    • *SBO (small bowel obstruction) (HCC) [K56.609]    • 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 Cautioned patient to go slowly and stop if she has increased bloating,  pain, nausea, vomiting.   Will d/c MAYUR drain when tolerating regular diet.   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 Oncology consulted by Dr. Watson.   Will follow.     Quality-Core Measures

## 2022-04-02 NOTE — PROGRESS NOTES
Received bedside patient report from SIDRA Peterson. Patient resting comfortably in bed, no complaints at this time. Safety precautions in place. Will continue to monitor.

## 2022-04-02 NOTE — CARE PLAN
The patient is Watcher - Medium risk of patient condition declining or worsening    Shift Goals  Clinical Goals: pain control, pass gas  Patient Goals: TPN Initiation, Rest and sleep, Pain Control  Family Goals: TPN Initiation, Rest and sleep, Pain Control    Progress made toward(s) clinical / shift goals:  pt provided with rest during shift for pain management. Pt passed gas during shift.    Problem: Knowledge Deficit - Standard  Goal: Patient and family/care givers will demonstrate understanding of plan of care, disease process/condition, diagnostic tests and medications  Outcome: Progressing     Problem: Bowel Elimination  Goal: Establish and maintain regular bowel function  Outcome: Progressing     Problem: Urinary Elimination  Goal: Establish and maintain regular urinary output  Outcome: Progressing     Problem: Mobility  Goal: Patient's capacity to carry out activities will improve  Outcome: Progressing     Problem: Pain - Standard  Goal: Alleviation of pain or a reduction in pain to the patient’s comfort goal  Outcome: Progressing       Patient is not progressing towards the following goals:

## 2022-04-02 NOTE — PROGRESS NOTES
Received report from SIDRA Canales Assumed pt care at 2330. Pt is A&Ox4, resting comfortably in bed. Pt on r.a.. TPN on flow, on blood sugar checking every 6 hours No signs of SOB/respiratory distress. Labs noted, VSS. Pt c/o no pain at this moment. Fall precautions in place. Bed at lowest position. Call light and personal belongings within reach. Continue to monitor

## 2022-04-02 NOTE — ASSESSMENT & PLAN NOTE
Patient was found to have an ileocecal mass on exploratory laparotomy.  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  We have consulted oncology, we appreciate further conditions.  We have ordered CT scan of the chest with contrast to evaluate for metastases.

## 2022-04-02 NOTE — PROGRESS NOTES
"Hospital Medicine Daily Progress Note    Date of Service  4/2/2022    Chief Complaint  Licha Pope is a 86 y.o. female admitted 3/25/2022 with abdominal pain.    Hospital Course  As per chart review  \"86 y.o. female with a history of hypertension, COPD, GERD, DVT dx 5/19/21 after long car trip (still on anticoagulation) who presented 3/25/2022 with abdominal pain.     Was recently admitted for terminal ileitis, SBO and pancreatitis on 2/17/2022, she was treated conservatively with IV fluids and pain control.  Right upper quadrant showed dependent gallbladder sludge versus small stones.  MRCP showed cholelithiasis and common bile duct 11.4 mm unchanged since 2015.  She was discharged with antibiotics and recommended to follow-up with GI, surgery and PCP after discharge.  She saw surgeon, she recommended cholecystectomy but wanted her to see GI doctor. GI doctor recommended cholecystectomy.  Has appt with surgery next Wed.       Patient never really felt like her abdominal pain improved however was stable until 2 days ago.  Abdominal pain became very severe however intermittent and would come in waves.  Patient became very fatigued and was sleeping most the day.  She had chills and was very weak.  Abdominal pain was diffuse, intermittent, described as sharp pain, 10/10 pain, went to PCP and doctor sent her to ER.  Did have some n/v, had some dark red or purple, size of quarter.  Passing gas.  Yesterday had BM.  Did have some dark stools, unsure if black was \"half asleep\".  Last night had lower back pain, now improved.      In ER she was afebrile, mildly tachycardic (heart rate 111), normal blood pressure and mild hypoxia requiring 1 L nasal cannula.  Labs remarkable for WBC 11.6 with a left shift, creatinine 1.06, calcium 10.6, lipase 104, troponin XX, lactic acid normal.  CT abdomen pelvis with contrast showed similar changes of bowel wall thickening involving the terminal ileum and base of the cecum adjacent to " "postoperative change from prior appendectomy which could be infectious or inflammatory.  There is mild proximal obstruction involving the ileum and distal jejunum in the lower abdomen left mid abdomen with small bowel loops dilated up to 4 cm.  There is no gross free air or pneumatosis.  Moderate size hiatal hernia again seen.  Minimal prominent biliary tree unchanged and physiologic, unchanged a back to 2015.  Simple hepatic cyst unchanged.  She was given IV fluids, Zosyn and general surgery was consulted.  Dr. Mcneil to see patient.  Recommended NG, IV fluids.\"    Interval Problem Update  3/26: Patient seen at bedside this morning.  Patient is hard of hearing.  Patient with NG tube in place.  Still complaining of abdominal pain some tenderness on examination.  General surgery has been consulted, we appreciate further recommendations.  Continue antibiotics for now.    3/27: Patient seen at bedside this morning.  It seems that the patient's NG tube came off, however the patient it seems that now is having regular bowel movements.  She still complaining of abdominal pain.  We have consulted GI, we appreciate further recommendations.  We also appreciate further recommendations by general surgery.  We will continue n.p.o. until surgery reevaluate the patient.  We will continue with antibiotics for now.  --The patient told me she used to see Dr Tarun Lewis at [facility], so I have consulted Dr Clay, we appreciate further recommendations.    3/28: Patient seen at bedside this morning.  The patient was vomiting this morning, she still complaining of abdominal pain.  We have placed the NG tube back on, we have ordered KUB.  We appreciate further recommendations by general surgery.  Also it seems that the patient had been seeing Dr. Ash earlier this month and she would like to continue seeing that group.  We have consulted Dr. Belle from GI, we appreciate further recommendations.  We will keep the patient n.p.o. for now " until surgical evaluation.  We will continue with antibiotics.    3/29: Patient seen at bedside this morning.  No family was present at bedside directly on my evaluation.  I tried calling the patient's son to give him an update however there was no answer.  Patient still n.p.o., with NG tube to suction.  General surgery and GI following the patient, we appreciate further recommendations.  The patient will undergo small bowel follow-through study today.  I asked the patient and it seems that she has not had a proper meal since last Thursday, if by tomorrow we are unable to feed the patient will consider TPN while further management is being done.    3/30: Patient seen at bedside this morning.  No family present at bedside, however I was able to talk to the patient's son over the phone and gave him an update.  Patient still n.p.o. with NG tube, I spoke to surgery and they will take her to the operating room later today.  We were going to start TPN today, however surgery would like to hold for now.  Patient with mild hyponatremia, this is most likely due to volume depletion as such she has not really eaten much for the last couple days.  We have increased the rate of the D5 NS, monitor and repeat sodium values.  We will order PICC line in preparation of possible TPN.  We appreciate further recommendations by general surgery.    3/31: Patient seen at bedside this morning.  No family members present at bedside, however again I was able to talk to the patient's son over the phone to update him on the patient's clinical status.  The patient underwent exploratory laparotomy with ileocecectomy yesterday by general surgery.  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  We will start the patient on TPN today.  The patient was seen at bedside, laying in bed currently comfortably with NG tube in place.  Patient also with Prevena status post exploratory laparotomy.  Patient requiring oxygen, this  is most likely postsurgical, we have ordered chest x-ray.  As per nursing no other overnight events reported.    4/1: Patient seen at bedside this morning.  No family present at bedside.  We are still pending pathology results.  Patient still not passing gas or having bowel movements.  We started TPN yesterday.  We will continue to monitor closely.    4/2: Patient seen at bedside this morning.  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  I have spoken with the patient and the patient's son regarding the diagnosis.  We have consulted oncology, we appreciate further recommendations.  We have ordered chest CT with contrast to evaluate for metastases.  Patient still not passing gas or having bowel movements.  We will continue with TPN.  We appreciate further recommendations by surgery.    I have personally seen and examined the patient at bedside. I discussed the plan of care with patient, bedside RN and charge RN.    Consultants/Specialty  general surgery and oncology    Code Status  DNAR/DNI    Disposition  Patient is not medically cleared for discharge.   Anticipate discharge to pending clinical evolution.    Review of Systems  Review of Systems   Constitutional: Positive for malaise/fatigue. Negative for chills and fever.   HENT: Positive for hearing loss. Negative for nosebleeds.    Eyes: Negative for blurred vision and double vision.   Respiratory: Negative for cough and shortness of breath.    Cardiovascular: Negative for chest pain and palpitations.   Gastrointestinal: Positive for abdominal pain. Negative for heartburn and vomiting.   Genitourinary: Negative for dysuria and urgency.   Musculoskeletal: Negative for back pain and falls.   Skin: Negative for itching and rash.   Neurological: Negative for dizziness and headaches.   Psychiatric/Behavioral: Negative for substance abuse. The patient is not nervous/anxious.    All other systems reviewed and are negative.       Physical Exam  Temp:   [37.4 °C (99.3 °F)-38 °C (100.4 °F)] 37.7 °C (99.9 °F)  Pulse:  [] 94  Resp:  [16-17] 16  BP: (134-160)/(70-79) 160/79  SpO2:  [90 %-92 %] 90 %    Physical Exam  Vitals and nursing note reviewed.   Constitutional:       General: She is not in acute distress.     Appearance: She is ill-appearing.   HENT:      Head: Normocephalic and atraumatic.      Right Ear: External ear normal.      Left Ear: External ear normal.      Mouth/Throat:      Pharynx: No oropharyngeal exudate or posterior oropharyngeal erythema.   Eyes:      General:         Right eye: No discharge.         Left eye: No discharge.   Cardiovascular:      Rate and Rhythm: Normal rate and regular rhythm.      Pulses: Normal pulses.      Heart sounds: No murmur heard.    No gallop.   Pulmonary:      Effort: Pulmonary effort is normal. No respiratory distress.      Breath sounds: Normal breath sounds. No wheezing or rhonchi.   Abdominal:      General: There is distension.      Tenderness: There is abdominal tenderness.      Comments: With NG tube    Prevena in place s/p surgery   Musculoskeletal:         General: No swelling or tenderness. Normal range of motion.      Cervical back: Normal range of motion and neck supple. No tenderness.   Skin:     General: Skin is warm and dry.   Neurological:      General: No focal deficit present.      Mental Status: She is alert and oriented to person, place, and time. Mental status is at baseline.      Motor: No weakness.   Psychiatric:         Mood and Affect: Mood normal.         Behavior: Behavior normal.         Thought Content: Thought content normal.         Fluids    Intake/Output Summary (Last 24 hours) at 4/2/2022 0828  Last data filed at 4/2/2022 0509  Gross per 24 hour   Intake --   Output 152 ml   Net -152 ml       Laboratory  Recent Labs     03/31/22  0447 04/01/22  0316   WBC 9.1 9.6   RBC 2.82* 3.10*   HEMOGLOBIN 8.9* 9.8*   HEMATOCRIT 29.2* 31.5*   .5* 101.6*   MCH 31.6 31.6   MCHC 30.5*  31.1*   RDW 46.2 47.6   PLATELETCT 204 232   MPV 11.5 11.3     Recent Labs     03/30/22  2240 03/31/22  0447 04/01/22  0316   SODIUM 144 147* 146*   POTASSIUM  --  4.1 3.8   CHLORIDE  --  116* 114*   CO2  --  24 25   GLUCOSE  --  177* 169*   BUN  --  8 9   CREATININE  --  0.66 0.62   CALCIUM  --  7.5* 7.7*             Recent Labs     03/31/22  0447   TRIGLYCERIDE 54       Imaging  DX-CHEST-PORTABLE (1 VIEW)   Final Result      Decreased lung volumes with small left subpulmonic effusion and likely basilar atelectasis      IR-PICC LINE PLACEMENT W/ GUIDANCE > AGE 5   Final Result                  Ultrasound-guided PICC placement performed by qualified nursing staff as    above.          DX-SMALL BOWEL SERIES   Final Result      Partial small bowel obstruction with contrast transit time to the colon exceeding 9 hours      5 cm paraesophageal hernia      NG tube terminates over the gastric fundus      DX-ABDOMEN FOR TUBE PLACEMENT   Final Result         Gastric drainage tube with tip projecting over the expected area of the stomach.      [id number] VIEW   Final Result      Long segmental small bowel dilatation concerning for distal small bowel obstruction      DX-ABDOMEN FOR TUBE PLACEMENT   Final Result      Enteric tube terminates over the stomach.      Small bowel loop dilatation concerning for small bowel obstruction      DX-ABDOMEN FOR TUBE PLACEMENT   Final Result         Gastric drainage tube with tip projecting over the expected area of the stomach.      DX-ABDOMEN FOR TUBE PLACEMENT   Final Result         Gastric drainage tube loops in the esophagus with tip projecting over the expected area of the upper esophagus.      CT-ABDOMEN-PELVIS WITH   Final Result      1.  There are similar changes of bowel wall thickening involving the terminal ileum and base of the cecum adjacent to postoperative change from prior appendectomy which could be infectious or inflammatory.   2.  There is mild proximal obstruction  involving the ileum and distal jejunum in the lower abdomen and left mid abdomen with small bowel loops dilated up to 4 cm.   3.  There is no gross free air or pneumatosis.   4.  Moderate size hiatal hernia again seen.   5.  Minimally prominent biliary tree unchanged and physiologic, unchanged dating back to 2015.   6.  Simple hepatic cysts are unchanged.         DX-CHEST-PORTABLE (1 VIEW)   Final Result      1.  There is no acute cardiopulmonary process.           Assessment/Plan  * SBO (small bowel obstruction) (HCC)- (present on admission)  Assessment & Plan  Patient with recent terminal ileitis and small bowel obstruction last month  Treated with conservative therapy and antibiotics however now with repeat symptoms and SBO  Surgery consulted by ERP: Dr. Mcneil was called, recommended NPO, NG, IVF, pain mgmt  CTX, flagyl    3/26: Pending surgical evaluation, we appreciate further recommendations.    3/27: Patient seems to have BMs now, we are awaiting further recommendations by general surgery. We have also consulted GI ( Dr Clay), we appreciate further recommendations.    3/28: Patient was vomiting again this morning with abdominal pain.  We have placed the NG tube back and have ordered KUB.  We appreciate further recommendations by general surgery and GI. (Dr Belle from GI will be evaluating the patient today).    3/29: Patient to have small bowel follow through study today, we appreciate further recommendations by GI and General Surgery    3/30: Patient still unable to tolerate PO, we will start TPN, we appreciate further recommendations by surgery and GI.  --UPDATE: I spoke to surgery, they would like to hold TPN for now, as she might undergo surgery today, we will order PICC line.    3/31: Patient underwent exploratory laparotomy yesterday by general surgery, the patient also underwent ileocecectomy with finding of ileocecal mass, pending pathology.  We appreciate further recommendations by general  surgery.  We will start TPN today.    Adenocarcinoma of ileum (HCC)  Assessment & Plan  Patient was found to have an ileocecal mass on exploratory laparotomy.  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  We have consulted oncology, we appreciate further conditions.  We have ordered CT scan of the chest with contrast to evaluate for metastases.    Hypophosphatemia  Assessment & Plan  Replace as needed  monitor    Intraabdominal mass  Assessment & Plan  Patient underwent exploratory laparotomy.  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        Acute hypoxemic respiratory failure (HCC)  Assessment & Plan  Patient currently requiring oxygen plantation.  This could be postsurgical.  Incentive spirometer encouraged.    Hypernatremia  Assessment & Plan  Mild, this could be due to volume depletion, patient has not had proper nutrition in a couple of days  Currently on D5 NS, we will increase the rate to 125 ml/hr  We were going to order TPN today, however surgery would like to hold, as patient will undergo surgery later today.  We will continue to monitor.    3/31: We have ordered TPN today    4/1: Improving    Generalized weakness  Assessment & Plan  PT/OT    Hyperglycemia  Assessment & Plan  Patient had recent A1c at 5.1  Monitor  No need to start insulin treatment at this time    NATIVIDAD (acute kidney injury) (HCC)  Assessment & Plan  Mild bump in Cr 0.6-->1.06  Likely in setting of decreased PO intake/pre-renal  IVF resuscitation  Continue to monitor    3/29: Improved. Cr is 0.55 today.    Abdominal pain  Assessment & Plan  Initial concern for sbo  Also concern for ileitis  General surgery and GI following patient, we appreciate further recommendations    Anemia- (present on admission)  Assessment & Plan  No signs of bleeding at this time, monitor    3/31: Patient underwent exploratory laparotomy yesterday.  Continue to monitor CBC.    History of deep venous thrombosis- (present on admission)  Assessment  & Plan  Diagnosed 5/19/2020 6 hour car trip  Still on anticoagulation    3/27: Will probably resume once surgery clears her NPO    COPD (chronic obstructive pulmonary disease) (HCC)- (present on admission)  Assessment & Plan  Not on home oxygen  Not having COPD exacerbation    Essential hypertension, benign- (present on admission)  Assessment & Plan  Home regimen: Valsartan 160 mg daily  Inpatient regimen: PRN medication, resume home medication once surgery clears for PO    3/29: Patient still NPO, however BP seems to be controlled, PRN medication for now       VTE prophylaxis: enoxaparin ppx    I have performed a physical exam and reviewed and updated ROS and Plan today (4/2/2022). In review of yesterday's note (4/1/2022), there are no changes except as documented above.

## 2022-04-02 NOTE — PROGRESS NOTES
Pharmacy TPN Day # 3       2022      Dosing Weight   55 kg TPN currently providing 100% of goal  TPN goal: 1532 kcal/day including 1.3 gm/kg/day Protein  TPN indication: SBO, NPO x 7 d     Pertinent PMH: Patient with recent terminal ileitis and SBO last month.  Patient treated with conservative therapy and ABX now with repeat symtoms and SBO.  Patient has been NPO x 7 days.  Patient s/p ex lap 3/30 with findings of ileal mass and an ileocecostomy was performed.  Surgical pathology demonstrated adenocarcinoma.  CT revealing a mediastinal collection of fluid and gas adjacent to esophagus (similar findings on imaging in ).  No flatus or BM yet. NGT discontinued today, and surgery starting sips of clears.        Temp (24hrs), Av.3 °C (99.1 °F), Min:36.7 °C (98 °F), Max:38 °C (100.4 °F)  .  Recent Labs     22  0447 22  0316 22  1240   SODIUM 147* 146* 137   POTASSIUM 4.1 3.8 4.5   CHLORIDE 116* 114* 101   CO2 24 25 28   BUN 8 9 9   CREATININE 0.66 0.62 0.56   GLUCOSE 177* 169* 130*   CALCIUM 7.5* 7.7* 8.2*   ASTSGOT 14 15 15   ALTSGPT <5 7 6   ALBUMIN 2.1* 2.3* 2.6*   TBILIRUBIN 0.3 0.3 0.6   PHOSPHORUS  --  1.3* 2.6   MAGNESIUM  --  1.9 2.1     Accu-Checks  No results for input(s): POCGLUCOSE in the last 72 hours.    Vitals:    22 2300 22 0509 22 1000 22 1600   BP: 134/70 160/79 151/74 144/83   Weight:       Height:           Intake/Output Summary (Last 24 hours) at 2022 1632  Last data filed at 2022 1600  Gross per 24 hour   Intake 900 ml   Output 992 ml   Net -92 ml       Orders Placed This Encounter   Procedures   • Diet NPO Restrict to: Strict     Standing Status:   Standing     Number of Occurrences:   1     Order Specific Question:   Diet NPO Restrict to:     Answer:   Strict [1]         TPN for past 72 hours (Show up to 3 orders; newest on the left. Changes between the two most recent orders are indicated.)     Start date and time   2022  04/01/2022 2000 03/31/2022 2000      TPN Adult Central Line [[id number]] TPN Adult Central Line [[id number]] TPN Adult Central Line [[id number]]    Order Status  Active Last Dose in Progress Completed    Last Admin   New Bag at 04/01/2022 1927 by Chris Tyson R.N. Rate Verify at 04/01/2022 0751 by Kristen Haas R.N.       Base    Clinisol 15%  72 g 36 g 36 g    dextrose 70%  254 g 127 g 127 g    fat emulsion (soy) 20%  38 g 19 g 19 g       Additives    potassium phosphate  21 mmol 21 mmol 14 mmol    potassium chloride  40 mEq 80 mEq 80 mEq    sodium acetate  220 mEq 160 mEq 225 mEq    magnesium sulfate  12 mEq 12 mEq 12 mEq    calcium GLUConate  9.4 mEq 9.4 mEq 9.4 mEq    M.T.E.-4  1 mL 1 mL 1 mL    M.V.I. Adult  10 mL 10 mL 10 mL       QS Base    sterile water  787.92 mL 1,314.42 mL 1,284.25 mL       Energy Contribution    Proteins  -- -- --    Dextrose  863.6 kcal 431.8 kcal 431.8 kcal    Lipids  380 kcal 190 kcal 190 kcal    Total  1,243.6 kcal 621.8 kcal 621.8 kcal       Electrolyte Ion Calculated Amount    Sodium  220 mEq 160 mEq 225 mEq    Potassium  70.8 mEq 110.8 mEq 100.53 mEq    Calcium  9.4 mEq 9.4 mEq 9.4 mEq    Magnesium  12 mEq 12 mEq 12 mEq    Aluminum  -- -- --    Phosphate  21 mmol 21 mmol 14 mmol    Chloride  40 mEq 80 mEq 80 mEq    Acetate  280.96 mEq 190.48 mEq 255.48 mEq       Other    Total Protein  72 g 36 g 36 g    Total Protein/kg  1.26 g/kg 0.63 g/kg 0.63 g/kg    Total Amino Acid  -- -- --    Total Amino Acid/kg  -- -- --    Glucose Infusion Rate  3.08 mg/kg/min 1.54 mg/kg/min 1.54 mg/kg/min    Osmolarity (Estimated)  -- -- --    Volume  1,992 mL 1,992 mL 1,992 mL    Rate  83 mL/hr 83 mL/hr 83 mL/hr    Dosing Weight  57.2 kg 57.2 kg 57.2 kg    Infusion Site  [facility]            This formula provides:  % kcal as lipids = 25  Grams protein/kg = 1.3  Non-protein calories = 1244  Kcals/kg = 27.9  Total daily calories = 1532    Comments:  Advancing TPN to goal protein and  calories.  Adjusting formulation back up to ~3/4NS equivalent.  Slight reduction in potassium content.  CMP/Mg/Phos/TG in AM.      Ba Kothari, PharmD

## 2022-04-03 PROBLEM — R50.9 FEVER: Status: ACTIVE | Noted: 2022-04-03

## 2022-04-03 LAB
ALBUMIN SERPL BCP-MCNC: 2.3 G/DL (ref 3.2–4.9)
ALBUMIN/GLOB SERPL: 1 G/DL
ALP SERPL-CCNC: 30 U/L (ref 30–99)
ALT SERPL-CCNC: <5 U/L (ref 2–50)
ANION GAP SERPL CALC-SCNC: 5 MMOL/L (ref 7–16)
AST SERPL-CCNC: 10 U/L (ref 12–45)
BASOPHILS # BLD AUTO: 0.3 % (ref 0–1.8)
BASOPHILS # BLD: 0.03 K/UL (ref 0–0.12)
BILIRUB SERPL-MCNC: 0.6 MG/DL (ref 0.1–1.5)
BUN SERPL-MCNC: 11 MG/DL (ref 8–22)
CALCIUM SERPL-MCNC: 7.9 MG/DL (ref 8.4–10.2)
CEA SERPL-MCNC: 3.5 NG/ML (ref 0–3)
CHLORIDE SERPL-SCNC: 99 MMOL/L (ref 96–112)
CO2 SERPL-SCNC: 29 MMOL/L (ref 20–33)
CREAT SERPL-MCNC: 0.62 MG/DL (ref 0.5–1.4)
EOSINOPHIL # BLD AUTO: 0.6 K/UL (ref 0–0.51)
EOSINOPHIL NFR BLD: 5.3 % (ref 0–6.9)
ERYTHROCYTE [DISTWIDTH] IN BLOOD BY AUTOMATED COUNT: 45.5 FL (ref 35.9–50)
GFR SERPLBLD CREATININE-BSD FMLA CKD-EPI: 86 ML/MIN/1.73 M 2
GLOBULIN SER CALC-MCNC: 2.3 G/DL (ref 1.9–3.5)
GLUCOSE BLD STRIP.AUTO-MCNC: 156 MG/DL (ref 65–99)
GLUCOSE BLD STRIP.AUTO-MCNC: 160 MG/DL (ref 65–99)
GLUCOSE BLD STRIP.AUTO-MCNC: 174 MG/DL (ref 65–99)
GLUCOSE BLD STRIP.AUTO-MCNC: 182 MG/DL (ref 65–99)
GLUCOSE SERPL-MCNC: 165 MG/DL (ref 65–99)
HCT VFR BLD AUTO: 31 % (ref 37–47)
HGB BLD-MCNC: 9.7 G/DL (ref 12–16)
IMM GRANULOCYTES # BLD AUTO: 0.14 K/UL (ref 0–0.11)
IMM GRANULOCYTES NFR BLD AUTO: 1.2 % (ref 0–0.9)
LYMPHOCYTES # BLD AUTO: 0.73 K/UL (ref 1–4.8)
LYMPHOCYTES NFR BLD: 6.5 % (ref 22–41)
MAGNESIUM SERPL-MCNC: 2.1 MG/DL (ref 1.5–2.5)
MCH RBC QN AUTO: 31.3 PG (ref 27–33)
MCHC RBC AUTO-ENTMCNC: 31.3 G/DL (ref 33.6–35)
MCV RBC AUTO: 100 FL (ref 81.4–97.8)
MONOCYTES # BLD AUTO: 0.89 K/UL (ref 0–0.85)
MONOCYTES NFR BLD AUTO: 7.9 % (ref 0–13.4)
NEUTROPHILS # BLD AUTO: 8.84 K/UL (ref 2–7.15)
NEUTROPHILS NFR BLD: 78.8 % (ref 44–72)
NRBC # BLD AUTO: 0 K/UL
NRBC BLD-RTO: 0 /100 WBC
PHOSPHATE SERPL-MCNC: 3.2 MG/DL (ref 2.5–4.5)
PLATELET # BLD AUTO: 247 K/UL (ref 164–446)
PMV BLD AUTO: 11.8 FL (ref 9–12.9)
POTASSIUM SERPL-SCNC: 4.3 MMOL/L (ref 3.6–5.5)
PROT SERPL-MCNC: 4.6 G/DL (ref 6–8.2)
RBC # BLD AUTO: 3.1 M/UL (ref 4.2–5.4)
SODIUM SERPL-SCNC: 133 MMOL/L (ref 135–145)
TRIGL SERPL-MCNC: 108 MG/DL (ref 0–149)
WBC # BLD AUTO: 11.2 K/UL (ref 4.8–10.8)

## 2022-04-03 PROCEDURE — 700101 HCHG RX REV CODE 250: Performed by: INTERNAL MEDICINE

## 2022-04-03 PROCEDURE — 80053 COMPREHEN METABOLIC PANEL: CPT

## 2022-04-03 PROCEDURE — 84100 ASSAY OF PHOSPHORUS: CPT

## 2022-04-03 PROCEDURE — 82962 GLUCOSE BLOOD TEST: CPT | Mod: 91

## 2022-04-03 PROCEDURE — C9113 INJ PANTOPRAZOLE SODIUM, VIA: HCPCS | Performed by: INTERNAL MEDICINE

## 2022-04-03 PROCEDURE — 97116 GAIT TRAINING THERAPY: CPT | Performed by: PHYSICAL THERAPIST

## 2022-04-03 PROCEDURE — 83735 ASSAY OF MAGNESIUM: CPT

## 2022-04-03 PROCEDURE — 700105 HCHG RX REV CODE 258: Performed by: INTERNAL MEDICINE

## 2022-04-03 PROCEDURE — 36415 COLL VENOUS BLD VENIPUNCTURE: CPT

## 2022-04-03 PROCEDURE — 99233 SBSQ HOSP IP/OBS HIGH 50: CPT | Performed by: INTERNAL MEDICINE

## 2022-04-03 PROCEDURE — 84478 ASSAY OF TRIGLYCERIDES: CPT

## 2022-04-03 PROCEDURE — 700111 HCHG RX REV CODE 636 W/ 250 OVERRIDE (IP): Performed by: INTERNAL MEDICINE

## 2022-04-03 PROCEDURE — 82378 CARCINOEMBRYONIC ANTIGEN: CPT

## 2022-04-03 PROCEDURE — 97110 THERAPEUTIC EXERCISES: CPT | Performed by: PHYSICAL THERAPIST

## 2022-04-03 PROCEDURE — 770006 HCHG ROOM/CARE - MED/SURG/GYN SEMI*

## 2022-04-03 PROCEDURE — 85025 COMPLETE CBC W/AUTO DIFF WBC: CPT

## 2022-04-03 RX ADMIN — PIPERACILLIN AND TAZOBACTAM 4.5 G: 4; .5 INJECTION, POWDER, LYOPHILIZED, FOR SOLUTION INTRAVENOUS; PARENTERAL at 04:48

## 2022-04-03 RX ADMIN — PANTOPRAZOLE SODIUM 40 MG: 40 INJECTION, POWDER, FOR SOLUTION INTRAVENOUS at 17:42

## 2022-04-03 RX ADMIN — POTASSIUM CHLORIDE: 2 INJECTION, SOLUTION, CONCENTRATE INTRAVENOUS at 19:02

## 2022-04-03 RX ADMIN — PIPERACILLIN AND TAZOBACTAM 4.5 G: 4; .5 INJECTION, POWDER, LYOPHILIZED, FOR SOLUTION INTRAVENOUS; PARENTERAL at 13:35

## 2022-04-03 RX ADMIN — INSULIN HUMAN 2 UNITS: 100 INJECTION, SOLUTION PARENTERAL at 12:00

## 2022-04-03 RX ADMIN — ENOXAPARIN SODIUM 40 MG: 40 INJECTION SUBCUTANEOUS at 04:49

## 2022-04-03 RX ADMIN — PIPERACILLIN AND TAZOBACTAM 4.5 G: 4; .5 INJECTION, POWDER, LYOPHILIZED, FOR SOLUTION INTRAVENOUS; PARENTERAL at 21:53

## 2022-04-03 RX ADMIN — INSULIN HUMAN 2 UNITS: 100 INJECTION, SOLUTION PARENTERAL at 05:02

## 2022-04-03 RX ADMIN — INSULIN HUMAN 2 UNITS: 100 INJECTION, SOLUTION PARENTERAL at 17:42

## 2022-04-03 RX ADMIN — PANTOPRAZOLE SODIUM 40 MG: 40 INJECTION, POWDER, FOR SOLUTION INTRAVENOUS at 04:49

## 2022-04-03 ASSESSMENT — PAIN DESCRIPTION - PAIN TYPE: TYPE: SURGICAL PAIN

## 2022-04-03 ASSESSMENT — GAIT ASSESSMENTS
DISTANCE (FEET): 150
DEVIATION: BRADYKINETIC;SHUFFLED GAIT;DECREASED HEEL STRIKE;DECREASED TOE OFF
GAIT LEVEL OF ASSIST: SUPERVISED
ASSISTIVE DEVICE: FRONT WHEEL WALKER

## 2022-04-03 ASSESSMENT — ENCOUNTER SYMPTOMS
ABDOMINAL PAIN: 1
HEARTBURN: 0
NERVOUS/ANXIOUS: 0
COUGH: 0
FEVER: 0
DOUBLE VISION: 0
BLURRED VISION: 0
SHORTNESS OF BREATH: 0
BACK PAIN: 0
FALLS: 0
DIZZINESS: 0
VOMITING: 0
PALPITATIONS: 0
CHILLS: 0
HEADACHES: 0
NAUSEA: 0

## 2022-04-03 ASSESSMENT — LIFESTYLE VARIABLES: SUBSTANCE_ABUSE: 0

## 2022-04-03 NOTE — PROGRESS NOTES
Surgical Progress Note               Author: Jonna Soni M.D. Date & Time created: 4/3/2022  9:54 AM     Interval History:  CT with question of air pocket in mediastinum - GI consulted.   Had a small amount of flatus when using the restroom this morning. No N/V. Pain tolerable.     Review of Systems:  Review of Systems   Constitutional: Negative for chills and fever.   Gastrointestinal: Negative for nausea and vomiting.       Physical Exam:  Physical Exam  Constitutional:       Appearance: Normal appearance.   HENT:      Head: Normocephalic and atraumatic.      Right Ear: External ear normal.      Left Ear: External ear normal.      Mouth/Throat:      Mouth: Mucous membranes are moist.   Eyes:      Extraocular Movements: Extraocular movements intact.   Cardiovascular:      Rate and Rhythm: Normal rate and regular rhythm.   Pulmonary:      Effort: Pulmonary effort is normal.   Abdominal:      General: There is distension.      Tenderness: There is abdominal tenderness.      Comments: MIld distention, some incisional tenderness. MAYUR drain serous.    Skin:     General: Skin is warm.      Capillary Refill: Capillary refill takes less than 2 seconds.   Neurological:      General: No focal deficit present.      Mental Status: She is alert and oriented to person, place, and time.   Psychiatric:         Mood and Affect: Mood normal.         Behavior: Behavior normal.         Labs:          Recent Labs     04/01/22 0316 04/02/22  1240 04/03/22  0315   SODIUM 146* 137 133*   POTASSIUM 3.8 4.5 4.3   CHLORIDE 114* 101 99   CO2 25 28 29   BUN 9 9 11   CREATININE 0.62 0.56 0.62   MAGNESIUM 1.9 2.1 2.1   PHOSPHORUS 1.3* 2.6 3.2   CALCIUM 7.7* 8.2* 7.9*     Recent Labs     04/01/22 0316 04/02/22  1240 04/03/22  0315   ALTSGPT 7 6 <5   ASTSGOT 15 15 10*   ALKPHOSPHAT 40 32 30   TBILIRUBIN 0.3 0.6 0.6   GLUCOSE 169* 130* 165*     Recent Labs     04/01/22 0316 04/02/22  1000 04/03/22 0315   RBC 3.10* 3.60* 3.10*    HEMOGLOBIN 9.8* 11.7* 9.7*   HEMATOCRIT 31.5* 37.4 31.0*   PLATELETCT 232 208 247     Recent Labs     22  0316 22  1000 22  1240 22  0315   WBC 9.6 11.2*  --  11.2*   NEUTSPOLYS 81.70* 83.00*  --  78.80*   LYMPHOCYTES 5.80* 6.50*  --  6.50*   MONOCYTES 7.80 5.90  --  7.90   EOSINOPHILS 3.50 3.00  --  5.30   BASOPHILS 0.30 0.30  --  0.30   ASTSGOT 15  --  15 10*   ALTSGPT 7  --  6 <5   ALKPHOSPHAT 40  --  32 30   TBILIRUBIN 0.3  --  0.6 0.6     Hemodynamics:  Temp (24hrs), Av.1 °C (98.7 °F), Min:36.7 °C (98 °F), Max:37.4 °C (99.4 °F)  Temperature: 37.3 °C (99.1 °F)  Pulse  Av.9  Min: 62  Max: 111   Blood Pressure : 143/74     Respiratory:    Respiration: 18, Pulse Oximetry: 94 %        RUL Breath Sounds: Diminished, RML Breath Sounds: Diminished, RLL Breath Sounds: Diminished, FARAZ Breath Sounds: Diminished, LLL Breath Sounds: Diminished  Fluids:    Intake/Output Summary (Last 24 hours) at 4/3/2022 0954  Last data filed at 4/3/2022 0600  Gross per 24 hour   Intake 900 ml   Output 2070 ml   Net -1170 ml        GI/Nutrition:  Orders Placed This Encounter   Procedures   • Diet Order Diet: Clear Liquid     Standing Status:   Standing     Number of Occurrences:   1     Order Specific Question:   Diet:     Answer:   Clear Liquid [10]     Medical Decision Making, by Problem:  Active Hospital Problems    Diagnosis    • *SBO (small bowel obstruction) (HCC) [K56.609]    • 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]    • Hypophosphatemia [E83.39]    • Acute hypoxemic respiratory failure (HCC) [J96.01]    • Intraabdominal mass [R19.00]    • Hypernatremia [E87.0]    • Generalized weakness [R53.1]    • Hyperglycemia [R73.9]    • NATIVIDAD (acute kidney injury) (HCC) [N17.9]    • Abdominal pain [R10.9]    • Anemia [D64.9]    • History of deep venous thrombosis [Z86.718]    • COPD (chronic obstructive pulmonary disease) (HCC) [J44.9]    • Essential hypertension, benign [I10]        Plan:  Mediastinal findings -  agree with GI, this is much less likely to be an acute finding as it has been there since at least 2015 on imaging. Will order upper GI for further information today.     Bowel function returning - clear liquid diet today. Cautioned to go slow and stop if she has increased pain, nausea, vomiting or bloating.     Fever reported to Dr. Watson yesterday but not recorded in chart. Blood cultures negative so far. Abdomen benign on exam, will consider CT Abd/pelvis if she has increasing pain, inability to tolerate advancing diet.     Wean O2, encouraged use of IS and ambulation. Currently going down to 88% on room air but able to get to mid 90s when taking a couple deep breaths.     Seen by oncology.     Dr. Umaña to take over care tomorrow.     Quality-Core Measures

## 2022-04-03 NOTE — THERAPY
Physical Therapy   Daily Treatment     Patient Name: Licha oPpe  Age:  86 y.o., Sex:  female  Medical Record #: [id number]  Today's Date: 4/3/2022     Precautions  Precautions: Fall Risk    Assessment  Pt is doing better and is able to tolerate gait without 02 but does needs VC's to work on pursed lip breathing to maintain 02 levels > 89%.  Pt is able to ambulate with improved balance and stamina with family present during tx. Pt does need cues for correct hand placement with tranfers using the FWW. Pt will benefit from home health at time of D/C.       Plan    Continue current treatment plan.    DC Equipment Recommendations: Unable to determine at this time  Discharge Recommendations: Recommend home health for continued physical therapy services      Subjective  Pt's son and daughter in law live next door and her son is discussing staying with pt for some time after D/C from acute care.        Objective     04/03/22 0915   Gait Analysis   Gait Level Of Assist Supervised   Assistive Device Front Wheel Walker   Distance (Feet) 150   # of Times Distance was Traveled 1   Deviation Bradykinetic;Shuffled Gait;Decreased Heel Strike;Decreased Toe Off   Weight Bearing Status FWB   Skilled Intervention Tactile Cuing;Verbal Cuing   Comments pt needs cues for proper hand placement   Bed Mobility    Supine to Sit Minimal Assist   Scooting Supervised   Rolling Supervised   Comments pt left up in chair following TX   Functional Mobility   Sit to Stand Contact Guard Assist   Bed, Chair, Wheelchair Transfer Contact Guard Assist   Transfer Method Stand Step   Mobility in room and hallway   Skilled Intervention Tactile Cuing;Verbal Cuing   Comments pt needs cues for correct hand placement.   Short Term Goals    Goal Outcome # 1 goal not met   Goal Outcome # 2 Progressing as expected   Goal Outcome # 3 Progressing as expected   Anticipated Discharge Equipment and Recommendations   DC Equipment Recommendations Unable to determine at  this time   Discharge Recommendations Recommend home health for continued physical therapy services   Session Information   Date / Session Number  4/3-3 (3/4, 4/3)

## 2022-04-03 NOTE — PROGRESS NOTES
Received report from day shift nurse. Assumed pt care at 1915. Pt is A&Ox4, resting comfortably in bed. Pt on r.a.. TPN on flow at 83mL/hr No signs of SOB/respiratory distress. Labs noted, VSS. Pt c/o no pain at this moment. Fall precautions in place. Bed at lowest position. Call light and personal belongings within reach. Continue to monitor

## 2022-04-03 NOTE — CARE PLAN
The patient is Stable - Low risk of patient condition declining or worsening    Shift Goals  Clinical Goals: maintain NPO, monitor MAYUR drain output  Patient Goals: sleep for 8 hours  Family Goals: Rest and sleep at least 6 hours    Progress made toward(s) clinical / shift goals:  yes      Problem: Knowledge Deficit - Standard  Goal: Patient and family/care givers will demonstrate understanding of plan of care, disease process/condition, diagnostic tests and medications  Outcome: Progressing     Problem: Bowel Elimination  Goal: Establish and maintain regular bowel function  Outcome: Progressing     Problem: Gastrointestinal Irritability  Goal: Nausea and vomiting will be absent or improve  Outcome: Progressing     Problem: Pain - Standard  Goal: Alleviation of pain or a reduction in pain to the patient’s comfort goal  Outcome: Progressing     Problem: Fluid Volume  Goal: Fluid volume balance will be maintained  Outcome: Progressing     Problem: Urinary Elimination  Goal: Establish and maintain regular urinary output  Outcome: Progressing     Problem: Mobility  Goal: Patient's capacity to carry out activities will improve  Outcome: Progressing     Problem: Fall Risk  Goal: Patient will remain free from falls  Outcome: Progressing     Problem: Knowledge Deficit - COPD  Goal: Patient/significant other demonstrates understanding of disease process, utilization of the Action Plan, medications and discharge instruction  Outcome: Progressing     Problem: Risk for Infection - COPD  Goal: Patient will remain free from signs and symptoms of infection  Outcome: Progressing     Problem: Nutrition - Advanced  Goal: Patient will display progressive weight gain toward goal have adequate food and fluid intake  Outcome: Progressing     Problem: Ineffective Airway Clearance  Goal: Patient will maintain patent airway with clear/clearing breath sounds  Outcome: Progressing     Problem: Impaired Gas Exchange  Goal: Patient will  demonstrate improved ventilation and adequate oxygenation and participate in treatment regimen within the level of ability/situation.  Outcome: Progressing     Problem: Risk for Aspiration  Goal: Patient's risk for aspiration will be absent or decrease  Outcome: Progressing     Problem: Self Care  Goal: Patient will have the ability to perform ADLs independently or with assistance (bathe, groom, dress, toilet and feed)  Outcome: Progressing     Problem: Skin Integrity  Goal: Skin integrity is maintained or improved  Outcome: Progressing     Problem: Surgical Drain Management  Goal: Proper management/care of surgical drains will be maintained  Outcome: Progressing

## 2022-04-03 NOTE — CONSULTS
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 Price is a 86-year-old  woman with history of COPD remote history of DVT hypertension hyperlipidemia prediabetes mellitus was admitted to the hospital with chief complaints of abdominal pain and 5 pound weight loss with no associated bleeding and issues with passing bowel movements. On admission 3/25/2022 CT abdomen showed terminal ileum thickening back of cecum with possible obstruction. Patient also had a CT abdomen in the past 2022 with some thickening of ileum found patient was advised to follow-up as outpatient with GI in the interim patient came back with abdominal pain and obstructive symptoms. On 3/30/2022 Dr. Mando Johnson took patient for surgery for small bowel obstruction laparotomy exploratory ileocecectomy. 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  But she is awake alert x3 she is pretty independent ECOG 0-1 at baseline she manages her finances unfortunately her   in December with brain cancer.  She has 1 living son who is very close to her lives nearby helps her with lot of support.    Past Medical History:    Past Medical History:   Diagnosis Date   • Arthritis    • COPD (chronic obstructive pulmonary disease) (HCC)    • DVT (deep venous thrombosis) (HCC)    • EMPHYSEMA    • Gastroesophageal reflux disease without esophagitis 3/13/2019   • Hypertension     • Hypertriglyceridemia 2019   • Prediabetes 2019       Past surgical history:    Past Surgical History:   Procedure Laterality Date   • MI LAP,DIAGNOSTIC ABDOMEN Bilateral 3/30/2022    Procedure: LAPAROSCOPY;  Surgeon: Mando Johnson M.D.;  Location: [facility];  Service: General   • MI EXPLORATORY OF ABDOMEN Bilateral 3/30/2022    Procedure: LAPAROTOMY, EXPLORATORY, ILEOCECECTOMY;  Surgeon: Mando Johnson M.D.;  Location: [facility];  Service: General   • MI LAP,APPENDECTOMY  2021    Procedure: APPENDECTOMY, LAPAROSCOPIC;  Surgeon: Pedro Mcneil M.D.;  Location: [facility];  Service: Gastroenterology   • CATARACT PHACO WITH IOL  2014    Performed by Jarred Almazan M.D. at [facility] ORS   • CATARACT PHACO WITH IOL  2014    Performed by Jarred Almazan M.D. at [facility]   • KNEE ARTHROPLASTY TOTAL  2014    Performed by Jose Hahn M.D. at [facility] ORS   • MI  DELIVERY ONLY     • HEMORRHOIDECTOMY         Allergies:  Codeine, Demerol, Shellfish allergy, Ciprofloxacin, Ibuprofen, and Iodine    Medications:    Current Facility-Administered Medications   Medication Dose Route Frequency Provider Last Rate Last Admin   • enoxaparin (LOVENOX) inj 40 mg  40 mg Subcutaneous DAILY Mando Del Rio M.D.   40 mg at 22 0449   • TPN Adult Central Line   Intravenous TPN DAILY Mando Del Rio M.D. 83 mL/hr at 22 1902 New Bag at 22 1902   • piperacillin-tazobactam (ZOSYN) 4.5 g in  mL IVPB  4.5 g Intravenous Q8HRS Mando Del Rio M.D.   Stopped at 22 0848   • dextrose 10% infusion 250 mL  250 mL Intravenous PRN Mando Del Rio M.D.       • insulin regular (HumuLIN R,NovoLIN R) injection  2-10 Units Subcutaneous Q6HRS Mando Del Rio M.D.   2 Units at 22 0502   • MD Alert...TPN per Pharmacy   Other PHARMACY  TO DOSE Mando Del Rio M.D.       • ondansetron (ZOFRAN) syringe/vial injection 4 mg  4 mg Intravenous Q8HRS PRN Mando Del Rio M.D.   4 mg at 22 1655   • metoclopramide (REGLAN) injection 10 mg  10 mg Intravenous Q4HRS PRN Irving Benoit D.O.       • pantoprazole (Protonix) injection 40 mg  40 mg Intravenous BID Mando Del Rio M.D.   40 mg at 22 0449   • benzocaine-menthol (Cepacol) lozenge 1 Lozenge  1 Lozenge Mouth/Throat Q8HRS PRN Mando Del Rio M.D.       • HYDROmorphone (Dilaudid) injection 0.5 mg  0.5 mg Intravenous Q3HRS PRN Dolores Sanchez M.D.       • labetalol (NORMODYNE/TRANDATE) injection 10 mg  10 mg Intravenous Q4HRS PRN Dolores Sanchez M.D.       • [Held by provider] valsartan (DIOVAN) tablet 160 mg  160 mg Oral DAILY Dolores Sanchez M.D.           Social History:     Social History     Socioeconomic History   • Marital status:      Spouse name: Not on file   • Number of children: Not on file   • Years of education: Not on file   • Highest education level: Not on file   Occupational History   • Not on file   Tobacco Use   • Smoking status: Former Smoker     Packs/day: 1.00     Years: 20.00     Pack years: 20.00     Types: Cigarettes     Quit date: 1975     Years since quittin.2   • Smokeless tobacco: Never Used   Vaping Use   • Vaping Use: Never used   Substance and Sexual Activity   • Alcohol use: Not Currently     Alcohol/week: 1.5 oz     Types: 3 Standard drinks or equivalent per week     Comment: RARE   • Drug use: No   • Sexual activity: Not Currently     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     Comment: , retired    Other Topics Concern   • Not on file   Social History Narrative   • Not on file     Social Determinants of Health     Financial Resource Strain: Low Risk    • Difficulty of Paying Living Expenses: Not hard at all   Food Insecurity: No Food Insecurity   • Worried About Running Out of Food in the Last  "Year: Never true   • Ran Out of Food in the Last Year: Never true   Transportation Needs: No Transportation Needs   • Lack of Transportation (Medical): No   • Lack of Transportation (Non-Medical): No   Physical Activity: Not on file   Stress: Not on file   Social Connections: Not on file   Intimate Partner Violence: Not on file   Housing Stability: Not on file    independent son lives nearby 20-pack-year history smoking quit 40 years ago    Family History:     Family History   Problem Relation Age of Onset   • Alzheimer's Disease Mother    • Heart Disease Father    • Other Father         AAA   • Heart Attack Sister    • Diabetes Brother        Review of Systems:   All other review of systems are negative except what was mentioned above in the HPI.    Constitutional: No fever, chills, weight loss+ ,malaise/fatigue+  HEENT: No new auditory or visual complaints. No sore throat and neck pain.     Respiratory:No new cough, sputum production, shortness of breath and wheezing.    Cardiovascular: No new chest pain, palpitations, orthopnea and leg swelling.    Gastrointestinal: No heartburn, nausea, vomiting ,abdominal pain, hematochezia or melena     Genitourinary: Negative for dysuria, hematuria    Musculoskeletal: No new arthralgias or myalgias   Skin: Negative for rash and itching.    Neurological: Negative for focal weakness or headaches.    Endo/Heme/Allergies: No abnormal bleed/bruise.    Psychiatric/Behavioral: No new depression/anxiety.    Physical Exam:   Vitals:   /74   Pulse 93   Temp 37.3 °C (99.1 °F) (Oral)   Resp 18   Ht 1.626 m (5' 4\")   Wt 57.2 kg (126 lb 1.7 oz)   SpO2 94%   BMI 21.65 kg/m²     General: Not in acute distress, alert and oriented x 3  HEENT: No pallor, icterus. Oropharynx clear.   Neck: Supple, no palpable masses.  Lymph nodes: No palpable cervical, supraclavicular, axillary or inguinal lymphadenopathy.    CVS: regular rate and rhythm, no rubs or gallops  RESP: Clear to " auscultate bilaterally, no wheezing or crackles.   ABD: Post surgicalPost surgicalBowel sounds are heard but decreased.  EXT: No edema or cyanosis  CNS: Alert and oriented x3, No focal deficits.  Skin- No rash      Labs:   Recent Labs     04/01/22  0316 04/02/22  1000 04/03/22  0315   RBC 3.10* 3.60* 3.10*   HEMOGLOBIN 9.8* 11.7* 9.7*   HEMATOCRIT 31.5* 37.4 31.0*   PLATELETCT 232 208 247     Lab Results   Component Value Date/Time    SODIUM 133 (L) 04/03/2022 03:15 AM    POTASSIUM 4.3 04/03/2022 03:15 AM    CHLORIDE 99 04/03/2022 03:15 AM    CO2 29 04/03/2022 03:15 AM    GLUCOSE 165 (H) 04/03/2022 03:15 AM    BUN 11 04/03/2022 03:15 AM    CREATININE 0.62 04/03/2022 03:15 AM    BUNCREATRAT 18 05/07/2019 08:48 AM      IMAGING    CT CHEST 4/2/2022    IMPRESSION:     1.  Mediastinal collection of fluid and gas adjacent to an abnormal thickened . Underlying mass or inflammatory change is a consideration though previously this had a more benign appearance. Suggest further assessment with endoscopy.  2.  Small BILATERAL pleural effusions  3.  BILATERAL atelectasis    CT ABD/PELV 3/25/2022  1.  There are similar changes of bowel wall thickening involving the terminal ileum and base of the cecum adjacent to postoperative change from prior appendectomy which could be infectious or inflammatory.  2.  There is mild proximal obstruction involving the ileum and distal jejunum in the lower abdomen and left mid abdomen with small bowel loops dilated up to 4 cm.  3.  There is no gross free air or pneumatosis.  4.  Moderate size hiatal hernia again seen.  5.  Minimally prominent biliary tree unchanged and physiologic, unchanged dating back to 2015.  6.  Simple hepatic cysts are unchanged.    Assessment and Plan:    1. 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 Intact expression of proteins.  - Cussed in detail with patient and her son and daughter-in-law at bedside  diagnosis prognosis treatment options etc. in detail.  At this time patient needs to recover from her surgery and improve needs some physical therapy improvement in nutritional status and performance status before we can consider any treatment options.  - Send out tissue for ONCOMAP  - I will see her as outpatient in 2 to 3 weeks to consider discussion palliative chemotherapy utilizing Xeloda +/- additional therapies or immunotherapy based on Next generation sequencing findings.,     2. COPD distant H/O DVT HTN HLD    3. Mediastinal collection of fluid and gas adjacent to an abnormal thickened, worsening over chronic findings . Underlying mass or inflammatory change is a consideration though previously this had a more benign appearance. Suggest further assessment with endoscopy GI Consult.      She agreed and verbalized her agreement and understanding with the current plan.  I answered all questions and concerns she has at this time.                Thank you for allowing me to participate in her care.      SIGNATURES:  Ryan Olivo M.D.

## 2022-04-03 NOTE — PROGRESS NOTES
Pt remains NPO, seen at bedside by GI, and Surgery, up OOB to BR , TPN new bag from pharmacy, verified with 2nd nurse, line changed. Pt with low grade fever,  hospitalitist aware, see new ATB orders. MAYUR drain intact, Previna and ABD binder intact. Family at bedside.

## 2022-04-03 NOTE — ASSESSMENT & PLAN NOTE
As per nursing patient has been having some low-grade fever.  She had been on ceftriaxone and Flagyl reported  We have switched to Zosyn yesterday pending cultures.  Due to the patient's extensive surgery and CT scan findings in the chest, will continue with antibiotics for now    4/5: The patient has no longer episode of fever.  Cultures have remained negative to date.  We will stop antibiotics at this time.

## 2022-04-03 NOTE — CARE PLAN
The patient is Stable - Low risk of patient condition declining or worsening    Shift Goals  Clinical Goals: maintain NPO, monitor MAYUR drain output  Patient Goals: sleep for 8 hours  Family Goals: Rest and sleep at least 6 hours    Progress made toward(s) clinical / shift goals:  Pt didn't complain from any pain. Instructed about the diet. Pt was seen sleeping in between rounds. Will continue to monitor.    Patient is not progressing towards the following goals: N/A      Problem: Gastrointestinal Irritability  Goal: Nausea and vomiting will be absent or improve  Outcome: Progressing     Problem: Urinary Elimination  Goal: Establish and maintain regular urinary output  Outcome: Progressing     Problem: Mobility  Goal: Patient's capacity to carry out activities will improve  Outcome: Progressing     Problem: Pain - Standard  Goal: Alleviation of pain or a reduction in pain to the patient’s comfort goal  Outcome: Progressing     Problem: Skin Integrity  Goal: Skin integrity is maintained or improved  4/2/2022 2310 by Kalpana Jenkins R.NSharita  Outcome: Progressing     Problem: Surgical Drain Management  Goal: Proper management/care of surgical drains will be maintained  Outcome: Progressing

## 2022-04-03 NOTE — ASSESSMENT & PLAN NOTE
"As part of the evaluation for metastases, we order a CT scan of the chest with contrast.  It reported: \"Mediastinal collection of fluid and gas adjacent to an abnormal thickened . Underlying mass or inflammatory change is a consideration though previously this had a more benign appearance.\"  We consulted GI, we appreciate further recommendations.    4/4: GI recommends esophagogram once able to tolerate PO and having BMs, we will discuss with surgery to see when would it be appropriate    4/5: Esophagogram reported: \"Contrast extends from the distal esophagus at the level of the gastroesophageal junction to the right of midline into a large collection likely representing either a lower esophageal or upper gastric diverticulum.\"  --I spoke to GI who recommended outpatient follow up.  "

## 2022-04-03 NOTE — PROGRESS NOTES
"Hospital Medicine Daily Progress Note    Date of Service  4/3/2022    Chief Complaint  Licha Pope is a 86 y.o. female admitted 3/25/2022 with abdominal pain.    Hospital Course  As per chart review  \"86 y.o. female with a history of hypertension, COPD, GERD, DVT dx 5/19/21 after long car trip (still on anticoagulation) who presented 3/25/2022 with abdominal pain.     Was recently admitted for terminal ileitis, SBO and pancreatitis on 2/17/2022, she was treated conservatively with IV fluids and pain control.  Right upper quadrant showed dependent gallbladder sludge versus small stones.  MRCP showed cholelithiasis and common bile duct 11.4 mm unchanged since 2015.  She was discharged with antibiotics and recommended to follow-up with GI, surgery and PCP after discharge.  She saw surgeon, she recommended cholecystectomy but wanted her to see GI doctor. GI doctor recommended cholecystectomy.  Has appt with surgery next Wed.       Patient never really felt like her abdominal pain improved however was stable until 2 days ago.  Abdominal pain became very severe however intermittent and would come in waves.  Patient became very fatigued and was sleeping most the day.  She had chills and was very weak.  Abdominal pain was diffuse, intermittent, described as sharp pain, 10/10 pain, went to PCP and doctor sent her to ER.  Did have some n/v, had some dark red or purple, size of quarter.  Passing gas.  Yesterday had BM.  Did have some dark stools, unsure if black was \"half asleep\".  Last night had lower back pain, now improved.      In ER she was afebrile, mildly tachycardic (heart rate 111), normal blood pressure and mild hypoxia requiring 1 L nasal cannula.  Labs remarkable for WBC 11.6 with a left shift, creatinine 1.06, calcium 10.6, lipase 104, troponin XX, lactic acid normal.  CT abdomen pelvis with contrast showed similar changes of bowel wall thickening involving the terminal ileum and base of the cecum adjacent to " "postoperative change from prior appendectomy which could be infectious or inflammatory.  There is mild proximal obstruction involving the ileum and distal jejunum in the lower abdomen left mid abdomen with small bowel loops dilated up to 4 cm.  There is no gross free air or pneumatosis.  Moderate size hiatal hernia again seen.  Minimal prominent biliary tree unchanged and physiologic, unchanged a back to 2015.  Simple hepatic cyst unchanged.  She was given IV fluids, Zosyn and general surgery was consulted.  Dr. Mcneil to see patient.  Recommended NG, IV fluids.\"    Interval Problem Update  3/26: Patient seen at bedside this morning.  Patient is hard of hearing.  Patient with NG tube in place.  Still complaining of abdominal pain some tenderness on examination.  General surgery has been consulted, we appreciate further recommendations.  Continue antibiotics for now.    3/27: Patient seen at bedside this morning.  It seems that the patient's NG tube came off, however the patient it seems that now is having regular bowel movements.  She still complaining of abdominal pain.  We have consulted GI, we appreciate further recommendations.  We also appreciate further recommendations by general surgery.  We will continue n.p.o. until surgery reevaluate the patient.  We will continue with antibiotics for now.  --The patient told me she used to see Dr Tarun Lewis at [facility], so I have consulted Dr Clay, we appreciate further recommendations.    3/28: Patient seen at bedside this morning.  The patient was vomiting this morning, she still complaining of abdominal pain.  We have placed the NG tube back on, we have ordered KUB.  We appreciate further recommendations by general surgery.  Also it seems that the patient had been seeing Dr. Ash earlier this month and she would like to continue seeing that group.  We have consulted Dr. Belle from GI, we appreciate further recommendations.  We will keep the patient n.p.o. for now " until surgical evaluation.  We will continue with antibiotics.    3/29: Patient seen at bedside this morning.  No family was present at bedside directly on my evaluation.  I tried calling the patient's son to give him an update however there was no answer.  Patient still n.p.o., with NG tube to suction.  General surgery and GI following the patient, we appreciate further recommendations.  The patient will undergo small bowel follow-through study today.  I asked the patient and it seems that she has not had a proper meal since last Thursday, if by tomorrow we are unable to feed the patient will consider TPN while further management is being done.    3/30: Patient seen at bedside this morning.  No family present at bedside, however I was able to talk to the patient's son over the phone and gave him an update.  Patient still n.p.o. with NG tube, I spoke to surgery and they will take her to the operating room later today.  We were going to start TPN today, however surgery would like to hold for now.  Patient with mild hyponatremia, this is most likely due to volume depletion as such she has not really eaten much for the last couple days.  We have increased the rate of the D5 NS, monitor and repeat sodium values.  We will order PICC line in preparation of possible TPN.  We appreciate further recommendations by general surgery.    3/31: Patient seen at bedside this morning.  No family members present at bedside, however again I was able to talk to the patient's son over the phone to update him on the patient's clinical status.  The patient underwent exploratory laparotomy with ileocecectomy yesterday by general surgery.  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  We will start the patient on TPN today.  The patient was seen at bedside, laying in bed currently comfortably with NG tube in place.  Patient also with Prevena status post exploratory laparotomy.  Patient requiring oxygen, this  is most likely postsurgical, we have ordered chest x-ray.  As per nursing no other overnight events reported.    4/1: Patient seen at bedside this morning.  No family present at bedside.  We are still pending pathology results.  Patient still not passing gas or having bowel movements.  We started TPN yesterday.  We will continue to monitor closely.    4/2: Patient seen at bedside this morning.  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  I have spoken with the patient and the patient's son regarding the diagnosis.  We have consulted oncology, we appreciate further recommendations.  We have ordered chest CT with contrast to evaluate for metastases.  Patient still not passing gas or having bowel movements.  We will continue with TPN.  We appreciate further recommendations by surgery.    4/3: Patient seen at bedside this morning.  At the time of my evaluation, the patient had mentioned to me that she has not had a bowel movement or passing gas oncology has been consulted, we appreciate further recommendations.  We did CT scan of the chest which reported mediastinal collection of fluid and gas adjacent, concerning for communication with esophagus.  We consulted GI who did not recommend emergent endoscopy and recommend Gastrografin esophagram once patient has oral intake and bowel movements.  We appreciate further recommendations.  Overall prognosis remains guarded.  As per nursing patient having episodes of fever due to the patient's complex history with recent GI surgery as well as abnormal CT scan we will continue the patient on Zosyn for now, we have ordered blood cultures.    I have personally seen and examined the patient at bedside. I discussed the plan of care with patient, bedside RN and charge RN.    Consultants/Specialty  general surgery and oncology    Code Status  DNAR/DNI    Disposition  Patient is not medically cleared for discharge.   Anticipate discharge to pending clinical  evolution.    Review of Systems  Review of Systems   Constitutional: Positive for malaise/fatigue. Negative for chills and fever.   HENT: Positive for hearing loss. Negative for nosebleeds.    Eyes: Negative for blurred vision and double vision.   Respiratory: Negative for cough and shortness of breath.    Cardiovascular: Negative for chest pain and palpitations.   Gastrointestinal: Positive for abdominal pain. Negative for heartburn and vomiting.   Genitourinary: Negative for dysuria and urgency.   Musculoskeletal: Negative for back pain and falls.   Skin: Negative for itching and rash.   Neurological: Negative for dizziness and headaches.   Psychiatric/Behavioral: Negative for substance abuse. The patient is not nervous/anxious.    All other systems reviewed and are negative.       Physical Exam  Temp:  [36.7 °C (98 °F)-37.4 °C (99.4 °F)] 37.3 °C (99.1 °F)  Pulse:  [93-98] 93  Resp:  [16-18] 18  BP: (128-144)/(71-83) 143/74  SpO2:  [88 %-94 %] 94 %    Physical Exam  Vitals and nursing note reviewed.   Constitutional:       General: She is not in acute distress.     Appearance: She is ill-appearing.   HENT:      Head: Normocephalic and atraumatic.      Right Ear: External ear normal.      Left Ear: External ear normal.      Mouth/Throat:      Pharynx: No oropharyngeal exudate or posterior oropharyngeal erythema.   Eyes:      General:         Right eye: No discharge.         Left eye: No discharge.   Cardiovascular:      Rate and Rhythm: Normal rate and regular rhythm.      Pulses: Normal pulses.      Heart sounds: No murmur heard.    No gallop.   Pulmonary:      Effort: Pulmonary effort is normal. No respiratory distress.      Breath sounds: Normal breath sounds. No wheezing or rhonchi.   Abdominal:      General: There is distension.      Tenderness: There is abdominal tenderness.      Comments: With NG tube    Prevena in place s/p surgery   Musculoskeletal:         General: No swelling or tenderness. Normal range  of motion.      Cervical back: Normal range of motion and neck supple. No tenderness.   Skin:     General: Skin is warm and dry.   Neurological:      General: No focal deficit present.      Mental Status: She is alert and oriented to person, place, and time. Mental status is at baseline.      Motor: No weakness.   Psychiatric:         Mood and Affect: Mood normal.         Behavior: Behavior normal.         Thought Content: Thought content normal.         Fluids    Intake/Output Summary (Last 24 hours) at 4/3/2022 1014  Last data filed at 4/3/2022 0600  Gross per 24 hour   Intake 900 ml   Output 2070 ml   Net -1170 ml       Laboratory  Recent Labs     04/01/22  0316 04/02/22  1000 04/03/22  0315   WBC 9.6 11.2* 11.2*   RBC 3.10* 3.60* 3.10*   HEMOGLOBIN 9.8* 11.7* 9.7*   HEMATOCRIT 31.5* 37.4 31.0*   .6* 103.9* 100.0*   MCH 31.6 32.5 31.3   MCHC 31.1* 31.3* 31.3*   RDW 47.6 48.0 45.5   PLATELETCT 232 208 247   MPV 11.3 11.8 11.8     Recent Labs     04/01/22  0316 04/02/22  1240 04/03/22  0315   SODIUM 146* 137 133*   POTASSIUM 3.8 4.5 4.3   CHLORIDE 114* 101 99   CO2 25 28 29   GLUCOSE 169* 130* 165*   BUN 9 9 11   CREATININE 0.62 0.56 0.62   CALCIUM 7.7* 8.2* 7.9*             Recent Labs     04/03/22  0315   TRIGLYCERIDE 108       Imaging  CT-CHEST (THORAX) WITH   Final Result      1.  Mediastinal collection of fluid and gas adjacent to an abnormal thickened . Underlying mass or inflammatory change is a consideration though previously this had a more benign appearance. Suggest further assessment with endoscopy.   2.  Small BILATERAL pleural effusions   3.  BILATERAL atelectasis      JOSEP ORTIZ was paged at 4/2/2022 10:19 AM.         DX-CHEST-PORTABLE (1 VIEW)   Final Result      Decreased lung volumes with small left subpulmonic effusion and likely basilar atelectasis      IR-PICC LINE PLACEMENT W/ GUIDANCE > AGE 5   Final Result                  Ultrasound-guided PICC placement performed by  qualified nursing staff as    above.          DX-SMALL BOWEL SERIES   Final Result      Partial small bowel obstruction with contrast transit time to the colon exceeding 9 hours      5 cm paraesophageal hernia      NG tube terminates over the gastric fundus      DX-ABDOMEN FOR TUBE PLACEMENT   Final Result         Gastric drainage tube with tip projecting over the expected area of the stomach.      BH-[id number]-6 VIEW   Final Result      Long segmental small bowel dilatation concerning for distal small bowel obstruction      DX-ABDOMEN FOR TUBE PLACEMENT   Final Result      Enteric tube terminates over the stomach.      Small bowel loop dilatation concerning for small bowel obstruction      DX-ABDOMEN FOR TUBE PLACEMENT   Final Result         Gastric drainage tube with tip projecting over the expected area of the stomach.      DX-ABDOMEN FOR TUBE PLACEMENT   Final Result         Gastric drainage tube loops in the esophagus with tip projecting over the expected area of the upper esophagus.      CT-ABDOMEN-PELVIS WITH   Final Result      1.  There are similar changes of bowel wall thickening involving the terminal ileum and base of the cecum adjacent to postoperative change from prior appendectomy which could be infectious or inflammatory.   2.  There is mild proximal obstruction involving the ileum and distal jejunum in the lower abdomen and left mid abdomen with small bowel loops dilated up to 4 cm.   3.  There is no gross free air or pneumatosis.   4.  Moderate size hiatal hernia again seen.   5.  Minimally prominent biliary tree unchanged and physiologic, unchanged dating back to 2015.   6.  Simple hepatic cysts are unchanged.         DX-CHEST-PORTABLE (1 VIEW)   Final Result      1.  There is no acute cardiopulmonary process.      DX-UPPER GI-SERIES WITH KUB    (Results Pending)        Assessment/Plan  * SBO (small bowel obstruction) (HCC)- (present on admission)  Assessment & Plan  Patient with recent terminal  ileitis and small bowel obstruction last month  Treated with conservative therapy and antibiotics however now with repeat symptoms and SBO  Surgery consulted by ERP: Dr. Mcneil was called, recommended NPO, NG, IVF, pain mgmt  CTX, flagyl    3/26: Pending surgical evaluation, we appreciate further recommendations.    3/27: Patient seems to have BMs now, we are awaiting further recommendations by general surgery. We have also consulted GI ( Dr Clay), we appreciate further recommendations.    3/28: Patient was vomiting again this morning with abdominal pain.  We have placed the NG tube back and have ordered KUB.  We appreciate further recommendations by general surgery and GI. (Dr Belle from GI will be evaluating the patient today).    3/29: Patient to have small bowel follow through study today, we appreciate further recommendations by GI and General Surgery    3/30: Patient still unable to tolerate PO, we will start TPN, we appreciate further recommendations by surgery and GI.  --UPDATE: I spoke to surgery, they would like to hold TPN for now, as she might undergo surgery today, we will order PICC line.    3/31: Patient underwent exploratory laparotomy yesterday by general surgery, the patient also underwent ileocecectomy with finding of ileocecal mass, pending pathology.  We appreciate further recommendations by general surgery.  We will start TPN today.    4/3: Pathology positive for adenocarcinoma, oncology was consulted.    Fever  Assessment & Plan  As per nursing patient has been having some low-grade fever.  She had been on ceftriaxone and Flagyl reported  We have switched to Zosyn yesterday pending cultures.  Due to the patient's extensive surgery and CT scan findings in the chest, will continue with antibiotics for now    Adenocarcinoma of ileum (HCC)  Assessment & Plan  Patient was found to have an ileocecal mass on exploratory laparotomy.  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  We have consulted  "oncology, we appreciate further conditions.  We have ordered CT scan of the chest with contrast to evaluate for metastases.    Hypophosphatemia  Assessment & Plan  Replace as needed  monitor    Intraabdominal mass  Assessment & Plan  Patient underwent exploratory laparotomy.  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    4/3: Pathology positive for adenocarcinoma, oncology consulted        Acute hypoxemic respiratory failure (HCC)  Assessment & Plan  Patient currently requiring oxygen plantation.  This could be postsurgical.  Incentive spirometer encouraged.    Hypernatremia  Assessment & Plan  Mild, this could be due to volume depletion, patient has not had proper nutrition in a couple of days  Currently on D5 NS, we will increase the rate to 125 ml/hr  We were going to order TPN today, however surgery would like to hold, as patient will undergo surgery later today.  We will continue to monitor.    3/31: We have ordered TPN today    4/1: Improving    Generalized weakness  Assessment & Plan  PT/OT    Hyperglycemia  Assessment & Plan  Patient had recent A1c at 5.1  Monitor  No need to start insulin treatment at this time    NATIVIDAD (acute kidney injury) (HCC)  Assessment & Plan  Mild bump in Cr 0.6-->1.06  Likely in setting of decreased PO intake/pre-renal  IVF resuscitation  Continue to monitor    3/29: Improved. Cr is 0.55 today.    Abdominal pain  Assessment & Plan  Initial concern for sbo  Also concern for ileitis  General surgery and GI following patient, we appreciate further recommendations    Abnormal finding on imaging  Assessment & Plan  As part of the evaluation for metastases, we order a CT scan of the chest with contrast.  It reported: \"Mediastinal collection of fluid and gas adjacent to an abnormal thickened . Underlying mass or inflammatory change is a consideration though previously this had a more benign appearance.\"  We consulted GI, we appreciate further recommendations.    Anemia- " (present on admission)  Assessment & Plan  No signs of bleeding at this time, monitor    3/31: Patient underwent exploratory laparotomy yesterday.  Continue to monitor CBC.    History of deep venous thrombosis- (present on admission)  Assessment & Plan  Diagnosed 5/19/2020 6 hour car trip  Still on anticoagulation    3/27: Will probably resume once surgery clears her NPO    COPD (chronic obstructive pulmonary disease) (HCC)- (present on admission)  Assessment & Plan  Not on home oxygen  Not having COPD exacerbation    Essential hypertension, benign- (present on admission)  Assessment & Plan  Home regimen: Valsartan 160 mg daily  Inpatient regimen: PRN medication, resume home medication once surgery clears for PO    3/29: Patient still NPO, however BP seems to be controlled, PRN medication for now       VTE prophylaxis: enoxaparin ppx    I have performed a physical exam and reviewed and updated ROS and Plan today (4/3/2022). In review of yesterday's note (4/2/2022), there are no changes except as documented above.

## 2022-04-03 NOTE — PROGRESS NOTES
Pt consuming clear liquid diet, tolerating well, up OOB with PT ambulating in hallway, reports BM today, continues on TPN.  Previna wound vac intact no output at this time. MAYUR intact with moderate yellow/thin output.

## 2022-04-03 NOTE — PROGRESS NOTES
0726 Verified from Dr Del Rio about her diet as general surgery has different recommendation, to continue on strict NPO

## 2022-04-03 NOTE — PROGRESS NOTES
Pharmacy TPN Day # 4       4/3/2022    Dosing Weight   55 kg TPN currently providing 100% of goal  TPN goal: 1532 kcal/day including 1.3 gm/kg/day Protein  TPN indication: SBO, NPO x 7 d     Pertinent PMH: Patient with recent terminal ileitis and SBO last month.  Patient treated with conservative therapy and ABX now with repeat symtoms and SBO.  Patient has been NPO x 7 days.  Patient s/p ex lap 3/30 with findings of ileal mass and an ileocecostomy was performed.  Surgical pathology demonstrated adenocarcinoma.   CT revealing a mediastinal collection of fluid and gas adjacent to esophagus. NGT discontinued .  Patient reports some flatus today, but no BM yet, and diet advanced to clears.    Temp (24hrs), Av.3 °C (99.1 °F), Min:36.7 °C (98 °F), Max:38 °C (100.4 °F)  .  Recent Labs     22  0316 22  1240 22  0315   SODIUM 146* 137 133*   POTASSIUM 3.8 4.5 4.3   CHLORIDE 114* 101 99   CO2 25 28 29   BUN 9 9 11   CREATININE 0.62 0.56 0.62   GLUCOSE 169* 130* 165*   CALCIUM 7.7* 8.2* 7.9*   ASTSGOT 15 15 10*   ALTSGPT 7 6 <5   ALBUMIN 2.3* 2.6* 2.3*   TBILIRUBIN 0.3 0.6 0.6   PHOSPHORUS 1.3* 2.6 3.2   MAGNESIUM 1.9 2.1 2.1     Accu-Checks  No results for input(s): POCGLUCOSE in the last 72 hours.    Vitals:    22 1600 22 2036 22 0402 22 1100   BP: 144/83 128/71 143/74 128/68   Weight:       Height:           Intake/Output Summary (Last 24 hours) at 4/3/2022 1319  Last data filed at 4/3/2022 0600  Gross per 24 hour   Intake 2700 ml   Output 1510 ml   Net 1190 ml       Orders Placed This Encounter   Procedures   • Diet Order Diet: Clear Liquid     Standing Status:   Standing     Number of Occurrences:   1     Order Specific Question:   Diet:     Answer:   Clear Liquid [10]         TPN for past 72 hours (Show up to 3 orders; newest on the left. Changes between the two most recent orders are indicated.)     Start date and time   2022       TPN Adult Central Line [[id number]] TPN Adult Central Line [[id number]] TPN Adult Central Line [[id number]]    Order Status  Active Last Dose in Progress Completed    Last Admin   New Bag at 04/02/2022 1902 by Esteban De La Fuente R.N. New Bag at 04/01/2022 1927 by Chris Tyson R.N.       Base    Clinisol 15%  72 g 72 g 36 g    dextrose 70%  254 g 254 g 127 g    fat emulsion (soy) 20%  38 g 38 g 19 g       Additives    potassium phosphate  21 mmol 21 mmol 21 mmol    potassium chloride  40 mEq 40 mEq 80 mEq    sodium acetate  300 mEq 220 mEq 160 mEq    magnesium sulfate  12 mEq 12 mEq 12 mEq    calcium GLUConate  9.4 mEq 9.4 mEq 9.4 mEq    M.T.E.-4  1 mL 1 mL 1 mL    M.V.I. Adult  10 mL 10 mL 10 mL       QS Base    sterile water  747.92 mL 787.92 mL 1,314.42 mL       Energy Contribution    Proteins  -- -- --    Dextrose  863.6 kcal 863.6 kcal 431.8 kcal    Lipids  380 kcal 380 kcal 190 kcal    Total  1,243.6 kcal 1,243.6 kcal 621.8 kcal       Electrolyte Ion Calculated Amount    Sodium  300 mEq 220 mEq 160 mEq    Potassium  70.8 mEq 70.8 mEq 110.8 mEq    Calcium  9.4 mEq 9.4 mEq 9.4 mEq    Magnesium  12 mEq 12 mEq 12 mEq    Aluminum  -- -- --    Phosphate  21 mmol 21 mmol 21 mmol    Chloride  40 mEq 40 mEq 80 mEq    Acetate  360.96 mEq 280.96 mEq 190.48 mEq       Other    Total Protein  72 g 72 g 36 g    Total Protein/kg  1.26 g/kg 1.26 g/kg 0.63 g/kg    Total Amino Acid  -- -- --    Total Amino Acid/kg  -- -- --    Glucose Infusion Rate  3.08 mg/kg/min 3.08 mg/kg/min 1.54 mg/kg/min    Osmolarity (Estimated)  -- -- --    Volume  1,992 mL 1,992 mL 1,992 mL    Rate  83 mL/hr 83 mL/hr 83 mL/hr    Dosing Weight  57.2 kg 57.2 kg 57.2 kg    Infusion Site  [facility]            This formula provides:  % kcal as lipids = 25  Grams protein/kg = 1.3  Non-protein calories = 1244  Kcals/kg = 27.9  Total daily calories = 1532       Comments:  Continuing TPN at goal, adjusting formulation to NS equivalent otherwise no  changes.  CMP/Mg/Phos/TG in AM.      Ba Kothari, PharmD

## 2022-04-04 ENCOUNTER — APPOINTMENT (OUTPATIENT)
Dept: RADIOLOGY | Facility: MEDICAL CENTER | Age: 87
DRG: 329 | End: 2022-04-04
Attending: SURGERY
Payer: MEDICARE

## 2022-04-04 ENCOUNTER — APPOINTMENT (OUTPATIENT)
Dept: RADIOLOGY | Facility: MEDICAL CENTER | Age: 87
DRG: 329 | End: 2022-04-04
Attending: INTERNAL MEDICINE
Payer: MEDICARE

## 2022-04-04 LAB
ALBUMIN SERPL BCP-MCNC: 2.5 G/DL (ref 3.2–4.9)
ALBUMIN/GLOB SERPL: 1 G/DL
ALP SERPL-CCNC: 28 U/L (ref 30–99)
ALT SERPL-CCNC: <5 U/L (ref 2–50)
ANION GAP SERPL CALC-SCNC: 4 MMOL/L (ref 7–16)
AST SERPL-CCNC: 11 U/L (ref 12–45)
BASOPHILS # BLD AUTO: 0.4 % (ref 0–1.8)
BASOPHILS # BLD: 0.04 K/UL (ref 0–0.12)
BILIRUB SERPL-MCNC: 0.6 MG/DL (ref 0.1–1.5)
BUN SERPL-MCNC: 14 MG/DL (ref 8–22)
CALCIUM SERPL-MCNC: 8.1 MG/DL (ref 8.4–10.2)
CHLORIDE SERPL-SCNC: 99 MMOL/L (ref 96–112)
CO2 SERPL-SCNC: 32 MMOL/L (ref 20–33)
CREAT SERPL-MCNC: 0.63 MG/DL (ref 0.5–1.4)
EOSINOPHIL # BLD AUTO: 0.83 K/UL (ref 0–0.51)
EOSINOPHIL NFR BLD: 8 % (ref 0–6.9)
ERYTHROCYTE [DISTWIDTH] IN BLOOD BY AUTOMATED COUNT: 44.6 FL (ref 35.9–50)
GFR SERPLBLD CREATININE-BSD FMLA CKD-EPI: 86 ML/MIN/1.73 M 2
GLOBULIN SER CALC-MCNC: 2.5 G/DL (ref 1.9–3.5)
GLUCOSE BLD STRIP.AUTO-MCNC: 119 MG/DL (ref 65–99)
GLUCOSE BLD STRIP.AUTO-MCNC: 143 MG/DL (ref 65–99)
GLUCOSE BLD STRIP.AUTO-MCNC: 155 MG/DL (ref 65–99)
GLUCOSE BLD STRIP.AUTO-MCNC: 156 MG/DL (ref 65–99)
GLUCOSE BLD STRIP.AUTO-MCNC: 159 MG/DL (ref 65–99)
GLUCOSE BLD STRIP.AUTO-MCNC: 171 MG/DL (ref 65–99)
GLUCOSE SERPL-MCNC: 135 MG/DL (ref 65–99)
HCT VFR BLD AUTO: 30.9 % (ref 37–47)
HGB BLD-MCNC: 9.9 G/DL (ref 12–16)
IMM GRANULOCYTES # BLD AUTO: 0.17 K/UL (ref 0–0.11)
IMM GRANULOCYTES NFR BLD AUTO: 1.6 % (ref 0–0.9)
LYMPHOCYTES # BLD AUTO: 0.73 K/UL (ref 1–4.8)
LYMPHOCYTES NFR BLD: 7 % (ref 22–41)
MAGNESIUM SERPL-MCNC: 2.1 MG/DL (ref 1.5–2.5)
MCH RBC QN AUTO: 31.7 PG (ref 27–33)
MCHC RBC AUTO-ENTMCNC: 32 G/DL (ref 33.6–35)
MCV RBC AUTO: 99 FL (ref 81.4–97.8)
MONOCYTES # BLD AUTO: 0.87 K/UL (ref 0–0.85)
MONOCYTES NFR BLD AUTO: 8.3 % (ref 0–13.4)
NEUTROPHILS # BLD AUTO: 7.79 K/UL (ref 2–7.15)
NEUTROPHILS NFR BLD: 74.7 % (ref 44–72)
NRBC # BLD AUTO: 0 K/UL
NRBC BLD-RTO: 0 /100 WBC
PHOSPHATE SERPL-MCNC: 3.2 MG/DL (ref 2.5–4.5)
PLATELET # BLD AUTO: 223 K/UL (ref 164–446)
PMV BLD AUTO: 11.1 FL (ref 9–12.9)
POTASSIUM SERPL-SCNC: 4 MMOL/L (ref 3.6–5.5)
PREALB SERPL-MCNC: 10.9 MG/DL (ref 18–38)
PROT SERPL-MCNC: 5 G/DL (ref 6–8.2)
RBC # BLD AUTO: 3.12 M/UL (ref 4.2–5.4)
SODIUM SERPL-SCNC: 135 MMOL/L (ref 135–145)
TRIGL SERPL-MCNC: 132 MG/DL (ref 0–149)
WBC # BLD AUTO: 10.4 K/UL (ref 4.8–10.8)

## 2022-04-04 PROCEDURE — 97530 THERAPEUTIC ACTIVITIES: CPT

## 2022-04-04 PROCEDURE — 83735 ASSAY OF MAGNESIUM: CPT

## 2022-04-04 PROCEDURE — 700105 HCHG RX REV CODE 258: Performed by: INTERNAL MEDICINE

## 2022-04-04 PROCEDURE — 84100 ASSAY OF PHOSPHORUS: CPT

## 2022-04-04 PROCEDURE — 700117 HCHG RX CONTRAST REV CODE 255: Performed by: INTERNAL MEDICINE

## 2022-04-04 PROCEDURE — 700101 HCHG RX REV CODE 250: Performed by: INTERNAL MEDICINE

## 2022-04-04 PROCEDURE — 700102 HCHG RX REV CODE 250 W/ 637 OVERRIDE(OP): Performed by: INTERNAL MEDICINE

## 2022-04-04 PROCEDURE — 770006 HCHG ROOM/CARE - MED/SURG/GYN SEMI*

## 2022-04-04 PROCEDURE — 700111 HCHG RX REV CODE 636 W/ 250 OVERRIDE (IP): Performed by: INTERNAL MEDICINE

## 2022-04-04 PROCEDURE — 99233 SBSQ HOSP IP/OBS HIGH 50: CPT | Performed by: INTERNAL MEDICINE

## 2022-04-04 PROCEDURE — 84134 ASSAY OF PREALBUMIN: CPT

## 2022-04-04 PROCEDURE — A9270 NON-COVERED ITEM OR SERVICE: HCPCS | Performed by: INTERNAL MEDICINE

## 2022-04-04 PROCEDURE — 84478 ASSAY OF TRIGLYCERIDES: CPT

## 2022-04-04 PROCEDURE — 74220 X-RAY XM ESOPHAGUS 1CNTRST: CPT

## 2022-04-04 PROCEDURE — C9113 INJ PANTOPRAZOLE SODIUM, VIA: HCPCS | Performed by: INTERNAL MEDICINE

## 2022-04-04 PROCEDURE — 82962 GLUCOSE BLOOD TEST: CPT

## 2022-04-04 PROCEDURE — 85025 COMPLETE CBC W/AUTO DIFF WBC: CPT

## 2022-04-04 PROCEDURE — 97535 SELF CARE MNGMENT TRAINING: CPT

## 2022-04-04 PROCEDURE — 80053 COMPREHEN METABOLIC PANEL: CPT

## 2022-04-04 RX ADMIN — PIPERACILLIN AND TAZOBACTAM 4.5 G: 4; .5 INJECTION, POWDER, LYOPHILIZED, FOR SOLUTION INTRAVENOUS; PARENTERAL at 13:00

## 2022-04-04 RX ADMIN — POTASSIUM CHLORIDE: 2 INJECTION, SOLUTION, CONCENTRATE INTRAVENOUS at 20:25

## 2022-04-04 RX ADMIN — INSULIN HUMAN 2 UNITS: 100 INJECTION, SOLUTION PARENTERAL at 00:08

## 2022-04-04 RX ADMIN — PIPERACILLIN AND TAZOBACTAM 4.5 G: 4; .5 INJECTION, POWDER, LYOPHILIZED, FOR SOLUTION INTRAVENOUS; PARENTERAL at 05:15

## 2022-04-04 RX ADMIN — IOHEXOL 100 ML: 300 INJECTION, SOLUTION INTRAVENOUS at 11:00

## 2022-04-04 RX ADMIN — PIPERACILLIN AND TAZOBACTAM 4.5 G: 4; .5 INJECTION, POWDER, LYOPHILIZED, FOR SOLUTION INTRAVENOUS; PARENTERAL at 20:35

## 2022-04-04 RX ADMIN — INSULIN HUMAN 2 UNITS: 100 INJECTION, SOLUTION PARENTERAL at 23:19

## 2022-04-04 RX ADMIN — PANTOPRAZOLE SODIUM 40 MG: 40 INJECTION, POWDER, FOR SOLUTION INTRAVENOUS at 05:12

## 2022-04-04 RX ADMIN — ENOXAPARIN SODIUM 40 MG: 40 INJECTION SUBCUTANEOUS at 05:12

## 2022-04-04 RX ADMIN — PANTOPRAZOLE SODIUM 40 MG: 40 INJECTION, POWDER, FOR SOLUTION INTRAVENOUS at 18:08

## 2022-04-04 RX ADMIN — APIXABAN 5 MG: 5 TABLET, FILM COATED ORAL at 18:10

## 2022-04-04 ASSESSMENT — ENCOUNTER SYMPTOMS
PALPITATIONS: 0
SHORTNESS OF BREATH: 0
FEVER: 0
HEARTBURN: 0
BLURRED VISION: 0
CHILLS: 0
ABDOMINAL PAIN: 1
DOUBLE VISION: 0
COUGH: 0
FALLS: 0
NERVOUS/ANXIOUS: 0
VOMITING: 0
HEADACHES: 0
DIZZINESS: 0
BACK PAIN: 0

## 2022-04-04 ASSESSMENT — COGNITIVE AND FUNCTIONAL STATUS - GENERAL
HELP NEEDED FOR BATHING: A LOT
DRESSING REGULAR LOWER BODY CLOTHING: A LITTLE
TOILETING: A LITTLE
SUGGESTED CMS G CODE MODIFIER DAILY ACTIVITY: CK
EATING MEALS: A LITTLE
DRESSING REGULAR UPPER BODY CLOTHING: A LITTLE
DAILY ACTIVITIY SCORE: 17
PERSONAL GROOMING: A LITTLE

## 2022-04-04 ASSESSMENT — PAIN DESCRIPTION - PAIN TYPE: TYPE: SURGICAL PAIN

## 2022-04-04 ASSESSMENT — LIFESTYLE VARIABLES: SUBSTANCE_ABUSE: 0

## 2022-04-04 ASSESSMENT — GAIT ASSESSMENTS: DISTANCE (FEET): 30

## 2022-04-04 NOTE — PROGRESS NOTES
Pharmacy TPN Day # 5    2022    Dosing Weight   55 kg TPN currently providing 100% of goal  TPN goal: 1532 kcal/day including 1.3 gm/kg/day Protein  TPN indication: SBO, NPO x 7 d     Pertinent PMH: Patient with recent terminal ileitis and SBO last month.  Patient treated with conservative therapy and ABX now with repeat symtoms and SBO.  Patient has been NPO x 7 days.  Patient s/p ex lap 3/30 with findings of ileal mass and an ileocecostomy was performed.  Surgical pathology demonstrated adenocarcinoma.   CT revealing a mediastinal collection of fluid and gas adjacent to esophagus. NGT discontinued . Today tolerating some liquids. Having flatus with small BM yesterday.    Temp (24hrs), Av.8 °C (98.3 °F), Min:36.3 °C (97.4 °F), Max:37.4 °C (99.3 °F)  .  Recent Labs     22  1240 22  0315 22  0315   SODIUM 137 133* 135   POTASSIUM 4.5 4.3 4.0   CHLORIDE 101 99 99   CO2 28 29 32   BUN 9 11 14   CREATININE 0.56 0.62 0.63   GLUCOSE 130* 165* 135*   CALCIUM 8.2* 7.9* 8.1*   ASTSGOT 15 10* 11*   ALTSGPT 6 <5 <5   ALBUMIN 2.6* 2.3* 2.5*   TBILIRUBIN 0.6 0.6 0.6   PHOSPHORUS 2.6 3.2 3.2   MAGNESIUM 2.1 2.1 2.1   PREALBUMIN  --   --  10.9*     Accu-Checks  No results for input(s): POCGLUCOSE in the last 72 hours.    Vitals:    22 1700 22 2229 22 0500 22 1002   BP: 134/65 137/72 125/56 126/64   Weight:       Height:           Intake/Output Summary (Last 24 hours) at 2022 1145  Last data filed at 2022 0515  Gross per 24 hour   Intake --   Output 80 ml   Net -80 ml       Orders Placed This Encounter   Procedures   • Diet Order Diet: Clear Liquid     Standing Status:   Standing     Number of Occurrences:   1     Order Specific Question:   Diet:     Answer:   Clear Liquid [10]         TPN for past 72 hours (Show up to 3 orders; newest on the left. Changes between the two most recent orders are indicated.)     Start date and time   2022  04/02/2022 2000      TPN Adult Central Line [[id number]] TPN Adult Central Line [[id number]] TPN Adult Central Line [[id number]]    Order Status  Active Last Dose in Progress Completed    Last Admin   New Bag at 04/03/2022 1902 by Esteban De La Fuente R.N. New Bag at 04/02/2022 1902 by Esteban De La Fuente R.N.       Base    Clinisol 15%  72 g 72 g 72 g    dextrose 70%  254 g 254 g 254 g    fat emulsion (soy) 20%  38 g 38 g 38 g       Additives    potassium phosphate  21 mmol 21 mmol 21 mmol    potassium chloride  40 mEq 40 mEq 40 mEq    sodium acetate  300 mEq 300 mEq 220 mEq    magnesium sulfate  12 mEq 12 mEq 12 mEq    calcium GLUConate  9.4 mEq 9.4 mEq 9.4 mEq    M.T.E.-4  1 mL 1 mL 1 mL    M.V.I. Adult  10 mL 10 mL 10 mL       QS Base    sterile water  747.92 mL 747.92 mL 787.92 mL       Energy Contribution    Proteins  -- -- --    Dextrose  863.6 kcal 863.6 kcal 863.6 kcal    Lipids  380 kcal 380 kcal 380 kcal    Total  1,243.6 kcal 1,243.6 kcal 1,243.6 kcal       Electrolyte Ion Calculated Amount    Sodium  300 mEq 300 mEq 220 mEq    Potassium  70.8 mEq 70.8 mEq 70.8 mEq    Calcium  9.4 mEq 9.4 mEq 9.4 mEq    Magnesium  12 mEq 12 mEq 12 mEq    Aluminum  -- -- --    Phosphate  21 mmol 21 mmol 21 mmol    Chloride  40 mEq 40 mEq 40 mEq    Acetate  360.96 mEq 360.96 mEq 280.96 mEq       Other    Total Protein  72 g 72 g 72 g    Total Protein/kg  1.26 g/kg 1.26 g/kg 1.26 g/kg    Total Amino Acid  -- -- --    Total Amino Acid/kg  -- -- --    Glucose Infusion Rate  3.08 mg/kg/min 3.08 mg/kg/min 3.08 mg/kg/min    Osmolarity (Estimated)  -- -- --    Volume  1,992 mL 1,992 mL 1,992 mL    Rate  83 mL/hr 83 mL/hr 83 mL/hr    Dosing Weight  57.2 kg 57.2 kg 57.2 kg    Infusion Site  [facility]            This formula provides:  % kcal as lipids = 25  Grams protein/kg = 1.3  Non-protein calories = 1244  Kcals/kg = 27.9  Total daily calories = 1532    Comments:  No changes to TPN. Electrolyte availability affects ability to  rebalance sodium to chloride form versus acetate.   Can consider decrease sodium acetate or converting some potassium phosphate to potassium chloride tomorrow.    Small BM yesterday. CLD with advance as tolerated. Re-evaluate tomorrow current needs for parenteral nutrition.      Irving May, PharmD

## 2022-04-04 NOTE — THERAPY
"Occupational Therapy  Daily Treatment     Patient Name: Licha Pope  Age:  86 y.o., Sex:  female  Medical Record #: [id number]  Today's Date: 4/4/2022     Precautions  Precautions: Fall Risk  Comments: NG tube    Assessment    Pt seen for OT tx today, agreeable with OOB activities. Assessed safety and independence with functional mob and ADL txfs, ADL task performance in seated and standing levels. Instructed and reviewed bed mob technique to reduce risk for pain and discomfort around surgical site, s/p ex-lap. Instructed pt in safe utilization of FWW and ADL txfs to/from sink, chair, and bed. Pt picked up for transport post session via wheelchair. Will continue to benefit from acute OT in house.    Plan    Continue current treatment plan.    DC Equipment Recommendations: Unable to determine at this time  Discharge Recommendations: Recommend home health for continued occupational therapy services    Subjective    \"I took a nap earlier.\"     Objective       04/04/22 1010   Pain   Pain Scales 0 to 10 Scale    Intervention Declines   Pain 0 - 10 Group   Pain Rating Scale (NPRS) 0   Therapist Pain Assessment Post Activity Pain Same as Prior to Activity   Non Verbal Descriptors   Non Verbal Scale  Calm;Unlabored Breathing   Cognition    Cognition / Consciousness WDL   Speech/ Communication Hard of Hearing   Level of Consciousness Alert   Comments pleasant and cooperative   Balance   Sitting Balance (Static) Fair +   Sitting Balance (Dynamic) Fair +   Standing Balance (Static) Fair +   Standing Balance (Dynamic) Fair +   Weight Shift Sitting Fair   Weight Shift Standing Fair   Skilled Intervention Postural Facilitation;Verbal Cuing   Comments with FWW   Bed Mobility    Supine to Sit Contact Guard Assist   Scooting Supervised   Rolling Standby Assist   Skilled Intervention Postural Facilitation;Verbal Cuing   Activities of Daily Living   Grooming Standing;Supervision   Upper Body Dressing Minimal Assist   Lower Body " Dressing Minimal Assist   Skilled Intervention Postural Facilitation;Verbal Cuing   How much help from another person does the patient currently need...   Putting on and taking off regular lower body clothing? 3   Bathing (including washing, rinsing, and drying)? 2   Toileting, which includes using a toilet, bedpan, or urinal? 3   Putting on and taking off regular upper body clothing? 3   Taking care of personal grooming such as brushing teeth? 3   Eating meals? 3   6 Clicks Daily Activity Score 17   Functional Mobility   Sit to Stand Standby Assist   Bed, Chair, Wheelchair Transfer Contact Guard Assist   Toilet Transfers Contact Guard Assist   Transfer Method Stand Step   Mobility with FWW   Distance (Feet) 30   # of Times Distance was Traveled 1   Skilled Intervention Postural Facilitation;Verbal Cuing   Activity Tolerance   Sitting in Chair 5 mins   Sitting Edge of Bed 10 mins   Standing 8 mins total   Comments limited by fatigue   Short Term Goals   Short Term Goal # 1 Pt will dress supervised in 3 visits   Goal Outcome # 1 Progressing as expected   Short Term Goal # 2 Pt will stand 10 min at sink supervised to groom in 3 visits   Goal Outcome # 2 Progressing as expected   Short Term Goal # 3 Pt will toilet supervised in 3 visits   Goal Outcome # 3 Progressing as expected   Education Group   Role of Occupational Therapist Patient Response Patient;Acceptance;Explanation;Verbal Demonstration   ADL Patient Response Patient;Acceptance;Explanation;Verbal Demonstration   Anticipated Discharge Equipment and Recommendations   DC Equipment Recommendations Unable to determine at this time   Discharge Recommendations Recommend home health for continued occupational therapy services   Interdisciplinary Plan of Care Collaboration   IDT Collaboration with  Nursing;Certified Nursing Assistant   Patient Position at End of Therapy Seated  (taken by transport for procedure)   Collaboration Comments RN aware of OT session    Session Information   Date / Session Number  4/4 #3 (1/3, 4/10)   Priority 2

## 2022-04-04 NOTE — DISCHARGE PLANNING
[facility] Transitional Care Coordination    Referral from:  Dr. Walter Del Rio    Insurance Provider on Facesheet: MCR/ANT    Potential Rehab Diagnosis: TBD    Chart review indicates patient may have on going medical management and may have therapy needs to possibly meet inpatient rehab facility criteria with the goal of returning to community.    D/C support: TBD     Physiatry consultation pended per protocol.     Would appreciate an updated OT eval once appropriate.  Esophagogram?     Thank you for the referral.

## 2022-04-04 NOTE — PROGRESS NOTES
Surgery  Doing ok, tolerating some liquids. Pain continues to improve but is still sore. +flatus and small BM yesterday.   CT with concern for mediastinal air, esophogram ordered for today  AF, HR 80s, WBC 10  MAYUR serous, SSG, 110cc    Plan:  Appreciate medical care  dvt prophylaxis  CLD, advance as tolerated  Esophogram today  Keep drain  Ambulation  will follow

## 2022-04-04 NOTE — CARE PLAN
The patient is Stable - Low risk of patient condition declining or worsening    Shift Goals  Clinical Goals: safety; IV antibiotics; TPN; pain mgmnt; drain outputs  Patient Goals: rest comfortably  Family Goals: Rest and sleep at least 6 hours    Progress made toward(s) clinical / shift goals:    Problem: Knowledge Deficit - Standard  Goal: Patient and family/care givers will demonstrate understanding of plan of care, disease process/condition, diagnostic tests and medications  Outcome: Progressing  Note: Updated patient on plan of care. Encouraged to ask questions and voice concerns. Answered all questions regarding care. Agrees with plan of care.      Problem: Pain - Standard  Goal: Alleviation of pain or a reduction in pain to the patient’s comfort goal  Flowsheets (Taken 4/3/2022 2030)  Pain Rating Scale (NPRS): 0  Note: Assessed patient's pain every time entering room; pt denies pain at this time; Will continue to monitor and assess.      Problem: Fall Risk  Goal: Patient will remain free from falls  Outcome: Progressing  Note: Pt reminded to use call bell before getting out of bed; pt stated understanding. Bed alarm on;  socks in place before exiting bed; bed in lowest locked position. Personal belongings and call bell within reach.       Problem: Surgical Drain Management  Goal: Proper management/care of surgical drains will be maintained  Outcome: Progressing       Patient is not progressing towards the following goals:N/A

## 2022-04-04 NOTE — CARE PLAN
The patient is Stable - Low risk of patient condition declining or worsening    Shift Goals  Clinical Goals: safety; IV antibiotics; TPN; pain mgmnt; drain outputs  Patient Goals: rest comfortably  Family Goals: Rest and sleep at least 6 hours    Progress made toward(s) clinical / shift goals:  yes        Problem: Knowledge Deficit - Standard  Goal: Patient and family/care givers will demonstrate understanding of plan of care, disease process/condition, diagnostic tests and medications  Outcome: Progressing     Problem: Bowel Elimination  Goal: Establish and maintain regular bowel function  Outcome: Progressing     Problem: Gastrointestinal Irritability  Goal: Nausea and vomiting will be absent or improve  Outcome: Progressing     Problem: Pain - Standard  Goal: Alleviation of pain or a reduction in pain to the patient’s comfort goal  Outcome: Progressing     Problem: Fluid Volume  Goal: Fluid volume balance will be maintained  Outcome: Progressing     Problem: Urinary Elimination  Goal: Establish and maintain regular urinary output  Outcome: Progressing     Problem: Mobility  Goal: Patient's capacity to carry out activities will improve  Outcome: Progressing     Problem: Fall Risk  Goal: Patient will remain free from falls  Outcome: Progressing     Problem: Knowledge Deficit - COPD  Goal: Patient/significant other demonstrates understanding of disease process, utilization of the Action Plan, medications and discharge instruction  Outcome: Progressing     Problem: Risk for Infection - COPD  Goal: Patient will remain free from signs and symptoms of infection  Outcome: Progressing     Problem: Nutrition - Advanced  Goal: Patient will display progressive weight gain toward goal have adequate food and fluid intake  Outcome: Progressing     Problem: Ineffective Airway Clearance  Goal: Patient will maintain patent airway with clear/clearing breath sounds  Outcome: Progressing     Problem: Impaired Gas Exchange  Goal:  Patient will demonstrate improved ventilation and adequate oxygenation and participate in treatment regimen within the level of ability/situation.  Outcome: Progressing     Problem: Risk for Aspiration  Goal: Patient's risk for aspiration will be absent or decrease  Outcome: Progressing     Problem: Self Care  Goal: Patient will have the ability to perform ADLs independently or with assistance (bathe, groom, dress, toilet and feed)  Outcome: Progressing     Problem: Skin Integrity  Goal: Skin integrity is maintained or improved  Outcome: Progressing     Problem: Surgical Drain Management  Goal: Proper management/care of surgical drains will be maintained  Outcome: Progressing

## 2022-04-04 NOTE — PROGRESS NOTES
Received report from day shift RN. Greeted patient and tended to immediate needs and informed patient I would be back within the next hour. Reviewed pt's chart to include medications due, lab values for the day and upcoming due times, and orders. TPN infusing into PICC line w/o issue; flushed both lumens w/good blood return in both; Antibiotics infusing in other line; pervina and MAYUR drains in place to ABD; no output in pervina; small amnt of yellow drainage in MAYUR; abdominal binder in place; pt denies pain at this time; pt placed NPO at midnight for SB follow through in AM; water cups set aside; Bed in low locked position; with x2 side rails up, non-slip socks in place before ambulating; bed alarm on; call bell and personal items within reach of patient. Reminded patient to use call bell before getting out of bed. Pt calls appropriately.

## 2022-04-05 ENCOUNTER — HOSPITAL ENCOUNTER (INPATIENT)
Facility: REHABILITATION | Age: 87
End: 2022-04-05
Attending: PHYSICAL MEDICINE & REHABILITATION | Admitting: PHYSICAL MEDICINE & REHABILITATION
Payer: MEDICARE

## 2022-04-05 LAB
ALBUMIN SERPL BCP-MCNC: 2.6 G/DL (ref 3.2–4.9)
ALBUMIN/GLOB SERPL: 0.9 G/DL
ALP SERPL-CCNC: 30 U/L (ref 30–99)
ALT SERPL-CCNC: 6 U/L (ref 2–50)
ANION GAP SERPL CALC-SCNC: 7 MMOL/L (ref 7–16)
AST SERPL-CCNC: 14 U/L (ref 12–45)
BILIRUB SERPL-MCNC: 0.4 MG/DL (ref 0.1–1.5)
BUN SERPL-MCNC: 14 MG/DL (ref 8–22)
CALCIUM SERPL-MCNC: 8.3 MG/DL (ref 8.4–10.2)
CHLORIDE SERPL-SCNC: 101 MMOL/L (ref 96–112)
CO2 SERPL-SCNC: 30 MMOL/L (ref 20–33)
CREAT SERPL-MCNC: 0.76 MG/DL (ref 0.5–1.4)
ERYTHROCYTE [DISTWIDTH] IN BLOOD BY AUTOMATED COUNT: 45.1 FL (ref 35.9–50)
GFR SERPLBLD CREATININE-BSD FMLA CKD-EPI: 76 ML/MIN/1.73 M 2
GLOBULIN SER CALC-MCNC: 2.8 G/DL (ref 1.9–3.5)
GLUCOSE BLD STRIP.AUTO-MCNC: 151 MG/DL (ref 65–99)
GLUCOSE BLD STRIP.AUTO-MCNC: 152 MG/DL (ref 65–99)
GLUCOSE BLD STRIP.AUTO-MCNC: 172 MG/DL (ref 65–99)
GLUCOSE BLD STRIP.AUTO-MCNC: 177 MG/DL (ref 65–99)
GLUCOSE SERPL-MCNC: 166 MG/DL (ref 65–99)
HCT VFR BLD AUTO: 28.3 % (ref 37–47)
HCT VFR BLD AUTO: 28.8 % (ref 37–47)
HGB BLD-MCNC: 8.9 G/DL (ref 12–16)
HGB BLD-MCNC: 9 G/DL (ref 12–16)
MCH RBC QN AUTO: 31.4 PG (ref 27–33)
MCHC RBC AUTO-ENTMCNC: 31.3 G/DL (ref 33.6–35)
MCV RBC AUTO: 100.3 FL (ref 81.4–97.8)
PLATELET # BLD AUTO: 264 K/UL (ref 164–446)
PMV BLD AUTO: 11.5 FL (ref 9–12.9)
POTASSIUM SERPL-SCNC: 4.1 MMOL/L (ref 3.6–5.5)
PROT SERPL-MCNC: 5.4 G/DL (ref 6–8.2)
RBC # BLD AUTO: 2.87 M/UL (ref 4.2–5.4)
SODIUM SERPL-SCNC: 138 MMOL/L (ref 135–145)
WBC # BLD AUTO: 10.4 K/UL (ref 4.8–10.8)

## 2022-04-05 PROCEDURE — 85027 COMPLETE CBC AUTOMATED: CPT

## 2022-04-05 PROCEDURE — 85018 HEMOGLOBIN: CPT

## 2022-04-05 PROCEDURE — 85014 HEMATOCRIT: CPT

## 2022-04-05 PROCEDURE — 700101 HCHG RX REV CODE 250: Performed by: INTERNAL MEDICINE

## 2022-04-05 PROCEDURE — 82962 GLUCOSE BLOOD TEST: CPT | Mod: 91

## 2022-04-05 PROCEDURE — C9113 INJ PANTOPRAZOLE SODIUM, VIA: HCPCS | Performed by: INTERNAL MEDICINE

## 2022-04-05 PROCEDURE — 770006 HCHG ROOM/CARE - MED/SURG/GYN SEMI*

## 2022-04-05 PROCEDURE — 700111 HCHG RX REV CODE 636 W/ 250 OVERRIDE (IP): Performed by: INTERNAL MEDICINE

## 2022-04-05 PROCEDURE — 700105 HCHG RX REV CODE 258: Performed by: INTERNAL MEDICINE

## 2022-04-05 PROCEDURE — 80053 COMPREHEN METABOLIC PANEL: CPT

## 2022-04-05 PROCEDURE — 99233 SBSQ HOSP IP/OBS HIGH 50: CPT | Performed by: INTERNAL MEDICINE

## 2022-04-05 RX ADMIN — POTASSIUM CHLORIDE: 2 INJECTION, SOLUTION, CONCENTRATE INTRAVENOUS at 20:33

## 2022-04-05 RX ADMIN — PIPERACILLIN AND TAZOBACTAM 4.5 G: 4; .5 INJECTION, POWDER, LYOPHILIZED, FOR SOLUTION INTRAVENOUS; PARENTERAL at 04:52

## 2022-04-05 RX ADMIN — INSULIN HUMAN 2 UNITS: 100 INJECTION, SOLUTION PARENTERAL at 23:21

## 2022-04-05 RX ADMIN — INSULIN HUMAN 2 UNITS: 100 INJECTION, SOLUTION PARENTERAL at 17:39

## 2022-04-05 RX ADMIN — PIPERACILLIN AND TAZOBACTAM 4.5 G: 4; .5 INJECTION, POWDER, LYOPHILIZED, FOR SOLUTION INTRAVENOUS; PARENTERAL at 12:55

## 2022-04-05 RX ADMIN — INSULIN HUMAN 2 UNITS: 100 INJECTION, SOLUTION PARENTERAL at 10:59

## 2022-04-05 RX ADMIN — PANTOPRAZOLE SODIUM 40 MG: 40 INJECTION, POWDER, FOR SOLUTION INTRAVENOUS at 04:52

## 2022-04-05 RX ADMIN — PANTOPRAZOLE SODIUM 40 MG: 40 INJECTION, POWDER, FOR SOLUTION INTRAVENOUS at 17:39

## 2022-04-05 RX ADMIN — INSULIN HUMAN 2 UNITS: 100 INJECTION, SOLUTION PARENTERAL at 05:02

## 2022-04-05 ASSESSMENT — ENCOUNTER SYMPTOMS
FALLS: 0
HEADACHES: 0
BACK PAIN: 0
ABDOMINAL PAIN: 1
COUGH: 0
BLURRED VISION: 0
DOUBLE VISION: 0
HEARTBURN: 0
NERVOUS/ANXIOUS: 0
CHILLS: 0
DIZZINESS: 0
VOMITING: 0
SHORTNESS OF BREATH: 0
FEVER: 0
PALPITATIONS: 0

## 2022-04-05 ASSESSMENT — PAIN DESCRIPTION - PAIN TYPE
TYPE: SURGICAL PAIN
TYPE: SURGICAL PAIN

## 2022-04-05 ASSESSMENT — PATIENT HEALTH QUESTIONNAIRE - PHQ9
2. FEELING DOWN, DEPRESSED, IRRITABLE, OR HOPELESS: NOT AT ALL
1. LITTLE INTEREST OR PLEASURE IN DOING THINGS: NOT AT ALL
SUM OF ALL RESPONSES TO PHQ9 QUESTIONS 1 AND 2: 0

## 2022-04-05 ASSESSMENT — LIFESTYLE VARIABLES: SUBSTANCE_ABUSE: 0

## 2022-04-05 NOTE — CARE PLAN
The patient is Stable - Low risk of patient condition declining or worsening    Shift Goals  Clinical Goals: continue TPN, pt tolerate diet, rest, comfort  Patient Goals: rest, comfort  Family Goals: Safety ATB therapy , BM    Progress made toward(s) clinical / shift goals:  Continuing TPN and pt tolerating clear liquids. Comfort measures provided. Progressing on other goals.   Problem: Knowledge Deficit - Standard  Goal: Patient and family/care givers will demonstrate understanding of plan of care, disease process/condition, diagnostic tests and medications  Outcome: Progressing     Problem: Bowel Elimination  Goal: Establish and maintain regular bowel function  Outcome: Progressing     Problem: Mobility  Goal: Patient's capacity to carry out activities will improve  Outcome: Progressing     Problem: Fall Risk  Goal: Patient will remain free from falls  Outcome: Progressing     Problem: Knowledge Deficit - COPD  Goal: Patient/significant other demonstrates understanding of disease process, utilization of the Action Plan, medications and discharge instruction  Outcome: Progressing       Patient is not progressing towards the following goals:

## 2022-04-05 NOTE — PROGRESS NOTES
Surgery  POD6 ileocecectomy  Feeling better today. Some bloody stool today after elliquis restarted yesterday. More liquid intake yesterday, not walking in halls or out of bed.   Barium swallow with large esophageal or gastric diverticulum  Afebrile, HR 90s  Drain SSG, 95cc  WBC 10, H/H 9/29  Abdomen soft, tenderness improved from yesterday    Plan:  Appreciate medical care  Cont MAYUR drain  TPN  DC prevena tomorrow am  DC abx from surgery standpoint  Encourage ambulation/sitting in chair  Diverticulum per GI  Hold AC 48 hours

## 2022-04-05 NOTE — PROGRESS NOTES
Pt states she had several BMs today loose dark stool, Prevna ABD midline intact, MAYUR intact with  moderate hazy/straw colored output. pt consuming PO fluids, appetite improving, remains on clear liquid diet. Family at bedside,TPN continues, VS WNL.    Pt to radiology in AM, resulted.

## 2022-04-05 NOTE — PROGRESS NOTES
Pharmacy TPN Day # 6       4/5/2022    Dosing Weight   55 kg TPN currently providing 100% of goal      TPN goal: 1532 kcal/day including 1.3 gm/kg/day Protein      TPN [id number] Max:37.7 °C (99.8 °F)  .  Recent Labs     04/02/22  1240 04/03/22  0315 04/04/22  0315 04/05/22  1100   SODIUM 137 133* 135 138   POTASSIUM 4.5 4.3 4.0 4.1   CHLORIDE 101 99 99 101   CO2 28 29 32 30   BUN 9 11 14 14   CREATININE 0.56 0.62 0.63 0.76   GLUCOSE 130* 165* 135* 166*   CALCIUM 8.2* 7.9* 8.1* 8.3*   ASTSGOT 15 10* 11* 14   ALTSGPT 6 <5 <5 6   ALBUMIN 2.6* 2.3* 2.5* 2.6*   TBILIRUBIN 0.6 0.6 0.6 0.4   PHOSPHORUS 2.6 3.2 3.2  --    MAGNESIUM 2.1 2.1 2.1  --    PREALBUMIN  --   --  10.9*  --      Accu-Checks  No results for input(s): POCGLUCOSE in the last 72 hours.    Vitals:    04/04/22 1610 04/04/22 2200 04/05/22 0400 04/05/22 0954   BP: 153/70 146/65 127/56 120/65   Weight:       Height:           Intake/Output Summary (Last 24 hours) at 4/5/2022 1231  Last data filed at 4/5/2022 0400  Gross per 24 hour   Intake 1700 ml   Output 685 ml   Net 1015 ml       Orders Placed This Encounter   Procedures   • Diet Order Diet: Clear Liquid     Standing Status:   Standing     Number of Occurrences:   1     Order Specific Question:   Diet:     Answer:   Clear Liquid [10]         TPN for past 72 hours (Show up to 3 orders; newest on the left. Changes between the two most recent orders are indicated.)     Start date and time   04/05/2022 2000 04/04/2022 2000 04/03/2022 2000      TPN Adult Central Line [[id number]] TPN Adult Central Line [[id number]] TPN Adult Central Line [[id number]]    Order Status  Active Last Dose in Progress Completed    Last Admin   New Bag at 04/04/2022 2025 by Dolores Vázquez R.N. New Bag at 04/03/2022 1902 by Esteban De La Fuente R.N.       Base    Clinisol 15%  72 g 72 g 72 g    dextrose 70%  254 g 254 g 254 g    fat emulsion (soy) 20%  38 g 38 g 38 g       Additives    potassium phosphate  21 mmol 21 mmol 21 mmol     potassium chloride  40 mEq 40 mEq 40 mEq    sodium acetate  300 mEq 300 mEq 300 mEq    magnesium sulfate  12 mEq 12 mEq 12 mEq    calcium GLUConate  9.4 mEq 9.4 mEq 9.4 mEq    M.T.E.-4  1 mL 1 mL 1 mL    M.V.I. Adult  10 mL 10 mL 10 mL       QS Base    sterile water  747.92 mL 747.92 mL 747.92 mL       Energy Contribution    Proteins  -- -- --    Dextrose  863.6 kcal 863.6 kcal 863.6 kcal    Lipids  380 kcal 380 kcal 380 kcal    Total  1,243.6 kcal 1,243.6 kcal 1,243.6 kcal       Electrolyte Ion Calculated Amount    Sodium  300 mEq 300 mEq 300 mEq    Potassium  70.8 mEq 70.8 mEq 70.8 mEq    Calcium  9.4 mEq 9.4 mEq 9.4 mEq    Magnesium  12 mEq 12 mEq 12 mEq    Aluminum  -- -- --    Phosphate  21 mmol 21 mmol 21 mmol    Chloride  40 mEq 40 mEq 40 mEq    Acetate  360.96 mEq 360.96 mEq 360.96 mEq       Other    Total Protein  72 g 72 g 72 g    Total Protein/kg  1.26 g/kg 1.26 g/kg 1.26 g/kg    Total Amino Acid  -- -- --    Total Amino Acid/kg  -- -- --    Glucose Infusion Rate  3.08 mg/kg/min 3.08 mg/kg/min 3.08 mg/kg/min    Osmolarity (Estimated)  -- -- --    Volume  1,992 mL 1,992 mL 1,992 mL    Rate  83 mL/hr 83 mL/hr 83 mL/hr    Dosing Weight  57.2 kg 57.2 kg 57.2 kg    Infusion Site  [facility]            This formula provides:  % kcal as lipids = 25  Grams protein/kg = 1.3  Non-protein calories = 1244  Kcals/kg = 27.9  Total daily calories = 1532    Comments:  Continue same formula as previous bag of TPN.  Labs per protocol tomorrow.      Christopher Don [facility]

## 2022-04-05 NOTE — PROGRESS NOTES
Pt called nurse in to go to restroom; after pt was done, noticed the bowel movement was bright red. Pt stated she hasn't had one like this before, they have been semi-normal (soft d/t being on clear liquid diet). Pt stated they did just restart her on eliquis yesterday. No labs currently ordered for today; notified hospitalist; awaiting reply/orders.  0400: CBC drawn; eliquis held for this AM;   MAYUR drain putting out serosanguinous colored fluid now.

## 2022-04-05 NOTE — PROGRESS NOTES
Received report from night shift RN, assumed care of pt. AAOx4, VSS. Pt denies pain, nausea, or SOB at this time. Prevena and MAYUR drain in place to abdomen, dressings intact. ABD binder in place. Pt sitting up for breakfast and updated on plan of care for the day. Answered any questions. Safety precautions in place, pt educated to call for assistance.

## 2022-04-05 NOTE — CARE PLAN
The patient is Stable - Low risk of patient condition declining or worsening    Shift Goals  Clinical Goals: TPN; IV ABX; drain mgmnt; AM labs; rest comfortably  Patient Goals: rest comfortably  Family Goals: Safety ATB therapy , BM    Progress made toward(s) clinical / shift goals:    Problem: Knowledge Deficit - Standard  Goal: Patient and family/care givers will demonstrate understanding of plan of care, disease process/condition, diagnostic tests and medications  Outcome: Progressing  Note: Updated patient on plan of care. Encouraged to ask questions and voice concerns. Answered all questions regarding care. Agrees with plan of care.      Problem: Bowel Elimination  Goal: Establish and maintain regular bowel function  Outcome: Progressing      Problem: Fall Risk  Goal: Patient will remain free from falls  Outcome: Progressing  Note: Pt reminded to use call bell before getting out of bed; pt stated understanding. Bed alarm on;  socks in place before exiting bed; bed in lowest locked position. Personal belongings and call bell within reach.       Problem: Self Care  Goal: Patient will have the ability to perform ADLs independently or with assistance (bathe, groom, dress, toilet and feed)  Outcome: Progressing     Problem: Surgical Drain Management  Goal: Proper management/care of surgical drains will be maintained  Outcome: Progressing        Problem: Pain - Standard  Goal: Alleviation of pain or a reduction in pain to the patient’s comfort goal  Outcome: Met  Flowsheets (Taken 4/4/2022 2045)  Pain Rating Scale (NPRS): 0  Note: Pt denies pain or need for any pain interventions at this time; will continue to monitor.       Patient is not progressing towards the following goals:N/A

## 2022-04-05 NOTE — PROGRESS NOTES
"Hospital Medicine Daily Progress Note    Date of Service  4/5/2022    Chief Complaint  Licha Pope is a 86 y.o. female admitted 3/25/2022 with abdominal pain.    Hospital Course  As per chart review  \"86 y.o. female with a history of hypertension, COPD, GERD, DVT dx 5/19/21 after long car trip (still on anticoagulation) who presented 3/25/2022 with abdominal pain.     Was recently admitted for terminal ileitis, SBO and pancreatitis on 2/17/2022, she was treated conservatively with IV fluids and pain control.  Right upper quadrant showed dependent gallbladder sludge versus small stones.  MRCP showed cholelithiasis and common bile duct 11.4 mm unchanged since 2015.  She was discharged with antibiotics and recommended to follow-up with GI, surgery and PCP after discharge.  She saw surgeon, she recommended cholecystectomy but wanted her to see GI doctor. GI doctor recommended cholecystectomy.  Has appt with surgery next Wed.       Patient never really felt like her abdominal pain improved however was stable until 2 days ago.  Abdominal pain became very severe however intermittent and would come in waves.  Patient became very fatigued and was sleeping most the day.  She had chills and was very weak.  Abdominal pain was diffuse, intermittent, described as sharp pain, 10/10 pain, went to PCP and doctor sent her to ER.  Did have some n/v, had some dark red or purple, size of quarter.  Passing gas.  Yesterday had BM.  Did have some dark stools, unsure if black was \"half asleep\".  Last night had lower back pain, now improved.      In ER she was afebrile, mildly tachycardic (heart rate 111), normal blood pressure and mild hypoxia requiring 1 L nasal cannula.  Labs remarkable for WBC 11.6 with a left shift, creatinine 1.06, calcium 10.6, lipase 104, troponin XX, lactic acid normal.  CT abdomen pelvis with contrast showed similar changes of bowel wall thickening involving the terminal ileum and base of the cecum adjacent to " "postoperative change from prior appendectomy which could be infectious or inflammatory.  There is mild proximal obstruction involving the ileum and distal jejunum in the lower abdomen left mid abdomen with small bowel loops dilated up to 4 cm.  There is no gross free air or pneumatosis.  Moderate size hiatal hernia again seen.  Minimal prominent biliary tree unchanged and physiologic, unchanged a back to 2015.  Simple hepatic cyst unchanged.  She was given IV fluids, Zosyn and general surgery was consulted.  Dr. Mcneil to see patient.  Recommended NG, IV fluids.\"    Interval Problem Update  3/26: Patient seen at bedside this morning.  Patient is hard of hearing.  Patient with NG tube in place.  Still complaining of abdominal pain some tenderness on examination.  General surgery has been consulted, we appreciate further recommendations.  Continue antibiotics for now.    3/27: Patient seen at bedside this morning.  It seems that the patient's NG tube came off, however the patient it seems that now is having regular bowel movements.  She still complaining of abdominal pain.  We have consulted GI, we appreciate further recommendations.  We also appreciate further recommendations by general surgery.  We will continue n.p.o. until surgery reevaluate the patient.  We will continue with antibiotics for now.  --The patient told me she used to see Dr Tarun Lewis at [facility], so I have consulted Dr Clay, we appreciate further recommendations.    3/28: Patient seen at bedside this morning.  The patient was vomiting this morning, she still complaining of abdominal pain.  We have placed the NG tube back on, we have ordered KUB.  We appreciate further recommendations by general surgery.  Also it seems that the patient had been seeing Dr. Ash earlier this month and she would like to continue seeing that group.  We have consulted Dr. Belle from GI, we appreciate further recommendations.  We will keep the patient n.p.o. for now " until surgical evaluation.  We will continue with antibiotics.    3/29: Patient seen at bedside this morning.  No family was present at bedside directly on my evaluation.  I tried calling the patient's son to give him an update however there was no answer.  Patient still n.p.o., with NG tube to suction.  General surgery and GI following the patient, we appreciate further recommendations.  The patient will undergo small bowel follow-through study today.  I asked the patient and it seems that she has not had a proper meal since last Thursday, if by tomorrow we are unable to feed the patient will consider TPN while further management is being done.    3/30: Patient seen at bedside this morning.  No family present at bedside, however I was able to talk to the patient's son over the phone and gave him an update.  Patient still n.p.o. with NG tube, I spoke to surgery and they will take her to the operating room later today.  We were going to start TPN today, however surgery would like to hold for now.  Patient with mild hyponatremia, this is most likely due to volume depletion as such she has not really eaten much for the last couple days.  We have increased the rate of the D5 NS, monitor and repeat sodium values.  We will order PICC line in preparation of possible TPN.  We appreciate further recommendations by general surgery.    3/31: Patient seen at bedside this morning.  No family members present at bedside, however again I was able to talk to the patient's son over the phone to update him on the patient's clinical status.  The patient underwent exploratory laparotomy with ileocecectomy yesterday by general surgery.  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  We will start the patient on TPN today.  The patient was seen at bedside, laying in bed currently comfortably with NG tube in place.  Patient also with Prevena status post exploratory laparotomy.  Patient requiring oxygen, this  is most likely postsurgical, we have ordered chest x-ray.  As per nursing no other overnight events reported.    4/1: Patient seen at bedside this morning.  No family present at bedside.  We are still pending pathology results.  Patient still not passing gas or having bowel movements.  We started TPN yesterday.  We will continue to monitor closely.    4/2: Patient seen at bedside this morning.  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  I have spoken with the patient and the patient's son regarding the diagnosis.  We have consulted oncology, we appreciate further recommendations.  We have ordered chest CT with contrast to evaluate for metastases.  Patient still not passing gas or having bowel movements.  We will continue with TPN.  We appreciate further recommendations by surgery.    4/3: Patient seen at bedside this morning.  At the time of my evaluation, the patient had mentioned to me that she has not had a bowel movement or passing gas oncology has been consulted, we appreciate further recommendations.  We did CT scan of the chest which reported mediastinal collection of fluid and gas adjacent, concerning for communication with esophagus.  We consulted GI who did not recommend emergent endoscopy and recommend Gastrografin esophagram once patient has oral intake and bowel movements.  We appreciate further recommendations.  Overall prognosis remains guarded.  As per nursing patient having episodes of fever due to the patient's complex history with recent GI surgery as well as abnormal CT scan we will continue the patient on Zosyn for now, we have ordered blood cultures.    4/4: Patient seen at bedside this morning.  She was laying in bed comfortable.  As per nursing she had a bowel movement yesterday.  We appreciate further recommendations by general surgery.  We will discuss the case with general surgery to see if we can move forward with the esophagogram that GI recommended.  Does not appear  that the patient had an episode of fever yesterday.  We will continue with antibiotics for now pending cultures.  We have put referral for inpatient rehab as well as order home health anticipating the patient could potentially be discharged sometime this week.  We appreciate recommendations by case management.  Oncology evaluated the patient and recommended outpatient follow-up at his clinic in 2 to 3 weeks.    4/5: Patient seen at bedside this morning.  He was laying in bed comfortably.  No family was present at bedside.  As per nursing patient had a bloody bowel movement this morning, anticoagulation has not been held.  I discussed the case with surgery who recommended holding anticoagulation for 48 hours.  We will monitor hemoglobin and transfuse if needed.  Patient slowly having p.o. intake, will continue TPN for now until surgery recommends off of it.  Overall prognosis remains guarded.  The patient has not had another episode of fever, blood cultures have remained negative.  We will discontinue antibiotics now.  We will continue to monitor closely.    I have personally seen and examined the patient at bedside. I discussed the plan of care with patient, bedside RN and charge RN.    Consultants/Specialty  general surgery and oncology    Code Status  DNAR/DNI    Disposition  Patient is not medically cleared for discharge.   Anticipate discharge to pending clinical evolution.    Review of Systems  Review of Systems   Constitutional: Positive for malaise/fatigue. Negative for chills and fever.   HENT: Positive for hearing loss. Negative for nosebleeds.    Eyes: Negative for blurred vision and double vision.   Respiratory: Negative for cough and shortness of breath.    Cardiovascular: Negative for chest pain and palpitations.   Gastrointestinal: Positive for abdominal pain. Negative for heartburn and vomiting.   Genitourinary: Negative for dysuria and urgency.   Musculoskeletal: Negative for back pain and falls.    Skin: Negative for itching and rash.   Neurological: Negative for dizziness and headaches.   Psychiatric/Behavioral: Negative for substance abuse. The patient is not nervous/anxious.    All other systems reviewed and are negative.       Physical Exam  Temp:  [36.9 °C (98.4 °F)-37.7 °C (99.8 °F)] 37.7 °C (99.8 °F)  Pulse:  [84-94] 94  Resp:  [17-18] 18  BP: (120-153)/(56-70) 120/65  SpO2:  [90 %-93 %] 92 %    Physical Exam  Vitals and nursing note reviewed.   Constitutional:       General: She is not in acute distress.     Appearance: She is ill-appearing.   HENT:      Head: Normocephalic and atraumatic.      Right Ear: External ear normal.      Left Ear: External ear normal.      Mouth/Throat:      Pharynx: No oropharyngeal exudate or posterior oropharyngeal erythema.   Eyes:      General:         Right eye: No discharge.         Left eye: No discharge.   Cardiovascular:      Rate and Rhythm: Normal rate and regular rhythm.      Pulses: Normal pulses.      Heart sounds: No murmur heard.    No gallop.   Pulmonary:      Effort: Pulmonary effort is normal. No respiratory distress.      Breath sounds: Normal breath sounds. No wheezing or rhonchi.   Abdominal:      General: There is distension.      Tenderness: There is abdominal tenderness.      Comments: With NG tube    Prevena in place s/p surgery   Musculoskeletal:         General: No swelling or tenderness. Normal range of motion.      Cervical back: Normal range of motion and neck supple. No tenderness.   Skin:     General: Skin is warm and dry.   Neurological:      General: No focal deficit present.      Mental Status: She is alert and oriented to person, place, and time. Mental status is at baseline.      Motor: No weakness.   Psychiatric:         Mood and Affect: Mood normal.         Behavior: Behavior normal.         Thought Content: Thought content normal.         Fluids    Intake/Output Summary (Last 24 hours) at 4/5/2022 1330  Last data filed at 4/5/2022  0400  Gross per 24 hour   Intake 1700 ml   Output 685 ml   Net 1015 ml       Laboratory  Recent Labs     04/03/22 0315 04/04/22 0315 04/05/22  0435   WBC 11.2* 10.4 10.4   RBC 3.10* 3.12* 2.87*   HEMOGLOBIN 9.7* 9.9* 9.0*   HEMATOCRIT 31.0* 30.9* 28.8*   .0* 99.0* 100.3*   MCH 31.3 31.7 31.4   MCHC 31.3* 32.0* 31.3*   RDW 45.5 44.6 45.1   PLATELETCT 247 223 264   MPV 11.8 11.1 11.5     Recent Labs     04/03/22 0315 04/04/22 0315 04/05/22  1100   SODIUM 133* 135 138   POTASSIUM 4.3 4.0 4.1   CHLORIDE 99 99 101   CO2 29 32 30   GLUCOSE 165* 135* 166*   BUN 11 14 14   CREATININE 0.62 0.63 0.76   CALCIUM 7.9* 8.1* 8.3*             Recent Labs     04/03/22 0315 04/04/22  0315   TRIGLYCERIDE 108 132       Imaging  DX-ESOPHAGUS BARIUM SWALLOW SINGLE CONTRAST   Final Result      Contrast extends from the distal esophagus at the level of the gastroesophageal junction to the right of midline into a large collection likely representing either a lower esophageal or upper gastric diverticulum.      CT-CHEST (THORAX) WITH   Final Result      1.  Mediastinal collection of fluid and gas adjacent to an abnormal thickened . Underlying mass or inflammatory change is a consideration though previously this had a more benign appearance. Suggest further assessment with endoscopy.   2.  Small BILATERAL pleural effusions   3.  BILATERAL atelectasis      JOSEP ORTIZ was paged at 4/2/2022 10:19 AM.         DX-CHEST-PORTABLE (1 VIEW)   Final Result      Decreased lung volumes with small left subpulmonic effusion and likely basilar atelectasis      IR-PICC LINE PLACEMENT W/ GUIDANCE > AGE 5   Final Result                  Ultrasound-guided PICC placement performed by qualified nursing staff as    above.          DX-SMALL BOWEL SERIES   Final Result      Partial small bowel obstruction with contrast transit time to the colon exceeding 9 hours      5 cm paraesophageal hernia      NG tube terminates over the gastric fundus       DX-ABDOMEN FOR TUBE PLACEMENT   Final Result         Gastric drainage tube with tip projecting over the expected area of the stomach.      [id number] VIEW   Final Result      Long segmental small bowel dilatation concerning for distal small bowel obstruction      DX-ABDOMEN FOR TUBE PLACEMENT   Final Result      Enteric tube terminates over the stomach.      Small bowel loop dilatation concerning for small bowel obstruction      DX-ABDOMEN FOR TUBE PLACEMENT   Final Result         Gastric drainage tube with tip projecting over the expected area of the stomach.      DX-ABDOMEN FOR TUBE PLACEMENT   Final Result         Gastric drainage tube loops in the esophagus with tip projecting over the expected area of the upper esophagus.      CT-ABDOMEN-PELVIS WITH   Final Result      1.  There are similar changes of bowel wall thickening involving the terminal ileum and base of the cecum adjacent to postoperative change from prior appendectomy which could be infectious or inflammatory.   2.  There is mild proximal obstruction involving the ileum and distal jejunum in the lower abdomen and left mid abdomen with small bowel loops dilated up to 4 cm.   3.  There is no gross free air or pneumatosis.   4.  Moderate size hiatal hernia again seen.   5.  Minimally prominent biliary tree unchanged and physiologic, unchanged dating back to 2015.   6.  Simple hepatic cysts are unchanged.         DX-CHEST-PORTABLE (1 VIEW)   Final Result      1.  There is no acute cardiopulmonary process.           Assessment/Plan  * SBO (small bowel obstruction) (HCC)- (present on admission)  Assessment & Plan  Patient with recent terminal ileitis and small bowel obstruction last month  Treated with conservative therapy and antibiotics however now with repeat symptoms and SBO  Surgery consulted by ERP: Dr. Mcneil was called, recommended NPO, NG, IVF, pain mgmt  CTX, flagyl    3/26: Pending surgical evaluation, we appreciate further  recommendations.    3/27: Patient seems to have BMs now, we are awaiting further recommendations by general surgery. We have also consulted GI ( Dr Clay), we appreciate further recommendations.    3/28: Patient was vomiting again this morning with abdominal pain.  We have placed the NG tube back and have ordered KUB.  We appreciate further recommendations by general surgery and GI. (Dr Belle from GI will be evaluating the patient today).    3/29: Patient to have small bowel follow through study today, we appreciate further recommendations by GI and General Surgery    3/30: Patient still unable to tolerate PO, we will start TPN, we appreciate further recommendations by surgery and GI.  --UPDATE: I spoke to surgery, they would like to hold TPN for now, as she might undergo surgery today, we will order PICC line.    3/31: Patient underwent exploratory laparotomy yesterday by general surgery, the patient also underwent ileocecectomy with finding of ileocecal mass, pending pathology.  We appreciate further recommendations by general surgery.  We will start TPN today.    4/3: Pathology positive for adenocarcinoma, oncology was consulted.    Fever  Assessment & Plan  As per nursing patient has been having some low-grade fever.  She had been on ceftriaxone and Flagyl reported  We have switched to Zosyn yesterday pending cultures.  Due to the patient's extensive surgery and CT scan findings in the chest, will continue with antibiotics for now    4/5: The patient has no longer episode of fever.  Cultures have remained negative to date.  We will stop antibiotics at this time.    Adenocarcinoma of ileum (HCC)  Assessment & Plan  Patient was found to have an ileocecal mass on exploratory laparotomy.  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  We have consulted oncology, we appreciate further conditions.  We have ordered CT scan of the chest with contrast to evaluate for metastases.    Hypophosphatemia  Assessment &  "Plan  Replace as needed  monitor    Intraabdominal mass  Assessment & Plan  Patient underwent exploratory laparotomy.  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    4/3: Pathology positive for adenocarcinoma, oncology consulted    4/5: Patient will require follow up with oncology as outpatient        Acute hypoxemic respiratory failure (HCC)  Assessment & Plan  Patient currently requiring oxygen plantation.  This could be postsurgical.  Incentive spirometer encouraged.    Hypernatremia  Assessment & Plan  Mild, this could be due to volume depletion, patient has not had proper nutrition in a couple of days  Currently on D5 NS, we will increase the rate to 125 ml/hr  We were going to order TPN today, however surgery would like to hold, as patient will undergo surgery later today.  We will continue to monitor.    3/31: We have ordered TPN today    4/1: Improving    Generalized weakness  Assessment & Plan  PT/OT    Hyperglycemia  Assessment & Plan  Patient had recent A1c at 5.1  Monitor  No need to start insulin treatment at this time    NATIVIDAD (acute kidney injury) (HCC)  Assessment & Plan  Mild bump in Cr 0.6-->1.06  Likely in setting of decreased PO intake/pre-renal  IVF resuscitation  Continue to monitor    3/29: Improved. Cr is 0.55 today.    Abdominal pain  Assessment & Plan  Initial concern for sbo  Also concern for ileitis  General surgery and GI following patient, we appreciate further recommendations    Abnormal finding on imaging  Assessment & Plan  As part of the evaluation for metastases, we order a CT scan of the chest with contrast.  It reported: \"Mediastinal collection of fluid and gas adjacent to an abnormal thickened . Underlying mass or inflammatory change is a consideration though previously this had a more benign appearance.\"  We consulted GI, we appreciate further recommendations.    4/4: GI recommends esophagogram once able to tolerate PO and having BMs, we will discuss with surgery " "to see when would it be appropriate    4/5: Esophagogram reported: \"Contrast extends from the distal esophagus at the level of the gastroesophageal junction to the right of midline into a large collection likely representing either a lower esophageal or upper gastric diverticulum.\"  --I spoke to GI who recommended outpatient follow up.    Anemia- (present on admission)  Assessment & Plan  No signs of bleeding at this time, monitor    3/31: Patient underwent exploratory laparotomy yesterday.  Continue to monitor CBC.    4/5: Patient had bright red blood per rectum today, we have held anticoagulation.  This could be due to the patient's anastomosis site.  We appreciate further recommendations by surgery.    History of deep venous thrombosis- (present on admission)  Assessment & Plan  Diagnosed 5/19/2020 6 hour car trip  Still on anticoagulation    3/27: Will probably resume once surgery clears her NPO    4/5: We had restarted anticoagulation yesterday, however this morning the patient had bloody bowel movement.  So we have held anticoagulation for now.    COPD (chronic obstructive pulmonary disease) (HCC)- (present on admission)  Assessment & Plan  Not on home oxygen  Not having COPD exacerbation    Essential hypertension, benign- (present on admission)  Assessment & Plan  Home regimen: Valsartan 160 mg daily  Inpatient regimen: PRN medication, resume home medication once surgery clears for PO    3/29: Patient still NPO, however BP seems to be controlled, PRN medication for now       VTE prophylaxis: SCDs/TEDs    I have performed a physical exam and reviewed and updated ROS and Plan today (4/5/2022). In review of yesterday's note (4/4/2022), there are no changes except as documented above.      "

## 2022-04-05 NOTE — DISCHARGE PLANNING
Anticipated Discharge Disposition: HH vs Rehab     Action: Pt A&Ox4 on RA. 6 clicks scores of 17/16. Updated PT eval needed.      Barriers to Discharge: medical clearance, PT eval     Plan:  f/u with medical team to discuss DC needs and barriers  f/u with [facility] regarding acceptance

## 2022-04-06 LAB
GLUCOSE BLD STRIP.AUTO-MCNC: 150 MG/DL (ref 65–99)
GLUCOSE BLD STRIP.AUTO-MCNC: 155 MG/DL (ref 65–99)
GLUCOSE BLD STRIP.AUTO-MCNC: 162 MG/DL (ref 65–99)
HCT VFR BLD AUTO: 26.1 % (ref 37–47)
HCT VFR BLD AUTO: 28.4 % (ref 37–47)
HGB BLD-MCNC: 8.2 G/DL (ref 12–16)
HGB BLD-MCNC: 8.9 G/DL (ref 12–16)

## 2022-04-06 PROCEDURE — C9113 INJ PANTOPRAZOLE SODIUM, VIA: HCPCS | Performed by: INTERNAL MEDICINE

## 2022-04-06 PROCEDURE — 97116 GAIT TRAINING THERAPY: CPT

## 2022-04-06 PROCEDURE — 97110 THERAPEUTIC EXERCISES: CPT

## 2022-04-06 PROCEDURE — 700111 HCHG RX REV CODE 636 W/ 250 OVERRIDE (IP): Performed by: INTERNAL MEDICINE

## 2022-04-06 PROCEDURE — 82962 GLUCOSE BLOOD TEST: CPT | Mod: 91

## 2022-04-06 PROCEDURE — 97535 SELF CARE MNGMENT TRAINING: CPT

## 2022-04-06 PROCEDURE — 700101 HCHG RX REV CODE 250: Performed by: INTERNAL MEDICINE

## 2022-04-06 PROCEDURE — 85014 HEMATOCRIT: CPT

## 2022-04-06 PROCEDURE — 700105 HCHG RX REV CODE 258: Performed by: INTERNAL MEDICINE

## 2022-04-06 PROCEDURE — 99233 SBSQ HOSP IP/OBS HIGH 50: CPT | Performed by: INTERNAL MEDICINE

## 2022-04-06 PROCEDURE — 770006 HCHG ROOM/CARE - MED/SURG/GYN SEMI*

## 2022-04-06 PROCEDURE — 85018 HEMOGLOBIN: CPT

## 2022-04-06 PROCEDURE — 94760 N-INVAS EAR/PLS OXIMETRY 1: CPT

## 2022-04-06 RX ADMIN — PANTOPRAZOLE SODIUM 40 MG: 40 INJECTION, POWDER, FOR SOLUTION INTRAVENOUS at 18:31

## 2022-04-06 RX ADMIN — INSULIN HUMAN 2 UNITS: 100 INJECTION, SOLUTION PARENTERAL at 23:40

## 2022-04-06 RX ADMIN — INSULIN HUMAN 2 UNITS: 100 INJECTION, SOLUTION PARENTERAL at 05:39

## 2022-04-06 RX ADMIN — INSULIN HUMAN 2 UNITS: 100 INJECTION, SOLUTION PARENTERAL at 18:31

## 2022-04-06 RX ADMIN — POTASSIUM CHLORIDE: 2 INJECTION, SOLUTION, CONCENTRATE INTRAVENOUS at 20:35

## 2022-04-06 RX ADMIN — PANTOPRAZOLE SODIUM 40 MG: 40 INJECTION, POWDER, FOR SOLUTION INTRAVENOUS at 05:25

## 2022-04-06 ASSESSMENT — ENCOUNTER SYMPTOMS
BLURRED VISION: 0
BACK PAIN: 0
SHORTNESS OF BREATH: 0
NERVOUS/ANXIOUS: 0
DOUBLE VISION: 0
HEARTBURN: 0
DIZZINESS: 0
COUGH: 0
CHILLS: 0
FEVER: 0
FALLS: 0
PALPITATIONS: 0
ABDOMINAL PAIN: 1
VOMITING: 0
HEADACHES: 0

## 2022-04-06 ASSESSMENT — GAIT ASSESSMENTS
DEVIATION: BRADYKINETIC
GAIT LEVEL OF ASSIST: SUPERVISED
DISTANCE (FEET): 25
DISTANCE (FEET): 75
ASSISTIVE DEVICE: FRONT WHEEL WALKER

## 2022-04-06 ASSESSMENT — LIFESTYLE VARIABLES: SUBSTANCE_ABUSE: 0

## 2022-04-06 ASSESSMENT — PAIN DESCRIPTION - PAIN TYPE: TYPE: SURGICAL PAIN

## 2022-04-06 NOTE — PROGRESS NOTES
Pharmacy TPN Day # 6       2022    Dosing Weight   55 kg TPN currently providing 100% of goal      TPN goal: 1532 kcal/day including 1.3 gm/kg/day Protein      TPN indication: SBO, NPO x 7 days       Pertinent PMH: Patient with recent terminal ileitis and SBO last month.  Patient treated with conservative therapy and ABX now with repeat symtoms and SBO.  Patient has been NPO x 7 days.  Patient s/p ex lap 3/30 with findings of ileal mass and an ileocecostomy was performed.  Surgical pathology demonstrated adenocarcinoma.   CT revealing a mediastinal collection of fluid and gas adjacent to esophagus. NGT discontinued .   Temp (24hrs), Av.3 °C (99.2 °F), Min:37 °C (98.6 °F), Max:37.7 °C (99.8 °F)  .  Recent Labs     22  0315 22  1100   SODIUM 135 138   POTASSIUM 4.0 4.1   CHLORIDE 99 101   CO2 32 30   BUN 14 14   CREATININE 0.63 0.76   GLUCOSE 135* 166*   CALCIUM 8.1* 8.3*   ASTSGOT 11* 14   ALTSGPT <5 6   ALBUMIN 2.5* 2.6*   TBILIRUBIN 0.6 0.4   PHOSPHORUS 3.2  --    MAGNESIUM 2.1  --    PREALBUMIN 10.9*  --      Accu-Checks  No results for input(s): POCGLUCOSE in the last 72 hours.    Vitals:    22 0954 22 1639 22 2144 22 0500   BP: 120/65 135/63 139/67 138/66   Weight:       Height:           Intake/Output Summary (Last 24 hours) at 2022 0940  Last data filed at 2022 0500  Gross per 24 hour   Intake --   Output 50 ml   Net -50 ml       Orders Placed This Encounter   Procedures   • Diet Order Diet: Clear Liquid     Standing Status:   Standing     Number of Occurrences:   1     Order Specific Question:   Diet:     Answer:   Clear Liquid [10]         TPN for past 72 hours (Show up to 3 orders; newest on the left. Changes between the two most recent orders are indicated.)     Start date and time   2022      TPN Adult Central Line [[id number]] TPN Adult Central Line [[id number]] TPN Adult Central Line [[id number]]    Order  Status  Active Last Dose in Progress Completed    Last Admin   New Bag at 04/05/2022 2033 by Kalpana Jenkins R.N. New Bag at 04/04/2022 2025 by Dolores Vázquez R.N.       Base    Clinisol 15%  72 g 72 g 72 g    dextrose 70%  254 g 254 g 254 g    fat emulsion (soy) 20%  38 g 38 g 38 g       Additives    potassium phosphate  21 mmol 21 mmol 21 mmol    potassium chloride  40 mEq 40 mEq 40 mEq    sodium acetate  300 mEq 300 mEq 300 mEq    magnesium sulfate  12 mEq 12 mEq 12 mEq    calcium GLUConate  9.4 mEq 9.4 mEq 9.4 mEq    M.T.E.-4  1 mL 1 mL 1 mL    M.V.I. Adult  10 mL 10 mL 10 mL       QS Base    sterile water  747.92 mL 747.92 mL 747.92 mL       Energy Contribution    Proteins  -- -- --    Dextrose  863.6 kcal 863.6 kcal 863.6 kcal    Lipids  380 kcal 380 kcal 380 kcal    Total  1,243.6 kcal 1,243.6 kcal 1,243.6 kcal       Electrolyte Ion Calculated Amount    Sodium  300 mEq 300 mEq 300 mEq    Potassium  70.8 mEq 70.8 mEq 70.8 mEq    Calcium  9.4 mEq 9.4 mEq 9.4 mEq    Magnesium  12 mEq 12 mEq 12 mEq    Aluminum  -- -- --    Phosphate  21 mmol 21 mmol 21 mmol    Chloride  40 mEq 40 mEq 40 mEq    Acetate  360.96 mEq 360.96 mEq 360.96 mEq       Other    Total Protein  72 g 72 g 72 g    Total Protein/kg  1.26 g/kg 1.26 g/kg 1.26 g/kg    Total Amino Acid  -- -- --    Total Amino Acid/kg  -- -- --    Glucose Infusion Rate  3.08 mg/kg/min 3.08 mg/kg/min 3.08 mg/kg/min    Osmolarity (Estimated)  -- -- --    Volume  1,992 mL 1,992 mL 1,992 mL    Rate  83 mL/hr 83 mL/hr 83 mL/hr    Dosing Weight  57.2 kg 57.2 kg 57.2 kg    Infusion Site  [facility]            This formula provides:  % kcal as lipids = 25  Grams protein/kg = 1.3  Non-protein calories = 1244  Kcals/kg = 27.9  Total daily calories = 1532    Comments:  Continue at 100% goal, no changes in formula, labs in am per protocol.      Christopher Don [facility]

## 2022-04-06 NOTE — PROGRESS NOTES
Received report from day shift nurse. Assumed pt care at 1915. Pt is A&Ox4, resting comfortably in bed. Pt on r.a.. TPN on flow at 83 mL/hr No signs of SOB/respiratory distress. Labs noted, VSS. Pt c/o no pain at this moment. Fall precautions in place. Bed at lowest position. Call light and personal belongings within reach. Continue to monitor

## 2022-04-06 NOTE — CARE PLAN
The patient is Watcher - Medium risk of patient condition declining or worsening    Shift Goals  Clinical Goals: Maintain hemoglobin levels above 8  Patient Goals: Ambulate comfortablly  Family Goals: no family at bedside at this time    Progress made toward(s) clinical / shift goals:  Pt is able to ambulate comfortably without pain. Pt still needs assistance to stand up.     Patient is not progressing towards the following goals:n/a      Problem: Knowledge Deficit - Standard  Goal: Patient and family/care givers will demonstrate understanding of plan of care, disease process/condition, diagnostic tests and medications  4/6/2022 1607 by Alejandrina Renner R.N.  Outcome: Progressing       Problem: Bowel Elimination  Goal: Establish and maintain regular bowel function  4/6/2022 1607 by Alejandrina Renner R.N.  Outcome: Progressing       Problem: Gastrointestinal Irritability  Goal: Nausea and vomiting will be absent or improve  4/6/2022 1607 by Alejandrina Renner R.N.  Outcome: Progressing       Problem: Fluid Volume  Goal: Fluid volume balance will be maintained  4/6/2022 1607 by Alejandrina Renner R.N.  Outcome: Progressing    Problem: Urinary Elimination  Goal: Establish and maintain regular urinary output  4/6/2022 1607 by Alejandrina Renner, R.N.  Outcome: Progressing    Problem: Mobility  Goal: Patient's capacity to carry out activities will improve  4/6/2022 1607 by Alejandrina Renner, R.N.  Outcome: Progressing       Problem: Fall Risk  Goal: Patient will remain free from falls  4/6/2022 1607 by Alejandrina Renner R.N.  Outcome: Progressing    Problem: Knowledge Deficit - COPD  Goal: Patient/significant other demonstrates understanding of disease process, utilization of the Action Plan, medications and discharge instruction  4/6/2022 1607 by Alejandrina Renner, R.N.  Outcome: Progressing       Problem: Risk for Infection - COPD  Goal: Patient will remain free from signs and symptoms of infection  4/6/2022 1607 by Alejandrina Renner R.N.  Outcome: Progressing       Problem:  Nutrition - Advanced  Goal: Patient will display progressive weight gain toward goal have adequate food and fluid intake  4/6/2022 1607 by Alejandrina Renner R.N.  Outcome: Progressing       Problem: Ineffective Airway Clearance  Goal: Patient will maintain patent airway with clear/clearing breath sounds  4/6/2022 1607 by Alejandrina Renner R.N.  Outcome: Progressing       Problem: Impaired Gas Exchange  Goal: Patient will demonstrate improved ventilation and adequate oxygenation and participate in treatment regimen within the level of ability/situation.  4/6/2022 1607 by Alejandrina Renner R.N.  Outcome: Progressing       Problem: Risk for Aspiration  Goal: Patient's risk for aspiration will be absent or decrease  4/6/2022 1607 by Alejandrina Renner R.N.  Outcome: Progressing       Problem: Self Care  Goal: Patient will have the ability to perform ADLs independently or with assistance (bathe, groom, dress, toilet and feed)  4/6/2022 1607 by Alejandrina Renner R.N.  Outcome: Progressing       Problem: Skin Integrity  Goal: Skin integrity is maintained or improved  4/6/2022 1607 by Alejandrina Renner, R.N.  Outcome: Progressing       Problem: Surgical Drain Management  Goal: Proper management/care of surgical drains will be maintained  4/6/2022 1607 by Alejandrina Renner R.N.  Outcome: Progressing

## 2022-04-06 NOTE — PROGRESS NOTES
"Hospital Medicine Daily Progress Note    Date of Service  4/6/2022    Chief Complaint  Licha Pope is a 86 y.o. female admitted 3/25/2022 with abdominal pain.    Hospital Course  As per chart review  \"86 y.o. female with a history of hypertension, COPD, GERD, DVT dx 5/19/21 after long car trip (still on anticoagulation) who presented 3/25/2022 with abdominal pain.     Was recently admitted for terminal ileitis, SBO and pancreatitis on 2/17/2022, she was treated conservatively with IV fluids and pain control.  Right upper quadrant showed dependent gallbladder sludge versus small stones.  MRCP showed cholelithiasis and common bile duct 11.4 mm unchanged since 2015.  She was discharged with antibiotics and recommended to follow-up with GI, surgery and PCP after discharge.  She saw surgeon, she recommended cholecystectomy but wanted her to see GI doctor. GI doctor recommended cholecystectomy.  Has appt with surgery next Wed.       Patient never really felt like her abdominal pain improved however was stable until 2 days ago.  Abdominal pain became very severe however intermittent and would come in waves.  Patient became very fatigued and was sleeping most the day.  She had chills and was very weak.  Abdominal pain was diffuse, intermittent, described as sharp pain, 10/10 pain, went to PCP and doctor sent her to ER.  Did have some n/v, had some dark red or purple, size of quarter.  Passing gas.  Yesterday had BM.  Did have some dark stools, unsure if black was \"half asleep\".  Last night had lower back pain, now improved.      In ER she was afebrile, mildly tachycardic (heart rate 111), normal blood pressure and mild hypoxia requiring 1 L nasal cannula.  Labs remarkable for WBC 11.6 with a left shift, creatinine 1.06, calcium 10.6, lipase 104, troponin XX, lactic acid normal.  CT abdomen pelvis with contrast showed similar changes of bowel wall thickening involving the terminal ileum and base of the cecum adjacent to " "postoperative change from prior appendectomy which could be infectious or inflammatory.  There is mild proximal obstruction involving the ileum and distal jejunum in the lower abdomen left mid abdomen with small bowel loops dilated up to 4 cm.  There is no gross free air or pneumatosis.  Moderate size hiatal hernia again seen.  Minimal prominent biliary tree unchanged and physiologic, unchanged a back to 2015.  Simple hepatic cyst unchanged.  She was given IV fluids, Zosyn and general surgery was consulted.  Dr. Mcneil to see patient.  Recommended NG, IV fluids.\"    Interval Problem Update  3/26: Patient seen at bedside this morning.  Patient is hard of hearing.  Patient with NG tube in place.  Still complaining of abdominal pain some tenderness on examination.  General surgery has been consulted, we appreciate further recommendations.  Continue antibiotics for now.    3/27: Patient seen at bedside this morning.  It seems that the patient's NG tube came off, however the patient it seems that now is having regular bowel movements.  She still complaining of abdominal pain.  We have consulted GI, we appreciate further recommendations.  We also appreciate further recommendations by general surgery.  We will continue n.p.o. until surgery reevaluate the patient.  We will continue with antibiotics for now.  --The patient told me she used to see Dr Tarun Lewis at [facility], so I have consulted Dr Clay, we appreciate further recommendations.    3/28: Patient seen at bedside this morning.  The patient was vomiting this morning, she still complaining of abdominal pain.  We have placed the NG tube back on, we have ordered KUB.  We appreciate further recommendations by general surgery.  Also it seems that the patient had been seeing Dr. Ash earlier this month and she would like to continue seeing that group.  We have consulted Dr. Belle from GI, we appreciate further recommendations.  We will keep the patient n.p.o. for now " until surgical evaluation.  We will continue with antibiotics.    3/29: Patient seen at bedside this morning.  No family was present at bedside directly on my evaluation.  I tried calling the patient's son to give him an update however there was no answer.  Patient still n.p.o., with NG tube to suction.  General surgery and GI following the patient, we appreciate further recommendations.  The patient will undergo small bowel follow-through study today.  I asked the patient and it seems that she has not had a proper meal since last Thursday, if by tomorrow we are unable to feed the patient will consider TPN while further management is being done.    3/30: Patient seen at bedside this morning.  No family present at bedside, however I was able to talk to the patient's son over the phone and gave him an update.  Patient still n.p.o. with NG tube, I spoke to surgery and they will take her to the operating room later today.  We were going to start TPN today, however surgery would like to hold for now.  Patient with mild hyponatremia, this is most likely due to volume depletion as such she has not really eaten much for the last couple days.  We have increased the rate of the D5 NS, monitor and repeat sodium values.  We will order PICC line in preparation of possible TPN.  We appreciate further recommendations by general surgery.    3/31: Patient seen at bedside this morning.  No family members present at bedside, however again I was able to talk to the patient's son over the phone to update him on the patient's clinical status.  The patient underwent exploratory laparotomy with ileocecectomy yesterday by general surgery.  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  We will start the patient on TPN today.  The patient was seen at bedside, laying in bed currently comfortably with NG tube in place.  Patient also with Prevena status post exploratory laparotomy.  Patient requiring oxygen, this  is most likely postsurgical, we have ordered chest x-ray.  As per nursing no other overnight events reported.    4/1: Patient seen at bedside this morning.  No family present at bedside.  We are still pending pathology results.  Patient still not passing gas or having bowel movements.  We started TPN yesterday.  We will continue to monitor closely.    4/2: Patient seen at bedside this morning.  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  I have spoken with the patient and the patient's son regarding the diagnosis.  We have consulted oncology, we appreciate further recommendations.  We have ordered chest CT with contrast to evaluate for metastases.  Patient still not passing gas or having bowel movements.  We will continue with TPN.  We appreciate further recommendations by surgery.    4/3: Patient seen at bedside this morning.  At the time of my evaluation, the patient had mentioned to me that she has not had a bowel movement or passing gas oncology has been consulted, we appreciate further recommendations.  We did CT scan of the chest which reported mediastinal collection of fluid and gas adjacent, concerning for communication with esophagus.  We consulted GI who did not recommend emergent endoscopy and recommend Gastrografin esophagram once patient has oral intake and bowel movements.  We appreciate further recommendations.  Overall prognosis remains guarded.  As per nursing patient having episodes of fever due to the patient's complex history with recent GI surgery as well as abnormal CT scan we will continue the patient on Zosyn for now, we have ordered blood cultures.    4/4: Patient seen at bedside this morning.  She was laying in bed comfortable.  As per nursing she had a bowel movement yesterday.  We appreciate further recommendations by general surgery.  We will discuss the case with general surgery to see if we can move forward with the esophagogram that GI recommended.  Does not appear  that the patient had an episode of fever yesterday.  We will continue with antibiotics for now pending cultures.  We have put referral for inpatient rehab as well as order home health anticipating the patient could potentially be discharged sometime this week.  We appreciate recommendations by case management.  Oncology evaluated the patient and recommended outpatient follow-up at his clinic in 2 to 3 weeks.    4/5: Patient seen at bedside this morning.  He was laying in bed comfortably.  No family was present at bedside.  As per nursing patient had a bloody bowel movement this morning, anticoagulation has not been held.  I discussed the case with surgery who recommended holding anticoagulation for 48 hours.  We will monitor hemoglobin and transfuse if needed.  Patient slowly having p.o. intake, will continue TPN for now until surgery recommends off of it.  Overall prognosis remains guarded.  The patient has not had another episode of fever, blood cultures have remained negative.  We will discontinue antibiotics now.  We will continue to monitor closely.    4/6: Patient seen at bedside this morning.  Continues with patient still having some bloody bowel movements.  Hemoglobin slowly trending down, however hemodynamically stable.  We will continue to trend hemoglobin and transfuse if needed.  The patient states that she is only eating between 10 to 20% of her meals.  We will continue with TPN for now.  We appreciate further recommendations by surgery.  We will continue to monitor closely.  No family members present at bedside at the time of evaluation.  I tried to talk to the patient's son over the phone without success.        I have personally seen and examined the patient at bedside. I discussed the plan of care with patient, bedside RN and charge RN.    Consultants/Specialty  general surgery and oncology    Code Status  DNAR/DNI    Disposition  Patient is not medically cleared for discharge.   Anticipate  discharge to pending clinical evolution.    Review of Systems  Review of Systems   Constitutional: Positive for malaise/fatigue. Negative for chills and fever.   HENT: Positive for hearing loss. Negative for nosebleeds.    Eyes: Negative for blurred vision and double vision.   Respiratory: Negative for cough and shortness of breath.    Cardiovascular: Negative for chest pain and palpitations.   Gastrointestinal: Positive for abdominal pain. Negative for heartburn and vomiting.   Genitourinary: Negative for dysuria and urgency.   Musculoskeletal: Negative for back pain and falls.   Skin: Negative for itching and rash.   Neurological: Negative for dizziness and headaches.   Psychiatric/Behavioral: Negative for substance abuse. The patient is not nervous/anxious.    All other systems reviewed and are negative.       Physical Exam  Temp:  [37 °C (98.6 °F)-37.7 °C (99.8 °F)] 37.1 °C (98.7 °F)  Pulse:  [84-90] 89  Resp:  [18] 18  BP: (135-144)/(63-67) 144/66  SpO2:  [92 %-94 %] 92 %    Physical Exam  Vitals and nursing note reviewed.   Constitutional:       General: She is not in acute distress.     Appearance: She is ill-appearing.   HENT:      Head: Normocephalic and atraumatic.      Right Ear: External ear normal.      Left Ear: External ear normal.      Mouth/Throat:      Pharynx: No oropharyngeal exudate or posterior oropharyngeal erythema.   Eyes:      General:         Right eye: No discharge.         Left eye: No discharge.   Cardiovascular:      Rate and Rhythm: Normal rate and regular rhythm.      Pulses: Normal pulses.      Heart sounds: No murmur heard.    No gallop.   Pulmonary:      Effort: Pulmonary effort is normal. No respiratory distress.      Breath sounds: Normal breath sounds. No wheezing or rhonchi.   Abdominal:      General: There is distension.      Tenderness: There is abdominal tenderness.      Comments: Prevena in place s/p surgery   Musculoskeletal:         General: No swelling or tenderness.  Normal range of motion.      Cervical back: Normal range of motion and neck supple. No tenderness.   Skin:     General: Skin is warm and dry.   Neurological:      General: No focal deficit present.      Mental Status: She is alert and oriented to person, place, and time. Mental status is at baseline.      Motor: No weakness.   Psychiatric:         Mood and Affect: Mood normal.         Behavior: Behavior normal.         Thought Content: Thought content normal.         Fluids    Intake/Output Summary (Last 24 hours) at 4/6/2022 1423  Last data filed at 4/6/2022 0500  Gross per 24 hour   Intake --   Output 50 ml   Net -50 ml       Laboratory  Recent Labs     04/04/22  0315 04/05/22  0435 04/05/22  1740 04/06/22  0212   WBC 10.4 10.4  --   --    RBC 3.12* 2.87*  --   --    HEMOGLOBIN 9.9* 9.0* 8.9* 8.2*   HEMATOCRIT 30.9* 28.8* 28.3* 26.1*   MCV 99.0* 100.3*  --   --    MCH 31.7 31.4  --   --    MCHC 32.0* 31.3*  --   --    RDW 44.6 45.1  --   --    PLATELETCT 223 264  --   --    MPV 11.1 11.5  --   --      Recent Labs     04/04/22  0315 04/05/22  1100   SODIUM 135 138   POTASSIUM 4.0 4.1   CHLORIDE 99 101   CO2 32 30   GLUCOSE 135* 166*   BUN 14 14   CREATININE 0.63 0.76   CALCIUM 8.1* 8.3*             Recent Labs     04/04/22  0315   TRIGLYCERIDE 132       Imaging  DX-ESOPHAGUS BARIUM SWALLOW SINGLE CONTRAST   Final Result      Contrast extends from the distal esophagus at the level of the gastroesophageal junction to the right of midline into a large collection likely representing either a lower esophageal or upper gastric diverticulum.      CT-CHEST (THORAX) WITH   Final Result      1.  Mediastinal collection of fluid and gas adjacent to an abnormal thickened . Underlying mass or inflammatory change is a consideration though previously this had a more benign appearance. Suggest further assessment with endoscopy.   2.  Small BILATERAL pleural effusions   3.  BILATERAL atelectasis      JOSEP ORTIZ was  paged at 4/2/2022 10:19 AM.         DX-CHEST-PORTABLE (1 VIEW)   Final Result      Decreased lung volumes with small left subpulmonic effusion and likely basilar atelectasis      IR-PICC LINE PLACEMENT W/ GUIDANCE > AGE 5   Final Result                  Ultrasound-guided PICC placement performed by qualified nursing staff as    above.          DX-SMALL BOWEL SERIES   Final Result      Partial small bowel obstruction with contrast transit time to the colon exceeding 9 hours      5 cm paraesophageal hernia      NG tube terminates over the gastric fundus      DX-ABDOMEN FOR TUBE PLACEMENT   Final Result         Gastric drainage tube with tip projecting over the expected area of the stomach.      NA-TDDHIOB-6 VIEW   Final Result      Long segmental small bowel dilatation concerning for distal small bowel obstruction      DX-ABDOMEN FOR TUBE PLACEMENT   Final Result      Enteric tube terminates over the stomach.      Small bowel loop dilatation concerning for small bowel obstruction      DX-ABDOMEN FOR TUBE PLACEMENT   Final Result         Gastric drainage tube with tip projecting over the expected area of the stomach.      DX-ABDOMEN FOR TUBE PLACEMENT   Final Result         Gastric drainage tube loops in the esophagus with tip projecting over the expected area of the upper esophagus.      CT-ABDOMEN-PELVIS WITH   Final Result      1.  There are similar changes of bowel wall thickening involving the terminal ileum and base of the cecum adjacent to postoperative change from prior appendectomy which could be infectious or inflammatory.   2.  There is mild proximal obstruction involving the ileum and distal jejunum in the lower abdomen and left mid abdomen with small bowel loops dilated up to 4 cm.   3.  There is no gross free air or pneumatosis.   4.  Moderate size hiatal hernia again seen.   5.  Minimally prominent biliary tree unchanged and physiologic, unchanged dating back to 2015.   6.  Simple hepatic cysts are  unchanged.         DX-CHEST-PORTABLE (1 VIEW)   Final Result      1.  There is no acute cardiopulmonary process.           Assessment/Plan  * SBO (small bowel obstruction) (HCC)- (present on admission)  Assessment & Plan  Patient with recent terminal ileitis and small bowel obstruction last month  Treated with conservative therapy and antibiotics however now with repeat symptoms and SBO  Surgery consulted by ERP: Dr. Mcneil was called, recommended NPO, NG, IVF, pain mgmt  CTX, flagyl    3/26: Pending surgical evaluation, we appreciate further recommendations.    3/27: Patient seems to have BMs now, we are awaiting further recommendations by general surgery. We have also consulted GI ( Dr Clay), we appreciate further recommendations.    3/28: Patient was vomiting again this morning with abdominal pain.  We have placed the NG tube back and have ordered KUB.  We appreciate further recommendations by general surgery and GI. (Dr Belle from GI will be evaluating the patient today).    3/29: Patient to have small bowel follow through study today, we appreciate further recommendations by GI and General Surgery    3/30: Patient still unable to tolerate PO, we will start TPN, we appreciate further recommendations by surgery and GI.  --UPDATE: I spoke to surgery, they would like to hold TPN for now, as she might undergo surgery today, we will order PICC line.    3/31: Patient underwent exploratory laparotomy yesterday by general surgery, the patient also underwent ileocecectomy with finding of ileocecal mass, pending pathology.  We appreciate further recommendations by general surgery.  We will start TPN today.    4/3: Pathology positive for adenocarcinoma, oncology was consulted.    Fever  Assessment & Plan  As per nursing patient has been having some low-grade fever.  She had been on ceftriaxone and Flagyl reported  We have switched to Zosyn yesterday pending cultures.  Due to the patient's extensive surgery and CT scan  findings in the chest, will continue with antibiotics for now    4/5: The patient has no longer episode of fever.  Cultures have remained negative to date.  We will stop antibiotics at this time.    Adenocarcinoma of ileum (HCC)  Assessment & Plan  Patient was found to have an ileocecal mass on exploratory laparotomy.  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  We have consulted oncology, we appreciate further conditions.  We have ordered CT scan of the chest with contrast to evaluate for metastases.    Hypophosphatemia  Assessment & Plan  Replace as needed  monitor    Intraabdominal mass  Assessment & Plan  Patient underwent exploratory laparotomy.  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    4/3: Pathology positive for adenocarcinoma, oncology consulted    4/5: Patient will require follow up with oncology as outpatient        Acute hypoxemic respiratory failure (HCC)  Assessment & Plan  Patient currently requiring oxygen plantation.  This could be postsurgical.  Incentive spirometer encouraged.    Hypernatremia  Assessment & Plan  Mild, this could be due to volume depletion, patient has not had proper nutrition in a couple of days  Currently on D5 NS, we will increase the rate to 125 ml/hr  We were going to order TPN today, however surgery would like to hold, as patient will undergo surgery later today.  We will continue to monitor.    3/31: We have ordered TPN today    4/1: Improving    Generalized weakness  Assessment & Plan  PT/OT    Hyperglycemia  Assessment & Plan  Patient had recent A1c at 5.1  Monitor  No need to start insulin treatment at this time    NATIVIDAD (acute kidney injury) (HCC)  Assessment & Plan  Mild bump in Cr 0.6-->1.06  Likely in setting of decreased PO intake/pre-renal  IVF resuscitation  Continue to monitor    3/29: Improved. Cr is 0.55 today.    Abdominal pain  Assessment & Plan  Initial concern for sbo  Also concern for ileitis  General surgery and GI following patient,  "we appreciate further recommendations    Abnormal finding on imaging  Assessment & Plan  As part of the evaluation for metastases, we order a CT scan of the chest with contrast.  It reported: \"Mediastinal collection of fluid and gas adjacent to an abnormal thickened . Underlying mass or inflammatory change is a consideration though previously this had a more benign appearance.\"  We consulted GI, we appreciate further recommendations.    4/4: GI recommends esophagogram once able to tolerate PO and having BMs, we will discuss with surgery to see when would it be appropriate    4/5: Esophagogram reported: \"Contrast extends from the distal esophagus at the level of the gastroesophageal junction to the right of midline into a large collection likely representing either a lower esophageal or upper gastric diverticulum.\"  --I spoke to GI who recommended outpatient follow up.    Anemia- (present on admission)  Assessment & Plan  No signs of bleeding at this time, monitor    3/31: Patient underwent exploratory laparotomy yesterday.  Continue to monitor CBC.    4/5: Patient had bright red blood per rectum today, we have held anticoagulation.  This could be due to the patient's anastomosis site.  We appreciate further recommendations by surgery.    4/6: Patient mentions she is still having some blood per rectum. We will continue to monitor. Patient is hemodynamically stable for now.    History of deep venous thrombosis- (present on admission)  Assessment & Plan  Diagnosed 5/19/2020 6 hour car trip  Still on anticoagulation    3/27: Will probably resume once surgery clears her NPO    4/5: We had restarted anticoagulation yesterday, however this morning the patient had bloody bowel movement.  So we have held anticoagulation for now.    COPD (chronic obstructive pulmonary disease) (HCC)- (present on admission)  Assessment & Plan  Not on home oxygen  Not having COPD exacerbation    Essential hypertension, benign- (present on " admission)  Assessment & Plan  Home regimen: Valsartan 160 mg daily  Inpatient regimen: PRN medication, resume home medication once surgery clears for PO    3/29: Patient still NPO, however BP seems to be controlled, PRN medication for now       VTE prophylaxis: SCDs/TEDs    I have performed a physical exam and reviewed and updated ROS and Plan today (4/6/2022). In review of yesterday's note (4/5/2022), there are no changes except as documented above.

## 2022-04-06 NOTE — DISCHARGE PLANNING
Anticipated Discharge Disposition: HH vs Rehab vs SNF     Action: Pt A&Ox4 on RA. 6 clicks scores of 17/16. [facility] accepted once medically cleared. Updated PT eval needed when medically appropriate.      Barriers to Discharge: medical clearance, PT eval     Plan:  f/u with medical team to discuss DC needs and barriers

## 2022-04-06 NOTE — CARE PLAN
The patient is Stable - Low risk of patient condition declining or worsening    Shift Goals  Clinical Goals: monitor Hgb level  Patient Goals: sleep comfortably tonight  Family Goals:     Progress made toward(s) clinical / shift goals: Latest Hgb 8.9 mg/dL. Pt not complaining from any pain. Pt was seen sleeping in between rounds.     Patient is not progressing towards the following goals: N/A      Problem: Knowledge Deficit - Standard  Goal: Patient and family/care givers will demonstrate understanding of plan of care, disease process/condition, diagnostic tests and medications  Outcome: Progressing     Problem: Bowel Elimination  Goal: Establish and maintain regular bowel function  Outcome: Progressing     Problem: Gastrointestinal Irritability  Goal: Nausea and vomiting will be absent or improve  Outcome: Progressing     Problem: Fluid Volume  Goal: Fluid volume balance will be maintained  Outcome: Progressing     Problem: Urinary Elimination  Goal: Establish and maintain regular urinary output  Outcome: Progressing     Problem: Mobility  Goal: Patient's capacity to carry out activities will improve  Outcome: Progressing     Problem: Fall Risk  Goal: Patient will remain free from falls  Outcome: Progressing     Problem: Self Care  Goal: Patient will have the ability to perform ADLs independently or with assistance (bathe, groom, dress, toilet and feed)  Outcome: Progressing     Problem: Skin Integrity  Goal: Skin integrity is maintained or improved  Outcome: Progressing     Problem: Surgical Drain Management  Goal: Proper management/care of surgical drains will be maintained  Outcome: Progressing

## 2022-04-06 NOTE — CARE PLAN
The patient is Stable - Low risk of patient condition declining or worsening    Shift Goals  Clinical Goals: Maintain hemoglobin levels above 8  Patient Goals: Ambulate comfortablly  Family Goals: no family at bedside at this time    Progress made toward(s) clinical / shift goals:  Progressed well. Hemoglobin above 8    Patient is not progressing towards the following goals:      Problem: Knowledge Deficit - Standard  Goal: Patient and family/care givers will demonstrate understanding of plan of care, disease process/condition, diagnostic tests and medications  4/6/2022 1606 by Alejandrina Renner, R.N.  Outcome: Not Progressing  4/6/2022 1604 by Alejandrina Renner, R.N.  Outcome: Progressing     Problem: Bowel Elimination  Goal: Establish and maintain regular bowel function  4/6/2022 1606 by Alejandrina Renner, R.N.  Outcome: Not Progressing  4/6/2022 1604 by Alejandrina Renner, R.N.  Outcome: Progressing     Problem: Gastrointestinal Irritability  Goal: Nausea and vomiting will be absent or improve  4/6/2022 1606 by Alejandrina Renner, R.N.  Outcome: Not Progressing  4/6/2022 1604 by Alejandrina Renner, R.N.  Outcome: Progressing     Problem: Fluid Volume  Goal: Fluid volume balance will be maintained  4/6/2022 1606 by Alejandrina Renner, R.N.  Outcome: Not Progressing  4/6/2022 1604 by Alejandrina Renner, R.N.  Outcome: Progressing     Problem: Urinary Elimination  Goal: Establish and maintain regular urinary output  4/6/2022 1606 by Alejandrina Renner, R.N.  Outcome: Not Progressing  4/6/2022 1604 by Alejandrina Renner, R.N.  Outcome: Progressing     Problem: Mobility  Goal: Patient's capacity to carry out activities will improve  4/6/2022 1606 by Alejandrina Renner, R.N.  Outcome: Not Progressing  4/6/2022 1604 by Alejandrina Renner, R.N.  Outcome: Progressing     Problem: Fall Risk  Goal: Patient will remain free from falls  4/6/2022 1606 by Alejandrina Renner, R.N.  Outcome: Not Progressing  4/6/2022 1604 by Alejandrina Renner, R.N.  Outcome: Progressing     Problem: Knowledge Deficit - COPD  Goal: Patient/significant  other demonstrates understanding of disease process, utilization of the Action Plan, medications and discharge instruction  4/6/2022 1606 by Alejandrina Renner, R.N.  Outcome: Not Progressing  4/6/2022 1604 by Alejandrina Renner, R.N.  Outcome: Progressing     Problem: Risk for Infection - COPD  Goal: Patient will remain free from signs and symptoms of infection  4/6/2022 1606 by Alejandrina Renner, R.N.  Outcome: Not Progressing  4/6/2022 1604 by Alejandrina Renner, R.N.  Outcome: Progressing     Problem: Nutrition - Advanced  Goal: Patient will display progressive weight gain toward goal have adequate food and fluid intake  4/6/2022 1606 by Alejandrina Renner, R.N.  Outcome: Not Progressing  4/6/2022 1604 by Alejandrina Renner, R.N.  Outcome: Progressing     Problem: Ineffective Airway Clearance  Goal: Patient will maintain patent airway with clear/clearing breath sounds  4/6/2022 1606 by Alejandrina Renner, R.N.  Outcome: Not Progressing  4/6/2022 1604 by Alejandrina Renner, R.N.  Outcome: Progressing     Problem: Impaired Gas Exchange  Goal: Patient will demonstrate improved ventilation and adequate oxygenation and participate in treatment regimen within the level of ability/situation.  4/6/2022 1606 by Alejandrina Renner, R.N.  Outcome: Not Progressing  4/6/2022 1604 by Alejandrina Renner, R.N.  Outcome: Progressing     Problem: Risk for Aspiration  Goal: Patient's risk for aspiration will be absent or decrease  4/6/2022 1606 by Alejandrina Renner, R.N.  Outcome: Not Progressing  4/6/2022 1604 by Alejandrina Renner, R.N.  Outcome: Progressing     Problem: Self Care  Goal: Patient will have the ability to perform ADLs independently or with assistance (bathe, groom, dress, toilet and feed)  4/6/2022 1606 by Alejandrina Renner, R.N.  Outcome: Not Progressing  4/6/2022 1604 by Alejandrina Renner, R.N.  Outcome: Progressing     Problem: Skin Integrity  Goal: Skin integrity is maintained or improved  4/6/2022 1606 by Alejandrina Renner, R.N.  Outcome: Not Progressing  4/6/2022 1604 by Meho Pasic, R.N.  Outcome: Progressing      Problem: Surgical Drain Management  Goal: Proper management/care of surgical drains will be maintained  4/6/2022 1606 by Alejandrina Renner R.N.  Outcome: Not Progressing  4/6/2022 1604 by Alejandrina Renner R.N.  Outcome: Progressing

## 2022-04-06 NOTE — CARE PLAN
Received report from night shift RN. Assumed care of patient at 0715. Pt alert and oriented. Assessment completed. VSS. Denies pain, numbness, or tingling at this time. Denies nausea/vomiting at this time. Pt updated on plan of care for the day. All questions answered. No other needs at this time. Call light and belongings within reach, bed locked and in lowest position. Pt educated to call for assistance.

## 2022-04-06 NOTE — PROGRESS NOTES
Received report from night shift RN. Assumed care of patient at 0715. Pt awake and alert. Assessment completed. VSS. Denies pain, numbness, or tingling at this time. Denies nausea/vomiting at this time. Pt updated on plan of care for the day. All questions answered. No other needs at this time. Call light and belongings within reach, bed locked and in lowest position. Pt educated to call for assistance.

## 2022-04-06 NOTE — THERAPY
Physical Therapy   Daily Treatment     Patient Name: Licha Pope  Age:  86 y.o., Sex:  female  Medical Record #: [id number]  Today's Date: 4/6/2022     Precautions  Precautions: Fall Risk  Comments: Hemoglobin 8.2    Assessment    Pt is making slow but steady progress with transfers and ambulation    Plan       04/06/22 1500   Total Time Spent   Total Time Spent (Mins) 30   Charge Group   PT Gait Training 1   PT Therapeutic Exercise 1   Pain 0 - 10 Group   Therapist Pain Assessment 3   Balance   Sitting Balance (Static) Fair +   Sitting Balance (Dynamic) Fair +   Standing Balance (Static) Fair +   Standing Balance (Dynamic) Fair +   Weight Shift Sitting Fair   Weight Shift Standing Fair   Gait Analysis   Gait Level Of Assist Supervised   Assistive Device Front Wheel Walker   Distance (Feet) 75   # of Times Distance was Traveled 2   Deviation Bradykinetic   Weight Bearing Status full   Bed Mobility    Supine to Sit Standby Assist   Sit to Supine Standby Assist   Scooting Standby Assist   Functional Mobility   Sit to Stand Standby Assist   Bed, Chair, Wheelchair Transfer Standby Assist   Toilet Transfers Standby Assist   Transfer Method Stand Step   Activity Tolerance   Sitting Edge of Bed 5   Standing 10   Short Term Goals    Short Term Goal # 1 Pt will be able to perform bed mobility and sup <> Jeff without use of bed rail and with a flat bed in 6 visits.   Goal Outcome # 1 Progressing as expected   Short Term Goal # 2 Pt will be able to perform sit <> stand and transfer Jeff in 6 visits.   Goal Outcome # 2 Progressing as expected   Short Term Goal # 3 Pt will be able to ambualte 200ft with FWW Jeff in 6 visits.   Goal Outcome # 3 Progressing as expected   Anticipated Discharge Equipment and Recommendations   Discharge Recommendations Recommend post-acute placement for additional physical therapy services prior to discharge home   Interdisciplinary Plan of Care Collaboration   IDT Collaboration with  Nursing    Session Information   Date / Session Number  4/6-4 4/4 4/4   Continue current treatment plan.    DC Equipment Recommendations: Unable to determine at this time  Discharge Recommendations: (P) Recommend post-acute placement for additional physical therapy services prior to discharge home

## 2022-04-06 NOTE — PROGRESS NOTES
Surgery  POD7 ileocecectomy  Again feeling better today. Bloody stool resolved, liquid brown stool this am. Tolerating liquids, +flatus. Not walking in halls but out of bed to bathroom multiple times, sitting in chair most of day..     Afebrile, HR 90s  Drain serous, 50cc  H/H 8/26  Abdomen soft, tenderness improved from yesterday    Plan:  Appreciate medical care  DC MAYUR drain  TPN  DC prevena  Encourage ambulation/sitting in chair  Continue to hold AC, monitor H/H

## 2022-04-06 NOTE — THERAPY
"Occupational Therapy  Daily Treatment     Patient Name: Licha Pope  Age:  86 y.o., Sex:  female  Medical Record #: [id number]  Today's Date: 4/6/2022     Precautions  Precautions: Fall Risk  Comments: Hemoglobin 8.2    Assessment    Pt feels weaker today than in previous sessions.  She states she's been up to the bathroom a lot and feels drained.  Hgb is down as well.  She is tolerating up to bathroom with FWW, and seated grooming in chair.  She preferred to sit instead of stand for grooming due to fatigue.  Pt is highly motivated and normally very independent.  She would benefit from further inpt post acute therapy to maximize overall fitness in preparation for possible aggressive cancer treatment.  OT will follow while in house.      Plan    Continue current treatment plan.    DC Equipment Recommendations: Unable to determine at this time  Discharge Recommendations: Recommend post-acute placement for additional occupational therapy services prior to discharge home (vs HH.  Pt is below her baseline level of function but highly motivated.  She would benefit from inpt therapy to be in best condition possible if she chooses to undergo agressive cancer treatment.)    Subjective    \"I just feel tired and weak today.\"     Objective       04/06/22 1159   Cognition    Cognition / Consciousness WDL   Speech/ Communication Hard of Hearing   Level of Consciousness Alert   Comments pleasant, cooperative   Active ROM Upper Body   Active ROM Upper Body  WDL   Strength Upper Body   Upper Body Strength  WDL   Other Treatments   Other Treatments Provided Worked on activity tolerance with ADl's, reviewed bed mobility techniques to minimize stress on surgical incision, pacing and energy conservation strategies.  Pt demo's appropriate use of FWW to go from bed to toilet and back to bedside chair.  Encouraged to do ADl's and OOB activities as tolerated   Balance   Sitting Balance (Static) Fair +   Sitting Balance (Dynamic) Fair + "   Standing Balance (Static) Fair +   Standing Balance (Dynamic) Fair   Weight Shift Sitting Fair   Weight Shift Standing Fair   Skilled Intervention Verbal Cuing   Comments with FWW.  Pt reports she feels weak today, Hgb is trending down   Bed Mobility    Supine to Sit Contact Guard Assist   Scooting Supervised   Activities of Daily Living   Eating Supervision   Grooming Supervision;Seated  (oral care and face seated with setup)   Upper Body Dressing Minimal Assist   Toileting Contact Guard Assist   Functional Mobility   Sit to Stand Contact Guard Assist   Bed, Chair, Wheelchair Transfer Contact Guard Assist   Toilet Transfers Contact Guard Assist   Transfer Method Stand Step   Mobility with FWW   Distance (Feet) 25   # of Times Distance was Traveled 2   Skilled Intervention Verbal Cuing   Visual Perception   Visual Perception  WDL   Activity Tolerance   Sitting in Chair > 20  min up at end of session   Sitting Edge of Bed 3 min   Standing 1 min, 2 min   Comments limited by fatigue   Patient / Family Goals   Patient / Family Goal #1 home   Short Term Goals   Short Term Goal # 1 Pt will dress supervised in 3 visits   Goal Outcome # 1 Progressing as expected   Short Term Goal # 2 Pt will stand 10 min at sink supervised to groom in 3 visits   Goal Outcome # 2 Progressing slower than expected   Short Term Goal # 3 Pt will toilet supervised in 3 visits   Goal Outcome # 3 Progressing as expected

## 2022-04-07 LAB
ALBUMIN SERPL BCP-MCNC: 2.7 G/DL (ref 3.2–4.9)
ALBUMIN/GLOB SERPL: 0.9 G/DL
ALP SERPL-CCNC: 38 U/L (ref 30–99)
ALT SERPL-CCNC: 8 U/L (ref 2–50)
ANION GAP SERPL CALC-SCNC: 6 MMOL/L (ref 7–16)
AST SERPL-CCNC: 16 U/L (ref 12–45)
BACTERIA BLD CULT: NORMAL
BACTERIA BLD CULT: NORMAL
BILIRUB SERPL-MCNC: 0.4 MG/DL (ref 0.1–1.5)
BUN SERPL-MCNC: 14 MG/DL (ref 8–22)
CALCIUM SERPL-MCNC: 8.5 MG/DL (ref 8.4–10.2)
CHLORIDE SERPL-SCNC: 98 MMOL/L (ref 96–112)
CO2 SERPL-SCNC: 30 MMOL/L (ref 20–33)
CREAT SERPL-MCNC: 0.68 MG/DL (ref 0.5–1.4)
GFR SERPLBLD CREATININE-BSD FMLA CKD-EPI: 84 ML/MIN/1.73 M 2
GLOBULIN SER CALC-MCNC: 3 G/DL (ref 1.9–3.5)
GLUCOSE BLD STRIP.AUTO-MCNC: 141 MG/DL (ref 65–99)
GLUCOSE BLD STRIP.AUTO-MCNC: 162 MG/DL (ref 65–99)
GLUCOSE BLD STRIP.AUTO-MCNC: 168 MG/DL (ref 65–99)
GLUCOSE BLD STRIP.AUTO-MCNC: 183 MG/DL (ref 65–99)
GLUCOSE SERPL-MCNC: 131 MG/DL (ref 65–99)
HCT VFR BLD AUTO: 29 % (ref 37–47)
HGB BLD-MCNC: 9.2 G/DL (ref 12–16)
MAGNESIUM SERPL-MCNC: 2.3 MG/DL (ref 1.5–2.5)
PHOSPHATE SERPL-MCNC: 2.9 MG/DL (ref 2.5–4.5)
POTASSIUM SERPL-SCNC: 4.1 MMOL/L (ref 3.6–5.5)
PROT SERPL-MCNC: 5.7 G/DL (ref 6–8.2)
SIGNIFICANT IND 70042: NORMAL
SIGNIFICANT IND 70042: NORMAL
SITE SITE: NORMAL
SITE SITE: NORMAL
SODIUM SERPL-SCNC: 134 MMOL/L (ref 135–145)
SOURCE SOURCE: NORMAL
SOURCE SOURCE: NORMAL

## 2022-04-07 PROCEDURE — 99233 SBSQ HOSP IP/OBS HIGH 50: CPT | Performed by: INTERNAL MEDICINE

## 2022-04-07 PROCEDURE — 85014 HEMATOCRIT: CPT

## 2022-04-07 PROCEDURE — 700101 HCHG RX REV CODE 250: Performed by: INTERNAL MEDICINE

## 2022-04-07 PROCEDURE — 770006 HCHG ROOM/CARE - MED/SURG/GYN SEMI*

## 2022-04-07 PROCEDURE — 700111 HCHG RX REV CODE 636 W/ 250 OVERRIDE (IP): Performed by: INTERNAL MEDICINE

## 2022-04-07 PROCEDURE — 700105 HCHG RX REV CODE 258: Performed by: INTERNAL MEDICINE

## 2022-04-07 PROCEDURE — 83735 ASSAY OF MAGNESIUM: CPT

## 2022-04-07 PROCEDURE — 84100 ASSAY OF PHOSPHORUS: CPT

## 2022-04-07 PROCEDURE — C9113 INJ PANTOPRAZOLE SODIUM, VIA: HCPCS | Performed by: INTERNAL MEDICINE

## 2022-04-07 PROCEDURE — 80053 COMPREHEN METABOLIC PANEL: CPT

## 2022-04-07 PROCEDURE — 82962 GLUCOSE BLOOD TEST: CPT | Mod: 91

## 2022-04-07 PROCEDURE — 85018 HEMOGLOBIN: CPT

## 2022-04-07 RX ADMIN — PANTOPRAZOLE SODIUM 40 MG: 40 INJECTION, POWDER, FOR SOLUTION INTRAVENOUS at 17:37

## 2022-04-07 RX ADMIN — POTASSIUM CHLORIDE: 2 INJECTION, SOLUTION, CONCENTRATE INTRAVENOUS at 20:37

## 2022-04-07 RX ADMIN — PANTOPRAZOLE SODIUM 40 MG: 40 INJECTION, POWDER, FOR SOLUTION INTRAVENOUS at 06:16

## 2022-04-07 RX ADMIN — INSULIN HUMAN 2 UNITS: 100 INJECTION, SOLUTION PARENTERAL at 23:46

## 2022-04-07 RX ADMIN — INSULIN HUMAN 2 UNITS: 100 INJECTION, SOLUTION PARENTERAL at 17:36

## 2022-04-07 RX ADMIN — INSULIN HUMAN 2 UNITS: 100 INJECTION, SOLUTION PARENTERAL at 06:38

## 2022-04-07 ASSESSMENT — LIFESTYLE VARIABLES: SUBSTANCE_ABUSE: 0

## 2022-04-07 ASSESSMENT — ENCOUNTER SYMPTOMS
ABDOMINAL PAIN: 1
FEVER: 0
CHILLS: 0
VOMITING: 0
COUGH: 0
BACK PAIN: 0
PALPITATIONS: 0
DOUBLE VISION: 0
BLURRED VISION: 0
FALLS: 0
NERVOUS/ANXIOUS: 0
HEADACHES: 0
HEARTBURN: 0
DIZZINESS: 0
SHORTNESS OF BREATH: 0

## 2022-04-07 ASSESSMENT — PAIN DESCRIPTION - PAIN TYPE
TYPE: SURGICAL PAIN
TYPE: SURGICAL PAIN

## 2022-04-07 ASSESSMENT — FIBROSIS 4 INDEX: FIB4 SCORE: 1.84

## 2022-04-07 NOTE — PROGRESS NOTES
Received report from day shift RN. Greeted patient and tended to immediate needs and informed patient I would be back within the next hour. Reviewed pt's chart to include medications due, lab values for the day and upcoming due times, and orders. Pt denies pain; new TPN hung; labs were drawn; pt calls appropriately; bed alarm on; Bed in low locked position; with x2 side rails up, non-slip socks in place before ambulating; call bell and personal items within reach of patient. Reminded patient to use call bell before getting out of bed.

## 2022-04-07 NOTE — CARE PLAN
The patient is Watcher - Medium risk of patient condition declining or worsening    Shift Goals  Clinical Goals: motitor H and H, staying above 8  Patient Goals: feel better, rest comfortably  Family Goals: no family at bedside    Progress made toward(s) clinical / shift goals:  Monitored stool for blood. No blood in stools. Pt participatory in care and feels comfortable.     Patient is not progressing towards the following goals:

## 2022-04-07 NOTE — DISCHARGE PLANNING
Anticipated Discharge Disposition: Rehab vs SNF      Action:     1442: Called [facility] via [employer]. Left VM.    1545: Spoke to pt at bedside, pt agreeable to SNF. Per pt, she will look into SNF choices. Choice form left at pt's bedside. RNCM to follow up with pt tomorrow morning.    Barriers to Discharge: medical clearance, SNF choice     Plan: f/u with pt regarding SNF choice,  f/u with [facility] regarding rehab

## 2022-04-07 NOTE — PROGRESS NOTES
85 y/o female POD8 ileocecectomy  Doing well. Tolerating clears, +flatus and BMx2. Still on TPN. 900cc PO intake yesterday  Abdomen soft, NT, incision c/d/i  Afebrile, HR 60s, cr       Plan:  Appreciate medical care  dvt prophylaxis to resume tomorrow  Advance diet as tolerated, ensure TID  Plan to wean TPN tomorrow if does well with diet today

## 2022-04-07 NOTE — DISCHARGE PLANNING
Licha is no longer requiring 2 of 3 disciplines.  TCC will no longer follow.  Please reach out to myself with any interval changes/questions.

## 2022-04-07 NOTE — PROGRESS NOTES
Pharmacy TPN Day # 8       2022    Dosing Weight   55 kg TPN currently providing 100% of goal                                                  TPN goal: 1532 kcal/day including 1.3 gm/kg/day Protein                                                  TPN indication: SBO, NPO x 7 days                                                              Pertinent PMH: Patient with recent terminal ileitis and SBO last month.  Patient treated with conservative therapy and ABX now with repeat symtoms and SBO.  Patient has been NPO x 7 days.  Patient s/p ex lap 3/30 with findings of ileal mass and an ileocecostomy was performed.  Surgical pathology demonstrated adenocarcinoma.   CT revealing a mediastinal collection of fluid and gas adjacent to esophagus. NGT discontinued .   Temp (24hrs), Av °C (98.6 °F), Min:36.7 °C (98 °F), Max:37.1 °C (98.8 °F)  .  Recent Labs     22  1100 22  0215   SODIUM 138 134*   POTASSIUM 4.1 4.1   CHLORIDE 101 98   CO2 30 30   BUN 14 14   CREATININE 0.76 0.68   GLUCOSE 166* 131*   CALCIUM 8.3* 8.5   ASTSGOT 14 16   ALTSGPT 6 8   ALBUMIN 2.6* 2.7*   TBILIRUBIN 0.4 0.4   PHOSPHORUS  --  2.9   MAGNESIUM  --  2.3     Accu-Checks  No results for input(s): POCGLUCOSE in the last 72 hours.    Vitals:    22 1621 22 2232 22 0500 22 1006   BP: 145/69 133/71 119/59 117/54   Weight:       Height:           Intake/Output Summary (Last 24 hours) at 2022 1347  Last data filed at 2022 1700  Gross per 24 hour   Intake 240 ml   Output 40 ml   Net 200 ml       Orders Placed This Encounter   Procedures   • Diet Order Diet: Full Liquid     Standing Status:   Standing     Number of Occurrences:   1     Order Specific Question:   Diet:     Answer:   Full Liquid [11]         TPN for past 72 hours (Show up to 3 orders; newest on the left. Changes between the two most recent orders are indicated.)     Start date and time   2022       TPN Adult Central Line [[id number]] TPN Adult Central Line [[id number]] TPN Adult Central Line [[id number]]    Order Status  Active Last Dose in Progress Completed    Last Admin   New Bag at 04/06/2022 2035 by Dolores Vázquez R.N. New Bag at 04/05/2022 2033 by Kalpana Jenkins R.N.       Base    Clinisol 15%  72 g 72 g 72 g    dextrose 70%  254 g 254 g 254 g    fat emulsion (soy) 20%  38 g 38 g 38 g       Additives    potassium phosphate  21 mmol 21 mmol 21 mmol    potassium chloride  40 mEq 40 mEq 40 mEq    sodium acetate  300 mEq 300 mEq 300 mEq    magnesium sulfate  12 mEq 12 mEq 12 mEq    calcium GLUConate  9.4 mEq 9.4 mEq 9.4 mEq    M.T.E.-4  1 mL 1 mL 1 mL    M.V.I. Adult  10 mL 10 mL 10 mL       QS Base    sterile water  747.92 mL 747.92 mL 747.92 mL       Energy Contribution    Proteins  -- -- --    Dextrose  863.6 kcal 863.6 kcal 863.6 kcal    Lipids  380 kcal 380 kcal 380 kcal    Total  1,243.6 kcal 1,243.6 kcal 1,243.6 kcal       Electrolyte Ion Calculated Amount    Sodium  300 mEq 300 mEq 300 mEq    Potassium  70.8 mEq 70.8 mEq 70.8 mEq    Calcium  9.4 mEq 9.4 mEq 9.4 mEq    Magnesium  12 mEq 12 mEq 12 mEq    Aluminum  -- -- --    Phosphate  21 mmol 21 mmol 21 mmol    Chloride  40 mEq 40 mEq 40 mEq    Acetate  360.96 mEq 360.96 mEq 360.96 mEq       Other    Total Protein  72 g 72 g 72 g    Total Protein/kg  1.26 g/kg 1.26 g/kg 1.26 g/kg    Total Amino Acid  -- -- --    Total Amino Acid/kg  -- -- --    Glucose Infusion Rate  3.08 mg/kg/min 3.08 mg/kg/min 3.08 mg/kg/min    Osmolarity (Estimated)  -- -- --    Volume  1,992 mL 1,992 mL 1,992 mL    Rate  83 mL/hr 83 mL/hr 83 mL/hr    Dosing Weight  57.2 kg 57.2 kg 57.2 kg    Infusion [facility]            This formula provides:  % kcal as lipids = 25  Grams protein/kg = 1.3  Non-protein calories = 1244  Kcals/kg = 27.9  Total daily calories = 1532    Comments:  TPN formulation repeated from yesterday @ 100% nutritional goal. Labs ordered on  Monday and Thursday per TPN protocol, no labs for tomorrow. Per surgery notes, TPN may be weaned off tomorrow if diet is tolerated. Pharmacy to continue to monitor and adjust per TPN protocol.        Pavan Bartlett PharmD.

## 2022-04-07 NOTE — CARE PLAN
The patient is Watcher - Medium risk of patient condition declining or worsening    Shift Goals  Clinical Goals: monitor H+H; TPN; AM labs; rest; saftey  Patient Goals: rest comfortably  Family Goals: no family at bedside at this time    Progress made toward(s) clinical / shift goals:    Problem: Knowledge Deficit - Standard  Goal: Patient and family/care givers will demonstrate understanding of plan of care, disease process/condition, diagnostic tests and medications  Outcome: Progressing  Note: Updated patient on plan of care. Encouraged to ask questions and voice concerns. Answered all questions regarding care. Agrees with plan of care.      Problem: Urinary Elimination  Goal: Establish and maintain regular urinary output  Outcome: Progressing  Note: Maintaining adequate urine output     Problem: Fall Risk  Goal: Patient will remain free from falls  Outcome: Progressing  Note: Pt reminded to use call bell before getting out of bed; pt stated understanding.  socks in place before exiting bed; bed in lowest locked position. Personal belongings and call bell within reach.         Patient is not progressing towards the following goals:

## 2022-04-07 NOTE — PROGRESS NOTES
"Hospital Medicine Daily Progress Note    Date of Service  4/7/2022    Chief Complaint  Licha Pope is a 86 y.o. female admitted 3/25/2022 with abdominal pain.    Hospital Course  As per chart review  \"86 y.o. female with a history of hypertension, COPD, GERD, DVT dx 5/19/21 after long car trip (still on anticoagulation) who presented 3/25/2022 with abdominal pain.     Was recently admitted for terminal ileitis, SBO and pancreatitis on 2/17/2022, she was treated conservatively with IV fluids and pain control.  Right upper quadrant showed dependent gallbladder sludge versus small stones.  MRCP showed cholelithiasis and common bile duct 11.4 mm unchanged since 2015.  She was discharged with antibiotics and recommended to follow-up with GI, surgery and PCP after discharge.  She saw surgeon, she recommended cholecystectomy but wanted her to see GI doctor. GI doctor recommended cholecystectomy.  Has appt with surgery next Wed.       Patient never really felt like her abdominal pain improved however was stable until 2 days ago.  Abdominal pain became very severe however intermittent and would come in waves.  Patient became very fatigued and was sleeping most the day.  She had chills and was very weak.  Abdominal pain was diffuse, intermittent, described as sharp pain, 10/10 pain, went to PCP and doctor sent her to ER.  Did have some n/v, had some dark red or purple, size of quarter.  Passing gas.  Yesterday had BM.  Did have some dark stools, unsure if black was \"half asleep\".  Last night had lower back pain, now improved.      In ER she was afebrile, mildly tachycardic (heart rate 111), normal blood pressure and mild hypoxia requiring 1 L nasal cannula.  Labs remarkable for WBC 11.6 with a left shift, creatinine 1.06, calcium 10.6, lipase 104, troponin XX, lactic acid normal.  CT abdomen pelvis with contrast showed similar changes of bowel wall thickening involving the terminal ileum and base of the cecum adjacent to " "postoperative change from prior appendectomy which could be infectious or inflammatory.  There is mild proximal obstruction involving the ileum and distal jejunum in the lower abdomen left mid abdomen with small bowel loops dilated up to 4 cm.  There is no gross free air or pneumatosis.  Moderate size hiatal hernia again seen.  Minimal prominent biliary tree unchanged and physiologic, unchanged a back to 2015.  Simple hepatic cyst unchanged.  She was given IV fluids, Zosyn and general surgery was consulted.  Dr. Mcneil to see patient.  Recommended NG, IV fluids.\"    Interval Problem Update  3/26: Patient seen at bedside this morning.  Patient is hard of hearing.  Patient with NG tube in place.  Still complaining of abdominal pain some tenderness on examination.  General surgery has been consulted, we appreciate further recommendations.  Continue antibiotics for now.    3/27: Patient seen at bedside this morning.  It seems that the patient's NG tube came off, however the patient it seems that now is having regular bowel movements.  She still complaining of abdominal pain.  We have consulted GI, we appreciate further recommendations.  We also appreciate further recommendations by general surgery.  We will continue n.p.o. until surgery reevaluate the patient.  We will continue with antibiotics for now.  --The patient told me she used to see Dr Tarun Lewis at [facility], so I have consulted Dr Clay, we appreciate further recommendations.    3/28: Patient seen at bedside this morning.  The patient was vomiting this morning, she still complaining of abdominal pain.  We have placed the NG tube back on, we have ordered KUB.  We appreciate further recommendations by general surgery.  Also it seems that the patient had been seeing Dr. Ash earlier this month and she would like to continue seeing that group.  We have consulted Dr. Belle from GI, we appreciate further recommendations.  We will keep the patient n.p.o. for now " until surgical evaluation.  We will continue with antibiotics.    3/29: Patient seen at bedside this morning.  No family was present at bedside directly on my evaluation.  I tried calling the patient's son to give him an update however there was no answer.  Patient still n.p.o., with NG tube to suction.  General surgery and GI following the patient, we appreciate further recommendations.  The patient will undergo small bowel follow-through study today.  I asked the patient and it seems that she has not had a proper meal since last Thursday, if by tomorrow we are unable to feed the patient will consider TPN while further management is being done.    3/30: Patient seen at bedside this morning.  No family present at bedside, however I was able to talk to the patient's son over the phone and gave him an update.  Patient still n.p.o. with NG tube, I spoke to surgery and they will take her to the operating room later today.  We were going to start TPN today, however surgery would like to hold for now.  Patient with mild hyponatremia, this is most likely due to volume depletion as such she has not really eaten much for the last couple days.  We have increased the rate of the D5 NS, monitor and repeat sodium values.  We will order PICC line in preparation of possible TPN.  We appreciate further recommendations by general surgery.    3/31: Patient seen at bedside this morning.  No family members present at bedside, however again I was able to talk to the patient's son over the phone to update him on the patient's clinical status.  The patient underwent exploratory laparotomy with ileocecectomy yesterday by general surgery.  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  We will start the patient on TPN today.  The patient was seen at bedside, laying in bed currently comfortably with NG tube in place.  Patient also with Prevena status post exploratory laparotomy.  Patient requiring oxygen, this  is most likely postsurgical, we have ordered chest x-ray.  As per nursing no other overnight events reported.    4/1: Patient seen at bedside this morning.  No family present at bedside.  We are still pending pathology results.  Patient still not passing gas or having bowel movements.  We started TPN yesterday.  We will continue to monitor closely.    4/2: Patient seen at bedside this morning.  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  I have spoken with the patient and the patient's son regarding the diagnosis.  We have consulted oncology, we appreciate further recommendations.  We have ordered chest CT with contrast to evaluate for metastases.  Patient still not passing gas or having bowel movements.  We will continue with TPN.  We appreciate further recommendations by surgery.    4/3: Patient seen at bedside this morning.  At the time of my evaluation, the patient had mentioned to me that she has not had a bowel movement or passing gas oncology has been consulted, we appreciate further recommendations.  We did CT scan of the chest which reported mediastinal collection of fluid and gas adjacent, concerning for communication with esophagus.  We consulted GI who did not recommend emergent endoscopy and recommend Gastrografin esophagram once patient has oral intake and bowel movements.  We appreciate further recommendations.  Overall prognosis remains guarded.  As per nursing patient having episodes of fever due to the patient's complex history with recent GI surgery as well as abnormal CT scan we will continue the patient on Zosyn for now, we have ordered blood cultures.    4/4: Patient seen at bedside this morning.  She was laying in bed comfortable.  As per nursing she had a bowel movement yesterday.  We appreciate further recommendations by general surgery.  We will discuss the case with general surgery to see if we can move forward with the esophagogram that GI recommended.  Does not appear  that the patient had an episode of fever yesterday.  We will continue with antibiotics for now pending cultures.  We have put referral for inpatient rehab as well as order home health anticipating the patient could potentially be discharged sometime this week.  We appreciate recommendations by case management.  Oncology evaluated the patient and recommended outpatient follow-up at his clinic in 2 to 3 weeks.    4/5: Patient seen at bedside this morning.  He was laying in bed comfortably.  No family was present at bedside.  As per nursing patient had a bloody bowel movement this morning, anticoagulation has not been held.  I discussed the case with surgery who recommended holding anticoagulation for 48 hours.  We will monitor hemoglobin and transfuse if needed.  Patient slowly having p.o. intake, will continue TPN for now until surgery recommends off of it.  Overall prognosis remains guarded.  The patient has not had another episode of fever, blood cultures have remained negative.  We will discontinue antibiotics now.  We will continue to monitor closely.    4/6: Patient seen at bedside this morning.  Continues with patient still having some bloody bowel movements.  Hemoglobin slowly trending down, however hemodynamically stable.  We will continue to trend hemoglobin and transfuse if needed.  The patient states that she is only eating between 10 to 20% of her meals.  We will continue with TPN for now.  We appreciate further recommendations by surgery.  We will continue to monitor closely.  No family members present at bedside at the time of evaluation.  I tried to talk to the patient's son over the phone without success.    4/7: Patient seen at bedside this morning.  No family present at bedside.  Again I tried to talk to the patient's son over the phone, I have left a voicemail, however I have been unable to reach him.  Hemoglobin seems to be stable, the patient denies any more bloody stools.  The patient states  that she is eating between 20 to 25% of her meals, I spoke to surgery and recommended adding supplement.  And if she is tolerating with supplement, we will most likely discontinue TPN tomorrow.  We appreciate further recommendations by surgery.        I have personally seen and examined the patient at bedside. I discussed the plan of care with patient, bedside RN and charge RN.    Consultants/Specialty  general surgery and oncology    Code Status  DNAR/DNI    Disposition  Patient is not medically cleared for discharge.   Anticipate discharge to pending clinical evolution.    Review of Systems  Review of Systems   Constitutional: Positive for malaise/fatigue. Negative for chills and fever.   HENT: Positive for hearing loss. Negative for nosebleeds.    Eyes: Negative for blurred vision and double vision.   Respiratory: Negative for cough and shortness of breath.    Cardiovascular: Negative for chest pain and palpitations.   Gastrointestinal: Positive for abdominal pain. Negative for heartburn and vomiting.   Genitourinary: Negative for dysuria and urgency.   Musculoskeletal: Negative for back pain and falls.   Skin: Negative for itching and rash.   Neurological: Negative for dizziness and headaches.   Psychiatric/Behavioral: Negative for substance abuse. The patient is not nervous/anxious.    All other systems reviewed and are negative.       Physical Exam  Temp:  [36.7 °C (98 °F)-37.1 °C (98.8 °F)] 37.1 °C (98.7 °F)  Pulse:  [] 89  Resp:  [17-18] 17  BP: (117-145)/(54-71) 117/54  SpO2:  [91 %-93 %] 91 %    Physical Exam  Vitals and nursing note reviewed.   Constitutional:       General: She is not in acute distress.     Appearance: She is ill-appearing.   HENT:      Head: Normocephalic and atraumatic.      Right Ear: External ear normal.      Left Ear: External ear normal.      Mouth/Throat:      Pharynx: No oropharyngeal exudate or posterior oropharyngeal erythema.   Eyes:      General:         Right eye: No  discharge.         Left eye: No discharge.   Cardiovascular:      Rate and Rhythm: Normal rate and regular rhythm.      Pulses: Normal pulses.      Heart sounds: No murmur heard.    No gallop.   Pulmonary:      Effort: Pulmonary effort is normal. No respiratory distress.      Breath sounds: Normal breath sounds. No wheezing or rhonchi.   Abdominal:      General: There is distension.      Tenderness: There is abdominal tenderness.      Comments: Prevena in place s/p surgery   Musculoskeletal:         General: No swelling or tenderness. Normal range of motion.      Cervical back: Normal range of motion and neck supple. No tenderness.   Skin:     General: Skin is warm and dry.   Neurological:      General: No focal deficit present.      Mental Status: She is alert and oriented to person, place, and time. Mental status is at baseline.      Motor: No weakness.   Psychiatric:         Mood and Affect: Mood normal.         Behavior: Behavior normal.         Thought Content: Thought content normal.         Fluids    Intake/Output Summary (Last 24 hours) at 4/7/2022 1433  Last data filed at 4/6/2022 1700  Gross per 24 hour   Intake 240 ml   Output 40 ml   Net 200 ml       Laboratory  Recent Labs     04/05/22  0435 04/05/22  1740 04/06/22  0212 04/06/22  1820 04/07/22  0215   WBC 10.4  --   --   --   --    RBC 2.87*  --   --   --   --    HEMOGLOBIN 9.0*   < > 8.2* 8.9* 9.2*   HEMATOCRIT 28.8*   < > 26.1* 28.4* 29.0*   .3*  --   --   --   --    MCH 31.4  --   --   --   --    MCHC 31.3*  --   --   --   --    RDW 45.1  --   --   --   --    PLATELETCT 264  --   --   --   --    MPV 11.5  --   --   --   --     < > = values in this interval not displayed.     Recent Labs     04/05/22  1100 04/07/22  0215   SODIUM 138 134*   POTASSIUM 4.1 4.1   CHLORIDE 101 98   CO2 30 30   GLUCOSE 166* 131*   BUN 14 14   CREATININE 0.76 0.68   CALCIUM 8.3* 8.5                   Imaging  DX-ESOPHAGUS BARIUM SWALLOW SINGLE CONTRAST   Final  Result      Contrast extends from the distal esophagus at the level of the gastroesophageal junction to the right of midline into a large collection likely representing either a lower esophageal or upper gastric diverticulum.      CT-CHEST (THORAX) WITH   Final Result      1.  Mediastinal collection of fluid and gas adjacent to an abnormal thickened . Underlying mass or inflammatory change is a consideration though previously this had a more benign appearance. Suggest further assessment with endoscopy.   2.  Small BILATERAL pleural effusions   3.  BILATERAL atelectasis      JOSEP ORTIZ was paged at 4/2/2022 10:19 AM.         DX-CHEST-PORTABLE (1 VIEW)   Final Result      Decreased lung volumes with small left subpulmonic effusion and likely basilar atelectasis      IR-PICC LINE PLACEMENT W/ GUIDANCE > AGE 5   Final Result                  Ultrasound-guided PICC placement performed by qualified nursing staff as    above.          DX-SMALL BOWEL SERIES   Final Result      Partial small bowel obstruction with contrast transit time to the colon exceeding 9 hours      5 cm paraesophageal hernia      NG tube terminates over the gastric fundus      DX-ABDOMEN FOR TUBE PLACEMENT   Final Result         Gastric drainage tube with tip projecting over the expected area of the stomach.      [id number] VIEW   Final Result      Long segmental small bowel dilatation concerning for distal small bowel obstruction      DX-ABDOMEN FOR TUBE PLACEMENT   Final Result      Enteric tube terminates over the stomach.      Small bowel loop dilatation concerning for small bowel obstruction      DX-ABDOMEN FOR TUBE PLACEMENT   Final Result         Gastric drainage tube with tip projecting over the expected area of the stomach.      DX-ABDOMEN FOR TUBE PLACEMENT   Final Result         Gastric drainage tube loops in the esophagus with tip projecting over the expected area of the upper esophagus.      CT-ABDOMEN-PELVIS WITH   Final  Result      1.  There are similar changes of bowel wall thickening involving the terminal ileum and base of the cecum adjacent to postoperative change from prior appendectomy which could be infectious or inflammatory.   2.  There is mild proximal obstruction involving the ileum and distal jejunum in the lower abdomen and left mid abdomen with small bowel loops dilated up to 4 cm.   3.  There is no gross free air or pneumatosis.   4.  Moderate size hiatal hernia again seen.   5.  Minimally prominent biliary tree unchanged and physiologic, unchanged dating back to 2015.   6.  Simple hepatic cysts are unchanged.         DX-CHEST-PORTABLE (1 VIEW)   Final Result      1.  There is no acute cardiopulmonary process.           Assessment/Plan  * SBO (small bowel obstruction) (HCC)- (present on admission)  Assessment & Plan  Patient with recent terminal ileitis and small bowel obstruction last month  Treated with conservative therapy and antibiotics however now with repeat symptoms and SBO  Surgery consulted by ERP: Dr. Mcneil was called, recommended NPO, NG, IVF, pain mgmt  CTX, flagyl    3/26: Pending surgical evaluation, we appreciate further recommendations.    3/27: Patient seems to have BMs now, we are awaiting further recommendations by general surgery. We have also consulted GI ( Dr Clay), we appreciate further recommendations.    3/28: Patient was vomiting again this morning with abdominal pain.  We have placed the NG tube back and have ordered KUB.  We appreciate further recommendations by general surgery and GI. (Dr Belle from GI will be evaluating the patient today).    3/29: Patient to have small bowel follow through study today, we appreciate further recommendations by GI and General Surgery    3/30: Patient still unable to tolerate PO, we will start TPN, we appreciate further recommendations by surgery and GI.  --UPDATE: I spoke to surgery, they would like to hold TPN for now, as she might undergo surgery  today, we will order PICC line.    3/31: Patient underwent exploratory laparotomy yesterday by general surgery, the patient also underwent ileocecectomy with finding of ileocecal mass, pending pathology.  We appreciate further recommendations by general surgery.  We will start TPN today.    -- Pathology positive for adenocarcinoma, oncology was consulted, and patient will require outpatient follow-up with oncology.    Fever  Assessment & Plan  As per nursing patient has been having some low-grade fever.  She had been on ceftriaxone and Flagyl reported  We have switched to Zosyn yesterday pending cultures.  Due to the patient's extensive surgery and CT scan findings in the chest, will continue with antibiotics for now    4/5: The patient has no longer episode of fever.  Cultures have remained negative to date.  We will stop antibiotics at this time.    Adenocarcinoma of ileum (HCC)  Assessment & Plan  Patient was found to have an ileocecal mass on exploratory laparotomy.  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  We have consulted oncology, we appreciate further conditions.  We have ordered CT scan of the chest with contrast to evaluate for metastases.    Hypophosphatemia  Assessment & Plan  Replace as needed  monitor    Intraabdominal mass  Assessment & Plan  Patient underwent exploratory laparotomy.  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    4/3: Pathology positive for adenocarcinoma, oncology consulted    4/5: Patient will require follow up with oncology as outpatient        Acute hypoxemic respiratory failure (HCC)  Assessment & Plan  Patient currently requiring oxygen plantation.  This could be postsurgical.  Incentive spirometer encouraged.    Hypernatremia  Assessment & Plan  Mild, this could be due to volume depletion, patient has not had proper nutrition in a couple of days  Currently on D5 NS, we will increase the rate to 125 ml/hr  We were going to order TPN today, however surgery  "would like to hold, as patient will undergo surgery later today.  We will continue to monitor.    3/31: We have ordered TPN today    4/1: Improving    Generalized weakness  Assessment & Plan  PT/OT    Hyperglycemia  Assessment & Plan  Patient had recent A1c at 5.1  Monitor  No need to start insulin treatment at this time    NATIVIDAD (acute kidney injury) (HCC)  Assessment & Plan  Mild bump in Cr 0.6-->1.06  Likely in setting of decreased PO intake/pre-renal  IVF resuscitation  Continue to monitor    3/29: Improved. Cr is 0.55 today.    Abdominal pain  Assessment & Plan  Initial concern for sbo  Also concern for ileitis  General surgery and GI following patient, we appreciate further recommendations    Abnormal finding on imaging  Assessment & Plan  As part of the evaluation for metastases, we order a CT scan of the chest with contrast.  It reported: \"Mediastinal collection of fluid and gas adjacent to an abnormal thickened . Underlying mass or inflammatory change is a consideration though previously this had a more benign appearance.\"  We consulted GI, we appreciate further recommendations.    4/4: GI recommends esophagogram once able to tolerate PO and having BMs, we will discuss with surgery to see when would it be appropriate    4/5: Esophagogram reported: \"Contrast extends from the distal esophagus at the level of the gastroesophageal junction to the right of midline into a large collection likely representing either a lower esophageal or upper gastric diverticulum.\"  --I spoke to GI who recommended outpatient follow up.    Anemia- (present on admission)  Assessment & Plan  No signs of bleeding at this time, monitor    3/31: Patient underwent exploratory laparotomy yesterday.  Continue to monitor CBC.    4/5: Patient had bright red blood per rectum today, we have held anticoagulation.  This could be due to the patient's anastomosis site.  We appreciate further recommendations by surgery.    4/6: Patient mentions she " is still having some blood per rectum. We will continue to monitor. Patient is hemodynamically stable for now.    4/7: Hemoglobin seems to be stable, patient mentioning that she is not having any more bloody stools.  I spoke to surgery who recommended starting DVT prophylaxis tomorrow if no evidence of further bleeding.    History of deep venous thrombosis- (present on admission)  Assessment & Plan  Diagnosed 5/19/2020 6 hour car trip  Still on anticoagulation    3/27: Will probably resume once surgery clears her NPO    4/5: We had restarted anticoagulation yesterday, however this morning the patient had bloody bowel movement.  So we have held anticoagulation for now.    4/7: I spoke to surgery today, if no evidence of further bleeding tomorrow we will start DVT prophylaxis    COPD (chronic obstructive pulmonary disease) (HCC)- (present on admission)  Assessment & Plan  Not on home oxygen  Not having COPD exacerbation    Essential hypertension, benign- (present on admission)  Assessment & Plan  Home regimen: Valsartan 160 mg daily  Inpatient regimen: PRN medication, resume home medication once surgery clears for PO    3/29: Patient still NPO, however BP seems to be controlled, PRN medication for now       VTE prophylaxis: SCDs/TEDs    I have performed a physical exam and reviewed and updated ROS and Plan today (4/7/2022). In review of yesterday's note (4/6/2022), there are no changes except as documented above.

## 2022-04-08 ENCOUNTER — APPOINTMENT (OUTPATIENT)
Dept: RADIOLOGY | Facility: MEDICAL CENTER | Age: 87
DRG: 329 | End: 2022-04-08
Attending: INTERNAL MEDICINE
Payer: MEDICARE

## 2022-04-08 LAB
GLUCOSE BLD STRIP.AUTO-MCNC: 141 MG/DL (ref 65–99)
GLUCOSE BLD STRIP.AUTO-MCNC: 145 MG/DL (ref 65–99)
GLUCOSE BLD STRIP.AUTO-MCNC: 169 MG/DL (ref 65–99)
GLUCOSE BLD STRIP.AUTO-MCNC: 196 MG/DL (ref 65–99)
HCT VFR BLD AUTO: 28 % (ref 37–47)
HGB BLD-MCNC: 8.5 G/DL (ref 12–16)

## 2022-04-08 PROCEDURE — 770006 HCHG ROOM/CARE - MED/SURG/GYN SEMI*

## 2022-04-08 PROCEDURE — 99233 SBSQ HOSP IP/OBS HIGH 50: CPT | Performed by: INTERNAL MEDICINE

## 2022-04-08 PROCEDURE — 93970 EXTREMITY STUDY: CPT

## 2022-04-08 PROCEDURE — C9113 INJ PANTOPRAZOLE SODIUM, VIA: HCPCS | Performed by: INTERNAL MEDICINE

## 2022-04-08 PROCEDURE — 85018 HEMOGLOBIN: CPT

## 2022-04-08 PROCEDURE — 85014 HEMATOCRIT: CPT

## 2022-04-08 PROCEDURE — 82962 GLUCOSE BLOOD TEST: CPT | Mod: 91

## 2022-04-08 PROCEDURE — 700111 HCHG RX REV CODE 636 W/ 250 OVERRIDE (IP): Performed by: INTERNAL MEDICINE

## 2022-04-08 RX ORDER — HEPARIN SODIUM 5000 [USP'U]/ML
5000 INJECTION, SOLUTION INTRAVENOUS; SUBCUTANEOUS EVERY 8 HOURS
Status: DISCONTINUED | OUTPATIENT
Start: 2022-04-08 | End: 2022-04-11 | Stop reason: HOSPADM

## 2022-04-08 RX ADMIN — INSULIN HUMAN 2 UNITS: 100 INJECTION, SOLUTION PARENTERAL at 06:21

## 2022-04-08 RX ADMIN — HEPARIN SODIUM 5000 UNITS: 5000 INJECTION INTRAVENOUS; SUBCUTANEOUS at 17:33

## 2022-04-08 RX ADMIN — PANTOPRAZOLE SODIUM 40 MG: 40 INJECTION, POWDER, FOR SOLUTION INTRAVENOUS at 17:33

## 2022-04-08 RX ADMIN — PANTOPRAZOLE SODIUM 40 MG: 40 INJECTION, POWDER, FOR SOLUTION INTRAVENOUS at 06:21

## 2022-04-08 ASSESSMENT — LIFESTYLE VARIABLES: SUBSTANCE_ABUSE: 0

## 2022-04-08 ASSESSMENT — ENCOUNTER SYMPTOMS
COUGH: 0
SHORTNESS OF BREATH: 0
HEARTBURN: 0
BLURRED VISION: 0
VOMITING: 0
CHILLS: 0
FALLS: 0
BACK PAIN: 0
NERVOUS/ANXIOUS: 0
FEVER: 0
DOUBLE VISION: 0
HEADACHES: 0
PALPITATIONS: 0
DIZZINESS: 0
ABDOMINAL PAIN: 1

## 2022-04-08 ASSESSMENT — PAIN DESCRIPTION - PAIN TYPE: TYPE: SURGICAL PAIN

## 2022-04-08 NOTE — PROGRESS NOTES
Pharmacy TPN Day # 9       2022    Dosing Weight   55 kg TPN currently providing 100% of goal                                                  TPN goal: 1532 kcal/day including 1.3 gm/kg/day Protein                                                  TPN indication: SBO, NPO x 7 days                                                              Pertinent PMH: Patient with recent terminal ileitis and SBO last month.  Patient treated with conservative therapy and ABX now with repeat symtoms and SBO.  Patient has been NPO x 7 days.  Patient s/p ex lap 3/30 with findings of ileal mass and an ileocecostomy was performed.  Surgical pathology demonstrated adenocarcinoma.   CT revealing a mediastinal collection of fluid and gas adjacent to esophagus. NGT discontinued .  Temp (24hrs), Av.1 °C (98.8 °F), Min:37 °C (98.6 °F), Max:37.3 °C (99.2 °F)  .  Recent Labs     22  1100 22  0215   SODIUM 138 134*   POTASSIUM 4.1 4.1   CHLORIDE 101 98   CO2 30 30   BUN 14 14   CREATININE 0.76 0.68   GLUCOSE 166* 131*   CALCIUM 8.3* 8.5   ASTSGOT 14 16   ALTSGPT 6 8   ALBUMIN 2.6* 2.7*   TBILIRUBIN 0.4 0.4   PHOSPHORUS  --  2.9   MAGNESIUM  --  2.3     Accu-Checks  No results for input(s): POCGLUCOSE in the last 72 hours.    Vitals:    22 1615 22 1644 22 2211 22 0445   BP:  129/80 129/72 114/61   Weight: 58.9 kg (129 lb 13.6 oz)      Height:         No intake or output data in the 24 hours ending 22 0859    Orders Placed This Encounter   Procedures   • Diet Order Diet: Full Liquid; Miscellaneous modifications: (optional): Lactose Free     Standing Status:   Standing     Number of Occurrences:   1     Order Specific Question:   Diet:     Answer:   Full Liquid [11]     Order Specific Question:   Miscellaneous modifications: (optional)     Answer:   Lactose Free [5]         TPN for past 72 hours (Show up to 3 orders; newest on the left. Changes between the two most recent orders are  indicated.)     Start date and time   04/07/2022 2000 04/06/2022 2000 04/05/2022 2000      TPN Adult Central Line [[id number]] TPN Adult Central Line [[id number]] TPN Adult Central Line [[id number]]    Order Status  Last Dose in Progress Completed Completed    Last Admin  New Bag at 04/07/2022 2037 by Sowmya Guerrier R.N. New Bag at 04/06/2022 2035 by Dolores Vázquez R.N. New Bag at 04/05/2022 2033 by Kalpana Jenkins R.N.       Base    Clinisol 15%  72 g 72 g 72 g    dextrose 70%  254 g 254 g 254 g    fat emulsion (soy) 20%  38 g 38 g 38 g       Additives    potassium phosphate  21 mmol 21 mmol 21 mmol    potassium chloride  40 mEq 40 mEq 40 mEq    sodium acetate  300 mEq 300 mEq 300 mEq    magnesium sulfate  12 mEq 12 mEq 12 mEq    calcium GLUConate  9.4 mEq 9.4 mEq 9.4 mEq    M.T.E.-4  1 mL 1 mL 1 mL    M.V.I. Adult  10 mL 10 mL 10 mL       QS Base    sterile water  747.92 mL 747.92 mL 747.92 mL       Energy Contribution    Proteins  -- -- --    Dextrose  863.6 kcal 863.6 kcal 863.6 kcal    Lipids  380 kcal 380 kcal 380 kcal    Total  1,243.6 kcal 1,243.6 kcal 1,243.6 kcal       Electrolyte Ion Calculated Amount    Sodium  300 mEq 300 mEq 300 mEq    Potassium  70.8 mEq 70.8 mEq 70.8 mEq    Calcium  9.4 mEq 9.4 mEq 9.4 mEq    Magnesium  12 mEq 12 mEq 12 mEq    Aluminum  -- -- --    Phosphate  21 mmol 21 mmol 21 mmol    Chloride  40 mEq 40 mEq 40 mEq    Acetate  360.96 mEq 360.96 mEq 360.96 mEq       Other    Total Protein  72 g 72 g 72 g    Total Protein/kg  1.26 g/kg 1.26 g/kg 1.26 g/kg    Total Amino Acid  -- -- --    Total Amino Acid/kg  -- -- --    Glucose Infusion Rate  3.08 mg/kg/min 3.08 mg/kg/min 3.08 mg/kg/min    Osmolarity (Estimated)  -- -- --    Volume  1,992 mL 1,992 mL 1,992 mL    Rate  83 mL/hr 83 mL/hr 83 mL/hr    Dosing Weight  57.2 kg 57.2 kg 57.2 kg    Infusion [facility]  [facility]          Comments:  Per surgery Dr. Thomas, stop TPN today. Nurse notified and nursing communication placed to  cut rate to 42 ml/hr x 4 hours then stop TPN. TPN consult d/c per protocol.       Pavan RochaD.

## 2022-04-08 NOTE — DISCHARGE PLANNING
Anticipated Discharge Disposition: SNF    Action: Discussed pt in morning rounds. Per MD, pt is medically cleared for SNF/rehab. Per MD, has reached out to rehab for re-eval.     LSW/RN CM to follow up with pt for SNF choice.    LSW able to look up PASRR: [id number].    Barriers to Discharge: SNF placement    Plan: LSW to follow and assist as needed.

## 2022-04-08 NOTE — PROGRESS NOTES
"Hospital Medicine Daily Progress Note    Date of Service  4/8/2022    Chief Complaint  Licha Pope is a 86 y.o. female admitted 3/25/2022 with abdominal pain.    Hospital Course  As per chart review  \"86 y.o. female with a history of hypertension, COPD, GERD, DVT dx 5/19/21 after long car trip (still on anticoagulation) who presented 3/25/2022 with abdominal pain.     Was recently admitted for terminal ileitis, SBO and pancreatitis on 2/17/2022, she was treated conservatively with IV fluids and pain control.  Right upper quadrant showed dependent gallbladder sludge versus small stones.  MRCP showed cholelithiasis and common bile duct 11.4 mm unchanged since 2015.  She was discharged with antibiotics and recommended to follow-up with GI, surgery and PCP after discharge.  She saw surgeon, she recommended cholecystectomy but wanted her to see GI doctor. GI doctor recommended cholecystectomy.  Has appt with surgery next Wed.       Patient never really felt like her abdominal pain improved however was stable until 2 days ago.  Abdominal pain became very severe however intermittent and would come in waves.  Patient became very fatigued and was sleeping most the day.  She had chills and was very weak.  Abdominal pain was diffuse, intermittent, described as sharp pain, 10/10 pain, went to PCP and doctor sent her to ER.  Did have some n/v, had some dark red or purple, size of quarter.  Passing gas.  Yesterday had BM.  Did have some dark stools, unsure if black was \"half asleep\".  Last night had lower back pain, now improved.      In ER she was afebrile, mildly tachycardic (heart rate 111), normal blood pressure and mild hypoxia requiring 1 L nasal cannula.  Labs remarkable for WBC 11.6 with a left shift, creatinine 1.06, calcium 10.6, lipase 104, troponin XX, lactic acid normal.  CT abdomen pelvis with contrast showed similar changes of bowel wall thickening involving the terminal ileum and base of the cecum adjacent to " "postoperative change from prior appendectomy which could be infectious or inflammatory.  There is mild proximal obstruction involving the ileum and distal jejunum in the lower abdomen left mid abdomen with small bowel loops dilated up to 4 cm.  There is no gross free air or pneumatosis.  Moderate size hiatal hernia again seen.  Minimal prominent biliary tree unchanged and physiologic, unchanged a back to 2015.  Simple hepatic cyst unchanged.  She was given IV fluids, Zosyn and general surgery was consulted.  Dr. Mcneil to see patient.  Recommended NG, IV fluids.\"    Interval Problem Update  3/26: Patient seen at bedside this morning.  Patient is hard of hearing.  Patient with NG tube in place.  Still complaining of abdominal pain some tenderness on examination.  General surgery has been consulted, we appreciate further recommendations.  Continue antibiotics for now.    3/27: Patient seen at bedside this morning.  It seems that the patient's NG tube came off, however the patient it seems that now is having regular bowel movements.  She still complaining of abdominal pain.  We have consulted GI, we appreciate further recommendations.  We also appreciate further recommendations by general surgery.  We will continue n.p.o. until surgery reevaluate the patient.  We will continue with antibiotics for now.  --The patient told me she used to see Dr Tarun Lewis at [facility], so I have consulted Dr Clay, we appreciate further recommendations.    3/28: Patient seen at bedside this morning.  The patient was vomiting this morning, she still complaining of abdominal pain.  We have placed the NG tube back on, we have ordered KUB.  We appreciate further recommendations by general surgery.  Also it seems that the patient had been seeing Dr. Ash earlier this month and she would like to continue seeing that group.  We have consulted Dr. Belle from GI, we appreciate further recommendations.  We will keep the patient n.p.o. for now " until surgical evaluation.  We will continue with antibiotics.    3/29: Patient seen at bedside this morning.  No family was present at bedside directly on my evaluation.  I tried calling the patient's son to give him an update however there was no answer.  Patient still n.p.o., with NG tube to suction.  General surgery and GI following the patient, we appreciate further recommendations.  The patient will undergo small bowel follow-through study today.  I asked the patient and it seems that she has not had a proper meal since last Thursday, if by tomorrow we are unable to feed the patient will consider TPN while further management is being done.    3/30: Patient seen at bedside this morning.  No family present at bedside, however I was able to talk to the patient's son over the phone and gave him an update.  Patient still n.p.o. with NG tube, I spoke to surgery and they will take her to the operating room later today.  We were going to start TPN today, however surgery would like to hold for now.  Patient with mild hyponatremia, this is most likely due to volume depletion as such she has not really eaten much for the last couple days.  We have increased the rate of the D5 NS, monitor and repeat sodium values.  We will order PICC line in preparation of possible TPN.  We appreciate further recommendations by general surgery.    3/31: Patient seen at bedside this morning.  No family members present at bedside, however again I was able to talk to the patient's son over the phone to update him on the patient's clinical status.  The patient underwent exploratory laparotomy with ileocecectomy yesterday by general surgery.  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  We will start the patient on TPN today.  The patient was seen at bedside, laying in bed currently comfortably with NG tube in place.  Patient also with Prevena status post exploratory laparotomy.  Patient requiring oxygen, this  is most likely postsurgical, we have ordered chest x-ray.  As per nursing no other overnight events reported.    4/1: Patient seen at bedside this morning.  No family present at bedside.  We are still pending pathology results.  Patient still not passing gas or having bowel movements.  We started TPN yesterday.  We will continue to monitor closely.    4/2: Patient seen at bedside this morning.  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  I have spoken with the patient and the patient's son regarding the diagnosis.  We have consulted oncology, we appreciate further recommendations.  We have ordered chest CT with contrast to evaluate for metastases.  Patient still not passing gas or having bowel movements.  We will continue with TPN.  We appreciate further recommendations by surgery.    4/3: Patient seen at bedside this morning.  At the time of my evaluation, the patient had mentioned to me that she has not had a bowel movement or passing gas oncology has been consulted, we appreciate further recommendations.  We did CT scan of the chest which reported mediastinal collection of fluid and gas adjacent, concerning for communication with esophagus.  We consulted GI who did not recommend emergent endoscopy and recommend Gastrografin esophagram once patient has oral intake and bowel movements.  We appreciate further recommendations.  Overall prognosis remains guarded.  As per nursing patient having episodes of fever due to the patient's complex history with recent GI surgery as well as abnormal CT scan we will continue the patient on Zosyn for now, we have ordered blood cultures.    4/4: Patient seen at bedside this morning.  She was laying in bed comfortable.  As per nursing she had a bowel movement yesterday.  We appreciate further recommendations by general surgery.  We will discuss the case with general surgery to see if we can move forward with the esophagogram that GI recommended.  Does not appear  that the patient had an episode of fever yesterday.  We will continue with antibiotics for now pending cultures.  We have put referral for inpatient rehab as well as order home health anticipating the patient could potentially be discharged sometime this week.  We appreciate recommendations by case management.  Oncology evaluated the patient and recommended outpatient follow-up at his clinic in 2 to 3 weeks.    4/5: Patient seen at bedside this morning.  He was laying in bed comfortably.  No family was present at bedside.  As per nursing patient had a bloody bowel movement this morning, anticoagulation has not been held.  I discussed the case with surgery who recommended holding anticoagulation for 48 hours.  We will monitor hemoglobin and transfuse if needed.  Patient slowly having p.o. intake, will continue TPN for now until surgery recommends off of it.  Overall prognosis remains guarded.  The patient has not had another episode of fever, blood cultures have remained negative.  We will discontinue antibiotics now.  We will continue to monitor closely.    4/6: Patient seen at bedside this morning.  Continues with patient still having some bloody bowel movements.  Hemoglobin slowly trending down, however hemodynamically stable.  We will continue to trend hemoglobin and transfuse if needed.  The patient states that she is only eating between 10 to 20% of her meals.  We will continue with TPN for now.  We appreciate further recommendations by surgery.  We will continue to monitor closely.  No family members present at bedside at the time of evaluation.  I tried to talk to the patient's son over the phone without success.    4/7: Patient seen at bedside this morning.  No family present at bedside.  Again I tried to talk to the patient's son over the phone, I have left a voicemail, however I have been unable to reach him.  Hemoglobin seems to be stable, the patient denies any more bloody stools.  The patient states  that she is eating between 20 to 25% of her meals, I spoke to surgery and recommended adding supplement.  And if she is tolerating with supplement, we will most likely discontinue TPN tomorrow.  We appreciate further recommendations by surgery.    4/8: Patient seen at bedside this morning.  I talked to surgery, we will start the patient on DVT prophylaxis with heparin.  I had a long conversation with the patient, it seems the patient denies diagnosed with DVT last in May after a period of immobilization.  That would make a provoked DVT and she has been on anticoagulation for more than 6 months.  We will order ultrasound of the lower extremities, if negative will hold full anticoagulation and if positive will restart anticoagulation, as she now has a diagnosis of malignancy.  We will stop TPN as per surgery.  We are pending SNF placement.  Overall prognosis still guarded.  I also talked to the patient's son at bedside.        I have personally seen and examined the patient at bedside. I discussed the plan of care with patient, bedside RN and charge RN.    Consultants/Specialty  general surgery and oncology    Code Status  DNAR/DNI    Disposition  Patient is not medically cleared for discharge.   Anticipate discharge to pending clinical evolution.    Review of Systems  Review of Systems   Constitutional: Positive for malaise/fatigue. Negative for chills and fever.   HENT: Positive for hearing loss. Negative for nosebleeds.    Eyes: Negative for blurred vision and double vision.   Respiratory: Negative for cough and shortness of breath.    Cardiovascular: Negative for chest pain and palpitations.   Gastrointestinal: Positive for abdominal pain. Negative for heartburn and vomiting.   Genitourinary: Negative for dysuria and urgency.   Musculoskeletal: Negative for back pain and falls.   Skin: Negative for itching and rash.   Neurological: Negative for dizziness and headaches.   Psychiatric/Behavioral: Negative for  substance abuse. The patient is not nervous/anxious.    All other systems reviewed and are negative.       Physical Exam  Temp:  [36.7 °C (98.1 °F)-37.3 °C (99.2 °F)] 36.7 °C (98.1 °F)  Pulse:  [] 90  Resp:  [16-20] 16  BP: (114-129)/(60-80) 123/60  SpO2:  [90 %-95 %] 95 %    Physical Exam  Vitals and nursing note reviewed.   Constitutional:       General: She is not in acute distress.     Appearance: She is ill-appearing.   HENT:      Head: Normocephalic and atraumatic.      Right Ear: External ear normal.      Left Ear: External ear normal.      Mouth/Throat:      Pharynx: No oropharyngeal exudate or posterior oropharyngeal erythema.   Eyes:      General:         Right eye: No discharge.         Left eye: No discharge.   Cardiovascular:      Rate and Rhythm: Normal rate and regular rhythm.      Pulses: Normal pulses.      Heart sounds: No murmur heard.    No gallop.   Pulmonary:      Effort: Pulmonary effort is normal. No respiratory distress.      Breath sounds: Normal breath sounds. No wheezing or rhonchi.   Abdominal:      General: There is distension.      Tenderness: There is abdominal tenderness.      Comments: Prevena in place s/p surgery   Musculoskeletal:         General: No swelling or tenderness. Normal range of motion.      Cervical back: Normal range of motion and neck supple. No tenderness.   Skin:     General: Skin is warm and dry.   Neurological:      General: No focal deficit present.      Mental Status: She is alert and oriented to person, place, and time. Mental status is at baseline.      Motor: No weakness.   Psychiatric:         Mood and Affect: Mood normal.         Behavior: Behavior normal.         Thought Content: Thought content normal.         Fluids    Intake/Output Summary (Last 24 hours) at 4/8/2022 1514  Last data filed at 4/8/2022 1200  Gross per 24 hour   Intake 480 ml   Output --   Net 480 ml       Laboratory  Recent Labs     04/06/22  1820 04/07/22  0215 04/08/22  0300    HEMOGLOBIN 8.9* 9.2* 8.5*   HEMATOCRIT 28.4* 29.0* 28.0*     Recent Labs     04/07/22  0215   SODIUM 134*   POTASSIUM 4.1   CHLORIDE 98   CO2 30   GLUCOSE 131*   BUN 14   CREATININE 0.68   CALCIUM 8.5                   Imaging  DX-ESOPHAGUS BARIUM SWALLOW SINGLE CONTRAST   Final Result      Contrast extends from the distal esophagus at the level of the gastroesophageal junction to the right of midline into a large collection likely representing either a lower esophageal or upper gastric diverticulum.      CT-CHEST (THORAX) WITH   Final Result      1.  Mediastinal collection of fluid and gas adjacent to an abnormal thickened . Underlying mass or inflammatory change is a consideration though previously this had a more benign appearance. Suggest further assessment with endoscopy.   2.  Small BILATERAL pleural effusions   3.  BILATERAL atelectasis      JOSEP ORTIZ was paged at 4/2/2022 10:19 AM.         DX-CHEST-PORTABLE (1 VIEW)   Final Result      Decreased lung volumes with small left subpulmonic effusion and likely basilar atelectasis      IR-PICC LINE PLACEMENT W/ GUIDANCE > AGE 5   Final Result                  Ultrasound-guided PICC placement performed by qualified nursing staff as    above.          DX-SMALL BOWEL SERIES   Final Result      Partial small bowel obstruction with contrast transit time to the colon exceeding 9 hours      5 cm paraesophageal hernia      NG tube terminates over the gastric fundus      DX-ABDOMEN FOR TUBE PLACEMENT   Final Result         Gastric drainage tube with tip projecting over the expected area of the stomach.      XT-GELZNUS-6 VIEW   Final Result      Long segmental small bowel dilatation concerning for distal small bowel obstruction      DX-ABDOMEN FOR TUBE PLACEMENT   Final Result      Enteric tube terminates over the stomach.      Small bowel loop dilatation concerning for small bowel obstruction      DX-ABDOMEN FOR TUBE PLACEMENT   Final Result          Gastric drainage tube with tip projecting over the expected area of the stomach.      DX-ABDOMEN FOR TUBE PLACEMENT   Final Result         Gastric drainage tube loops in the esophagus with tip projecting over the expected area of the upper esophagus.      CT-ABDOMEN-PELVIS WITH   Final Result      1.  There are similar changes of bowel wall thickening involving the terminal ileum and base of the cecum adjacent to postoperative change from prior appendectomy which could be infectious or inflammatory.   2.  There is mild proximal obstruction involving the ileum and distal jejunum in the lower abdomen and left mid abdomen with small bowel loops dilated up to 4 cm.   3.  There is no gross free air or pneumatosis.   4.  Moderate size hiatal hernia again seen.   5.  Minimally prominent biliary tree unchanged and physiologic, unchanged dating back to 2015.   6.  Simple hepatic cysts are unchanged.         DX-CHEST-PORTABLE (1 VIEW)   Final Result      1.  There is no acute cardiopulmonary process.      US-EXTREMITY VENOUS LOWER BILAT    (Results Pending)        Assessment/Plan  * SBO (small bowel obstruction) (HCC)- (present on admission)  Assessment & Plan  Patient with recent terminal ileitis and small bowel obstruction last month  Treated with conservative therapy and antibiotics however now with repeat symptoms and SBO  Surgery consulted by ERP: Dr. Mcneil was called, recommended NPO, NG, IVF, pain mgmt  CTX, flagyl    3/26: Pending surgical evaluation, we appreciate further recommendations.    3/27: Patient seems to have BMs now, we are awaiting further recommendations by general surgery. We have also consulted GI ( Dr Clay), we appreciate further recommendations.    3/28: Patient was vomiting again this morning with abdominal pain.  We have placed the NG tube back and have ordered KUB.  We appreciate further recommendations by general surgery and GI. (Dr Belle from GI will be evaluating the patient  today).    3/29: Patient to have small bowel follow through study today, we appreciate further recommendations by GI and General Surgery    3/30: Patient still unable to tolerate PO, we will start TPN, we appreciate further recommendations by surgery and GI.  --UPDATE: I spoke to surgery, they would like to hold TPN for now, as she might undergo surgery today, we will order PICC line.    3/31: Patient underwent exploratory laparotomy yesterday by general surgery, the patient also underwent ileocecectomy with finding of ileocecal mass, pending pathology.  We appreciate further recommendations by general surgery.  We will start TPN today.    -- Pathology positive for adenocarcinoma, oncology was consulted, and patient will require outpatient follow-up with oncology.    Fever  Assessment & Plan  As per nursing patient has been having some low-grade fever.  She had been on ceftriaxone and Flagyl reported  We have switched to Zosyn yesterday pending cultures.  Due to the patient's extensive surgery and CT scan findings in the chest, will continue with antibiotics for now    4/5: The patient has no longer episode of fever.  Cultures have remained negative to date.  We will stop antibiotics at this time.    Adenocarcinoma of ileum (HCC)  Assessment & Plan  Patient was found to have an ileocecal mass on exploratory laparotomy.  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  We have consulted oncology, we appreciate further conditions.  We have ordered CT scan of the chest with contrast to evaluate for metastases.    Hypophosphatemia  Assessment & Plan  Replace as needed  monitor    Intraabdominal mass  Assessment & Plan  Patient underwent exploratory laparotomy.  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    4/3: Pathology positive for adenocarcinoma, oncology consulted    4/5: Patient will require follow up with oncology as outpatient        Acute hypoxemic respiratory failure (HCC)  Assessment &  "Plan  Patient currently requiring oxygen  This could be postsurgical.  Incentive spirometer encouraged.    4/8: Patient on room air today at the time of my evaluation.    Hypernatremia  Assessment & Plan  Mild, this could be due to volume depletion, patient has not had proper nutrition in a couple of days  Currently on D5 NS, we will increase the rate to 125 ml/hr  We were going to order TPN today, however surgery would like to hold, as patient will undergo surgery later today.  We will continue to monitor.    3/31: We have ordered TPN today    4/1: Improving    Generalized weakness  Assessment & Plan  PT/OT    Hyperglycemia  Assessment & Plan  Patient had recent A1c at 5.1  Monitor  No need to start insulin treatment at this time    NATIVIDAD (acute kidney injury) (HCC)  Assessment & Plan  Mild bump in Cr 0.6-->1.06  Likely in setting of decreased PO intake/pre-renal  IVF resuscitation  Continue to monitor    3/29: Improved. Cr is 0.55 today.    Abdominal pain  Assessment & Plan  Initial concern for sbo  Also concern for ileitis  General surgery and GI following patient, we appreciate further recommendations    Abnormal finding on imaging  Assessment & Plan  As part of the evaluation for metastases, we order a CT scan of the chest with contrast.  It reported: \"Mediastinal collection of fluid and gas adjacent to an abnormal thickened . Underlying mass or inflammatory change is a consideration though previously this had a more benign appearance.\"  We consulted GI, we appreciate further recommendations.    4/4: GI recommends esophagogram once able to tolerate PO and having BMs, we will discuss with surgery to see when would it be appropriate    4/5: Esophagogram reported: \"Contrast extends from the distal esophagus at the level of the gastroesophageal junction to the right of midline into a large collection likely representing either a lower esophageal or upper gastric diverticulum.\"  --I spoke to GI who recommended " "outpatient follow up.    Anemia- (present on admission)  Assessment & Plan  No signs of bleeding at this time, monitor    3/31: Patient underwent exploratory laparotomy yesterday.  Continue to monitor CBC.    4/5: Patient had bright red blood per rectum today, we have held anticoagulation.  This could be due to the patient's anastomosis site.  We appreciate further recommendations by surgery.    4/6: Patient mentions she is still having some blood per rectum. We will continue to monitor. Patient is hemodynamically stable for now.    4/7: Hemoglobin seems to be stable, patient mentioning that she is not having any more bloody stools.  I spoke to surgery who recommended starting DVT prophylaxis tomorrow if no evidence of further bleeding.    4/8: Hemoglobin seems to be stable.  We have started DVT prophylaxis today.  We are pending ultrasound of the lower extremities to see if she will require full dose anticoagulation.    History of deep venous thrombosis- (present on admission)  Assessment & Plan  As per previous hospitalist: \"Diagnosed 5/19/2020 6 hour car trip. Still on anticoagulation.\"    3/27: Will probably resume once surgery clears her NPO    4/5: We had restarted anticoagulation yesterday, however this morning the patient had bloody bowel movement.  So we have held anticoagulation for now.    4/7: I spoke to surgery today, if no evidence of further bleeding tomorrow we will start DVT prophylaxis    4/8: I spoke at length with the patient today, it seems that she was diagnosed last year around May with a DVT after being immobile for several days.  This would mean that she had a provoked DVT.  She has had more than 6 months of treatment at this time will hold anticoagulation.  Of note she now does have a diagnosis of malignancy.  We will order ultrasound of the lower extremities if positive will restart anticoagulation if negative will not continue full dose anticoagulation.  Patient agrees at this " time.    COPD (chronic obstructive pulmonary disease) (HCC)- (present on admission)  Assessment & Plan  Not on home oxygen  Not having COPD exacerbation    Essential hypertension, benign- (present on admission)  Assessment & Plan  Home regimen: Valsartan 160 mg daily  Inpatient regimen: PRN medication, resume home medication once surgery clears for PO    3/29: Patient still NPO, however BP seems to be controlled, PRN medication for now       VTE prophylaxis: SCDs/TEDs    I have performed a physical exam and reviewed and updated ROS and Plan today (4/8/2022). In review of yesterday's note (4/7/2022), there are no changes except as documented above.

## 2022-04-08 NOTE — PROGRESS NOTES
87 y/o female POD9 ileocecectomy  Doing well. Tolerating liquids, TPN still on  Abdomen soft, NT, incision c/d/i  Regular rate and rhythm    Plan:  Appreciate medical care  dvt prophylaxis to resume tomorrow  diet as tolerated, ensure TID ensures  Can stop TPN and transition to rehab facility.

## 2022-04-08 NOTE — DIETARY
"Nutrition support weekly update:  Day 14 of admit.  Licha Pope is a 86 y.o. female with admitting DX of SBO (small bowel obstruction).      TPN initiated on 3/31. 4/8 PharmD note: Current TPN formulation providing 100% of needs w/ plan to wean off today per surgery MD William.    Assessment:  Weight (4/7): 58.9 kg via stand up scale, wt +1.7 kg from 3/25; fluid status +6.5L from admit, no recent I/Os documented past 24 hrs. Re-estimate of nutritional needs not indicated.     Evaluation:   1. Clear liquids diet started per MD 4/3, progressed to full liquids, lactose-free, on 4/7. Intake of meals on full liquids diet per ADLs has been 50-75% consistently. Order in place for lactose-free supplements, Boost Plus (suitable for lactose-intolerance per [employer] formulary) TID w/ meals. PO intake likely adequate if pt taking >/=50% of meals and supplements, based on kcals ordered in [employer].  2. Most recent labs on 4/7: Na: 134, glu 131, alb 2.7 (L, trending up), pre-alb on 4/4 10.9 (L) - no recent or prior results to trend.  3. MAR: SSI, TPN (rate change to 42 ml/hr @ 0942). Per PharmD note, TPN weaning plan: \"Cut rate to 42 ml/hr x 4 hours then stop TPN.\"  4. Last BM 4/8 x 3, formed per flowsheets  5. Trace edema to all extremities  6. Current clinical picture and MD notes reviewed. Medically cleared, discharge orders in, placement pending.    Malnutrition risk: No new risks identified.    Recommendations/Plan:  1. TPN weaning/discontinue today per PharmD  2. Diet as tolerated, advancements to be per MD.  3. Encourage PO intake of meals and supplements.  4. Document PO intake at meals and snacks as % in ADL flowsheets.  5. NR to see for meal and snack preferences.  6. Monitor weight trends.     RD continues to follow.    "

## 2022-04-08 NOTE — CARE PLAN
The patient is Watcher - Medium risk of patient condition declining or worsening    Shift Goals  Clinical Goals: monitor H and H  Patient Goals: sleep  Family Goals: no family at bedside    Progress made toward(s) clinical / shift goals:        Problem: Knowledge Deficit - Standard  Goal: Patient and family/care givers will demonstrate understanding of plan of care, disease process/condition, diagnostic tests and medications  Outcome: Progressing     Problem: Mobility  Goal: Patient's capacity to carry out activities will improve  Outcome: Progressing     Problem: Fall Risk  Goal: Patient will remain free from falls  Outcome: Progressing     Problem: Nutrition - Advanced  Goal: Patient will display progressive weight gain toward goal have adequate food and fluid intake  Outcome: Progressing     Problem: Self Care  Goal: Patient will have the ability to perform ADLs independently or with assistance (bathe, groom, dress, toilet and feed)  Outcome: Progressing       Patient is not progressing towards the following goals:

## 2022-04-08 NOTE — CARE PLAN
Problem: Nutritional:  Goal: Achieve adequate nutritional intake  Description: Patient will consume >/=50% of meals and supplements on full liquids diet w/ diet progression to be per MD.  Outcome: Progressing     See RD note.

## 2022-04-08 NOTE — DISCHARGE PLANNING
Received Choice form at 1420  Agency/Facility Name: [facility]  Referral sent per Choice form @ [phone]

## 2022-04-08 NOTE — DISCHARGE PLANNING
Anticipated Discharge Disposition: SNF     Action:   Spoke to Fredy this morning, per Fredy pt is no longer appropriate for rehab due to pt not requiring high intensity PT/OT as that provided at rehab.   Spoke to pt at bedside. Choice form completed for [facility]. Pt did not want to provide 2 more choices. RNCM informed pt that if [facility] does not accept, next step is to send blanket SNF referral. Pt verbalized understanding.     Barriers to Discharge: SNF acceptance     Plan: f/u with DPA regarding SNF acceptance

## 2022-04-08 NOTE — CARE PLAN
The patient is Stable - Low risk of patient condition declining or worsening    Shift Goals  Clinical Goals: Monitor H and H, above 8  Patient Goals: Progress diet  Family Goals: no family at bedside    Progress made toward(s) clinical / shift goals:  Hemoglobin stayed above 8.     Patient is not progressing towards the following goals:n/a

## 2022-04-09 PROBLEM — R19.7 DIARRHEA: Status: ACTIVE | Noted: 2022-04-09

## 2022-04-09 LAB
BASOPHILS # BLD AUTO: 0.5 % (ref 0–1.8)
BASOPHILS # BLD: 0.06 K/UL (ref 0–0.12)
C DIFF DNA SPEC QL NAA+PROBE: NEGATIVE
C DIFF TOX GENS STL QL NAA+PROBE: NEGATIVE
EOSINOPHIL # BLD AUTO: 0.63 K/UL (ref 0–0.51)
EOSINOPHIL NFR BLD: 5.4 % (ref 0–6.9)
ERYTHROCYTE [DISTWIDTH] IN BLOOD BY AUTOMATED COUNT: 50.3 FL (ref 35.9–50)
GLUCOSE BLD STRIP.AUTO-MCNC: 108 MG/DL (ref 65–99)
GLUCOSE BLD STRIP.AUTO-MCNC: 113 MG/DL (ref 65–99)
GLUCOSE BLD STRIP.AUTO-MCNC: 136 MG/DL (ref 65–99)
GLUCOSE BLD STRIP.AUTO-MCNC: 144 MG/DL (ref 65–99)
HCT VFR BLD AUTO: 26 % (ref 37–47)
HCT VFR BLD AUTO: 27.8 % (ref 37–47)
HGB BLD-MCNC: 8.1 G/DL (ref 12–16)
HGB BLD-MCNC: 8.6 G/DL (ref 12–16)
IMM GRANULOCYTES # BLD AUTO: 0.12 K/UL (ref 0–0.11)
IMM GRANULOCYTES NFR BLD AUTO: 1 % (ref 0–0.9)
LYMPHOCYTES # BLD AUTO: 0.84 K/UL (ref 1–4.8)
LYMPHOCYTES NFR BLD: 7.2 % (ref 22–41)
MCH RBC QN AUTO: 31.7 PG (ref 27–33)
MCHC RBC AUTO-ENTMCNC: 30.9 G/DL (ref 33.6–35)
MCV RBC AUTO: 102.6 FL (ref 81.4–97.8)
MONOCYTES # BLD AUTO: 0.9 K/UL (ref 0–0.85)
MONOCYTES NFR BLD AUTO: 7.8 % (ref 0–13.4)
NEUTROPHILS # BLD AUTO: 9.05 K/UL (ref 2–7.15)
NEUTROPHILS NFR BLD: 78.1 % (ref 44–72)
NRBC # BLD AUTO: 0 K/UL
NRBC BLD-RTO: 0 /100 WBC
PLATELET # BLD AUTO: 291 K/UL (ref 164–446)
PMV BLD AUTO: 11.6 FL (ref 9–12.9)
RBC # BLD AUTO: 2.71 M/UL (ref 4.2–5.4)
WBC # BLD AUTO: 11.6 K/UL (ref 4.8–10.8)

## 2022-04-09 PROCEDURE — 85018 HEMOGLOBIN: CPT

## 2022-04-09 PROCEDURE — 99232 SBSQ HOSP IP/OBS MODERATE 35: CPT | Performed by: INTERNAL MEDICINE

## 2022-04-09 PROCEDURE — 82962 GLUCOSE BLOOD TEST: CPT | Mod: 91

## 2022-04-09 PROCEDURE — 770006 HCHG ROOM/CARE - MED/SURG/GYN SEMI*

## 2022-04-09 PROCEDURE — 700102 HCHG RX REV CODE 250 W/ 637 OVERRIDE(OP): Performed by: INTERNAL MEDICINE

## 2022-04-09 PROCEDURE — 85014 HEMATOCRIT: CPT

## 2022-04-09 PROCEDURE — 94760 N-INVAS EAR/PLS OXIMETRY 1: CPT

## 2022-04-09 PROCEDURE — A9270 NON-COVERED ITEM OR SERVICE: HCPCS | Performed by: INTERNAL MEDICINE

## 2022-04-09 PROCEDURE — C9113 INJ PANTOPRAZOLE SODIUM, VIA: HCPCS | Performed by: INTERNAL MEDICINE

## 2022-04-09 PROCEDURE — 85025 COMPLETE CBC W/AUTO DIFF WBC: CPT

## 2022-04-09 PROCEDURE — 700111 HCHG RX REV CODE 636 W/ 250 OVERRIDE (IP): Performed by: INTERNAL MEDICINE

## 2022-04-09 PROCEDURE — 87493 C DIFF AMPLIFIED PROBE: CPT

## 2022-04-09 RX ORDER — OMEPRAZOLE 20 MG/1
20 CAPSULE, DELAYED RELEASE ORAL 2 TIMES DAILY
Status: DISCONTINUED | OUTPATIENT
Start: 2022-04-09 | End: 2022-04-11 | Stop reason: HOSPADM

## 2022-04-09 RX ADMIN — HEPARIN SODIUM 5000 UNITS: 5000 INJECTION INTRAVENOUS; SUBCUTANEOUS at 06:17

## 2022-04-09 RX ADMIN — PANTOPRAZOLE SODIUM 40 MG: 40 INJECTION, POWDER, FOR SOLUTION INTRAVENOUS at 06:17

## 2022-04-09 RX ADMIN — ALTEPLASE 2 MG: 2.2 INJECTION, POWDER, LYOPHILIZED, FOR SOLUTION INTRAVENOUS at 09:53

## 2022-04-09 RX ADMIN — HEPARIN SODIUM 5000 UNITS: 5000 INJECTION INTRAVENOUS; SUBCUTANEOUS at 23:57

## 2022-04-09 RX ADMIN — OMEPRAZOLE 20 MG: 20 CAPSULE, DELAYED RELEASE ORAL at 17:34

## 2022-04-09 RX ADMIN — HEPARIN SODIUM 5000 UNITS: 5000 INJECTION INTRAVENOUS; SUBCUTANEOUS at 00:07

## 2022-04-09 RX ADMIN — HEPARIN SODIUM 5000 UNITS: 5000 INJECTION INTRAVENOUS; SUBCUTANEOUS at 14:33

## 2022-04-09 ASSESSMENT — ENCOUNTER SYMPTOMS
DOUBLE VISION: 0
COUGH: 0
NERVOUS/ANXIOUS: 0
DIZZINESS: 0
BLURRED VISION: 0
FALLS: 0
CHILLS: 0
HEADACHES: 0
PALPITATIONS: 0
HEARTBURN: 0
FEVER: 0
VOMITING: 0
ABDOMINAL PAIN: 1
BACK PAIN: 0
SHORTNESS OF BREATH: 0

## 2022-04-09 ASSESSMENT — PAIN DESCRIPTION - PAIN TYPE
TYPE: SURGICAL PAIN

## 2022-04-09 ASSESSMENT — FIBROSIS 4 INDEX: FIB4 SCORE: 1.84

## 2022-04-09 ASSESSMENT — LIFESTYLE VARIABLES: SUBSTANCE_ABUSE: 0

## 2022-04-09 NOTE — DISCHARGE PLANNING
Agency/Facility Name: [facility]  Outcome: DPA left v-mail regarding referral status with request for callback.     [id number]  Agency/Facility Name: [facility]  Spoke To: Kourtney  Outcome: SNF does not have an available bed today.    RN VIRI notified

## 2022-04-09 NOTE — PROGRESS NOTES
87 y/o female POD10 ileocecectomy  Doing well. Tolerating diet,  Abdomen soft, NT, incision c/d/i      Plan:  Appreciate medical care  diet as tolerated, ensure TID ensures  transition to rehab facility as able

## 2022-04-09 NOTE — CARE PLAN
The patient is Stable - Low risk of patient condition declining or worsening    Shift Goals  Clinical Goals: Remain free of falls  Patient Goals: Pain control  Family Goals: no family at bedside    Progress made toward(s) clinical / shift goals:  Pt remained free of falls reported no pain this shift.     Problem: Knowledge Deficit - Standard  Goal: Patient and family/care givers will demonstrate understanding of plan of care, disease process/condition, diagnostic tests and medications  Outcome: Progressing     Problem: Gastrointestinal Irritability  Goal: Nausea and vomiting will be absent or improve  Outcome: Progressing     Problem: Urinary Elimination  Goal: Establish and maintain regular urinary output  Outcome: Progressing     Problem: Mobility  Goal: Patient's capacity to carry out activities will improve  Outcome: Progressing     Problem: Fall Risk  Goal: Patient will remain free from falls  Outcome: Progressing     Problem: Self Care  Goal: Patient will have the ability to perform ADLs independently or with assistance (bathe, groom, dress, toilet and feed)  Outcome: Progressing     Problem: Skin Integrity  Goal: Skin integrity is maintained or improved  Outcome: Progressing       Patient is not progressing towards the following goals:

## 2022-04-09 NOTE — PROGRESS NOTES
"Hospital Medicine Daily Progress Note    Date of Service  4/9/2022    Chief Complaint  Licha Pope is a 86 y.o. female admitted 3/25/2022 with abdominal pain.    Hospital Course  As per chart review  \"86 y.o. female with a history of hypertension, COPD, GERD, DVT dx 5/19/21 after long car trip (still on anticoagulation) who presented 3/25/2022 with abdominal pain.     Was recently admitted for terminal ileitis, SBO and pancreatitis on 2/17/2022, she was treated conservatively with IV fluids and pain control.  Right upper quadrant showed dependent gallbladder sludge versus small stones.  MRCP showed cholelithiasis and common bile duct 11.4 mm unchanged since 2015.  She was discharged with antibiotics and recommended to follow-up with GI, surgery and PCP after discharge.  She saw surgeon, she recommended cholecystectomy but wanted her to see GI doctor. GI doctor recommended cholecystectomy.  Has appt with surgery next Wed.       Patient never really felt like her abdominal pain improved however was stable until 2 days ago.  Abdominal pain became very severe however intermittent and would come in waves.  Patient became very fatigued and was sleeping most the day.  She had chills and was very weak.  Abdominal pain was diffuse, intermittent, described as sharp pain, 10/10 pain, went to PCP and doctor sent her to ER.  Did have some n/v, had some dark red or purple, size of quarter.  Passing gas.  Yesterday had BM.  Did have some dark stools, unsure if black was \"half asleep\".  Last night had lower back pain, now improved.      In ER she was afebrile, mildly tachycardic (heart rate 111), normal blood pressure and mild hypoxia requiring 1 L nasal cannula.  Labs remarkable for WBC 11.6 with a left shift, creatinine 1.06, calcium 10.6, lipase 104, troponin XX, lactic acid normal.  CT abdomen pelvis with contrast showed similar changes of bowel wall thickening involving the terminal ileum and base of the cecum adjacent to " "postoperative change from prior appendectomy which could be infectious or inflammatory.  There is mild proximal obstruction involving the ileum and distal jejunum in the lower abdomen left mid abdomen with small bowel loops dilated up to 4 cm.  There is no gross free air or pneumatosis.  Moderate size hiatal hernia again seen.  Minimal prominent biliary tree unchanged and physiologic, unchanged a back to 2015.  Simple hepatic cyst unchanged.  She was given IV fluids, Zosyn and general surgery was consulted.  Dr. Mcneil to see patient.  Recommended NG, IV fluids.\"    Interval Problem Update  3/26: Patient seen at bedside this morning.  Patient is hard of hearing.  Patient with NG tube in place.  Still complaining of abdominal pain some tenderness on examination.  General surgery has been consulted, we appreciate further recommendations.  Continue antibiotics for now.    3/27: Patient seen at bedside this morning.  It seems that the patient's NG tube came off, however the patient it seems that now is having regular bowel movements.  She still complaining of abdominal pain.  We have consulted GI, we appreciate further recommendations.  We also appreciate further recommendations by general surgery.  We will continue n.p.o. until surgery reevaluate the patient.  We will continue with antibiotics for now.  --The patient told me she used to see Dr Tarun Lewis at [facility], so I have consulted Dr Clay, we appreciate further recommendations.    3/28: Patient seen at bedside this morning.  The patient was vomiting this morning, she still complaining of abdominal pain.  We have placed the NG tube back on, we have ordered KUB.  We appreciate further recommendations by general surgery.  Also it seems that the patient had been seeing Dr. Ash earlier this month and she would like to continue seeing that group.  We have consulted Dr. Belle from GI, we appreciate further recommendations.  We will keep the patient n.p.o. for now " until surgical evaluation.  We will continue with antibiotics.    3/29: Patient seen at bedside this morning.  No family was present at bedside directly on my evaluation.  I tried calling the patient's son to give him an update however there was no answer.  Patient still n.p.o., with NG tube to suction.  General surgery and GI following the patient, we appreciate further recommendations.  The patient will undergo small bowel follow-through study today.  I asked the patient and it seems that she has not had a proper meal since last Thursday, if by tomorrow we are unable to feed the patient will consider TPN while further management is being done.    3/30: Patient seen at bedside this morning.  No family present at bedside, however I was able to talk to the patient's son over the phone and gave him an update.  Patient still n.p.o. with NG tube, I spoke to surgery and they will take her to the operating room later today.  We were going to start TPN today, however surgery would like to hold for now.  Patient with mild hyponatremia, this is most likely due to volume depletion as such she has not really eaten much for the last couple days.  We have increased the rate of the D5 NS, monitor and repeat sodium values.  We will order PICC line in preparation of possible TPN.  We appreciate further recommendations by general surgery.    3/31: Patient seen at bedside this morning.  No family members present at bedside, however again I was able to talk to the patient's son over the phone to update him on the patient's clinical status.  The patient underwent exploratory laparotomy with ileocecectomy yesterday by general surgery.  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  We will start the patient on TPN today.  The patient was seen at bedside, laying in bed currently comfortably with NG tube in place.  Patient also with Prevena status post exploratory laparotomy.  Patient requiring oxygen, this  is most likely postsurgical, we have ordered chest x-ray.  As per nursing no other overnight events reported.    4/1: Patient seen at bedside this morning.  No family present at bedside.  We are still pending pathology results.  Patient still not passing gas or having bowel movements.  We started TPN yesterday.  We will continue to monitor closely.    4/2: Patient seen at bedside this morning.  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  I have spoken with the patient and the patient's son regarding the diagnosis.  We have consulted oncology, we appreciate further recommendations.  We have ordered chest CT with contrast to evaluate for metastases.  Patient still not passing gas or having bowel movements.  We will continue with TPN.  We appreciate further recommendations by surgery.    4/3: Patient seen at bedside this morning.  At the time of my evaluation, the patient had mentioned to me that she has not had a bowel movement or passing gas oncology has been consulted, we appreciate further recommendations.  We did CT scan of the chest which reported mediastinal collection of fluid and gas adjacent, concerning for communication with esophagus.  We consulted GI who did not recommend emergent endoscopy and recommend Gastrografin esophagram once patient has oral intake and bowel movements.  We appreciate further recommendations.  Overall prognosis remains guarded.  As per nursing patient having episodes of fever due to the patient's complex history with recent GI surgery as well as abnormal CT scan we will continue the patient on Zosyn for now, we have ordered blood cultures.    4/4: Patient seen at bedside this morning.  She was laying in bed comfortable.  As per nursing she had a bowel movement yesterday.  We appreciate further recommendations by general surgery.  We will discuss the case with general surgery to see if we can move forward with the esophagogram that GI recommended.  Does not appear  that the patient had an episode of fever yesterday.  We will continue with antibiotics for now pending cultures.  We have put referral for inpatient rehab as well as order home health anticipating the patient could potentially be discharged sometime this week.  We appreciate recommendations by case management.  Oncology evaluated the patient and recommended outpatient follow-up at his clinic in 2 to 3 weeks.    4/5: Patient seen at bedside this morning.  He was laying in bed comfortably.  No family was present at bedside.  As per nursing patient had a bloody bowel movement this morning, anticoagulation has not been held.  I discussed the case with surgery who recommended holding anticoagulation for 48 hours.  We will monitor hemoglobin and transfuse if needed.  Patient slowly having p.o. intake, will continue TPN for now until surgery recommends off of it.  Overall prognosis remains guarded.  The patient has not had another episode of fever, blood cultures have remained negative.  We will discontinue antibiotics now.  We will continue to monitor closely.    4/6: Patient seen at bedside this morning.  Continues with patient still having some bloody bowel movements.  Hemoglobin slowly trending down, however hemodynamically stable.  We will continue to trend hemoglobin and transfuse if needed.  The patient states that she is only eating between 10 to 20% of her meals.  We will continue with TPN for now.  We appreciate further recommendations by surgery.  We will continue to monitor closely.  No family members present at bedside at the time of evaluation.  I tried to talk to the patient's son over the phone without success.    4/7: Patient seen at bedside this morning.  No family present at bedside.  Again I tried to talk to the patient's son over the phone, I have left a voicemail, however I have been unable to reach him.  Hemoglobin seems to be stable, the patient denies any more bloody stools.  The patient states  that she is eating between 20 to 25% of her meals, I spoke to surgery and recommended adding supplement.  And if she is tolerating with supplement, we will most likely discontinue TPN tomorrow.  We appreciate further recommendations by surgery.    4/8: Patient seen at bedside this morning.  I talked to surgery, we will start the patient on DVT prophylaxis with heparin.  I had a long conversation with the patient, it seems the patient denies diagnosed with DVT last in May after a period of immobilization.  That would make a provoked DVT and she has been on anticoagulation for more than 6 months.  We will order ultrasound of the lower extremities, if negative will hold full anticoagulation and if positive will restart anticoagulation, as she now has a diagnosis of malignancy.  We will stop TPN as per surgery.  We are pending SNF placement.  Overall prognosis still guarded.  I also talked to the patient's son at bedside.    4/9: Patient seen at bedside this morning.  Patient today complaining of some watery stools.  She was on antibiotics.  We have ordered C. difficile.  Ultrasound of the lower extremities did not report DVT, we will not start full anticoagulation anymore.  We will continue on need for DVT prophylaxis.  Hemoglobin seems to be stable and not complaining of bloody stools at this time.  We are pending placement to SNF.  We appreciate further recommendations by case management.        I have personally seen and examined the patient at bedside. I discussed the plan of care with patient, bedside RN and charge RN.    Consultants/Specialty  general surgery and oncology    Code Status  DNAR/DNI    Disposition  Patient is not medically cleared for discharge.   Anticipate discharge to pending clinical evolution.    Review of Systems  Review of Systems   Constitutional: Positive for malaise/fatigue. Negative for chills and fever.   HENT: Positive for hearing loss. Negative for nosebleeds.    Eyes: Negative for  blurred vision and double vision.   Respiratory: Negative for cough and shortness of breath.    Cardiovascular: Negative for chest pain and palpitations.   Gastrointestinal: Positive for abdominal pain. Negative for heartburn and vomiting.   Genitourinary: Negative for dysuria and urgency.   Musculoskeletal: Negative for back pain and falls.   Skin: Negative for itching and rash.   Neurological: Negative for dizziness and headaches.   Psychiatric/Behavioral: Negative for substance abuse. The patient is not nervous/anxious.    All other systems reviewed and are negative.       Physical Exam  Temp:  [36.7 °C (98 °F)-37.2 °C (98.9 °F)] 36.7 °C (98 °F)  Pulse:  [] 89  Resp:  [16-20] 20  BP: (112-127)/(54-59) 112/54  SpO2:  [91 %-96 %] 91 %    Physical Exam  Vitals and nursing note reviewed.   Constitutional:       General: She is not in acute distress.     Appearance: She is ill-appearing.   HENT:      Head: Normocephalic and atraumatic.      Right Ear: External ear normal.      Left Ear: External ear normal.      Mouth/Throat:      Pharynx: No oropharyngeal exudate or posterior oropharyngeal erythema.   Eyes:      General:         Right eye: No discharge.         Left eye: No discharge.   Cardiovascular:      Rate and Rhythm: Normal rate and regular rhythm.      Pulses: Normal pulses.      Heart sounds: No murmur heard.    No gallop.   Pulmonary:      Effort: Pulmonary effort is normal. No respiratory distress.      Breath sounds: Normal breath sounds. No wheezing or rhonchi.   Abdominal:      General: There is distension.      Tenderness: There is abdominal tenderness.      Comments: Prevena in place s/p surgery   Musculoskeletal:         General: No swelling or tenderness. Normal range of motion.      Cervical back: Normal range of motion and neck supple. No tenderness.   Skin:     General: Skin is warm and dry.   Neurological:      General: No focal deficit present.      Mental Status: She is alert and  oriented to person, place, and time. Mental status is at baseline.      Motor: No weakness.   Psychiatric:         Mood and Affect: Mood normal.         Behavior: Behavior normal.         Thought Content: Thought content normal.         Fluids    Intake/Output Summary (Last 24 hours) at 4/9/2022 1421  Last data filed at 4/9/2022 0926  Gross per 24 hour   Intake 240 ml   Output --   Net 240 ml       Laboratory  Recent Labs     04/08/22  0300 04/09/22  0618 04/09/22  0859   WBC  --   --  11.6*   RBC  --   --  2.71*   HEMOGLOBIN 8.5* 8.1* 8.6*   HEMATOCRIT 28.0* 26.0* 27.8*   MCV  --   --  102.6*   MCH  --   --  31.7   MCHC  --   --  30.9*   RDW  --   --  50.3*   PLATELETCT  --   --  291   MPV  --   --  11.6     Recent Labs     04/07/22  0215   SODIUM 134*   POTASSIUM 4.1   CHLORIDE 98   CO2 30   GLUCOSE 131*   BUN 14   CREATININE 0.68   CALCIUM 8.5                   Imaging  US-EXTREMITY VENOUS LOWER BILAT   Final Result      DX-ESOPHAGUS BARIUM SWALLOW SINGLE CONTRAST   Final Result      Contrast extends from the distal esophagus at the level of the gastroesophageal junction to the right of midline into a large collection likely representing either a lower esophageal or upper gastric diverticulum.      CT-CHEST (THORAX) WITH   Final Result      1.  Mediastinal collection of fluid and gas adjacent to an abnormal thickened . Underlying mass or inflammatory change is a consideration though previously this had a more benign appearance. Suggest further assessment with endoscopy.   2.  Small BILATERAL pleural effusions   3.  BILATERAL atelectasis      JOSEP ORTIZ was paged at 4/2/2022 10:19 AM.         DX-CHEST-PORTABLE (1 VIEW)   Final Result      Decreased lung volumes with small left subpulmonic effusion and likely basilar atelectasis      IR-PICC LINE PLACEMENT W/ GUIDANCE > AGE 5   Final Result                  Ultrasound-guided PICC placement performed by qualified nursing staff as    above.           DX-SMALL BOWEL SERIES   Final Result      Partial small bowel obstruction with contrast transit time to the colon exceeding 9 hours      5 cm paraesophageal hernia      NG tube terminates over the gastric fundus      DX-ABDOMEN FOR TUBE PLACEMENT   Final Result         Gastric drainage tube with tip projecting over the expected area of the stomach.      [id number] VIEW   Final Result      Long segmental small bowel dilatation concerning for distal small bowel obstruction      DX-ABDOMEN FOR TUBE PLACEMENT   Final Result      Enteric tube terminates over the stomach.      Small bowel loop dilatation concerning for small bowel obstruction      DX-ABDOMEN FOR TUBE PLACEMENT   Final Result         Gastric drainage tube with tip projecting over the expected area of the stomach.      DX-ABDOMEN FOR TUBE PLACEMENT   Final Result         Gastric drainage tube loops in the esophagus with tip projecting over the expected area of the upper esophagus.      CT-ABDOMEN-PELVIS WITH   Final Result      1.  There are similar changes of bowel wall thickening involving the terminal ileum and base of the cecum adjacent to postoperative change from prior appendectomy which could be infectious or inflammatory.   2.  There is mild proximal obstruction involving the ileum and distal jejunum in the lower abdomen and left mid abdomen with small bowel loops dilated up to 4 cm.   3.  There is no gross free air or pneumatosis.   4.  Moderate size hiatal hernia again seen.   5.  Minimally prominent biliary tree unchanged and physiologic, unchanged dating back to 2015.   6.  Simple hepatic cysts are unchanged.         DX-CHEST-PORTABLE (1 VIEW)   Final Result      1.  There is no acute cardiopulmonary process.           Assessment/Plan  * SBO (small bowel obstruction) (HCC)- (present on admission)  Assessment & Plan  Patient with recent terminal ileitis and small bowel obstruction last month  Treated with conservative therapy and antibiotics  however now with repeat symptoms and SBO  Surgery consulted by ERP: Dr. Mcneil was called, recommended NPO, NG, IVF, pain mgmt  CTX, flagyl    3/26: Pending surgical evaluation, we appreciate further recommendations.    3/27: Patient seems to have BMs now, we are awaiting further recommendations by general surgery. We have also consulted GI ( Dr Clay), we appreciate further recommendations.    3/28: Patient was vomiting again this morning with abdominal pain.  We have placed the NG tube back and have ordered KUB.  We appreciate further recommendations by general surgery and GI. (Dr Belle from GI will be evaluating the patient today).    3/29: Patient to have small bowel follow through study today, we appreciate further recommendations by GI and General Surgery    3/30: Patient still unable to tolerate PO, we will start TPN, we appreciate further recommendations by surgery and GI.  --UPDATE: I spoke to surgery, they would like to hold TPN for now, as she might undergo surgery today, we will order PICC line.    3/31: Patient underwent exploratory laparotomy yesterday by general surgery, the patient also underwent ileocecectomy with finding of ileocecal mass, pending pathology.  We appreciate further recommendations by general surgery.  We will start TPN today.    -- Pathology positive for adenocarcinoma, oncology was consulted, and patient will require outpatient follow-up with oncology.    Diarrhea  Assessment & Plan  Patient complaining of some watery stools.  The patient was on antibiotics.  We will order C. difficile.    Fever  Assessment & Plan  As per nursing patient has been having some low-grade fever.  She had been on ceftriaxone and Flagyl reported  We have switched to Zosyn yesterday pending cultures.  Due to the patient's extensive surgery and CT scan findings in the chest, will continue with antibiotics for now    4/5: The patient has no longer episode of fever.  Cultures have remained negative to  "date.  We will stop antibiotics at this time.    Adenocarcinoma of ileum (HCC)  Assessment & Plan  Patient was found to have an ileocecal mass on exploratory laparotomy.  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  We have consulted oncology, we appreciate further conditions.  We have ordered CT scan of the chest with contrast to evaluate for metastases.    Anemia- (present on admission)  Assessment & Plan  No signs of bleeding at this time, monitor    3/31: Patient underwent exploratory laparotomy yesterday.  Continue to monitor CBC.    4/5: Patient had bright red blood per rectum today, we have held anticoagulation.  This could be due to the patient's anastomosis site.  We appreciate further recommendations by surgery.    4/6: Patient mentions she is still having some blood per rectum. We will continue to monitor. Patient is hemodynamically stable for now.    4/7: Hemoglobin seems to be stable, patient mentioning that she is not having any more bloody stools.  I spoke to surgery who recommended starting DVT prophylaxis tomorrow if no evidence of further bleeding.    4/8: Hemoglobin seems to be stable.  We have started DVT prophylaxis today.  We are pending ultrasound of the lower extremities to see if she will require full dose anticoagulation.    4/9: Hemoglobin is stable.  We will not start full anticoagulation at this time.    History of deep venous thrombosis- (present on admission)  Assessment & Plan  As per previous hospitalist: \"Diagnosed 5/19/2020 6 hour car trip. Still on anticoagulation.\"    3/27: Will probably resume once surgery clears her NPO    4/5: We had restarted anticoagulation yesterday, however this morning the patient had bloody bowel movement.  So we have held anticoagulation for now.    4/7: I spoke to surgery today, if no evidence of further bleeding tomorrow we will start DVT prophylaxis    4/8: I spoke at length with the patient today, it seems that she was diagnosed last year around " May with a DVT after being immobile for several days.  This would mean that she had a provoked DVT.  She has had more than 6 months of treatment at this time will hold anticoagulation.  Of note she now does have a diagnosis of malignancy.  We will order ultrasound of the lower extremities if positive will restart anticoagulation if negative will not continue full dose anticoagulation.  Patient agrees at this time.    4/9: Ultrasound of the lower extremities negative for DVT.  We will not start full anticoagulation at this time.    Hypophosphatemia  Assessment & Plan  Replace as needed  monitor    Intraabdominal mass  Assessment & Plan  Patient underwent exploratory laparotomy.  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    4/3: Pathology positive for adenocarcinoma, oncology consulted    4/5: Patient will require follow up with oncology as outpatient        Acute hypoxemic respiratory failure (HCC)  Assessment & Plan  Patient currently requiring oxygen  This could be postsurgical.  Incentive spirometer encouraged.    4/8: Patient on room air today at the time of my evaluation.    Hypernatremia  Assessment & Plan  Mild, this could be due to volume depletion, patient has not had proper nutrition in a couple of days  Currently on D5 NS, we will increase the rate to 125 ml/hr  We were going to order TPN today, however surgery would like to hold, as patient will undergo surgery later today.  We will continue to monitor.    3/31: We have ordered TPN today    4/1: Improving    Generalized weakness  Assessment & Plan  PT/OT    Hyperglycemia  Assessment & Plan  Patient had recent A1c at 5.1  Monitor  No need to start insulin treatment at this time    NATIVIDAD (acute kidney injury) (HCC)  Assessment & Plan  Mild bump in Cr 0.6-->1.06  Likely in setting of decreased PO intake/pre-renal  IVF resuscitation  Continue to monitor    3/29: Improved. Cr is 0.55 today.    Abdominal pain  Assessment & Plan  Initial concern  "for sbo  Also concern for ileitis  General surgery and GI following patient, we appreciate further recommendations    Abnormal finding on imaging  Assessment & Plan  As part of the evaluation for metastases, we order a CT scan of the chest with contrast.  It reported: \"Mediastinal collection of fluid and gas adjacent to an abnormal thickened . Underlying mass or inflammatory change is a consideration though previously this had a more benign appearance.\"  We consulted GI, we appreciate further recommendations.    4/4: GI recommends esophagogram once able to tolerate PO and having BMs, we will discuss with surgery to see when would it be appropriate    4/5: Esophagogram reported: \"Contrast extends from the distal esophagus at the level of the gastroesophageal junction to the right of midline into a large collection likely representing either a lower esophageal or upper gastric diverticulum.\"  --I spoke to GI who recommended outpatient follow up.    COPD (chronic obstructive pulmonary disease) (HCC)- (present on admission)  Assessment & Plan  Not on home oxygen  Not having COPD exacerbation    Essential hypertension, benign- (present on admission)  Assessment & Plan  Home regimen: Valsartan 160 mg daily  Inpatient regimen: PRN medication, resume home medication once surgery clears for PO    3/29: Patient still NPO, however BP seems to be controlled, PRN medication for now       VTE prophylaxis: SCDs/TEDs    I have performed a physical exam and reviewed and updated ROS and Plan today (4/9/2022). In review of yesterday's note (4/8/2022), there are no changes except as documented above.      "

## 2022-04-09 NOTE — CARE PLAN
The patient is Stable - Low risk of patient condition declining or worsening    Shift Goals  Clinical Goals: Hgb will be above 7 this shift  Patient Goals: Rest    Progress made toward(s) clinical / shift goals:  Patient verbalizes understanding of plan of care. Lab values are monitored.    Patient is not progressing towards the following goals: N/A      Problem: Knowledge Deficit - Standard  Goal: Patient and family/care givers will demonstrate understanding of plan of care, disease process/condition, diagnostic tests and medications  Outcome: Progressing     Problem: Mobility  Goal: Patient's capacity to carry out activities will improve  Outcome: Progressing     Problem: Fall Risk  Goal: Patient will remain free from falls  Outcome: Progressing     Problem: Skin Integrity  Goal: Skin integrity is maintained or improved  Outcome: Progressing

## 2022-04-09 NOTE — ASSESSMENT & PLAN NOTE
Patient complaining of some watery stools.  The patient was on antibiotics.  We will order C. Difficile.    4/10: C. difficile negative, and the patient states that her diarrhea has improved.

## 2022-04-09 NOTE — DOCUMENTATION QUERY
[facility]                                                                       Query Response Note      PATIENT:               SATINDER LARES  ACCT #:                  [id number]  MRN:                     [id number]  :                      1935  ADMIT DATE:       3/25/2022 5:47 PM  DISCH DATE:          RESPONDING  PROVIDER #:        [id number]           QUERY TEXT:    Anemia is documented in the Medical Record. patient with bleeding per rectum and hemoptysis.  Recent surgery for intestinal obstruction, takes anticoagulants.  H&H on day of admission 12.4 & 39.1,  H&H 22 8.2 & 26.1. 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  Unspecified type of anemia per progress notes.  Please specify the underlying cause of the anemia.    NOTE:  If the appropriate response is not listed below, please respond with a new note.              The patient's Clinical Indicators include:  - Findings:  Unspecified type of anemia per progress notes 3/31 - .    - PN :  Patient had bright red blood per rectum today, we have held anticoagulation.  This could be due to the patient's anastomosis site.    -PN :   Patient mentions she is still having some blood per rectum. We will continue to monitor. Patient is hemodynamically stable for now.  - PN :  Hemoglobin seems to be stable, patient mentioning that she is not having any more bloody stools.  I spoke to surgery who recommended starting DVT prophylaxis tomorrow if no evidence of further bleeding.  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      Thank You,  Abbie Johnson, RN  Clinical Documentation   [email or web address]  [email or web address] via [email or web address] Messenger  Options provided:   -- Blood loss anemia, acute   -- Blood loss anemia, chronic   -- Chronic anemia   -- Due to/in/with neoplastic disease   -- Postoperative blood loss anemia   -- Other type of anemia, please specify type of anemia   -- Unable to determine      Query created by: Abbie Johnson on 4/8/2022 2:50 PM    RESPONSE TEXT:    Blood loss anemia, acute          Electronically signed by:  JOSEP ALEJO MD 4/9/2022 6:13 AM

## 2022-04-09 NOTE — PROGRESS NOTES
Received bedside patient report from day shift RN. Patient resting in bed, no complaints at this time. Patient is awake, A&Ox4, and denies pain. Safety precautions in place.

## 2022-04-09 NOTE — PROGRESS NOTES
Received report from night RN, assumed care. Pt resting, exhibiting no signs of distress. Bed in lowest position with call light and belongings within reach.

## 2022-04-10 LAB
BASOPHILS # BLD AUTO: 0.5 % (ref 0–1.8)
BASOPHILS # BLD: 0.04 K/UL (ref 0–0.12)
EOSINOPHIL # BLD AUTO: 0.57 K/UL (ref 0–0.51)
EOSINOPHIL NFR BLD: 6.6 % (ref 0–6.9)
ERYTHROCYTE [DISTWIDTH] IN BLOOD BY AUTOMATED COUNT: 51.3 FL (ref 35.9–50)
GLUCOSE BLD STRIP.AUTO-MCNC: 115 MG/DL (ref 65–99)
GLUCOSE BLD STRIP.AUTO-MCNC: 119 MG/DL (ref 65–99)
GLUCOSE BLD STRIP.AUTO-MCNC: 130 MG/DL (ref 65–99)
GLUCOSE BLD STRIP.AUTO-MCNC: 162 MG/DL (ref 65–99)
HCT VFR BLD AUTO: 25.5 % (ref 37–47)
HGB BLD-MCNC: 7.9 G/DL (ref 12–16)
IMM GRANULOCYTES # BLD AUTO: 0.1 K/UL (ref 0–0.11)
IMM GRANULOCYTES NFR BLD AUTO: 1.2 % (ref 0–0.9)
LYMPHOCYTES # BLD AUTO: 0.83 K/UL (ref 1–4.8)
LYMPHOCYTES NFR BLD: 9.6 % (ref 22–41)
MCH RBC QN AUTO: 32 PG (ref 27–33)
MCHC RBC AUTO-ENTMCNC: 31 G/DL (ref 33.6–35)
MCV RBC AUTO: 103.2 FL (ref 81.4–97.8)
MONOCYTES # BLD AUTO: 0.76 K/UL (ref 0–0.85)
MONOCYTES NFR BLD AUTO: 8.8 % (ref 0–13.4)
NEUTROPHILS # BLD AUTO: 6.32 K/UL (ref 2–7.15)
NEUTROPHILS NFR BLD: 73.3 % (ref 44–72)
NRBC # BLD AUTO: 0 K/UL
NRBC BLD-RTO: 0 /100 WBC
PLATELET # BLD AUTO: 256 K/UL (ref 164–446)
PMV BLD AUTO: 12.2 FL (ref 9–12.9)
RBC # BLD AUTO: 2.47 M/UL (ref 4.2–5.4)
WBC # BLD AUTO: 8.6 K/UL (ref 4.8–10.8)

## 2022-04-10 PROCEDURE — 700102 HCHG RX REV CODE 250 W/ 637 OVERRIDE(OP): Performed by: INTERNAL MEDICINE

## 2022-04-10 PROCEDURE — 99232 SBSQ HOSP IP/OBS MODERATE 35: CPT | Performed by: INTERNAL MEDICINE

## 2022-04-10 PROCEDURE — A9270 NON-COVERED ITEM OR SERVICE: HCPCS | Performed by: INTERNAL MEDICINE

## 2022-04-10 PROCEDURE — 770006 HCHG ROOM/CARE - MED/SURG/GYN SEMI*

## 2022-04-10 PROCEDURE — 97116 GAIT TRAINING THERAPY: CPT

## 2022-04-10 PROCEDURE — 97530 THERAPEUTIC ACTIVITIES: CPT

## 2022-04-10 PROCEDURE — 94760 N-INVAS EAR/PLS OXIMETRY 1: CPT

## 2022-04-10 PROCEDURE — 97110 THERAPEUTIC EXERCISES: CPT

## 2022-04-10 PROCEDURE — 700111 HCHG RX REV CODE 636 W/ 250 OVERRIDE (IP): Performed by: INTERNAL MEDICINE

## 2022-04-10 PROCEDURE — 82962 GLUCOSE BLOOD TEST: CPT | Mod: 91

## 2022-04-10 PROCEDURE — 85025 COMPLETE CBC W/AUTO DIFF WBC: CPT

## 2022-04-10 PROCEDURE — 97535 SELF CARE MNGMENT TRAINING: CPT

## 2022-04-10 RX ADMIN — OMEPRAZOLE 20 MG: 20 CAPSULE, DELAYED RELEASE ORAL at 17:29

## 2022-04-10 RX ADMIN — HEPARIN SODIUM 5000 UNITS: 5000 INJECTION INTRAVENOUS; SUBCUTANEOUS at 06:41

## 2022-04-10 RX ADMIN — HEPARIN SODIUM 5000 UNITS: 5000 INJECTION INTRAVENOUS; SUBCUTANEOUS at 21:54

## 2022-04-10 RX ADMIN — HEPARIN SODIUM 5000 UNITS: 5000 INJECTION INTRAVENOUS; SUBCUTANEOUS at 13:33

## 2022-04-10 RX ADMIN — OMEPRAZOLE 20 MG: 20 CAPSULE, DELAYED RELEASE ORAL at 06:43

## 2022-04-10 RX ADMIN — INSULIN HUMAN 2 UNITS: 100 INJECTION, SOLUTION PARENTERAL at 11:40

## 2022-04-10 ASSESSMENT — ENCOUNTER SYMPTOMS
HEARTBURN: 0
PALPITATIONS: 0
DIZZINESS: 0
DOUBLE VISION: 0
BACK PAIN: 0
CHILLS: 0
FEVER: 0
ABDOMINAL PAIN: 1
SHORTNESS OF BREATH: 0
BLURRED VISION: 0
NERVOUS/ANXIOUS: 0
FALLS: 0
HEADACHES: 0
COUGH: 0
VOMITING: 0

## 2022-04-10 ASSESSMENT — COGNITIVE AND FUNCTIONAL STATUS - GENERAL
CLIMB 3 TO 5 STEPS WITH RAILING: A LITTLE
MOBILITY SCORE: 19
MOVING TO AND FROM BED TO CHAIR: A LITTLE
DRESSING REGULAR LOWER BODY CLOTHING: A LITTLE
DRESSING REGULAR UPPER BODY CLOTHING: A LITTLE
WALKING IN HOSPITAL ROOM: A LITTLE
HELP NEEDED FOR BATHING: A LITTLE
MOVING FROM LYING ON BACK TO SITTING ON SIDE OF FLAT BED: A LITTLE
STANDING UP FROM CHAIR USING ARMS: A LITTLE
DAILY ACTIVITIY SCORE: 19
SUGGESTED CMS G CODE MODIFIER DAILY ACTIVITY: CK
SUGGESTED CMS G CODE MODIFIER MOBILITY: CK
TOILETING: A LITTLE
PERSONAL GROOMING: A LITTLE

## 2022-04-10 ASSESSMENT — LIFESTYLE VARIABLES: SUBSTANCE_ABUSE: 0

## 2022-04-10 ASSESSMENT — GAIT ASSESSMENTS
DISTANCE (FEET): 30
GAIT LEVEL OF ASSIST: SUPERVISED
DEVIATION: BRADYKINETIC
ASSISTIVE DEVICE: FRONT WHEEL WALKER
DISTANCE (FEET): 15

## 2022-04-10 ASSESSMENT — PAIN DESCRIPTION - PAIN TYPE
TYPE: SURGICAL PAIN
TYPE: SURGICAL PAIN

## 2022-04-10 NOTE — PROGRESS NOTES
"Hospital Medicine Daily Progress Note    Date of Service  4/10/2022    Chief Complaint  Licha Pope is a 86 y.o. female admitted 3/25/2022 with abdominal pain.    Hospital Course  As per chart review  \"86 y.o. female with a history of hypertension, COPD, GERD, DVT dx 5/19/21 after long car trip (still on anticoagulation) who presented 3/25/2022 with abdominal pain.     Was recently admitted for terminal ileitis, SBO and pancreatitis on 2/17/2022, she was treated conservatively with IV fluids and pain control.  Right upper quadrant showed dependent gallbladder sludge versus small stones.  MRCP showed cholelithiasis and common bile duct 11.4 mm unchanged since 2015.  She was discharged with antibiotics and recommended to follow-up with GI, surgery and PCP after discharge.  She saw surgeon, she recommended cholecystectomy but wanted her to see GI doctor. GI doctor recommended cholecystectomy.  Has appt with surgery next Wed.       Patient never really felt like her abdominal pain improved however was stable until 2 days ago.  Abdominal pain became very severe however intermittent and would come in waves.  Patient became very fatigued and was sleeping most the day.  She had chills and was very weak.  Abdominal pain was diffuse, intermittent, described as sharp pain, 10/10 pain, went to PCP and doctor sent her to ER.  Did have some n/v, had some dark red or purple, size of quarter.  Passing gas.  Yesterday had BM.  Did have some dark stools, unsure if black was \"half asleep\".  Last night had lower back pain, now improved.      In ER she was afebrile, mildly tachycardic (heart rate 111), normal blood pressure and mild hypoxia requiring 1 L nasal cannula.  Labs remarkable for WBC 11.6 with a left shift, creatinine 1.06, calcium 10.6, lipase 104, troponin XX, lactic acid normal.  CT abdomen pelvis with contrast showed similar changes of bowel wall thickening involving the terminal ileum and base of the cecum adjacent " "to postoperative change from prior appendectomy which could be infectious or inflammatory.  There is mild proximal obstruction involving the ileum and distal jejunum in the lower abdomen left mid abdomen with small bowel loops dilated up to 4 cm.  There is no gross free air or pneumatosis.  Moderate size hiatal hernia again seen.  Minimal prominent biliary tree unchanged and physiologic, unchanged a back to 2015.  Simple hepatic cyst unchanged.  She was given IV fluids, Zosyn and general surgery was consulted.  Dr. Mcneil to see patient.  Recommended NG, IV fluids.\"    Interval Problem Update  3/26: Patient seen at bedside this morning.  Patient is hard of hearing.  Patient with NG tube in place.  Still complaining of abdominal pain some tenderness on examination.  General surgery has been consulted, we appreciate further recommendations.  Continue antibiotics for now.    3/27: Patient seen at bedside this morning.  It seems that the patient's NG tube came off, however the patient it seems that now is having regular bowel movements.  She still complaining of abdominal pain.  We have consulted GI, we appreciate further recommendations.  We also appreciate further recommendations by general surgery.  We will continue n.p.o. until surgery reevaluate the patient.  We will continue with antibiotics for now.  --The patient told me she used to see Dr Tarun Lewis at [facility], so I have consulted Dr Clay, we appreciate further recommendations.    3/28: Patient seen at bedside this morning.  The patient was vomiting this morning, she still complaining of abdominal pain.  We have placed the NG tube back on, we have ordered KUB.  We appreciate further recommendations by general surgery.  Also it seems that the patient had been seeing Dr. Ash earlier this month and she would like to continue seeing that group.  We have consulted Dr. Belle from GI, we appreciate further recommendations.  We will keep the patient n.p.o. for " now until surgical evaluation.  We will continue with antibiotics.    3/29: Patient seen at bedside this morning.  No family was present at bedside directly on my evaluation.  I tried calling the patient's son to give him an update however there was no answer.  Patient still n.p.o., with NG tube to suction.  General surgery and GI following the patient, we appreciate further recommendations.  The patient will undergo small bowel follow-through study today.  I asked the patient and it seems that she has not had a proper meal since last Thursday, if by tomorrow we are unable to feed the patient will consider TPN while further management is being done.    3/30: Patient seen at bedside this morning.  No family present at bedside, however I was able to talk to the patient's son over the phone and gave him an update.  Patient still n.p.o. with NG tube, I spoke to surgery and they will take her to the operating room later today.  We were going to start TPN today, however surgery would like to hold for now.  Patient with mild hyponatremia, this is most likely due to volume depletion as such she has not really eaten much for the last couple days.  We have increased the rate of the D5 NS, monitor and repeat sodium values.  We will order PICC line in preparation of possible TPN.  We appreciate further recommendations by general surgery.    3/31: Patient seen at bedside this morning.  No family members present at bedside, however again I was able to talk to the patient's son over the phone to update him on the patient's clinical status.  The patient underwent exploratory laparotomy with ileocecectomy yesterday by general surgery.  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  We will start the patient on TPN today.  The patient was seen at bedside, laying in bed currently comfortably with NG tube in place.  Patient also with Prevena status post exploratory laparotomy.  Patient requiring oxygen,  this is most likely postsurgical, we have ordered chest x-ray.  As per nursing no other overnight events reported.    4/1: Patient seen at bedside this morning.  No family present at bedside.  We are still pending pathology results.  Patient still not passing gas or having bowel movements.  We started TPN yesterday.  We will continue to monitor closely.    4/2: Patient seen at bedside this morning.  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  I have spoken with the patient and the patient's son regarding the diagnosis.  We have consulted oncology, we appreciate further recommendations.  We have ordered chest CT with contrast to evaluate for metastases.  Patient still not passing gas or having bowel movements.  We will continue with TPN.  We appreciate further recommendations by surgery.    4/3: Patient seen at bedside this morning.  At the time of my evaluation, the patient had mentioned to me that she has not had a bowel movement or passing gas oncology has been consulted, we appreciate further recommendations.  We did CT scan of the chest which reported mediastinal collection of fluid and gas adjacent, concerning for communication with esophagus.  We consulted GI who did not recommend emergent endoscopy and recommend Gastrografin esophagram once patient has oral intake and bowel movements.  We appreciate further recommendations.  Overall prognosis remains guarded.  As per nursing patient having episodes of fever due to the patient's complex history with recent GI surgery as well as abnormal CT scan we will continue the patient on Zosyn for now, we have ordered blood cultures.    4/4: Patient seen at bedside this morning.  She was laying in bed comfortable.  As per nursing she had a bowel movement yesterday.  We appreciate further recommendations by general surgery.  We will discuss the case with general surgery to see if we can move forward with the esophagogram that GI recommended.  Does not  appear that the patient had an episode of fever yesterday.  We will continue with antibiotics for now pending cultures.  We have put referral for inpatient rehab as well as order home health anticipating the patient could potentially be discharged sometime this week.  We appreciate recommendations by case management.  Oncology evaluated the patient and recommended outpatient follow-up at his clinic in 2 to 3 weeks.    4/5: Patient seen at bedside this morning.  He was laying in bed comfortably.  No family was present at bedside.  As per nursing patient had a bloody bowel movement this morning, anticoagulation has not been held.  I discussed the case with surgery who recommended holding anticoagulation for 48 hours.  We will monitor hemoglobin and transfuse if needed.  Patient slowly having p.o. intake, will continue TPN for now until surgery recommends off of it.  Overall prognosis remains guarded.  The patient has not had another episode of fever, blood cultures have remained negative.  We will discontinue antibiotics now.  We will continue to monitor closely.    4/6: Patient seen at bedside this morning.  Continues with patient still having some bloody bowel movements.  Hemoglobin slowly trending down, however hemodynamically stable.  We will continue to trend hemoglobin and transfuse if needed.  The patient states that she is only eating between 10 to 20% of her meals.  We will continue with TPN for now.  We appreciate further recommendations by surgery.  We will continue to monitor closely.  No family members present at bedside at the time of evaluation.  I tried to talk to the patient's son over the phone without success.    4/7: Patient seen at bedside this morning.  No family present at bedside.  Again I tried to talk to the patient's son over the phone, I have left a voicemail, however I have been unable to reach him.  Hemoglobin seems to be stable, the patient denies any more bloody stools.  The patient  states that she is eating between 20 to 25% of her meals, I spoke to surgery and recommended adding supplement.  And if she is tolerating with supplement, we will most likely discontinue TPN tomorrow.  We appreciate further recommendations by surgery.    4/8: Patient seen at bedside this morning.  I talked to surgery, we will start the patient on DVT prophylaxis with heparin.  I had a long conversation with the patient, it seems the patient denies diagnosed with DVT last in May after a period of immobilization.  That would make a provoked DVT and she has been on anticoagulation for more than 6 months.  We will order ultrasound of the lower extremities, if negative will hold full anticoagulation and if positive will restart anticoagulation, as she now has a diagnosis of malignancy.  We will stop TPN as per surgery.  We are pending SNF placement.  Overall prognosis still guarded.  I also talked to the patient's son at bedside.    4/9: Patient seen at bedside this morning.  Patient today complaining of some watery stools.  She was on antibiotics.  We have ordered C. difficile.  Ultrasound of the lower extremities did not report DVT, we will not start full anticoagulation anymore.  We will continue on need for DVT prophylaxis.  Hemoglobin seems to be stable and not complaining of bloody stools at this time.  We are pending placement to SNF.  We appreciate further recommendations by case management.    4/10: Patient seen at bedside this morning.  Patient states that her diarrhea is much improved.  C. difficile is negative.  Hemoglobin this morning is 7.9, however yesterday was 8.1.  This most likely lab variability.  Patient is hemodynamically stable, there are no signs of bleeding.  Patient denying any blood in stools.  This could be due to having the patient have regular blood draws.  At this time we will not do daily draws and monitor clinical status.  We are pending SNF placement, we appreciate further  recommendations by case management.  The patient will require follow-up with oncology as an outpatient.        I have personally seen and examined the patient at bedside. I discussed the plan of care with patient, bedside RN and charge RN.    Consultants/Specialty  general surgery and oncology    Code Status  DNAR/DNI    Disposition  Patient is not medically cleared for discharge.   Anticipate discharge to pending clinical evolution.    Review of Systems  Review of Systems   Constitutional: Positive for malaise/fatigue. Negative for chills and fever.   HENT: Positive for hearing loss. Negative for nosebleeds.    Eyes: Negative for blurred vision and double vision.   Respiratory: Negative for cough and shortness of breath.    Cardiovascular: Negative for chest pain and palpitations.   Gastrointestinal: Positive for abdominal pain. Negative for heartburn and vomiting.   Genitourinary: Negative for dysuria and urgency.   Musculoskeletal: Negative for back pain and falls.   Skin: Negative for itching and rash.   Neurological: Negative for dizziness and headaches.   Psychiatric/Behavioral: Negative for substance abuse. The patient is not nervous/anxious.    All other systems reviewed and are negative.       Physical Exam  Temp:  [36.9 °C (98.5 °F)-37.2 °C (98.9 °F)] 36.9 °C (98.5 °F)  Pulse:  [92-93] 93  Resp:  [20] 20  BP: (110-127)/(59-68) 127/68  SpO2:  [92 %-94 %] 93 %    Physical Exam  Vitals and nursing note reviewed.   Constitutional:       General: She is not in acute distress.     Appearance: She is ill-appearing.   HENT:      Head: Normocephalic and atraumatic.      Right Ear: External ear normal.      Left Ear: External ear normal.      Mouth/Throat:      Pharynx: No oropharyngeal exudate or posterior oropharyngeal erythema.   Eyes:      General:         Right eye: No discharge.         Left eye: No discharge.   Cardiovascular:      Rate and Rhythm: Normal rate and regular rhythm.      Pulses: Normal pulses.       Heart sounds: No murmur heard.    No gallop.   Pulmonary:      Effort: Pulmonary effort is normal. No respiratory distress.      Breath sounds: Normal breath sounds. No wheezing or rhonchi.   Abdominal:      General: There is distension.      Tenderness: There is abdominal tenderness.      Comments: Prevena in place s/p surgery   Musculoskeletal:         General: No swelling or tenderness. Normal range of motion.      Cervical back: Normal range of motion and neck supple. No tenderness.   Skin:     General: Skin is warm and dry.   Neurological:      General: No focal deficit present.      Mental Status: She is alert and oriented to person, place, and time. Mental status is at baseline.      Motor: No weakness.   Psychiatric:         Mood and Affect: Mood normal.         Behavior: Behavior normal.         Thought Content: Thought content normal.         Fluids    Intake/Output Summary (Last 24 hours) at 4/10/2022 1027  Last data filed at 4/9/2022 2300  Gross per 24 hour   Intake 440 ml   Output --   Net 440 ml       Laboratory  Recent Labs     04/09/22  0618 04/09/22  0859 04/10/22  0110   WBC  --  11.6* 8.6   RBC  --  2.71* 2.47*   HEMOGLOBIN 8.1* 8.6* 7.9*   HEMATOCRIT 26.0* 27.8* 25.5*   MCV  --  102.6* 103.2*   MCH  --  31.7 32.0   MCHC  --  30.9* 31.0*   RDW  --  50.3* 51.3*   PLATELETCT  --  291 256   MPV  --  11.6 12.2                       Imaging  US-EXTREMITY VENOUS LOWER BILAT   Final Result      DX-ESOPHAGUS BARIUM SWALLOW SINGLE CONTRAST   Final Result      Contrast extends from the distal esophagus at the level of the gastroesophageal junction to the right of midline into a large collection likely representing either a lower esophageal or upper gastric diverticulum.      CT-CHEST (THORAX) WITH   Final Result      1.  Mediastinal collection of fluid and gas adjacent to an abnormal thickened . Underlying mass or inflammatory change is a consideration though previously this had a more benign  appearance. Suggest further assessment with endoscopy.   2.  Small BILATERAL pleural effusions   3.  BILATERAL atelectasis      JOSEP ORTIZ was paged at 4/2/2022 10:19 AM.         DX-CHEST-PORTABLE (1 VIEW)   Final Result      Decreased lung volumes with small left subpulmonic effusion and likely basilar atelectasis      IR-PICC LINE PLACEMENT W/ GUIDANCE > AGE 5   Final Result                  Ultrasound-guided PICC placement performed by qualified nursing staff as    above.          DX-SMALL BOWEL SERIES   Final Result      Partial small bowel obstruction with contrast transit time to the colon exceeding 9 hours      5 cm paraesophageal hernia      NG tube terminates over the gastric fundus      DX-ABDOMEN FOR TUBE PLACEMENT   Final Result         Gastric drainage tube with tip projecting over the expected area of the stomach.      [id number] VIEW   Final Result      Long segmental small bowel dilatation concerning for distal small bowel obstruction      DX-ABDOMEN FOR TUBE PLACEMENT   Final Result      Enteric tube terminates over the stomach.      Small bowel loop dilatation concerning for small bowel obstruction      DX-ABDOMEN FOR TUBE PLACEMENT   Final Result         Gastric drainage tube with tip projecting over the expected area of the stomach.      DX-ABDOMEN FOR TUBE PLACEMENT   Final Result         Gastric drainage tube loops in the esophagus with tip projecting over the expected area of the upper esophagus.      CT-ABDOMEN-PELVIS WITH   Final Result      1.  There are similar changes of bowel wall thickening involving the terminal ileum and base of the cecum adjacent to postoperative change from prior appendectomy which could be infectious or inflammatory.   2.  There is mild proximal obstruction involving the ileum and distal jejunum in the lower abdomen and left mid abdomen with small bowel loops dilated up to 4 cm.   3.  There is no gross free air or pneumatosis.   4.  Moderate size  hiatal hernia again seen.   5.  Minimally prominent biliary tree unchanged and physiologic, unchanged dating back to 2015.   6.  Simple hepatic cysts are unchanged.         DX-CHEST-PORTABLE (1 VIEW)   Final Result      1.  There is no acute cardiopulmonary process.           Assessment/Plan  * SBO (small bowel obstruction) (HCC)- (present on admission)  Assessment & Plan  Patient with recent terminal ileitis and small bowel obstruction last month  Treated with conservative therapy and antibiotics however now with repeat symptoms and SBO  Surgery consulted by ERP: Dr. Mcneil was called, recommended NPO, NG, IVF, pain mgmt  CTX, flagyl    3/26: Pending surgical evaluation, we appreciate further recommendations.    3/27: Patient seems to have BMs now, we are awaiting further recommendations by general surgery. We have also consulted GI ( Dr Clay), we appreciate further recommendations.    3/28: Patient was vomiting again this morning with abdominal pain.  We have placed the NG tube back and have ordered KUB.  We appreciate further recommendations by general surgery and GI. (Dr Belle from GI will be evaluating the patient today).    3/29: Patient to have small bowel follow through study today, we appreciate further recommendations by GI and General Surgery    3/30: Patient still unable to tolerate PO, we will start TPN, we appreciate further recommendations by surgery and GI.  --UPDATE: I spoke to surgery, they would like to hold TPN for now, as she might undergo surgery today, we will order PICC line.    3/31: Patient underwent exploratory laparotomy yesterday by general surgery, the patient also underwent ileocecectomy with finding of ileocecal mass, pending pathology.  We appreciate further recommendations by general surgery.  We will start TPN today.    -- Pathology positive for adenocarcinoma, oncology was consulted, and patient will require outpatient follow-up with oncology.    Diarrhea  Assessment &  Plan  Patient complaining of some watery stools.  The patient was on antibiotics.  We will order C. Difficile.    4/10: C. difficile negative, and the patient states that her diarrhea has improved.    Fever  Assessment & Plan  As per nursing patient has been having some low-grade fever.  She had been on ceftriaxone and Flagyl reported  We have switched to Zosyn yesterday pending cultures.  Due to the patient's extensive surgery and CT scan findings in the chest, will continue with antibiotics for now    4/5: The patient has no longer episode of fever.  Cultures have remained negative to date.  We will stop antibiotics at this time.    Adenocarcinoma of ileum (HCC)  Assessment & Plan  Patient was found to have an ileocecal mass on exploratory laparotomy.  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  We have consulted oncology, we appreciate further conditions.  We have ordered CT scan of the chest with contrast to evaluate for metastases.    Anemia- (present on admission)  Assessment & Plan  No signs of bleeding at this time, monitor    3/31: Patient underwent exploratory laparotomy yesterday.  Continue to monitor CBC.    4/5: Patient had bright red blood per rectum today, we have held anticoagulation.  This could be due to the patient's anastomosis site.  We appreciate further recommendations by surgery.    4/6: Patient mentions she is still having some blood per rectum. We will continue to monitor. Patient is hemodynamically stable for now.    4/7: Hemoglobin seems to be stable, patient mentioning that she is not having any more bloody stools.  I spoke to surgery who recommended starting DVT prophylaxis tomorrow if no evidence of further bleeding.    4/8: Hemoglobin seems to be stable.  We have started DVT prophylaxis today.  We are pending ultrasound of the lower extremities to see if she will require full dose anticoagulation.    4/9: Hemoglobin is stable.  We will not start full anticoagulation at this  "time.    4/10: Hemoglobin seems to be stable.  This morning the hemoglobin is 7.9, however yesterday morning it was 8.1.  Patient is hemodynamically stable.  No signs of bleeding.  Patient denies blood in stools.  This could be due to having regular blood draws.  We will not do regular blood draws anymore and monitor the patient's clinical status.    History of deep venous thrombosis- (present on admission)  Assessment & Plan  As per previous hospitalist: \"Diagnosed 5/19/2020 6 hour car trip. Still on anticoagulation.\"    3/27: Will probably resume once surgery clears her NPO    4/5: We had restarted anticoagulation yesterday, however this morning the patient had bloody bowel movement.  So we have held anticoagulation for now.    4/7: I spoke to surgery today, if no evidence of further bleeding tomorrow we will start DVT prophylaxis    4/8: I spoke at length with the patient today, it seems that she was diagnosed last year around May with a DVT after being immobile for several days.  This would mean that she had a provoked DVT.  She has had more than 6 months of treatment at this time will hold anticoagulation.  Of note she now does have a diagnosis of malignancy.  We will order ultrasound of the lower extremities if positive will restart anticoagulation if negative will not continue full dose anticoagulation.  Patient agrees at this time.    4/9: Ultrasound of the lower extremities negative for DVT.  We will not start full anticoagulation at this time.    Hypophosphatemia  Assessment & Plan  Replace as needed  monitor    Intraabdominal mass  Assessment & Plan  Patient underwent exploratory laparotomy.  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    4/3: Pathology positive for adenocarcinoma, oncology consulted    4/5: Patient will require follow up with oncology as outpatient        Acute hypoxemic respiratory failure (HCC)  Assessment & Plan  Patient currently requiring oxygen  This could be " "postsurgical.  Incentive spirometer encouraged.    4/8: Patient on room air today at the time of my evaluation.    Hypernatremia  Assessment & Plan  Mild, this could be due to volume depletion, patient has not had proper nutrition in a couple of days  Currently on D5 NS, we will increase the rate to 125 ml/hr  We were going to order TPN today, however surgery would like to hold, as patient will undergo surgery later today.  We will continue to monitor.    3/31: We have ordered TPN today    4/1: Improving    Generalized weakness  Assessment & Plan  PT/OT    Hyperglycemia  Assessment & Plan  Patient had recent A1c at 5.1  Monitor  No need to start insulin treatment at this time    NATIVIDAD (acute kidney injury) (HCC)  Assessment & Plan  Mild bump in Cr 0.6-->1.06  Likely in setting of decreased PO intake/pre-renal  IVF resuscitation  Continue to monitor    3/29: Improved. Cr is 0.55 today.    Abdominal pain  Assessment & Plan  Initial concern for sbo  Also concern for ileitis  General surgery and GI following patient, we appreciate further recommendations    Abnormal finding on imaging  Assessment & Plan  As part of the evaluation for metastases, we order a CT scan of the chest with contrast.  It reported: \"Mediastinal collection of fluid and gas adjacent to an abnormal thickened . Underlying mass or inflammatory change is a consideration though previously this had a more benign appearance.\"  We consulted GI, we appreciate further recommendations.    4/4: GI recommends esophagogram once able to tolerate PO and having BMs, we will discuss with surgery to see when would it be appropriate    4/5: Esophagogram reported: \"Contrast extends from the distal esophagus at the level of the gastroesophageal junction to the right of midline into a large collection likely representing either a lower esophageal or upper gastric diverticulum.\"  --I spoke to GI who recommended outpatient follow up.    COPD (chronic obstructive pulmonary " disease) (HCC)- (present on admission)  Assessment & Plan  Not on home oxygen  Not having COPD exacerbation    Essential hypertension, benign- (present on admission)  Assessment & Plan  Home regimen: Valsartan 160 mg daily  Inpatient regimen: PRN medication, resume home medication once surgery clears for PO    3/29: Patient still NPO, however BP seems to be controlled, PRN medication for now       VTE prophylaxis: SCDs/TEDs    I have performed a physical exam and reviewed and updated ROS and Plan today (4/10/2022). In review of yesterday's note (4/9/2022), there are no changes except as documented above.

## 2022-04-10 NOTE — PROGRESS NOTES
85 y/o female POD11 ileocecectomy  Doing well. Tolerating diet,  Abdomen soft, NT, incision c/d/i      Plan:  Appreciate medical care  diet as tolerated, ensure TID ensures  transition to rehab facility as able

## 2022-04-10 NOTE — PROGRESS NOTES
Received bedside report.  Assumed pt care.   Assessment and chart check complete.  Pt is AA0X4, no signs of distress, denies nausea/vomiting  PICC line and abdominal binder in place.  Fall precautions in place, treaded socks on pt, bed in low position, bed alarm on.   Call light within reach.   Educated pt to call if needing anything.   Hourly rounding.

## 2022-04-10 NOTE — THERAPY
Physical Therapy   Daily Treatment     Patient Name: Licha Pope  Age:  86 y.o., Sex:  female  Medical Record #: [id number]  Today's Date: 4/10/2022     Precautions  Precautions: (P) Fall Risk (due to advanced age and deconditoning)  Comments: (P) no hx of falls    Assessment    Pt is progressing well towards goals. Slight dizziness at EOB when first up. Pt demo's steady gait with FWW for short distance. Good safety awareness and use of FWW. Pt fatigued a after moderate activity. Able to self pace and take standing rest when needed. Recommend HHPT services if family is available to assist pt at home. Will continue to follow pt during acute stay.       Plan    Continue current treatment plan.    DC Equipment Recommendations: (P) Front-Wheel Walker  Discharge Recommendations: (P) Recommend home health for continued physical therapy services         04/10/22 1100   Total Time Spent   Total Time Spent (Mins) 38   Charge Group   PT Gait Training 1   PT Therapeutic Activities 1   PT Therapeutic Exercise 1   Precautions   Precautions Fall Risk  (due to advanced age and deconditoning)   Comments no hx of falls   Vitals   O2 Delivery Device None - Room Air   Pain 0 - 10 Group   Therapist Pain Assessment 0;Post Activity Pain Same as Prior to Activity;Nurse Notified   Cognition    Level of Consciousness Alert   Comments pleasant cooperative and motivated   Passive ROM Lower Body   Passive ROM Lower Body WDL   Active ROM Lower Body    Active ROM Lower Body  WDL   Strength Lower Body   Lower Body Strength  WDL   Comments for mobility and gait   Sensation Lower Body   Lower Extremity Sensation   WDL   Lower Body Muscle Tone   Lower Body Muscle Tone  WDL   Supine Lower Body Exercise   Ankle Pumps Reciprocal;Bilateral;1 set of 10   Comments marching and LAQ in sitting   Sitting Lower Body Exercises   Long Arc Quad 1 set of 10;Bilateral   Other Treatments   Other Treatments Provided paced activity   Neurological Concerns    Neurological Concerns No   Balance   Sitting Balance (Static) Fair +   Sitting Balance (Dynamic) Fair   Standing Balance (Static) Fair +   Standing Balance (Dynamic) Fair +   Weight Shift Sitting Good   Weight Shift Standing Fair   Comments with FWW. no overt LOB   Gait Analysis   Gait Level Of Assist Supervised   Assistive Device Front Wheel Walker   Distance (Feet) 30  (x2 plus 100 with standing rested for aDL's and fatique)   Deviation Bradykinetic   # of Stairs Climbed 0   Weight Bearing Status full   Vision Deficits Impacting Mobility none   Skilled Intervention Verbal Cuing;Tactile Cuing   Comments . Pt shows good ability to self pace, with standing rest. decreased endurance noted. pt has ramp at home   Bed Mobility    Supine to Sit Supervised   Sit to Supine   (pt left up in chair.)   Scooting Supervised   Rolling Supervised   Comments pt altille dizzy when first sitting EOB   Functional Mobility   Sit to Stand Standby Assist   Bed, Chair, Wheelchair Transfer Standby Assist   Toilet Transfers Standby Assist   Transfer Method Stand Step   Mobility with fWW   Comments pt demo's safe mobility and use of FWW while in bathroom and at sink   How much difficulty does the patient currently have...   Turning over in bed (including adjusting bedclothes, sheets and blankets)? 4   Sitting down on and standing up from a chair with arms (e.g., wheelchair, bedside commode, etc.) 3   Moving from lying on back to sitting on the side of the bed? 3   How much help from another person does the patient currently need...   Moving to and from a bed to a chair (including a wheelchair)? 3   Need to walk in a hospital room? 3   Climbing 3-5 steps with a railing? 3   6 clicks Mobility Score 19   Activity Tolerance   Sitting in Chair 1 hour recliner   Sitting Edge of Bed 10 mins   Standing 3 min , 7 mins   Comments use of toilet and sink   Short Term Goals    Short Term Goal # 1 Pt will be able to perform bed mobility and sup <> Jeff  without use of bed rail and with a flat bed in 6 visits.   Goal Outcome # 1 Goal met   Short Term Goal # 2 Pt will be able to perform sit <> stand and transfer Jeff in 6 visits.   Goal Outcome # 2 Progressing as expected   Short Term Goal # 3 Pt will be able to ambualte 200ft with FWW Jeff in 6 visits.   Goal Outcome # 3 Progressing as expected   Education Group   Education Provided Exercises - Seated   Use of Assistive Device Patient Response Patient;Acceptance;Explanation;Demonstration;Verbal Demonstration;Action Demonstration   Exercises - Supine Patient Response Patient;Family;Acceptance;Explanation;Demonstration;Handout;Verbal Demonstration;Action Demonstration;Reinforcement Needed   Exercises - Seated Patient Response Patient;Family;Acceptance;Explanation;Demonstration;Handout;Verbal Demonstration;Action Demonstration;Reinforcement Needed   Anticipated Discharge Equipment and Recommendations   DC Equipment Recommendations Front-Wheel Walker   Discharge Recommendations Recommend home health for continued physical therapy services   Interdisciplinary Plan of Care Collaboration   IDT Collaboration with  Nursing   Patient Position at End of Therapy Seated;Call Light within Reach;Tray Table within Reach;Phone within Reach   Collaboration Comments re: tx   Session Information   Date / Session Number  4/10-5 ( 2/4, 4/16)   Priority 2

## 2022-04-10 NOTE — CARE PLAN
The patient is Stable - Low risk of patient condition declining or worsening    Shift Goals  Clinical Goals: Pt will remain free from falls and injury  Patient Goals: Rest      Progress made toward(s) clinical / shift goals:  Safety measures in place.    Patient is not progressing towards the following goals: N/A      Problem: Knowledge Deficit - Standard  Goal: Patient and family/care givers will demonstrate understanding of plan of care, disease process/condition, diagnostic tests and medications  Outcome: Progressing     Problem: Fall Risk  Goal: Patient will remain free from falls  Outcome: Progressing     Problem: Skin Integrity  Goal: Skin integrity is maintained or improved  Outcome: Progressing

## 2022-04-10 NOTE — PROGRESS NOTES
0900 Line 2 Purple basilic of PICC double lumen will not flush or return blood.  0915 reported to MD complete occulusion of line 2  0916 Alteplase ordered  0950 line 2 hub changed  0953 Alteplase 2 mg instill to line 2   1035 Not able to aspirate fluids  1135 3 mL aspirated, flushed using push-pause method with 10 mL of saline   Line 2 saline locked  Pt stable

## 2022-04-10 NOTE — CARE PLAN
The patient is Stable - Low risk of patient condition declining or worsening    Shift Goals  Clinical Goals: Pt will remain at stated pain of 3/10 this shift  Patient Goals: Pt will tolerate current diet  Family Goals: no family at bedside    Progress made toward(s) clinical / shift goals:  PRN medication not given for pain control. Pain is well controlled, tolerating current diet. Pt able to ambulate with FWW, steady gait.    Patient is not progressing towards the following goals:      Problem: Mobility  Goal: Patient's capacity to carry out activities will improve  Outcome: Progressing     Problem: Pain - Standard  Goal: Alleviation of pain or a reduction in pain to the patient’s comfort goal  Outcome: Progressing     Problem: Fall Risk  Goal: Patient will remain free from falls  Outcome: Progressing

## 2022-04-11 ENCOUNTER — PATIENT OUTREACH (OUTPATIENT)
Dept: HEALTH INFORMATION MANAGEMENT | Facility: OTHER | Age: 87
End: 2022-04-11
Payer: MEDICARE

## 2022-04-11 VITALS
SYSTOLIC BLOOD PRESSURE: 124 MMHG | HEART RATE: 96 BPM | BODY MASS INDEX: 21.3 KG/M2 | RESPIRATION RATE: 18 BRPM | WEIGHT: 124.78 LBS | HEIGHT: 64 IN | DIASTOLIC BLOOD PRESSURE: 61 MMHG | TEMPERATURE: 97.6 F | OXYGEN SATURATION: 92 %

## 2022-04-11 LAB
ALBUMIN SERPL BCP-MCNC: 3.1 G/DL (ref 3.2–4.9)
ALBUMIN/GLOB SERPL: 1 G/DL
ALP SERPL-CCNC: 60 U/L (ref 30–99)
ALT SERPL-CCNC: 9 U/L (ref 2–50)
ANION GAP SERPL CALC-SCNC: 9 MMOL/L (ref 7–16)
AST SERPL-CCNC: 13 U/L (ref 12–45)
BILIRUB SERPL-MCNC: 0.3 MG/DL (ref 0.1–1.5)
BUN SERPL-MCNC: 13 MG/DL (ref 8–22)
CALCIUM SERPL-MCNC: 9 MG/DL (ref 8.4–10.2)
CHLORIDE SERPL-SCNC: 103 MMOL/L (ref 96–112)
CO2 SERPL-SCNC: 26 MMOL/L (ref 20–33)
CREAT SERPL-MCNC: 0.8 MG/DL (ref 0.5–1.4)
GFR SERPLBLD CREATININE-BSD FMLA CKD-EPI: 71 ML/MIN/1.73 M 2
GLOBULIN SER CALC-MCNC: 3.2 G/DL (ref 1.9–3.5)
GLUCOSE BLD STRIP.AUTO-MCNC: 110 MG/DL (ref 65–99)
GLUCOSE BLD STRIP.AUTO-MCNC: 125 MG/DL (ref 65–99)
GLUCOSE BLD STRIP.AUTO-MCNC: 142 MG/DL (ref 65–99)
GLUCOSE SERPL-MCNC: 183 MG/DL (ref 65–99)
HCT VFR BLD AUTO: 29 % (ref 37–47)
HGB BLD-MCNC: 8.9 G/DL (ref 12–16)
MAGNESIUM SERPL-MCNC: 2.2 MG/DL (ref 1.5–2.5)
POTASSIUM SERPL-SCNC: 4 MMOL/L (ref 3.6–5.5)
PROT SERPL-MCNC: 6.3 G/DL (ref 6–8.2)
SODIUM SERPL-SCNC: 138 MMOL/L (ref 135–145)

## 2022-04-11 PROCEDURE — 99239 HOSP IP/OBS DSCHRG MGMT >30: CPT | Performed by: INTERNAL MEDICINE

## 2022-04-11 PROCEDURE — 80053 COMPREHEN METABOLIC PANEL: CPT

## 2022-04-11 PROCEDURE — 82962 GLUCOSE BLOOD TEST: CPT

## 2022-04-11 PROCEDURE — 85014 HEMATOCRIT: CPT

## 2022-04-11 PROCEDURE — 700102 HCHG RX REV CODE 250 W/ 637 OVERRIDE(OP): Performed by: INTERNAL MEDICINE

## 2022-04-11 PROCEDURE — 700111 HCHG RX REV CODE 636 W/ 250 OVERRIDE (IP): Performed by: INTERNAL MEDICINE

## 2022-04-11 PROCEDURE — 85018 HEMOGLOBIN: CPT

## 2022-04-11 PROCEDURE — A9270 NON-COVERED ITEM OR SERVICE: HCPCS | Performed by: INTERNAL MEDICINE

## 2022-04-11 PROCEDURE — 83735 ASSAY OF MAGNESIUM: CPT

## 2022-04-11 RX ORDER — OMEPRAZOLE 20 MG/1
20 CAPSULE, DELAYED RELEASE ORAL 2 TIMES DAILY
Qty: 30 CAPSULE | Status: SHIPPED
Start: 2022-04-11 | End: 2022-10-26

## 2022-04-11 RX ORDER — HEPARIN SODIUM 5000 [USP'U]/ML
5000 INJECTION, SOLUTION INTRAVENOUS; SUBCUTANEOUS EVERY 8 HOURS
Refills: 0 | Status: SHIPPED
Start: 2022-04-11 | End: 2022-10-26

## 2022-04-11 RX ADMIN — OMEPRAZOLE 20 MG: 20 CAPSULE, DELAYED RELEASE ORAL at 06:18

## 2022-04-11 RX ADMIN — HEPARIN SODIUM 5000 UNITS: 5000 INJECTION INTRAVENOUS; SUBCUTANEOUS at 06:18

## 2022-04-11 ASSESSMENT — PAIN DESCRIPTION - PAIN TYPE: TYPE: SURGICAL PAIN

## 2022-04-11 NOTE — CARE PLAN
The patient is Stable - Low risk of patient condition declining or worsening    Shift Goals  Clinical Goals: pt score will remain at 0/10  Patient Goals: will sleep 8 hours tonight  Family Goals: no family at bedside    Progress made toward(s) clinical / shift goals:  Pt didn't complain from any pain up to this time. Pt was seen sleeping in between rounds, from 2100H but getting up for toilet.    Patient is not progressing towards the following goals: NA      Problem: Knowledge Deficit - Standard  Goal: Patient and family/care givers will demonstrate understanding of plan of care, disease process/condition, diagnostic tests and medications  Outcome: Progressing     Problem: Bowel Elimination  Goal: Establish and maintain regular bowel function  Outcome: Progressing     Problem: Urinary Elimination  Goal: Establish and maintain regular urinary output  Outcome: Progressing     Problem: Mobility  Goal: Patient's capacity to carry out activities will improve  Outcome: Progressing     Problem: Pain - Standard  Goal: Alleviation of pain or a reduction in pain to the patient’s comfort goal  Outcome: Progressing     Problem: Fall Risk  Goal: Patient will remain free from falls  Outcome: Progressing     Problem: Skin Integrity  Goal: Skin integrity is maintained or improved  Outcome: Progressing

## 2022-04-11 NOTE — DISCHARGE INSTRUCTIONS
Discharge Instructions    Discharged to [facility] care SNF by medical transportation with escort. Discharged via ambulance, hospital escort: Yes.  Special equipment needed: Walker    Be sure to schedule a follow-up appointment with your primary care doctor or any specialists as instructed.     Discharge Plan:   Diet Plan: Discussed  Activity Level: Discussed  Confirmed Follow up Appointment: Patient to Call and Schedule Appointment  Confirmed Symptoms Management: Discussed  Medication Reconciliation Updated: Yes  Influenza Vaccine Indication: Not indicated: Previously immunized this influenza season and > 8 years of age    I understand that a diet low in cholesterol, fat, and sodium is recommended for good health. Unless I have been given specific instructions below for another diet, I accept this instruction as my diet prescription.   Other diet: Full liquids then advanced as tolerated    Special Instructions: None    · Is patient discharged on Warfarin / Coumadin?   No     Depression / Suicide Risk    As you are discharged from this [facility] facility, it is important to learn how to keep safe from harming yourself.    Recognize the warning signs:  · Abrupt changes in personality, positive or negative- including increase in energy   · Giving away possessions  · Change in eating patterns- significant weight changes-  positive or negative  · Change in sleeping patterns- unable to sleep or sleeping all the time   · Unwillingness or inability to communicate  · Depression  · Unusual sadness, discouragement and loneliness  · Talk of wanting to die  · Neglect of personal appearance   · Rebelliousness- reckless behavior  · Withdrawal from people/activities they love  · Confusion- inability to concentrate     If you or a loved one observes any of these behaviors or has concerns about self-harm, here's what you can do:  · Talk about it- your feelings and reasons for harming yourself  · Remove any means that you might use to  hurt yourself (examples: pills, rope, extension cords, firearm)  · Get professional help from the community (Mental Health, Substance Abuse, psychological counseling)  · Do not be alone:Call your Safe Contact- someone whom you trust who will be there for you.  · Call your local CRISIS HOTLINE 514-6923 or 225-850-9748  · Call your local Children's Mobile Crisis Response Team [place] (012) 935-0854 or www.FreeAgent  · Call the toll free National Suicide Prevention Hotlines   · National Suicide Prevention Lifeline 158-034-DBNI (6624)  · National Hope Line Network 800-SUICIDE (655-6524)      Exploratory Laparotomy, Adult, Care After  This sheet gives you information about how to care for yourself after your procedure. Your health care provider may also give you more specific instructions. If you have problems or questions, contact your health care provider.  What can I expect after the procedure?  After the procedure, it is common to have:  · Abdominal soreness.  · Fatigue.  · A sore throat from the tube in your throat.  · A lack of appetite.  Follow these instructions at home:  Medicines  · Take over-the-counter and prescription medicines only as told by your health care provider.  · If you were prescribed an antibiotic medicine, take it as told by your health care provider. Do not stop taking the antibiotic even if you start to feel better.  · Do not drive or operate heavy machinery while taking pain medicine.  · If you are taking prescription pain medicine, take actions to prevent or treat constipation. Your health care provider may recommend that you:  ? Drink enough fluid to keep your urine pale yellow.  ? Eat foods that are high in fiber, such as fresh fruits and vegetables, whole grains, and beans.  ? Limit foods that are high in fat and processed sugars, such as fried or sweet foods.  ? Take an over-the-counter or prescription medicine for constipation. Undergoing surgery and taking pain medicines  can make constipation worse.  Incision care    · Follow instructions from your health care provider about how to take care of your incision. Make sure you:  ? Wash your hands with soap and water before you change your bandage (dressing). If soap and water are not available, use hand .  ? Change your dressing as told by your health care provider.  ? Leave stitches (sutures), skin glue, or adhesive strips in place. These skin closures may need to stay in place for 2 weeks or longer. If adhesive strip edges start to loosen and curl up, you may trim the loose edges. Do not remove adhesive strips completely unless your health care provider tells you to do that.  · If you were sent home with a drain, follow instructions from your health care provider about how to care for it.  · Check your incision area every day for signs of infection. Check for:  ? Redness, swelling, or pain.  ? Fluid or blood.  ? Warmth.  ? Pus or a bad smell.  Activity    · Rest as told by your health care provider.  ? Avoid sitting for a long time without moving. Get up to take short walks every 1-2 hours. This is important to improve blood flow and breathing. Ask for help if you feel weak or unsteady.  · Do not lift anything that is heavier than 5 lb (2.2 kg), or the limit that your health care provider tells you, until he or she says that it is safe.  · Ask your health care provider when you can start to do your usual activities again, such as driving, going back to work, and having sex.  Eating and drinking  · You may eat what you usually eat. Include lots of whole grains, fruits, and vegetables in your diet. This will help to prevent constipation.  · Drink enough fluid to keep your urine pale yellow.  Bathing  · Keep your incision clean and dry. Clean it as often as told by your health care provider:  ? Gently wash the incision with soap and water.  ? Rinse the incision with water to remove all soap.  ? Pat the incision dry with a clean  towel. Do not rub the incision.  · You may take showers after 48 hours.  · Do not take baths, swim, or use a hot tub until your health care provider says it is okay to do so.  General instructions  · Do not use any products that contain nicotine or tobacco, such as cigarettes and e-cigarettes. These can delay healing after surgery. If you need help quitting, ask your health care provider.  · Wear compression stockings as told by your health care provider. These stockings help to prevent blood clots and reduce swelling in your legs.  · Keep all follow-up visits as told by your health care provider. This is important.  Contact a health care provider if:  · You have a fever.  · You have chills.  · Your pain medicine is not helping.  · You have constipation or diarrhea.  · You have nausea or vomiting.  · You have drainage, redness, swelling, or pain at your incision site.  Get help right away if:  · Your pain is getting worse.  · You have not had a bowel movement for more than 3 days.  · You have ongoing (persistent) vomiting.  · The edges of your incision open up.  · You have warmth, tenderness, and swelling in your calf.  · You have trouble breathing.  · You have chest pain.  These symptoms may represent a serious problem that is an emergency. Do not wait to see if the symptoms will go away. Get medical help right away. Call your local emergency services (911 in the [place]). Do not drive yourself to the hospital.  Summary  · Abdominal soreness is common after exploratory laparotomy. Take over-the-counter and prescription pain medicines only as told by your health care provider.  · Follow instructions from your health care provider about how to take care of your incision. Do not take baths, swim, or use a hot tub until your health care provider says it is okay to do so.  · Watch for signs and symptoms of infection after surgery, including fever, chills, drainage from your incision, and worsening abdominal  pain.  This information is not intended to replace advice given to you by your health care provider. Make sure you discuss any questions you have with your health care provider.  Document Released: 08/01/2005 Document Revised: 02/10/2020 Document Reviewed: 12/28/2018  Elsevier Patient Education © 2020 [employer]    Anemia    Anemia is a condition in which you do not have enough red blood cells or hemoglobin. Hemoglobin is a substance in red blood cells that carries oxygen. When you do not have enough red blood cells or hemoglobin (are anemic), your body cannot get enough oxygen and your organs may not work properly. As a result, you may feel very tired or have other problems.  What are the causes?  Common causes of anemia include:  · Excessive bleeding. Anemia can be caused by excessive bleeding inside or outside the body, including bleeding from the intestine or from periods in women.  · Poor nutrition.  · Long-lasting (chronic) kidney, thyroid, and liver disease.  · Bone marrow disorders.  · Cancer and treatments for cancer.  · HIV (human immunodeficiency virus) and AIDS (acquired immunodeficiency syndrome).  · Treatments for HIV and AIDS.  · Spleen problems.  · Blood disorders.  · Infections, medicines, and autoimmune disorders that destroy red blood cells.  What are the signs or symptoms?  Symptoms of this condition include:  · Minor weakness.  · Dizziness.  · Headache.  · Feeling heartbeats that are irregular or faster than normal (palpitations).  · Shortness of breath, especially with exercise.  · Paleness.  · Cold sensitivity.  · Indigestion.  · Nausea.  · Difficulty sleeping.  · Difficulty concentrating.  Symptoms may occur suddenly or develop slowly. If your anemia is mild, you may not have symptoms.  How is this diagnosed?  This condition is diagnosed based on:  · Blood tests.  · Your medical history.  · A physical exam.  · Bone marrow biopsy.  Your health care provider may also check your stool (feces)  for blood and may do additional testing to look for the cause of your bleeding.  You may also have other tests, including:  · Imaging tests, such as a CT scan or MRI.  · Endoscopy.  · Colonoscopy.  How is this treated?  Treatment for this condition depends on the cause. If you continue to lose a lot of blood, you may need to be treated at a hospital. Treatment may include:  · Taking supplements of iron, vitamin B12, or folic acid.  · Taking a hormone medicine (erythropoietin) that can help to stimulate red blood cell growth.  · Having a blood transfusion. This may be needed if you lose a lot of blood.  · Making changes to your diet.  · Having surgery to remove your spleen.  Follow these instructions at home:  · Take over-the-counter and prescription medicines only as told by your health care provider.  · Take supplements only as told by your health care provider.  · Follow any diet instructions that you were given.  · Keep all follow-up visits as told by your health care provider. This is important.  Contact a health care provider if:  · You develop new bleeding anywhere in the body.  Get help right away if:  · You are very weak.  · You are short of breath.  · You have pain in your abdomen or chest.  · You are dizzy or feel faint.  · You have trouble concentrating.  · You have bloody or black, tarry stools.  · You vomit repeatedly or you vomit up blood.  Summary  · Anemia is a condition in which you do not have enough red blood cells or enough of a substance in your red blood cells that carries oxygen (hemoglobin).  · Symptoms may occur suddenly or develop slowly.  · If your anemia is mild, you may not have symptoms.  · This condition is diagnosed with blood tests as well as a medical history and physical exam. Other tests may be needed.  · Treatment for this condition depends on the cause of the anemia.  This information is not intended to replace advice given to you by your health care provider. Make sure you  discuss any questions you have with your health care provider.  Document Released: 01/25/2006 Document Revised: 11/30/2018 Document Reviewed: 01/19/2018  Elsevier Patient Education © 2020 [employer]

## 2022-04-11 NOTE — DISCHARGE PLANNING
Anticipated Discharge Disposition: [facility]    Action:     0820: Discussed pt in morning rounds. Per MD, pt is clear for SNF pending hemoglobin lab this am.     1000: Per DPA, [facility] can  pt at 1430. LSW messaged MD for medical clearance. Per DPA, transport can be cancelled if not cleared.    Barriers to Discharge: None    Plan: LSW to follow up with MD for medical clearance. LSW to complete transport packet if pt is medically cleared.    1040: Discussed pt in IDT rounds. Per MD, pt is clear to d/c to [facility]. Per MD will work on d/c summary after rounds.    1205: LSW completed transfer packet. LSW met with pt at bedside. Pt signed IMM. LSW provided copy of IMM to pt. Pt signed cobra consent to transfer. LSW placed transfer packet in pt's chart. LSW faxed IMM to DPA.

## 2022-04-11 NOTE — DISCHARGE PLANNING
Agency/Facility Name: [facility]  Spoke To: Kathleen   Outcome: Pt accepted with bed available today, SNF offering a 1430 transport time to SNF via [facility] van.     RN CM notified

## 2022-04-11 NOTE — THERAPY
Missed Therapy     Patient Name: Licha Pope  Age:  86 y.o., Sex:  female  Medical Record #: [id number]  Today's Date: 4/11/2022    Discussed missed therapy with RN       04/11/22 6203   Interdisciplinary Plan of Care Collaboration   Collaboration Comments Attempted OT, pt to be transferred to SNF within the hour per RN.  Will hold therapy today to help conserve pt's energy for the transfer.  If not discharged today will continue therapy per plan.

## 2022-04-11 NOTE — PROGRESS NOTES
Discharging patient at SNF per MD order.   Pt is voiding without difficulty, pain well controlled, tolerating oral medications, O2 greater than 90%. PICC line in place. Discharge papers,transferred packet and cobra given. Pt discharged off unit with escort.

## 2022-04-11 NOTE — CARE PLAN
The patient is Stable - Low risk of patient condition declining or worsening    Shift Goals  Clinical Goals: Pt will transfer to SNF by 1430  Patient Goals: will sleep 8 hours tonight  Family Goals: no family at bedside    Progress made toward(s) clinical / shift goals: Coordinated with SW. Given report to SNF. Pain is well controlled, PRM medication not given.    Patient is not progressing towards the following goals:      Problem: Discharge Barriers/Planning  Goal: Patient's continuum of care needs are met  Outcome: Progressing  Flowsheets (Taken 4/11/2022 2715)  Continuum of Care Needs:   Assessed for discharge barriers   Communicated discharge barriers to interdisciplinary tream   Involved patient/family/support system in discharge process   Collaborated with Case Management/   Provided and explained discharge instructions     Problem: Pain - Standard  Goal: Alleviation of pain or a reduction in pain to the patient’s comfort goal  Outcome: Progressing

## 2022-04-11 NOTE — DISCHARGE SUMMARY
"Discharge Summary    CHIEF COMPLAINT ON ADMISSION  Chief Complaint   Patient presents with   • Abdominal Pain     Reports low abd pain with \"vomiting dark purple\" and diarrhea, \"I think the last couple was black\". Reports she was seen at PCP office this AM and placed on 2L O2 there states O2 was 88% at PCP office.    • Blood in Vomit       Reason for Admission  EMS     CODE STATUS  DNAR/DNI    HPI & HOSPITAL COURSE  As per chart review  \"86 y.o. female with a history of hypertension, COPD, GERD, DVT dx 5/19/21 after long car trip (still on anticoagulation) who presented 3/25/2022 with abdominal pain.     Was recently admitted for terminal ileitis, SBO and pancreatitis on 2/17/2022, she was treated conservatively with IV fluids and pain control.  Right upper quadrant showed dependent gallbladder sludge versus small stones.  MRCP showed cholelithiasis and common bile duct 11.4 mm unchanged since 2015.  She was discharged with antibiotics and recommended to follow-up with GI, surgery and PCP after discharge.  She saw surgeon, she recommended cholecystectomy but wanted her to see GI doctor. GI doctor recommended cholecystectomy.  Has appt with surgery next Wed.       Patient never really felt like her abdominal pain improved however was stable until 2 days ago.  Abdominal pain became very severe however intermittent and would come in waves.  Patient became very fatigued and was sleeping most the day.  She had chills and was very weak.  Abdominal pain was diffuse, intermittent, described as sharp pain, 10/10 pain, went to PCP and doctor sent her to ER.  Did have some n/v, had some dark red or purple, size of quarter.  Passing gas.  Yesterday had BM.  Did have some dark stools, unsure if black was \"half asleep\".  Last night had lower back pain, now improved.      In ER she was afebrile, mildly tachycardic (heart rate 111), normal blood pressure and mild hypoxia requiring 1 L nasal cannula.  Labs remarkable for WBC 11.6 " "with a left shift, creatinine 1.06, calcium 10.6, lipase 104, troponin XX, lactic acid normal.  CT abdomen pelvis with contrast showed similar changes of bowel wall thickening involving the terminal ileum and base of the cecum adjacent to postoperative change from prior appendectomy which could be infectious or inflammatory.  There is mild proximal obstruction involving the ileum and distal jejunum in the lower abdomen left mid abdomen with small bowel loops dilated up to 4 cm.  There is no gross free air or pneumatosis.  Moderate size hiatal hernia again seen.  Minimal prominent biliary tree unchanged and physiologic, unchanged a back to 2015.  Simple hepatic cyst unchanged.  She was given IV fluids, Zosyn and general surgery was consulted.  Dr. Mcneil to see patient.  Recommended NG, IV fluids.\"    The patient was admitted for further management and care.  It seems that initially the patient was having this, however still complaining of abdominal pain.  GI was also consulted.  General surgery eventually performed diagnostic laparoscopy, however it turned into exploratory laparotomy with ileocecectomy.  Eventually pathology turned out to be terminal ileum adenocarcinoma, concern with metastatic disease in the peritoneum.  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  Oncology recommended to recover from surgery and improved physical therapy with nutritional status.  It seems that oncology will follow up with the patient as an outpatient to consider discussion palliative chemotherapy utilizing Xeloda plus minus additional therapies or immunotherapy depending on next generation sequencing findings.    During this hospitalization the patient had to be placed on TPN, however eventually the patient started having bowel movements after surgery and tolerating p.o. and we discontinue TPN.  The patient " also received multiple days of antibiotics, however she has been afebrile, hemodynamically stable without signs of infection now.    The patient does have a history of DVT and was on Eliquis at home.  We initially restarted back her Eliquis after general surgery recommended, however the patient developed bloody stools.  We stopped anticoagulation.  It seems that this episode was provoked, and the patient received more than 6 months of anticoagulation.  We did repeat ultrasound of the lower extremities without evidence of DVT, so after discussing the pros and benefits of anticoagulation with the patient they decided not to continue anticoagulation at this time, which sounds reasonable.     We did restart DVT prophylaxis, and it seems that she has been tolerating it without evidence of further hemoglobin drop or bloody stools.    Today at the time of my evaluation, the patient tolerating p.o., mentioning feeling much better.  She has now been accepted to a skilled nursing facility which we will transfer today.  She will require close follow-up with PCP, oncology, general surgery and GI as an outpatient.    Therefore, she is discharged in guarded and stable condition to skilled nursing facility.    The patient met 2-midnight criteria for an inpatient stay at the time of discharge.      FOLLOW UP ITEMS POST DISCHARGE  Patient will require close follow-up with PCP, oncology, general surgery and GI as an outpatient.  Patient will be transferred to a skilled nursing facility.    DISCHARGE DIAGNOSES  Principal Problem:    SBO (small bowel obstruction) (HCC) POA: Yes  Active Problems:    History of deep venous thrombosis POA: Yes    Anemia POA: Yes    Adenocarcinoma of ileum (HCC) POA: Unknown    Fever POA: Unknown    Diarrhea POA: Unknown    Essential hypertension, benign POA: Yes    COPD (chronic obstructive pulmonary disease) (HCC) POA: Yes    Abnormal finding on imaging POA: Unknown    Abdominal pain POA: Unknown    NATIVIDAD  (acute kidney injury) (HCC) POA: Unknown    Hyperglycemia POA: Unknown    Generalized weakness POA: Unknown    Hypernatremia POA: Unknown    Acute hypoxemic respiratory failure (HCC) POA: Unknown    Intraabdominal mass POA: Unknown    Hypophosphatemia POA: Unknown  Resolved Problems:    * No resolved hospital problems. *      FOLLOW UP  No future appointments.  Prateek ANDERSON M.D.  [address]  [facility] [id number] [phone]    Schedule an appointment as soon as possible for a visit      Ryan Olivo M.D.  [address] [place] [address] [phone]    On 5/3/2022  Please arrive to your appt @10am to establish with your oncologist. This office will be sending you a pre-registration email.     Donovan Belle M.D.  [address] [facility] [id number] [phone]      The office of your GI doctor will contact you to schedule a hospital follow up. If you do not hear from them within 2 business days, please call to schedule. Thank you      MEDICATIONS ON DISCHARGE     Medication List      START taking these medications      Instructions   heparin 5000 UNIT/ML Soln   Inject 1 mL under the skin every 8 hours.  Dose: 5,000 Units     omeprazole 20 MG delayed-release capsule  Commonly known as: PRILOSEC   Take 1 Capsule by mouth 2 times a day.  Dose: 20 mg        CONTINUE taking these medications      Instructions   CALCIUM-MAGNESIUM-ZINC PO   Take 1 tablet by mouth every day.  Dose: 1 Tablet     D3 PO   Take 5,000 Units by mouth every day.  Dose: 5,000 Units     GLUCOSAMINE HCL-MSM PO   Take 2 Tablets by mouth every day. Move Free Ultra supplement  Dose: 2 Tablet     LUTEIN-ZEAXANTHIN PO   Take 1 tablet by mouth every day.  Dose: 1 Tablet     therapeutic multivitamin-minerals Tabs   Take 1 tablet by mouth every day.  Dose: 1 Tablet     TURMERIC PO   Take 2,000 mg by mouth every day.  Dose: 2,000 mg     VITAMIN C PO   Take 1,000 mg by mouth every day.  Dose: 1,000 mg        STOP  taking these medications    apixaban 5mg Tabs  Commonly known as: ELIQUIS     valsartan 160 MG Tabs  Commonly known as: DIOVAN            Allergies  Allergies   Allergen Reactions   • Codeine Vomiting   • Demerol Vomiting     Injectable type   • Shellfish Allergy Shortness of Breath     Soft shell   • Ciprofloxacin Rash     rash   • Ibuprofen      Patient developed gastric ulcer/GI bleed from ibuprofen use   • Iodine      PATIENT ALLERGIC TO SHELLFISH, NOT SURE IF SHE IS ALLERGIC TO IODINE       DIET  Orders Placed This Encounter   Procedures   • Diet Order Diet: Full Liquid; Miscellaneous modifications: (optional): Lactose Free     Standing Status:   Standing     Number of Occurrences:   1     Order Specific Question:   Diet:     Answer:   Full Liquid [11]     Order Specific Question:   Miscellaneous modifications: (optional)     Answer:   Lactose Free [5]       ACTIVITY  As tolerated and directed by skilled nursing.  Weight bearing as tolerated and directed by skilled nursing.    LINES, DRAINS, AND WOUNDS  This is an automated list. Peripheral IVs will be removed prior to discharge.  PICC Double Lumen 03/30/22 Lumen 1: Red Lumen 2: Purple Left Basilic (Active)   Site Assessment Clean;Dry;Intact 04/11/22 0800   Lumen 1 Status Normal saline locked;Scrubbed the hub prior to access;Flushed;Blood return noted 04/11/22 0800   Lumen 2 Status Normal saline locked;Scrubbed the hub prior to access;Flushed;Blood return noted 04/11/22 0800   Line Secured at (cm) 0 cm 03/30/22 1347   Dressing Type Biopatch;Transparent;Securing device 04/11/22 0800   Dressing Status Clean;Dry;Intact 04/11/22 0800   Dressing Intervention Dressing changed per protocol 04/10/22 0100   Dressing Change Due 04/16/22 04/11/22 0800   Date Primary Tubing Changed 04/05/22 04/06/22 2000   Date Secondary Tubing Changed 04/05/22 04/05/22 0800   Date TPN Tubing Changed (Q 24 Hrs) 04/07/22 04/07/22 2030   Date IV Connector(s) Changed 04/04/22 04/06/22 2000    NEXT Primary Tubing Change  04/07/22 04/07/22 2030   NEXT Secondary Tubing Change  04/06/22 04/05/22 0800   Line Necessity Assessed Lack of Peripheral Access 04/11/22 0800   $ Double Lumen PICC Charge Single kit used 03/30/22 1347       Wound 03/30/22 Incision Abdomen Bilateral prevena dressing (Active)   Site Assessment Clean;Dry;Intact 04/11/22 0700   Periwound Assessment Clean;Dry;Intact 04/11/22 0700   Margins Attached edges 04/11/22 0700   Closure Staples 04/11/22 0700   Drainage Amount None 04/11/22 0700   Drainage Description Serosanguineous 04/08/22 0945   Dressing Options Open to Air 04/11/22 0700   Dressing Changed Observed 04/11/22 0700   Dressing Status Clean;Dry;Intact 04/11/22 0700   Dressing Change/Treatment Frequency By Wound Team Only 04/09/22 1000      PICC Double Lumen 03/30/22 Lumen 1: Red Lumen 2: Purple Left Basilic (Active)   Site Assessment Clean;Dry;Intact 04/11/22 0800   Lumen 1 Status Normal saline locked;Scrubbed the hub prior to access;Flushed;Blood return noted 04/11/22 0800   Lumen 2 Status Normal saline locked;Scrubbed the hub prior to access;Flushed;Blood return noted 04/11/22 0800   Line Secured at (cm) 0 cm 03/30/22 1347   Dressing Type Biopatch;Transparent;Securing device 04/11/22 0800   Dressing Status Clean;Dry;Intact 04/11/22 0800   Dressing Intervention Dressing changed per protocol 04/10/22 0100   Dressing Change Due 04/16/22 04/11/22 0800   Date Primary Tubing Changed 04/05/22 04/06/22 2000   Date Secondary Tubing Changed 04/05/22 04/05/22 0800   Date TPN Tubing Changed (Q 24 Hrs) 04/07/22 04/07/22 2030   Date IV Connector(s) Changed 04/04/22 04/06/22 2000   NEXT Primary Tubing Change  04/07/22 04/07/22 2030   NEXT Secondary Tubing Change  04/06/22 04/05/22 0800   Line Necessity Assessed Lack of Peripheral Access 04/11/22 0800   $ Double Lumen PICC Charge Single kit used 03/30/22 1347                MENTAL STATUS ON TRANSFER   Alert and oriented           CONSULTATIONS  Oncology  Gastroenterology  General Surgery    PROCEDURES  Diagnostic laparoscopy  Exploratory laparotomy with ileocecectomy    US-EXTREMITY VENOUS LOWER BILAT   Final Result      DX-ESOPHAGUS BARIUM SWALLOW SINGLE CONTRAST   Final Result      Contrast extends from the distal esophagus at the level of the gastroesophageal junction to the right of midline into a large collection likely representing either a lower esophageal or upper gastric diverticulum.      CT-CHEST (THORAX) WITH   Final Result      1.  Mediastinal collection of fluid and gas adjacent to an abnormal thickened . Underlying mass or inflammatory change is a consideration though previously this had a more benign appearance. Suggest further assessment with endoscopy.   2.  Small BILATERAL pleural effusions   3.  BILATERAL atelectasis      JOSEP ORTIZ was paged at 4/2/2022 10:19 AM.         DX-CHEST-PORTABLE (1 VIEW)   Final Result      Decreased lung volumes with small left subpulmonic effusion and likely basilar atelectasis      IR-PICC LINE PLACEMENT W/ GUIDANCE > AGE 5   Final Result                  Ultrasound-guided PICC placement performed by qualified nursing staff as    above.          DX-SMALL BOWEL SERIES   Final Result      Partial small bowel obstruction with contrast transit time to the colon exceeding 9 hours      5 cm paraesophageal hernia      NG tube terminates over the gastric fundus      DX-ABDOMEN FOR TUBE PLACEMENT   Final Result         Gastric drainage tube with tip projecting over the expected area of the stomach.      [id number] VIEW   Final Result      Long segmental small bowel dilatation concerning for distal small bowel obstruction      DX-ABDOMEN FOR TUBE PLACEMENT   Final Result      Enteric tube terminates over the stomach.      Small bowel loop dilatation concerning for small bowel obstruction      DX-ABDOMEN FOR TUBE PLACEMENT   Final Result         Gastric drainage tube with tip  projecting over the expected area of the stomach.      DX-ABDOMEN FOR TUBE PLACEMENT   Final Result         Gastric drainage tube loops in the esophagus with tip projecting over the expected area of the upper esophagus.      CT-ABDOMEN-PELVIS WITH   Final Result      1.  There are similar changes of bowel wall thickening involving the terminal ileum and base of the cecum adjacent to postoperative change from prior appendectomy which could be infectious or inflammatory.   2.  There is mild proximal obstruction involving the ileum and distal jejunum in the lower abdomen and left mid abdomen with small bowel loops dilated up to 4 cm.   3.  There is no gross free air or pneumatosis.   4.  Moderate size hiatal hernia again seen.   5.  Minimally prominent biliary tree unchanged and physiologic, unchanged dating back to 2015.   6.  Simple hepatic cysts are unchanged.         DX-CHEST-PORTABLE (1 VIEW)   Final Result      1.  There is no acute cardiopulmonary process.          LABORATORY  Lab Results   Component Value Date    SODIUM 138 04/11/2022    POTASSIUM 4.0 04/11/2022    CHLORIDE 103 04/11/2022    CO2 26 04/11/2022    GLUCOSE 183 (H) 04/11/2022    BUN 13 04/11/2022    CREATININE 0.80 04/11/2022        Lab Results   Component Value Date    WBC 8.6 04/10/2022    HEMOGLOBIN 8.9 (L) 04/11/2022    HEMATOCRIT 29.0 (L) 04/11/2022    PLATELETCT 256 04/10/2022        Total time of the discharge process exceeds 35 minutes.

## 2022-04-11 NOTE — DISCHARGE PLANNING
Anticipated Discharge Disposition:   [facility]    Action:   Chart review complete     Discussed patient during rounds. MD states one final hemoglobin lab to be obtained prior to discharge.  If hemoglobin is stable patient will discharge today pending bed availability at SNF.     RN CM will continue to follow.     1140: Per LSW, transport to [facility] set for 1430 today.      Barriers to Discharge:   None     Plan:   HCM will continue to follow and assist with discharge needs.

## 2022-04-11 NOTE — PROGRESS NOTES
Received bedside report.  Assumed pt care.   Assessment and chart check complete.  Pt is AA0X4, no signs of distress, denies nausea/vomiting  PICC line and abdominal binder in place.  Able to go to bathroom using FWW, steady gait.  1235-Given report to :edison ingrid Berry RN  Fall precautions in place, treaded socks on pt, bed in low position, bed alarm on.   Call light within reach.   Educated pt to call if needing anything.   Hourly rounding.

## 2022-04-11 NOTE — THERAPY
"Occupational Therapy  Daily Treatment     Patient Name: Licha Pope  Age:  86 y.o., Sex:  female  Medical Record #: [id number]  Today's Date: 4/10/2022     Precautions  Precautions: Fall Risk  Comments: no hx of falls    Assessment    Pt is very close to achieving OT goals in the acute setting. She was able to tolerate all basic self care tasks (UB/LB dressing, UB/LB bathing, toileting in bathroom, G/H sinkside, and ADL txfs) without c/o pain, fatigues but with improved muscular endurance and activity tolerance. Pt only required min A with soaping and drying back, but was able to complete all other ADLs at SBA/spv level. She is now safe to transition home with  setup and family support/assistance.    Plan    Continue current treatment plan.    DC Equipment Recommendations: None  Discharge Recommendations: Recommend home health for continued occupational therapy services    Subjective    \"I haven't washed my hair for two weeks.\"     Objective       04/10/22 1336   Pain   Pain Scales 0 to 10 Scale    Intervention Declines   Pain 0 - 10 Group   Pain Rating Scale (NPRS) 0   Therapist Pain Assessment Post Activity Pain Same as Prior to Activity   Non Verbal Descriptors   Non Verbal Scale  Calm;Unlabored Breathing   Cognition    Cognition / Consciousness WDL   Speech/ Communication Hard of Hearing   Level of Consciousness Alert   Comments pleasant and cooperative   Other Treatments   Other Treatments Provided motivated and agreeable for shower today, able to tolerate all basic self cares, including: toileting in bathroom, G/H standing sinkside, UB/LB bathing, UB/LB dressing, and ADL txfs to/from bed, sink, chair, toilet, and shower.   Balance   Sitting Balance (Static) Fair +   Sitting Balance (Dynamic) Fair +   Standing Balance (Static) Fair +   Standing Balance (Dynamic) Fair +   Weight Shift Sitting Good   Weight Shift Standing Fair   Skilled Intervention Postural Facilitation;Verbal Cuing   Comments with FWW   Bed " Mobility    Supine to Sit Supervised   Sit to Supine   (up in recliner post session)   Scooting Supervised   Rolling Supervised   Skilled Intervention Postural Facilitation;Verbal Cuing   Activities of Daily Living   Grooming Standing;Supervision   Bathing Minimal Assist  (SBA with LB bathing; min A with soaping/drying back)   Upper Body Dressing Minimal Assist  (d/t abdominal incision)   Lower Body Dressing Standby Assist   Toileting Supervision   Skilled Intervention Postural Facilitation;Verbal Cuing   Comments cues for safety   How much help from another person does the patient currently need...   Putting on and taking off regular lower body clothing? 3   Bathing (including washing, rinsing, and drying)? 3   Toileting, which includes using a toilet, bedpan, or urinal? 3   Putting on and taking off regular upper body clothing? 3   Taking care of personal grooming such as brushing teeth? 3   Eating meals? 4   6 Clicks Daily Activity Score 19   Functional Mobility   Sit to Stand Standby Assist   Bed, Chair, Wheelchair Transfer Standby Assist   Toilet Transfers Standby Assist   Tub / Shower Transfers Contact Guard Assist   Transfer Method Stand Step   Mobility with FWW   Distance (Feet) 15   # of Times Distance was Traveled 3   Skilled Intervention Verbal Cuing;Postural Facilitation   Activity Tolerance   Sitting in Chair ad sheyla (toilet, shower chair, recliner)   Sitting Edge of Bed 10 mins   Standing 15 mins total   Patient / Family Goals   Patient / Family Goal #1 home   Goal #1 Outcome Progressing as expected   Short Term Goals   Short Term Goal # 1 Pt will dress supervised in 3 visits   Goal Outcome # 1 Progressing as expected   Short Term Goal # 2 Pt will stand 10 min at sink supervised to groom in 3 visits   Goal Outcome # 2 Goal met   Short Term Goal # 3 Pt will toilet supervised in 3 visits   Goal Outcome # 3 Goal met   Short Term Goal # 4 pt will complete FB bathing at SBA level   Goal Outcome # 4  Progressing as expected   Education Group   Education Provided Transfers;Activities of Daily Living   Transfers Patient Response Patient;Acceptance;Explanation;Verbal Demonstration;Action Demonstration   ADL Patient Response Patient;Acceptance;Explanation;Verbal Demonstration;Action Demonstration   Anticipated Discharge Equipment and Recommendations   DC Equipment Recommendations None   Discharge Recommendations Recommend home health for continued occupational therapy services   Interdisciplinary Plan of Care Collaboration   IDT Collaboration with  Nursing;Physical Therapist;Certified Nursing Assistant;Family / Caregiver   Patient Position at End of Therapy Seated;Chair Alarm On;Call Light within Reach;Tray Table within Reach;Phone within Reach   Collaboration Comments RN aware of OT session   Session Information   Date / Session Number  4/10 #5 (3/3, 4/10)   Priority 2

## 2022-04-11 NOTE — CARE PLAN
Problem: Nutritional:  Goal: Achieve adequate nutritional intake  Description: Patient will consume >/=50% of meals and supplements on full liquids diet w/ diet progression to be per MD.  Outcome: Progressing    Pt is eating mainly % of meals with increaisng O intake. TPN discontinued 4/8. Pt has Boost Plus with meals as well. RD monitoring PO intake.

## 2022-05-16 ENCOUNTER — HOSPITAL ENCOUNTER (OUTPATIENT)
Dept: RADIOLOGY | Facility: MEDICAL CENTER | Age: 87
End: 2022-05-16
Attending: INTERNAL MEDICINE
Payer: MEDICARE

## 2022-07-09 NOTE — CARE PLAN
Problem: Nutritional:  Goal: Nutrition support tolerated and meeting greater than 85% of estimated needs  Outcome: Met   Per pharmacist's note yesterday, TPN is at goal providing 1532 kcals and 72 gms of protein per day. Clear liquid diet also started w/ order to [facility] per surgeon's note today. RD will continue to follow.   Protopic Pregnancy And Lactation Text: This medication is Pregnancy Category C. It is unknown if this medication is excreted in breast milk when applied topically.

## 2022-08-22 NOTE — DISCHARGE PLANNING
Anticipated Discharge Disposition: SNF    Action: LSW called and left message for Gay in admissions at [facility] to inquire about open bed availability today.     Barriers to Discharge: SNF bed    Plan: LSW to f/u with [facility] for updates on bed availability.      Urine specimen sent     López Benito RN  08/22/22 [id number]

## 2022-09-05 ENCOUNTER — HOSPITAL ENCOUNTER (OUTPATIENT)
Dept: RADIOLOGY | Facility: MEDICAL CENTER | Age: 87
End: 2022-09-05
Attending: INTERNAL MEDICINE
Payer: MEDICARE

## 2022-09-05 DIAGNOSIS — R04.0 EPISTAXIS: ICD-10-CM

## 2022-09-05 DIAGNOSIS — C18.2 MALIGNANT NEOPLASM OF ASCENDING COLON (HCC): ICD-10-CM

## 2022-09-05 DIAGNOSIS — C17.2 MALIGNANT NEOPLASM OF ILEUM (HCC): ICD-10-CM

## 2022-09-05 DIAGNOSIS — Z86.718 PERSONAL HISTORY OF OTHER VENOUS THROMBOSIS AND EMBOLISM: ICD-10-CM

## 2022-09-05 DIAGNOSIS — C78.6 SECONDARY MALIGNANT NEOPLASM OF RETROPERITONEUM AND PERITONEUM (HCC): ICD-10-CM

## 2022-09-05 DIAGNOSIS — Z51.12 ENCOUNTER FOR ANTINEOPLASTIC IMMUNOTHERAPY: ICD-10-CM

## 2022-09-05 PROCEDURE — 71260 CT THORAX DX C+: CPT

## 2022-09-05 PROCEDURE — 700117 HCHG RX CONTRAST REV CODE 255: Performed by: INTERNAL MEDICINE

## 2022-09-05 RX ADMIN — IOHEXOL 80 ML: 350 INJECTION, SOLUTION INTRAVENOUS at 08:28

## 2022-10-11 ENCOUNTER — HOSPITAL ENCOUNTER (OUTPATIENT)
Dept: RADIOLOGY | Facility: MEDICAL CENTER | Age: 87
End: 2022-10-11
Payer: MEDICARE

## 2022-10-11 DIAGNOSIS — K80.20 CALCULUS OF GALLBLADDER WITHOUT CHOLECYSTITIS WITHOUT OBSTRUCTION: ICD-10-CM

## 2022-10-11 DIAGNOSIS — R14.0 BLOATING: ICD-10-CM

## 2022-10-11 DIAGNOSIS — K85.90 ACUTE PANCREATITIS, UNSPECIFIED COMPLICATION STATUS, UNSPECIFIED PANCREATITIS TYPE: ICD-10-CM

## 2022-10-11 DIAGNOSIS — R93.89 ABNORMAL CT SCAN: ICD-10-CM

## 2022-10-11 PROCEDURE — 74181 MRI ABDOMEN W/O CONTRAST: CPT

## 2022-10-26 ENCOUNTER — APPOINTMENT (OUTPATIENT)
Dept: RADIOLOGY | Facility: MEDICAL CENTER | Age: 87
DRG: 438 | End: 2022-10-26
Attending: EMERGENCY MEDICINE
Payer: MEDICARE

## 2022-10-26 ENCOUNTER — HOSPITAL ENCOUNTER (INPATIENT)
Facility: MEDICAL CENTER | Age: 87
LOS: 5 days | DRG: 438 | End: 2022-10-31
Attending: EMERGENCY MEDICINE | Admitting: INTERNAL MEDICINE
Payer: MEDICARE

## 2022-10-26 DIAGNOSIS — R94.31 NONSPECIFIC ABNORMAL ELECTROCARDIOGRAM (ECG) (EKG): ICD-10-CM

## 2022-10-26 DIAGNOSIS — R53.1 WEAKNESS: ICD-10-CM

## 2022-10-26 DIAGNOSIS — R79.89 ELEVATED LFTS: ICD-10-CM

## 2022-10-26 PROBLEM — K85.90 POST-ERCP ACUTE PANCREATITIS: Status: ACTIVE | Noted: 2022-10-26

## 2022-10-26 PROBLEM — Z71.89 ADVANCE CARE PLANNING: Status: ACTIVE | Noted: 2022-10-26

## 2022-10-26 PROBLEM — E83.52 HYPERCALCEMIA: Status: ACTIVE | Noted: 2022-10-26

## 2022-10-26 PROBLEM — K91.89 POST-ERCP ACUTE PANCREATITIS: Status: ACTIVE | Noted: 2022-10-26

## 2022-10-26 PROBLEM — K72.90 LIVER FAILURE WITHOUT HEPATIC COMA (HCC): Status: ACTIVE | Noted: 2022-10-26

## 2022-10-26 PROBLEM — R19.5 DARK STOOLS: Status: ACTIVE | Noted: 2022-10-26

## 2022-10-26 PROBLEM — Q39.6 ESOPHAGEAL DIVERTICULUM: Chronic | Status: ACTIVE | Noted: 2022-10-26

## 2022-10-26 PROBLEM — R74.8 ELEVATED LIPASE: Status: ACTIVE | Noted: 2022-10-26

## 2022-10-26 LAB
ABO GROUP BLD: NORMAL
ALBUMIN SERPL BCP-MCNC: 3.7 G/DL (ref 3.2–4.9)
ALBUMIN/GLOB SERPL: 1 G/DL
ALP SERPL-CCNC: 448 U/L (ref 30–99)
ALT SERPL-CCNC: 72 U/L (ref 2–50)
ANION GAP SERPL CALC-SCNC: 9 MMOL/L (ref 7–16)
APTT PPP: 26.7 SEC (ref 24.7–36)
AST SERPL-CCNC: 100 U/L (ref 12–45)
BASOPHILS # BLD AUTO: 0.6 % (ref 0–1.8)
BASOPHILS # BLD: 0.04 K/UL (ref 0–0.12)
BILIRUB SERPL-MCNC: 1.7 MG/DL (ref 0.1–1.5)
BLD GP AB SCN SERPL QL: NORMAL
BUN SERPL-MCNC: 15 MG/DL (ref 8–22)
CA-I SERPL-SCNC: 1.26 MMOL/L (ref 1.1–1.3)
CALCIUM SERPL-MCNC: 10.5 MG/DL (ref 8.4–10.2)
CHLORIDE SERPL-SCNC: 98 MMOL/L (ref 96–112)
CO2 SERPL-SCNC: 30 MMOL/L (ref 20–33)
COMMENT 1642: NORMAL
CREAT SERPL-MCNC: 0.83 MG/DL (ref 0.5–1.4)
EOSINOPHIL # BLD AUTO: 0.12 K/UL (ref 0–0.51)
EOSINOPHIL NFR BLD: 1.9 % (ref 0–6.9)
ERYTHROCYTE [DISTWIDTH] IN BLOOD BY AUTOMATED COUNT: 67.5 FL (ref 35.9–50)
GFR SERPLBLD CREATININE-BSD FMLA CKD-EPI: 68 ML/MIN/1.73 M 2
GIANT PLATELETS BLD QL SMEAR: NORMAL
GLOBULIN SER CALC-MCNC: 3.8 G/DL (ref 1.9–3.5)
GLUCOSE SERPL-MCNC: 147 MG/DL (ref 65–99)
HCT VFR BLD AUTO: 45.2 % (ref 37–47)
HGB BLD-MCNC: 14.7 G/DL (ref 12–16)
HYPOCHROMIA BLD QL SMEAR: ABNORMAL
IMM GRANULOCYTES # BLD AUTO: 0.02 K/UL (ref 0–0.11)
IMM GRANULOCYTES NFR BLD AUTO: 0.3 % (ref 0–0.9)
INR PPP: 0.97 (ref 0.87–1.13)
LG PLATELETS BLD QL SMEAR: NORMAL
LIPASE SERPL-CCNC: 113 U/L (ref 7–58)
LYMPHOCYTES # BLD AUTO: 0.53 K/UL (ref 1–4.8)
LYMPHOCYTES NFR BLD: 8.2 % (ref 22–41)
MAGNESIUM SERPL-MCNC: 2.3 MG/DL (ref 1.5–2.5)
MCH RBC QN AUTO: 32.2 PG (ref 27–33)
MCHC RBC AUTO-ENTMCNC: 32.5 G/DL (ref 33.6–35)
MCV RBC AUTO: 99.1 FL (ref 81.4–97.8)
MONOCYTES # BLD AUTO: 0.35 K/UL (ref 0–0.85)
MONOCYTES NFR BLD AUTO: 5.4 % (ref 0–13.4)
NEUTROPHILS # BLD AUTO: 5.4 K/UL (ref 2–7.15)
NEUTROPHILS NFR BLD: 83.6 % (ref 44–72)
NRBC # BLD AUTO: 0 K/UL
NRBC BLD-RTO: 0 /100 WBC
PLATELET # BLD AUTO: 191 K/UL (ref 164–446)
PLATELET BLD QL SMEAR: NORMAL
PMV BLD AUTO: 12.7 FL (ref 9–12.9)
POTASSIUM SERPL-SCNC: 4.3 MMOL/L (ref 3.6–5.5)
PROT SERPL-MCNC: 7.5 G/DL (ref 6–8.2)
PROTHROMBIN TIME: 12.5 SEC (ref 12–14.6)
PTH-INTACT SERPL-MCNC: 25.7 PG/ML (ref 14–72)
RBC # BLD AUTO: 4.56 M/UL (ref 4.2–5.4)
RBC BLD AUTO: PRESENT
RH BLD: NORMAL
SODIUM SERPL-SCNC: 137 MMOL/L (ref 135–145)
TSH SERPL DL<=0.005 MIU/L-ACNC: 0.9 UIU/ML (ref 0.38–5.33)
WBC # BLD AUTO: 6.5 K/UL (ref 4.8–10.8)

## 2022-10-26 PROCEDURE — 85025 COMPLETE CBC W/AUTO DIFF WBC: CPT

## 2022-10-26 PROCEDURE — 92610 EVALUATE SWALLOWING FUNCTION: CPT

## 2022-10-26 PROCEDURE — 84443 ASSAY THYROID STIM HORMONE: CPT

## 2022-10-26 PROCEDURE — 85730 THROMBOPLASTIN TIME PARTIAL: CPT

## 2022-10-26 PROCEDURE — 80053 COMPREHEN METABOLIC PANEL: CPT

## 2022-10-26 PROCEDURE — 83970 ASSAY OF PARATHORMONE: CPT

## 2022-10-26 PROCEDURE — 83735 ASSAY OF MAGNESIUM: CPT

## 2022-10-26 PROCEDURE — 93005 ELECTROCARDIOGRAM TRACING: CPT | Performed by: INTERNAL MEDICINE

## 2022-10-26 PROCEDURE — 84207 ASSAY OF VITAMIN B-6: CPT

## 2022-10-26 PROCEDURE — 97602 WOUND(S) CARE NON-SELECTIVE: CPT

## 2022-10-26 PROCEDURE — 94760 N-INVAS EAR/PLS OXIMETRY 1: CPT

## 2022-10-26 PROCEDURE — 86900 BLOOD TYPING SEROLOGIC ABO: CPT

## 2022-10-26 PROCEDURE — 85610 PROTHROMBIN TIME: CPT

## 2022-10-26 PROCEDURE — 700105 HCHG RX REV CODE 258: Performed by: INTERNAL MEDICINE

## 2022-10-26 PROCEDURE — 86850 RBC ANTIBODY SCREEN: CPT

## 2022-10-26 PROCEDURE — 82105 ALPHA-FETOPROTEIN SERUM: CPT

## 2022-10-26 PROCEDURE — 99285 EMERGENCY DEPT VISIT HI MDM: CPT

## 2022-10-26 PROCEDURE — 36415 COLL VENOUS BLD VENIPUNCTURE: CPT

## 2022-10-26 PROCEDURE — 99223 1ST HOSP IP/OBS HIGH 75: CPT | Mod: 25,AI | Performed by: INTERNAL MEDICINE

## 2022-10-26 PROCEDURE — 84425 ASSAY OF VITAMIN B-1: CPT

## 2022-10-26 PROCEDURE — 71045 X-RAY EXAM CHEST 1 VIEW: CPT

## 2022-10-26 PROCEDURE — 82330 ASSAY OF CALCIUM: CPT

## 2022-10-26 PROCEDURE — 770006 HCHG ROOM/CARE - MED/SURG/GYN SEMI*

## 2022-10-26 PROCEDURE — 99497 ADVNCD CARE PLAN 30 MIN: CPT | Performed by: INTERNAL MEDICINE

## 2022-10-26 PROCEDURE — 83690 ASSAY OF LIPASE: CPT

## 2022-10-26 PROCEDURE — 700111 HCHG RX REV CODE 636 W/ 250 OVERRIDE (IP): Performed by: INTERNAL MEDICINE

## 2022-10-26 PROCEDURE — 86901 BLOOD TYPING SEROLOGIC RH(D): CPT

## 2022-10-26 RX ORDER — POLYETHYLENE GLYCOL 3350 17 G/17G
1 POWDER, FOR SOLUTION ORAL
Status: DISCONTINUED | OUTPATIENT
Start: 2022-10-26 | End: 2022-10-31 | Stop reason: HOSPADM

## 2022-10-26 RX ORDER — AMOXICILLIN 250 MG
2 CAPSULE ORAL 2 TIMES DAILY
Status: DISCONTINUED | OUTPATIENT
Start: 2022-10-26 | End: 2022-10-31 | Stop reason: HOSPADM

## 2022-10-26 RX ORDER — OXYCODONE HYDROCHLORIDE 5 MG/1
5 TABLET ORAL
Status: DISCONTINUED | OUTPATIENT
Start: 2022-10-26 | End: 2022-10-31 | Stop reason: HOSPADM

## 2022-10-26 RX ORDER — ONDANSETRON 2 MG/ML
4 INJECTION INTRAMUSCULAR; INTRAVENOUS EVERY 4 HOURS PRN
Status: DISCONTINUED | OUTPATIENT
Start: 2022-10-26 | End: 2022-10-31 | Stop reason: HOSPADM

## 2022-10-26 RX ORDER — HYDROMORPHONE HYDROCHLORIDE 1 MG/ML
0.25 INJECTION, SOLUTION INTRAMUSCULAR; INTRAVENOUS; SUBCUTANEOUS
Status: DISCONTINUED | OUTPATIENT
Start: 2022-10-26 | End: 2022-10-31 | Stop reason: HOSPADM

## 2022-10-26 RX ORDER — SODIUM CHLORIDE 9 MG/ML
INJECTION, SOLUTION INTRAVENOUS CONTINUOUS
Status: ACTIVE | OUTPATIENT
Start: 2022-10-26 | End: 2022-10-27

## 2022-10-26 RX ORDER — ONDANSETRON 4 MG/1
4 TABLET, ORALLY DISINTEGRATING ORAL EVERY 4 HOURS PRN
Status: DISCONTINUED | OUTPATIENT
Start: 2022-10-26 | End: 2022-10-31 | Stop reason: HOSPADM

## 2022-10-26 RX ORDER — LABETALOL HYDROCHLORIDE 5 MG/ML
10 INJECTION, SOLUTION INTRAVENOUS EVERY 4 HOURS PRN
Status: DISCONTINUED | OUTPATIENT
Start: 2022-10-26 | End: 2022-10-31 | Stop reason: HOSPADM

## 2022-10-26 RX ORDER — OXYCODONE HYDROCHLORIDE 5 MG/1
2.5 TABLET ORAL
Status: DISCONTINUED | OUTPATIENT
Start: 2022-10-26 | End: 2022-10-31 | Stop reason: HOSPADM

## 2022-10-26 RX ORDER — BISACODYL 10 MG
10 SUPPOSITORY, RECTAL RECTAL
Status: DISCONTINUED | OUTPATIENT
Start: 2022-10-26 | End: 2022-10-31 | Stop reason: HOSPADM

## 2022-10-26 RX ADMIN — SODIUM CHLORIDE: 9 INJECTION, SOLUTION INTRAVENOUS at 12:15

## 2022-10-26 RX ADMIN — SODIUM CHLORIDE: 9 INJECTION, SOLUTION INTRAVENOUS at 23:10

## 2022-10-26 RX ADMIN — THIAMINE HYDROCHLORIDE 100 MG: 100 INJECTION, SOLUTION INTRAMUSCULAR; INTRAVENOUS at 20:56

## 2022-10-26 ASSESSMENT — COGNITIVE AND FUNCTIONAL STATUS - GENERAL
DRESSING REGULAR LOWER BODY CLOTHING: A LOT
EATING MEALS: A LITTLE
DAILY ACTIVITIY SCORE: 16
PERSONAL GROOMING: A LITTLE
MOVING FROM LYING ON BACK TO SITTING ON SIDE OF FLAT BED: A LITTLE
MOVING TO AND FROM BED TO CHAIR: A LITTLE
MOBILITY SCORE: 16
TOILETING: A LITTLE
TURNING FROM BACK TO SIDE WHILE IN FLAT BAD: A LITTLE
CLIMB 3 TO 5 STEPS WITH RAILING: A LOT
DRESSING REGULAR UPPER BODY CLOTHING: A LITTLE
STANDING UP FROM CHAIR USING ARMS: A LITTLE
SUGGESTED CMS G CODE MODIFIER DAILY ACTIVITY: CK
SUGGESTED CMS G CODE MODIFIER MOBILITY: CK
WALKING IN HOSPITAL ROOM: A LOT
HELP NEEDED FOR BATHING: A LOT

## 2022-10-26 ASSESSMENT — LIFESTYLE VARIABLES
ALCOHOL_USE: NO
TOTAL SCORE: 0
CONSUMPTION TOTAL: NEGATIVE
HAVE YOU EVER FELT YOU SHOULD CUT DOWN ON YOUR DRINKING: NO
HAVE PEOPLE ANNOYED YOU BY CRITICIZING YOUR DRINKING: NO
EVER FELT BAD OR GUILTY ABOUT YOUR DRINKING: NO
TOTAL SCORE: 0
TOTAL SCORE: 0
HAVE PEOPLE ANNOYED YOU BY CRITICIZING YOUR DRINKING: NO
EVER FELT BAD OR GUILTY ABOUT YOUR DRINKING: NO
ON A TYPICAL DAY WHEN YOU DRINK ALCOHOL HOW MANY DRINKS DO YOU HAVE: 0
SUBSTANCE_ABUSE: 0
TOTAL SCORE: 0
DO YOU DRINK ALCOHOL: NO
EVER HAD A DRINK FIRST THING IN THE MORNING TO STEADY YOUR NERVES TO GET RID OF A HANGOVER: NO
AVERAGE NUMBER OF DAYS PER WEEK YOU HAVE A DRINK CONTAINING ALCOHOL: 0
EVER HAD A DRINK FIRST THING IN THE MORNING TO STEADY YOUR NERVES TO GET RID OF A HANGOVER: NO
HAVE YOU EVER FELT YOU SHOULD CUT DOWN ON YOUR DRINKING: NO
CONSUMPTION TOTAL: INCOMPLETE
HOW MANY TIMES IN THE PAST YEAR HAVE YOU HAD 5 OR MORE DRINKS IN A DAY: 0

## 2022-10-26 ASSESSMENT — ENCOUNTER SYMPTOMS
NAUSEA: 1
FALLS: 0
EYE REDNESS: 0
MYALGIAS: 0
PALPITATIONS: 0
ABDOMINAL PAIN: 1
BACK PAIN: 1
EYE DISCHARGE: 0
WEIGHT LOSS: 1
WEAKNESS: 1
FEVER: 0
VOMITING: 0
SHORTNESS OF BREATH: 0
HEADACHES: 0
BLURRED VISION: 0
DIZZINESS: 0
ABDOMINAL PAIN: 0
COUGH: 0
CHILLS: 0
INSOMNIA: 0
EYE PAIN: 0
HALLUCINATIONS: 0
NERVOUS/ANXIOUS: 0

## 2022-10-26 ASSESSMENT — PAIN DESCRIPTION - PAIN TYPE: TYPE: ACUTE PAIN

## 2022-10-26 ASSESSMENT — FIBROSIS 4 INDEX
FIB4 SCORE: 5.37
FIB4 SCORE: 2.77

## 2022-10-26 NOTE — CONSULTS
Gastroenterology Initial Consult Note               Author:  BEL Rae Date & Time Created: 10/26/2022 12:47 PM       Patient ID:  Name:             Licha Pope  YOB: 1935  Age:                 87 y.o.  female  MRN:               [id number]      Referring Provider:  Sandor Espinal MD      Presenting Chief Complaint:  Abdominal pain, Elevated liver enzymes      History of Present Illness:    This is a very pleasant 87 y.o. 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  The patient recently underwent ERCP by Dr. Rubén Guthrie on 10/21/2022 at [facility].  Post procedure, the patient's had increased abdominal pain, as well as intractable nausea and vomiting.  This resolved by 10/22 2022 and she was discharged home.  The patient's son, Aniceto, states that after her procedure she was eating well but was having recurrent abdominal pain, some nausea, and has been lethargic.  She has lost 10 pounds recently.    Labs and imaging reviewed from this admission including normal CBC other than MCV 99.1, RDW 67.5.  CMP with glucose 147, calcium 10.5, , ALT 72, alkaline phosphatase 448, bilirubin 1.7.  Lipase elevated 113.  Chest x-ray is normal.      Review of Systems:  Review of Systems   Constitutional:  Negative for chills and fever.   Eyes:  Negative for discharge and redness.   Respiratory:  Negative for cough and shortness of breath.    Cardiovascular:  Negative for chest pain and palpitations.   Gastrointestinal:  Positive for abdominal pain, melena and nausea. Negative for vomiting.   Genitourinary:  Negative for dysuria and urgency.   Musculoskeletal:  Negative for falls and myalgias.   Skin:  Negative for itching and rash.   Neurological:  Positive for weakness. Negative for  dizziness.   Psychiatric/Behavioral:  Negative for hallucinations and substance abuse.            Past Medical History:  Past Medical History:   Diagnosis Date    Arthritis     COPD (chronic obstructive pulmonary disease) (HCC)     DVT (deep venous thrombosis) (HCC)     EMPHYSEMA     Gastroesophageal reflux disease without esophagitis 3/13/2019    Hypertension     Hypertriglyceridemia 2019    Prediabetes 2019     Active Hospital Problems    Diagnosis     Liver failure without hepatic coma (HCC) [K72.90]     Hypercalcemia [E83.52]     Post-ERCP acute pancreatitis [K91.89, K85.90]     Advance care planning [Z71.89]     Dark stools [R19.5]     Esophageal diverticulum [Q39.6]     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]     Generalized weakness [R53.1]     Gastroesophageal reflux disease without esophagitis [K21.9]     COPD (chronic obstructive pulmonary disease) (HCC) [J44.9]          Past Surgical History:  Past Surgical History:   Procedure Laterality Date    NE LAP,DIAGNOSTIC ABDOMEN Bilateral 3/30/2022    Procedure: LAPAROSCOPY;  Surgeon: Mando Johnson M.D.;  Location: [facility];  Service: General    NE EXPLORATORY OF ABDOMEN Bilateral 3/30/2022    Procedure: LAPAROTOMY, EXPLORATORY, ILEOCECECTOMY;  Surgeon: Mando Johnson M.D.;  Location: [facility];  Service: General    NE LAP,APPENDECTOMY  2021    Procedure: APPENDECTOMY, LAPAROSCOPIC;  Surgeon: Pedro Mcneil M.D.;  Location: [facility];  Service: Gastroenterology    CATARACT PHACO WITH IOL  2014    Performed by Jarred Almazan M.D. at [facility] ORS    CATARACT PHACO WITH IOL  2014    Performed by Jarred Almazan M.D. at [facility] OR[facility]    KNEE ARTHROPLASTY TOTAL  2014    Performed by Jose Hahn M.D. at [facility] ORS    NE  DELIVERY ONLY  1969    HEMORRHOIDECTOMY             Hospital Medications:  Current Facility-Administered  Medications   Medication Dose Frequency Provider Last Rate Last Admin    senna-docusate (PERICOLACE or SENOKOT S) 8.6-50 MG per tablet 2 Tablet  2 Tablet BID Lester Reynoso M.D.        And    polyethylene glycol/lytes (MIRALAX) PACKET 1 Packet  1 Packet QDAY PRN Lester Reynoso M.D.        And    magnesium hydroxide (MILK OF MAGNESIA) suspension 30 mL  30 mL QDAY PRN Lester Reynoso M.D.        And    bisacodyl (DULCOLAX) suppository 10 mg  10 mg QDAY PRN Lester Reynoso M.D.        NS infusion   Continuous Lester Reynoso M.D. 100 mL/hr at 10/26/22 1215 New Bag at 10/26/22 1215    labetalol (NORMODYNE/TRANDATE) injection 10 mg  10 mg Q4HRS PRN Lester Reynoso M.D.        ondansetron (ZOFRAN) syringe/vial injection 4 mg  4 mg Q4HRS PRN Lester Reynoso M.D.        ondansetron (ZOFRAN ODT) dispertab 4 mg  4 mg Q4HRS PRABDON Reynoso M.D.        Pharmacy Consult Request ...Pain Management Review 1 Each  1 Each PHARMACY TO DOSE Lester Reynoso M.D.        oxyCODONE immediate-release (ROXICODONE) tablet 2.5 mg  2.5 mg Q3HRS PRABDON Reynoso M.D.        Or    oxyCODONE immediate-release (ROXICODONE) tablet 5 mg  5 mg Q3HRS PRABDON Reynoso M.D.        Or    HYDROmorphone (Dilaudid) injection 0.25 mg  0.25 mg Q3HRS PRABDON Reynoso M.D.       Last reviewed on 10/26/2022  9:54 AM by Meeta Perdomo       Current Outpatient Medications:  Medications Prior to Admission   Medication Sig Dispense Refill Last Dose    POTASSIUM PO Take 1 Tablet by mouth every day.   unknown at unknown         Medication Allergies:  Allergies   Allergen Reactions    Codeine Vomiting    Demerol Vomiting     Injectable type    Shellfish Allergy Shortness of Breath     Soft shell    Ciprofloxacin Rash     rash    Ibuprofen      Patient developed gastric ulcer/GI bleed from ibuprofen use    Iodine      PATIENT ALLERGIC TO SHELLFISH, NOT SURE IF SHE IS ALLERGIC TO IODINE         Family  "Medical History:  Family History   Problem Relation Age of Onset    Alzheimer's Disease Mother     Heart Disease Father     Other Father         AAA    Heart Attack Sister     Diabetes Brother          Social History:  Social History     Socioeconomic History    Marital status:      Spouse name: Not on file    Number of children: Not on file    Years of education: Not on file    Highest education level: Not on file   Occupational History    Not on file   Tobacco Use    Smoking status: Former     Packs/day: 1.00     Years: 20.00     Pack years: 20.00     Types: Cigarettes     Quit date: 1975     Years since quittin.8    Smokeless tobacco: Never   Vaping Use    Vaping Use: Never used   Substance and Sexual Activity    Alcohol use: Not Currently     Alcohol/week: 1.5 oz     Types: 3 Standard drinks or equivalent per week     Comment: RARE    Drug use: No    Sexual activity: Not Currently     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     Comment: , retired lab tech   Other Topics Concern    Not on file   Social History Narrative    Not on file     Social Determinants of Health     Financial Resource Strain: Low Risk     Difficulty of Paying Living Expenses: Not hard at all   Food Insecurity: No Food Insecurity    Worried About Running Out of Food in the Last Year: Never true    Ran Out of Food in the Last Year: Never true   Transportation Needs: No Transportation Needs    Lack of Transportation (Medical): No    Lack of Transportation (Non-Medical): No   Physical Activity: Not on file   Stress: Not on file   Social Connections: Not on file   Intimate Partner Violence: Not on file   Housing Stability: Not on file         Vital signs:  Weight/BMI: Body mass index is 20.08 kg/m².  BP (!) 150/86   Pulse 78   Temp 36.6 °C (97.9 °F) (Temporal)   Resp 15   Ht 1.626 m (5' 4\")   Wt 53.1 kg (117 lb)   SpO2 95%   Vitals:    10/26/22 1100 10/26/22 1115 10/26/22 1145 10/26/22 1200   BP:  (!) 159/89 (!) 150/86    Pulse: 76 76 83 78 "   Resp: 16 16 19 15   Temp: 36.7 °C (98 °F)   36.6 °C (97.9 °F)   TempSrc: Temporal   Temporal   SpO2: 94% 94% 94% 95%   Weight:       Height:         Oxygen Therapy:  Pulse Oximetry: 95 %, O2 (LPM): 0, O2 Delivery Device: None - Room Air  No intake or output data in the 24 hours ending 10/26/22 1247      Physical Exam:  Physical Exam  Vitals and nursing note reviewed.   Constitutional:       Appearance: She is ill-appearing.   HENT:      Head: Normocephalic and atraumatic.      Right Ear: External ear normal.      Left Ear: External ear normal.      Ears:      Comments: Hard of hearing     Nose: Nose normal. No rhinorrhea.      Mouth/Throat:      Mouth: Mucous membranes are dry.      Pharynx: Oropharynx is clear.   Eyes:      General: No scleral icterus.     Extraocular Movements: Extraocular movements intact.      Pupils: Pupils are equal, round, and reactive to light.   Cardiovascular:      Rate and Rhythm: Normal rate and regular rhythm.      Pulses: Normal pulses.      Heart sounds: Normal heart sounds.   Pulmonary:      Effort: Pulmonary effort is normal. No respiratory distress.      Breath sounds: Normal breath sounds. No wheezing.   Abdominal:      General: Abdomen is flat. Bowel sounds are normal. There is distension.      Palpations: Abdomen is soft.      Tenderness: There is abdominal tenderness (Epigastric, RUQ, LUQ, Umbilical tenderness). There is no rebound.   Musculoskeletal:      Cervical back: Neck supple.      Right lower leg: No edema.      Left lower leg: No edema.   Lymphadenopathy:      Cervical: No cervical adenopathy.   Skin:     General: Skin is warm and dry.      Coloration: Skin is pale.   Neurological:      General: No focal deficit present.      Mental Status: She is alert and oriented to person, place, and time.   Psychiatric:         Thought Content: Thought content normal.         Judgment: Judgment normal.         Labs:  Recent Labs     10/26/22  0940   SODIUM 137   POTASSIUM 4.3    CHLORIDE 98   CO2 30   BUN 15   CREATININE 0.83   MAGNESIUM 2.3   CALCIUM 10.5*     Recent Labs     10/26/22  0940   ALTSGPT 72*   ASTSGOT 100*   ALKPHOSPHAT 448*   TBILIRUBIN 1.7*   LIPASE 113*   GLUCOSE 147*     Recent Labs     10/26/22  0940   WBC 6.5   NEUTSPOLYS 83.60*   LYMPHOCYTES 8.20*   MONOCYTES 5.40   EOSINOPHILS 1.90   BASOPHILS 0.60   ASTSGOT 100*   ALTSGPT 72*   ALKPHOSPHAT 448*   TBILIRUBIN 1.7*     Recent Labs     10/26/22  0940   RBC 4.56   HEMOGLOBIN 14.7   HEMATOCRIT 45.2   PLATELETCT 191   PROTHROMBTM 12.5   APTT 26.7   INR 0.97     Recent Results (from the past 24 hour(s))   CBC WITH DIFFERENTIAL    Collection Time: 10/26/22  9:40 AM   Result Value Ref Range    WBC 6.5 4.8 - 10.8 K/uL    RBC 4.56 4.20 - 5.40 M/uL    Hemoglobin 14.7 12.0 - 16.0 g/dL    Hematocrit 45.2 37.0 - 47.0 %    MCV 99.1 (H) 81.4 - 97.8 fL    MCH 32.2 27.0 - 33.0 pg    MCHC 32.5 (L) 33.6 - 35.0 g/dL    RDW 67.5 (H) 35.9 - 50.0 fL    Platelet Count 191 164 - 446 K/uL    MPV 12.7 9.0 - 12.9 fL    Neutrophils-Polys 83.60 (H) 44.00 - 72.00 %    Lymphocytes 8.20 (L) 22.00 - 41.00 %    Monocytes 5.40 0.00 - 13.40 %    Eosinophils 1.90 0.00 - 6.90 %    Basophils 0.60 0.00 - 1.80 %    Immature Granulocytes 0.30 0.00 - 0.90 %    Nucleated RBC 0.00 /100 WBC    Neutrophils (Absolute) 5.40 2.00 - 7.15 K/uL    Lymphs (Absolute) 0.53 (L) 1.00 - 4.80 K/uL    Monos (Absolute) 0.35 0.00 - 0.85 K/uL    Eos (Absolute) 0.12 0.00 - 0.51 K/uL    Baso (Absolute) 0.04 0.00 - 0.12 K/uL    Immature Granulocytes (abs) 0.02 0.00 - 0.11 K/uL    NRBC (Absolute) 0.00 K/uL    Hypochromia 1+    COMP METABOLIC PANEL    Collection Time: 10/26/22  9:40 AM   Result Value Ref Range    Sodium 137 135 - 145 mmol/L    Potassium 4.3 3.6 - 5.5 mmol/L    Chloride 98 96 - 112 mmol/L    Co2 30 20 - 33 mmol/L    Anion Gap 9.0 7.0 - 16.0    Glucose 147 (H) 65 - 99 mg/dL    Bun 15 8 - 22 mg/dL    Creatinine 0.83 0.50 - 1.40 mg/dL    Calcium 10.5 (H) 8.4 - 10.2 mg/dL     AST(SGOT) 100 (H) 12 - 45 U/L    ALT(SGPT) 72 (H) 2 - 50 U/L    Alkaline Phosphatase 448 (H) 30 - 99 U/L    Total Bilirubin 1.7 (H) 0.1 - 1.5 mg/dL    Albumin 3.7 3.2 - 4.9 g/dL    Total Protein 7.5 6.0 - 8.2 g/dL    Globulin 3.8 (H) 1.9 - 3.5 g/dL    A-G Ratio 1.0 g/dL   PROTHROMBIN TIME (INR)    Collection Time: 10/26/22  9:40 AM   Result Value Ref Range    PT 12.5 12.0 - 14.6 sec    INR 0.97 0.87 - 1.13   APTT    Collection Time: 10/26/22  9:40 AM   Result Value Ref Range    APTT 26.7 24.7 - 36.0 sec   COD (ADULT)    Collection Time: 10/26/22  9:40 AM   Result Value Ref Range    ABO Grouping Only B     Rh Grouping Only POS     Antibody Screen-Cod NEG    LIPASE    Collection Time: 10/26/22  9:40 AM   Result Value Ref Range    Lipase 113 (H) 7 - 58 U/L   ESTIMATED GFR    Collection Time: 10/26/22  9:40 AM   Result Value Ref Range    GFR (CKD-EPI) 68 >60 mL/min/1.73 m 2   PLATELET ESTIMATE    Collection Time: 10/26/22  9:40 AM   Result Value Ref Range    Plt Estimation Normal    MORPHOLOGY    Collection Time: 10/26/22  9:40 AM   Result Value Ref Range    RBC Morphology Present     Large Platelets 1+     Giant Platelets 1+    DIFFERENTIAL COMMENT    Collection Time: 10/26/22  9:40 AM   Result Value Ref Range    Comments-Diff see below    Magnesium    Collection Time: 10/26/22  9:40 AM   Result Value Ref Range    Magnesium 2.3 1.5 - 2.5 mg/dL   PTH WITH IONIZED CALCIUM    Collection Time: 10/26/22  9:40 AM   Result Value Ref Range    Ionized Calcium 1.26 1.10 - 1.30 mmol/L         Radiology Review:  DX-CHEST-PORTABLE (1 VIEW)   Final Result         1. No acute cardiopulmonary abnormalities are identified.      MR-ABDOMEN-WITH & W/O    (Results Pending)         MDM (Data Review):   -Records reviewed and summarized in current documentation  -I personally reviewed and interpreted the laboratory results  -I personally reviewed the radiology images        Medical Decision Making, by Problem:  Active Hospital Problems     Diagnosis     Liver failure without hepatic coma (HCC) [K72.90]     Hypercalcemia [E83.52]     Post-ERCP acute pancreatitis [K91.89, K85.90]     Advance care planning [Z71.89]     Dark stools [R19.5]     Esophageal diverticulum [Q39.6]     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]     Generalized weakness [R53.1]     Gastroesophageal reflux disease without esophagitis [K21.9]     COPD (chronic obstructive pulmonary disease) (HCC) [J44.9]            Assessment/Recommendations:  87-year-old female with history of stage IV ileal cecal adenocarcinoma status post resection, chemotherapy, and recent ERCP on 10/21/2022 seen for abdominal pain, weakness, elevated liver enzymes.  Patient had an MRCP last month that demonstrated possible hepatic lesion and intrahepatic ductal dilation which was not identified on ERCP.  Brushing biopsies pathology negative.  Post procedure the patient states she had pain and has documented nausea and vomiting.  She continues to have mildly elevated liver enzymes as well as bilirubin.  Question whether patient does have infiltrative lesion of the liver given her continued weight loss, lethargy, and persistently elevating alkaline phosphatase along with findings on previous MRCP.  Additionally, patient complains of black stool however no overt GI bleeding in the hospital and hemoglobin as well as BUN are normal.    Assessment:  -Abdominal pain-patient is tender to the upper abdomen, but complains of both upper and lower abdominal pain.  Some of this is chronic and some seems to be worse after her recent ERCP  -Elevated liver enzymes  -Stage IV ileocecal adenocarcinoma status post resection  -Suspected post ERCP pancreatitis  -Weakness  -Dark or black stools  -Esophageal or gastric diverticulum    Plan:  -MRI with and without contrast hepatic protocol  -AFP  -Protonix 40 mg IV twice daily  -Diet as tolerated, however if patient develops further black stools and drop in hemoglobin would recommend  n.p.o.  -Monitor for overt GI bleeding.  Serial hemoglobin and hematocrit  -If needed, can consider EGD but if no evidence of ongoing overt GI bleeding would hold off given that the patient is sensitive to anesthesia    ALISE Rae.      Thank you for inviting me to participate in the care of this patient. Please do not hesitate to call GI consultants with additional questions/concerns or changes in the patient's clinical status at [phone].      Core Quality Measures   Reviewed items:  Labs, Medications and Radiology reports reviewed

## 2022-10-26 NOTE — ED NOTES
Med rec complete per pt's son at bedside. Per son, pt was recently prescribed potassium, however, pt has not taken as prescribed. Also, pt was taking Doxycycline but stopped approximately 1 week ago.

## 2022-10-26 NOTE — THERAPY
"Speech Language Pathology   Clinical Swallow Evaluation     Patient Name: Licha Pope  AGE:  87 y.o., SEX:  female  Medical Record #: [id number]  Today's Date: 10/26/2022     Precautions  Precautions: Swallow Precautions ( See Comments)    HPI:  86 y/o admitted on 10/26 for blood stools and weakness. Not seen by SLP before at [facility].    Esophagram 4/22/22: \"Contrast extends from the distal esophagus at the level of the gastroesophageal junction to the right of midline into a large collection likely representing either a lower esophageal or upper gastric diverticulum.\"    PMHx:  COPD, DVT, HTN, dyslipidemia, recent diagnosis of stage IV colon cancer      Level of Consciousness: Drowsy  Affect/Behavior: Cooperative  Follows Directives: Yes  Hearing: Hearing impaired with adaption  Vision: Functional vision      Prior Living Situation & Level of Function:  Pt's son served as historian for session and he endorsed that pt lives independently at home, however, has been much weaker than usual since EGD procedure last Friday. He said she cooks, cleans, and walks at baseline and they live next door for support as needed. Pt reported increase in nausea and heartburn over last few days that appears to come from stomach/lower esophageal region. She denied globus sensation in throat/upper esophagus.      Oral Mechanism Evaluation  Facial Symmetry: Equal  Labial Observations: WFL  Lingual Observations: Midline  Dentition: Good  Comments:      Voice  Quality: WFL  Resonance: WFL  Intensity: Appropriate  Comments:      Current Method of Nutrition   Oral diet (soft and bite size/thin liquids)         Assessment  Positioning: Clarke's (60-90 degrees)  Bolus Administration: Patient  Oxygen Requirements: Room Air  Factor(s) Affecting Performance:  nausea    Swallowing Trials  Thin Liquid (TN0): WFL  Soft & Bite-Sized (SB6): WFL    Comments:  No overt s/sx of aspiration noted with trials of thin liquids and soft and bite size " "textures. Gurgly noted x1 in upper esophageal region. She denied globus sensation in throat or upper esophagus but did c/o nausea.         Clinical Impressions  Given pt's hx and report, pt is presenting with s/sx concerning for esophageal dysphagia. She may benefit from an esophagram to further assess motility. Recommend continuation of current diet, soft and bite size/thin liquid diet or as tolerated per GI.       Recommendations  1.  Continue soft and bite size/thin liquids or as tolerated per GI. Pt may benefit from a repeat esophagram as last was conducted in April 2022 where a large diverticulum was seen  2.  Instrumentation: None indicated at this time  3.  Swallowing Instructions & Precautions:   Supervision: Independent  Positioning: Fully upright and midline during oral intake  Medication: Whole with liquid, As tolerated  Strategies: None  Oral Care: BID        Plan    Recommend Speech Therapy 2 times per week until therapy goals are met for the following treatments:  Dysphagia Training and Patient / Family / Caregiver Education.    Discharge Recommendations: Anticipate that the patient will have no further speech therapy needs after discharge from the hospital       Objective       10/26/22 6180   Initial Contact Note    Initial Contact Note  Order Received and Verified, Speech Therapy Evaluation in Progress with Full Report to Follow.   Precautions   Precautions Swallow Precautions ( See Comments)   Vitals   O2 (LPM) 0   O2 Delivery Device None - Room Air   Prior Living Situation   Lives with - Patient's Self Care Capacity Alone and Able to Care For Self   Prior Level Of Function   Communication Within Functional Limits   Swallow Within Functional Limits   Dentition Intact   Hearing Impaired Both Ears   Hearing Aid Left;Right   Patient's Primary Language English   Occupation (Pre-Hospital Vocational) Not Employed   Patient / Family Goals   Patient / Family Goal #1 \"She's been getting nauseous when she " "eats\"   Short Term Goals   Short Term Goal # 1 Pt will consume an SB6/TN0 diet with no overt s/sx of aspiration   Education Group   Education Provided Dysphagia   Dysphagia Patient Response Patient;Family;Acceptance;Explanation;Verbal Demonstration;Reinforcement Needed   Problem List   Problem List Dysphagia   Anticipated Discharge Needs   Discharge Recommendations Anticipate that the patient will have no further speech therapy needs after discharge from the hospital   Therapy Recommendations Upon DC Dysphagia Training;Community Re-Integration;Patient / Family / Caregiver Education   Interdisciplinary Plan of Care Collaboration   IDT Collaboration with  Physician;Family / Caregiver   Patient Position at End of Therapy Seated;In Bed;Call Light within Reach;Tray Table within Reach;Phone within Reach;Family / Friend in Room     "

## 2022-10-26 NOTE — PROGRESS NOTES
4 Eyes Skin Assessment Completed by SIDRA Barrientos and SIDRA Bertrand.    Head WDL  Ears WDL  Nose WDL  Mouth WDL  Neck WDL  Breast/Chest WDL  Shoulder Blades WDL  Spine WDL  (R) Arm/Elbow/Hand WDL  (L) Arm/Elbow/Hand WDL  Abdomen WDL  Groin WDL  Scrotum/Coccyx/Buttocks Redness and Non-Blanching  (R) Leg WDL  (L) Leg WDL  (R) Heel/Foot/Toe WDL  (L) Heel/Foot/Toe WDL          Devices In Places PIV      Interventions In Place Pillows, Q2 Turns, and Barrier Cream    Possible Skin Injury No    Pictures Uploaded Into Epic Yes  Wound Consult Placed Yes  RN Wound Prevention Protocol Ordered Yes

## 2022-10-26 NOTE — ASSESSMENT & PLAN NOTE
Last code status DNR/DNI 4/11/22    I spent extra time discussing with patient about advanced care planning.    I spent a total of 25 minutes with the patient directly at bedside, and discussed patient's current diagnosis, prognosis and goals of care. I discussed code status, medical interventions, nutritional interventions.    Patient had multiple discussions regarding goals of care.  Did meet with palliative team.  We will continue to discuss goals of care with palliative team, PCP and oncology before making final decision.  At this time she is not ready for hospice, but is open in the future.

## 2022-10-26 NOTE — ASSESSMENT & PLAN NOTE
stage IV colon cancer adenocarcinoma.    Patient had endoscopy performed on Friday 5 days ago performed by GI consultants in [place] with sphincterectomy.  GIC evaluating patient in ED, appreciate any recommendations  Monitoring LFTs

## 2022-10-26 NOTE — ED PROVIDER NOTES
ED Provider Note    CHIEF COMPLAINT  Chief Complaint   Patient presents with    Bloody Stools     X 24 hours, s/p endoscopy on Friday    Weakness       HPI  Licha Pope is a 87 y.o. female who presents with a history of COPD, DVT, hypertension, high cholesterol, adenocarcinoma of the distal ileum, and had endoscopy performed and on Friday, 5 days ago underwent endoscopy to try to biopsy a tumor or possibly place a stent if needed in the biliary tree.  The GI specialist from [facility] in [place] told the son that there was no indication for a stent in the biliary tree and there was no visualization of tumor.  Sphincterotomy was performed.  The patient reports that in  she was diagnosed with an ulcer when she was taking ibuprofen.  She also reports that historically she was on blood thinning medication for DVT but they stopped this medicine recently when she was diagnosed with cancer but she believes that she received blood thinning medicine while she was hospitalized and comes to the ER because she has had black bowel movements yesterday and found black stool in her underwear.  She is complaining of generalized weakness.    REVIEW OF SYSTEMS  See HPI for further details. All other systems are negative.     PAST MEDICAL HISTORY   has a past medical history of Arthritis, COPD (chronic obstructive pulmonary disease) (HCC), DVT (deep venous thrombosis) (HCC), EMPHYSEMA, Gastroesophageal reflux disease without esophagitis (3/13/2019), Hypertension, Hypertriglyceridemia (2019), and Prediabetes (2019).    SOCIAL HISTORY  Social History     Tobacco Use    Smoking status: Former     Packs/day: 1.00     Years: 20.00     Pack years: 20.00     Types: Cigarettes     Quit date: 1975     Years since quittin.8    Smokeless tobacco: Never   Vaping Use    Vaping Use: Never used   Substance and Sexual Activity    Alcohol use: Not Currently     Alcohol/week: 1.5 oz     Types: 3 Standard drinks or  "equivalent per week     Comment: RARE    Drug use: No    Sexual activity: Not Currently     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     Comment: , retired [employer]       SURGICAL HISTORY   has a past surgical history that includes hemorrhoidectomy (/);  delivery only (); knee arthroplasty total (2014); cataract phaco with iol (2014); cataract phaco with iol (2014); lap,appendectomy (2021); lap,diagnostic abdomen (Bilateral, 3/30/2022); and exploratory of abdomen (Bilateral, 3/30/2022).    CURRENT MEDICATIONS  Home Medications       Reviewed by Iliana Almendarez R.N. (Registered Nurse) on 10/26/22 at 1513  Med List Status: Complete     Medication Last Dose Status   POTASSIUM PO unknown Active                      ALLERGIES  Allergies   Allergen Reactions    Codeine Vomiting    Demerol Vomiting     Injectable type    Shellfish Allergy Shortness of Breath     Soft shell    Ciprofloxacin Rash     rash    Ibuprofen      Patient developed gastric ulcer/GI bleed from ibuprofen use    Iodine      PATIENT ALLERGIC TO SHELLFISH, NOT SURE IF SHE IS ALLERGIC TO IODINE       FAMILY HISTORY  No pertinent family history    PHYSICAL EXAM  VITAL SIGNS: BP (!) 165/97 Comment: rn notified and aware  Pulse 82   Temp 36.9 °C (98.4 °F) (Temporal)   Resp 16   Ht 1.626 m (5' 4\")   Wt 53.1 kg (117 lb)   SpO2 94%   BMI 20.08 kg/m²  @LEATHA[[id number]::@   Pulse ox interpretation: I interpret this pulse ox as normal.  Constitutional: Alert.  The patient appears pale.  She is generally weak.  HENT: No signs of trauma, Bilateral external ears normal, Nose normal.   Eyes: Pupils are equal and reactive, Conjunctiva normal, Non-icteric.   Neck: Normal range of motion, No tenderness, Supple, No stridor.   Lymphatic: No lymphadenopathy noted.   Cardiovascular: Regular rate and rhythm, no murmurs.   Thorax & Lungs: Normal breath sounds, No respiratory distress, No wheezing, No chest tenderness.   Abdomen: Bowel sounds normal, " Soft, No tenderness, No masses, No pulsatile masses. No peritoneal signs.  Skin: Warm, Dry, No erythema, No rash.   Back: No bony tenderness, No CVA tenderness.   Extremities: Intact distal pulses, No edema, No tenderness, No cyanosis.  Musculoskeletal: Good range of motion in all major joints. No tenderness to palpation or major deformities noted.   Neurologic: Alert , Normal motor function, Normal sensory function, No focal deficits noted.  Generally weak with no focal neurologic deficits.  Psychiatric: Affect normal, Judgment normal, Mood normal.       DIAGNOSTIC STUDIES / PROCEDURES      LABS  Labs Reviewed   CBC WITH DIFFERENTIAL - Abnormal; Notable for the following components:       Result Value    MCV 99.1 (*)     MCHC 32.5 (*)     RDW 67.5 (*)     Neutrophils-Polys 83.60 (*)     Lymphocytes 8.20 (*)     Lymphs (Absolute) 0.53 (*)     All other components within normal limits    Narrative:     Indicate which anticoagulants the patient is on:->UNKNOWN   COMP METABOLIC PANEL - Abnormal; Notable for the following components:    Glucose 147 (*)     Calcium 10.5 (*)     AST(SGOT) 100 (*)     ALT(SGPT) 72 (*)     Alkaline Phosphatase 448 (*)     Total Bilirubin 1.7 (*)     Globulin 3.8 (*)     All other components within normal limits    Narrative:     Indicate which anticoagulants the patient is on:->UNKNOWN   LIPASE - Abnormal; Notable for the following components:    Lipase 113 (*)     All other components within normal limits    Narrative:     Indicate which anticoagulants the patient is on:->UNKNOWN   PROTHROMBIN TIME    Narrative:     Indicate which anticoagulants the patient is on:->UNKNOWN   APTT    Narrative:     Indicate which anticoagulants the patient is on:->UNKNOWN   COD (ADULT)   ESTIMATED GFR    Narrative:     Indicate which anticoagulants the patient is on:->UNKNOWN   PLATELET ESTIMATE    Narrative:     Indicate which anticoagulants the patient is on:->UNKNOWN   MORPHOLOGY    Narrative:      Indicate which anticoagulants the patient is on:->UNKNOWN   DIFFERENTIAL COMMENT    Narrative:     Indicate which anticoagulants the patient is on:->UNKNOWN   MAGNESIUM   PTH WITH IONIZED CALCIUM (AR)   TSH   AFP SERUM TUMOR MARKER   VITAMIN B1   VITAMIN B6         RADIOLOGY  DX-CHEST-PORTABLE (1 VIEW)   Final Result         1. No acute cardiopulmonary abnormalities are identified.      MR-ABDOMEN-WITH & W/O    (Results Pending)           COURSE & MEDICAL DECISION MAKING  Pertinent Labs & Imaging studies reviewed. (See chart for details)    The patient presents with black stool and generalized weakness status post endoscopy 5 days ago, she reports a history of ulcer diagnosed in 2003 when she was taking ibuprofen.  She reports that in the past she has been on blood thinning medication but has not been on it recently until she was hospitalized.    Reviewed the patient's previous records in care everywhere, on October 22, 2022 she had an H&H of 12 and 36.    The patient's H&H today is normal.  Her BUN is normal.  Do not believe she is having a GI bleed.    Her ALT has gone from , ALT 44-72, and alk phos 240-448.  Her bilirubin is 1.7.    I spoke with [facility] of [facility].  He will consult on the patient.    I spoke with the hospitalist to assess the patient for hospitalization, the patient is in fair condition at the time of admission.        FINAL IMPRESSION  1. Weakness        2. Elevated LFTs                   Electronically signed by: Sandor Espinal M.D., 10/26/2022 9:34 AM

## 2022-10-26 NOTE — ASSESSMENT & PLAN NOTE
, ALT 72, , Tbili 1.7  Lipase 113  Concern for liver metastasis from colon  GIC evaluating patient, follow recommendations  Repeat CMP daily  Avoid acetaminophen  MRI liver protocol showing no liver lesions.

## 2022-10-26 NOTE — ED TRIAGE NOTES
"Licha Pope 87 y.o. female bib EMS from home for   Chief Complaint   Patient presents with    Bloody Stools     X 24 hours, s/p endoscopy on Friday    Weakness     EMS reports patient has hx of stage IV colon cancer.  Pt reports 2 x black BMs yesterday and some dark stool in her underwear overnight.  Pt reports she lives independently, normally ambulatory but unable to get up today due to weakness.  Pt reports chemo stopped 2 months ago.  Pt appears fatigued, denies pain or fevers.  No interventions by EMS.  BP (!) 160/82   Pulse 77   Temp 36.3 °C (97.3 °F) (Temporal)   Resp 16   Ht 1.626 m (5' 4\")   Wt 53.1 kg (117 lb)   SpO2 93%   BMI 20.08 kg/m²     "

## 2022-10-26 NOTE — ASSESSMENT & PLAN NOTE
Pt reported back pain, but no epigastric pain  Lipase 113, from post ERCP pancreatitis  IVF  Pain control if needed

## 2022-10-26 NOTE — H&P
Hospital Medicine History & Physical Note    Date of Service  10/26/2022    Primary Care Physician  Prateek ANDERSON M.D.    [facility]  Specialist Names: [facility]    Code Status  DNAR/DNI    Chief Complaint  Chief Complaint   Patient presents with    Bloody Stools     X 24 hours, s/p endoscopy on Friday    Weakness       History of Presenting Illness  Licha Pope is a 87-year-old female with past medical history of COPD, DVTs, hypertension, dyslipidemia recent diagnosis of stage IV colon cancer adenocarcinoma.  Admitted on 10/26/22 for increased generalized weakness and reported blood in stool. Patient had endoscopy performed on Friday 5 days ago performed by [facility] in [place] with sphincterectomy. Reported melena to ED. Came from home, lives alone. Possible hx of PUD after ibuprofen, not currently using NSAIDs.  Son at bedside, confirming patient has had decreased appetite for about 2-3 weeks now and she has lost significant weight.    I spoke with EDP about patient's case. Dr. Ervin has spoken with Dr. Belle with [facility] who will review. For now, recommended monitoring CMP and exclude liver metastasis.    I discussed the plan of care with patient, bedside RN, charge RN, , and pharmacy.    Review of Systems  Review of Systems   Constitutional:  Positive for malaise/fatigue and weight loss. Negative for fever.   HENT:  Negative for congestion and hearing loss.    Eyes:  Negative for blurred vision and pain.   Respiratory:  Negative for cough and shortness of breath.    Cardiovascular:  Negative for chest pain and palpitations.   Gastrointestinal:  Positive for nausea. Negative for abdominal pain and vomiting.   Genitourinary:  Negative for dysuria and hematuria.   Musculoskeletal:  Positive for back pain. Negative for joint pain.   Skin:  Negative for itching and rash.   Neurological:  Positive for weakness. Negative for dizziness and headaches.   Psychiatric/Behavioral:  The  patient is not nervous/anxious and does not have insomnia.    All other systems reviewed and are negative.    Past Medical History   has a past medical history of Arthritis, COPD (chronic obstructive pulmonary disease) (HCC), DVT (deep venous thrombosis) (HCC), EMPHYSEMA, Gastroesophageal reflux disease without esophagitis (3/13/2019), Hypertension, Hypertriglyceridemia (2019), and Prediabetes (2019).    Surgical History   has a past surgical history that includes hemorrhoidectomy (/); pr  delivery only (); knee arthroplasty total (2014); cataract phaco with iol (2014); cataract phaco with iol (2014); pr lap,appendectomy (2021); pr lap,diagnostic abdomen (Bilateral, 3/30/2022); and pr exploratory of abdomen (Bilateral, 3/30/2022).     Family History  family history includes Alzheimer's Disease in her mother; Diabetes in her brother; Heart Attack in her sister; Heart Disease in her father; Other in her father.   Family history reviewed with patient. There is no family history that is pertinent to the chief complaint.     Social History   reports that she quit smoking about 47 years ago. Her smoking use included cigarettes. She has a 20.00 pack-year smoking history. She has never used smokeless tobacco. She reports that she does not currently use alcohol after a past usage of about 1.5 oz per week. She reports that she does not use drugs.    Allergies  Allergies   Allergen Reactions    Codeine Vomiting    Demerol Vomiting     Injectable type    Shellfish Allergy Shortness of Breath     Soft shell    Ciprofloxacin Rash     rash    Ibuprofen      Patient developed gastric ulcer/GI bleed from ibuprofen use    Iodine      PATIENT ALLERGIC TO SHELLFISH, NOT SURE IF SHE IS ALLERGIC TO IODINE       Medications  Prior to Admission Medications   Prescriptions Last Dose Informant Patient Reported? Taking?   POTASSIUM PO unknown at unknown  Yes Yes   Sig: Take 1 Tablet by mouth  every day.      Facility-Administered Medications: None       Physical Exam  Temp:  [36.3 °C (97.3 °F)-36.7 °C (98 °F)] 36.3 °C (97.3 °F)  Pulse:  [75-83] 78  Resp:  [15-19] 15  BP: (143-165)/(82-91) 154/89  SpO2:  [93 %-95 %] 95 %  Blood Pressure : (!) 165/91   Temperature: 36.3 °C (97.3 °F)   Pulse: 76   Respiration: 16   Pulse Oximetry: 94 %       Physical Exam  Vitals and nursing note reviewed.   Constitutional:       General: She is in acute distress.      Appearance: She is ill-appearing.      Comments: Frail appearing elderly female   HENT:      Head: Normocephalic and atraumatic.      Comments: Temporal muscle wasting positive     Ears:      Comments: Difficulty hearing     Mouth/Throat:      Mouth: Mucous membranes are dry.      Pharynx: No oropharyngeal exudate.   Eyes:      General: No scleral icterus.     Extraocular Movements: Extraocular movements intact.   Cardiovascular:      Rate and Rhythm: Normal rate and regular rhythm.      Pulses: Normal pulses.      Heart sounds: Normal heart sounds. No murmur heard.  Pulmonary:      Effort: Pulmonary effort is normal. No respiratory distress.      Breath sounds: Normal breath sounds. No wheezing.   Abdominal:      General: Bowel sounds are normal. There is no distension.      Palpations: Abdomen is soft.      Tenderness: There is abdominal tenderness. There is no right CVA tenderness or left CVA tenderness.   Musculoskeletal:         General: No swelling or tenderness.      Cervical back: Normal range of motion. No rigidity.      Comments: Sarcopenic. Bilateral hand muscle atrophy noted.   Skin:     General: Skin is warm.      Capillary Refill: Capillary refill takes less than 2 seconds.      Coloration: Skin is not jaundiced.      Findings: No erythema.   Neurological:      Mental Status: She is alert and oriented to person, place, and time. Mental status is at baseline.      Motor: Weakness present.      Comments: lethargic   Psychiatric:         Mood and  Affect: Mood normal.         Behavior: Behavior normal.         Thought Content: Thought content normal.         Judgment: Judgment normal.       Laboratory:  Recent Labs     10/26/22  0940   WBC 6.5   RBC 4.56   HEMOGLOBIN 14.7   HEMATOCRIT 45.2   MCV 99.1*   MCH 32.2   MCHC 32.5*   RDW 67.5*   PLATELETCT 191   MPV 12.7     Recent Labs     10/26/22  0940   SODIUM 137   POTASSIUM 4.3   CHLORIDE 98   CO2 30   GLUCOSE 147*   BUN 15   CREATININE 0.83   CALCIUM 10.5*     Recent Labs     10/26/22  0940   ALTSGPT 72*   ASTSGOT 100*   ALKPHOSPHAT 448*   TBILIRUBIN 1.7*   LIPASE 113*   GLUCOSE 147*     Recent Labs     10/26/22  0940   APTT 26.7   INR 0.97     No results for input(s): NTPROBNP in the last 72 hours.      No results for input(s): TROPONINT in the last 72 hours.    Imaging:  DX-CHEST-PORTABLE (1 VIEW)   Final Result         1. No acute cardiopulmonary abnormalities are identified.      MR-ABDOMEN-WITH & W/O    (Results Pending)       X-Ray:  I have personally reviewed the images and compared with prior images.  No ECG obtained in ED.    Assessment/Plan:  Justification for Admission Status  I anticipate this patient will require at least two midnights for appropriate medical management, necessitating inpatient admission because worsening LFTs complicated with known colon adenocarcinoma stage IV. Patient will need further imaging, palliative care, GIC consult, monitoring LFTs daily with high concerns for worsening liver organ failure.    Patient will need a  bed on MEDICINE service .  The need is secondary to acute liver failure.    * Liver failure without hepatic coma (HCC)- (present on admission)  Assessment & Plan  , ALT 72, , Tbili 1.7  Lipase 113  Concern for liver metastasis from colon  GIC evaluating patient  Repeat CMP daily  Avoid acetaminophen  MRI liver protocol w/wo    Post-ERCP acute pancreatitis- (present on admission)  Assessment & Plan  Pt reported back pain, but no epigastric  pain  Lipase 113, from post ERCP pancreatitis  IVF  Pain control if needed    Adenocarcinoma of ileum (HCC)- (present on admission)  Assessment & Plan  stage IV colon cancer adenocarcinoma.    Patient had endoscopy performed on Friday 5 days ago performed by [facility] in [place] with sphincterectomy.  GIC evaluating patient in ED, appreciate any recommendations  Monitoring LFTs    Advance care planning- (present on admission)  Assessment & Plan  Last code status DNR/DNI 4/11/22    I spent extra time discussing with patient about advanced care planning.    I spent a total of 25 minutes with the patient directly at bedside, and discussed patient's current diagnosis, prognosis and goals of care. I discussed code status, medical interventions, nutritional interventions.    Patient wished to speak to her son before making a decision.  Will convert patient back to FULL CODE for now.    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 Changed to Q6H if patient has ongoing melena.  GIC evaluating patient in ED, possible procedure?    Gastroesophageal reflux disease without esophagitis- (present on admission)  Assessment & Plan  IV pantoprazole given reported black stools. This likely came from post sphincterotomy from ERCP.    COPD (chronic obstructive pulmonary disease) (HCC)-  (present on admission)  Assessment & Plan  Not on maintenance therapy or home oxygen  Monitor  RT consult for Duonebs if needed      VTE prophylaxis: SCDs/TEDs and pharmacologic prophylaxis contraindicated due to reported possible GIB/melena.

## 2022-10-26 NOTE — ASSESSMENT & PLAN NOTE
Pt reported  Just had recent ERCP with sphincterotomy  Hgb 14, likely hemoconcentrated  Repeat CBC daily. Changed to Q6H if patient has ongoing melena.  EGD on 10/29 showed no active source of bleeding

## 2022-10-26 NOTE — ASSESSMENT & PLAN NOTE
Unclear cause, possible dehydration, worsening colon cancer  Concern of ongoing weight loss, anorexia symptoms, and pt lives alone  - IVF  - Palliative Care consult-POLST signed.  Patient and family waiting for final work-up from GI and oncology before making any decisions, which is completely reasonable.  - fall precautions  OT actually recommending home with home health, if continues to improve may be able to discharge in the next 24 to 48 hours

## 2022-10-27 ENCOUNTER — APPOINTMENT (OUTPATIENT)
Dept: RADIOLOGY | Facility: MEDICAL CENTER | Age: 87
DRG: 438 | End: 2022-10-27
Attending: INTERNAL MEDICINE
Payer: MEDICARE

## 2022-10-27 LAB
ALBUMIN SERPL BCP-MCNC: 3.1 G/DL (ref 3.2–4.9)
ALBUMIN/GLOB SERPL: 1.1 G/DL
ALP SERPL-CCNC: 373 U/L (ref 30–99)
ALT SERPL-CCNC: 58 U/L (ref 2–50)
ANION GAP SERPL CALC-SCNC: 8 MMOL/L (ref 7–16)
AST SERPL-CCNC: 92 U/L (ref 12–45)
BILIRUB SERPL-MCNC: 1.7 MG/DL (ref 0.1–1.5)
BUN SERPL-MCNC: 13 MG/DL (ref 8–22)
CALCIUM SERPL-MCNC: 9.2 MG/DL (ref 8.4–10.2)
CHLORIDE SERPL-SCNC: 102 MMOL/L (ref 96–112)
CO2 SERPL-SCNC: 27 MMOL/L (ref 20–33)
CREAT SERPL-MCNC: 0.81 MG/DL (ref 0.5–1.4)
EKG IMPRESSION: NORMAL
ERYTHROCYTE [DISTWIDTH] IN BLOOD BY AUTOMATED COUNT: 68.2 FL (ref 35.9–50)
GFR SERPLBLD CREATININE-BSD FMLA CKD-EPI: 70 ML/MIN/1.73 M 2
GLOBULIN SER CALC-MCNC: 2.9 G/DL (ref 1.9–3.5)
GLUCOSE SERPL-MCNC: 108 MG/DL (ref 65–99)
HCT VFR BLD AUTO: 38 % (ref 37–47)
HGB BLD-MCNC: 12.7 G/DL (ref 12–16)
MAGNESIUM SERPL-MCNC: 2 MG/DL (ref 1.5–2.5)
MCH RBC QN AUTO: 33.1 PG (ref 27–33)
MCHC RBC AUTO-ENTMCNC: 33.4 G/DL (ref 33.6–35)
MCV RBC AUTO: 99 FL (ref 81.4–97.8)
PHOSPHATE SERPL-MCNC: 3.8 MG/DL (ref 2.5–4.5)
PLATELET # BLD AUTO: 180 K/UL (ref 164–446)
PMV BLD AUTO: 12.3 FL (ref 9–12.9)
POTASSIUM SERPL-SCNC: 4.3 MMOL/L (ref 3.6–5.5)
PROT SERPL-MCNC: 6 G/DL (ref 6–8.2)
RBC # BLD AUTO: 3.84 M/UL (ref 4.2–5.4)
SODIUM SERPL-SCNC: 137 MMOL/L (ref 135–145)
WBC # BLD AUTO: 7.6 K/UL (ref 4.8–10.8)

## 2022-10-27 PROCEDURE — 700105 HCHG RX REV CODE 258: Performed by: INTERNAL MEDICINE

## 2022-10-27 PROCEDURE — 93010 ELECTROCARDIOGRAM REPORT: CPT | Performed by: INTERNAL MEDICINE

## 2022-10-27 PROCEDURE — 700111 HCHG RX REV CODE 636 W/ 250 OVERRIDE (IP): Performed by: NURSE PRACTITIONER

## 2022-10-27 PROCEDURE — 74183 MRI ABD W/O CNTR FLWD CNTR: CPT

## 2022-10-27 PROCEDURE — A9576 INJ PROHANCE MULTIPACK: HCPCS | Performed by: INTERNAL MEDICINE

## 2022-10-27 PROCEDURE — 80053 COMPREHEN METABOLIC PANEL: CPT

## 2022-10-27 PROCEDURE — 36415 COLL VENOUS BLD VENIPUNCTURE: CPT

## 2022-10-27 PROCEDURE — 99233 SBSQ HOSP IP/OBS HIGH 50: CPT | Performed by: INTERNAL MEDICINE

## 2022-10-27 PROCEDURE — 84100 ASSAY OF PHOSPHORUS: CPT

## 2022-10-27 PROCEDURE — 700111 HCHG RX REV CODE 636 W/ 250 OVERRIDE (IP): Performed by: INTERNAL MEDICINE

## 2022-10-27 PROCEDURE — 770006 HCHG ROOM/CARE - MED/SURG/GYN SEMI*

## 2022-10-27 PROCEDURE — 83735 ASSAY OF MAGNESIUM: CPT

## 2022-10-27 PROCEDURE — 700117 HCHG RX CONTRAST REV CODE 255: Performed by: INTERNAL MEDICINE

## 2022-10-27 PROCEDURE — 97161 PT EVAL LOW COMPLEX 20 MIN: CPT

## 2022-10-27 PROCEDURE — C9113 INJ PANTOPRAZOLE SODIUM, VIA: HCPCS | Performed by: NURSE PRACTITIONER

## 2022-10-27 PROCEDURE — 85027 COMPLETE CBC AUTOMATED: CPT

## 2022-10-27 RX ORDER — PANTOPRAZOLE SODIUM 40 MG/10ML
40 INJECTION, POWDER, LYOPHILIZED, FOR SOLUTION INTRAVENOUS 2 TIMES DAILY
Status: DISCONTINUED | OUTPATIENT
Start: 2022-10-27 | End: 2022-10-30

## 2022-10-27 RX ADMIN — PANTOPRAZOLE SODIUM 40 MG: 40 INJECTION, POWDER, FOR SOLUTION INTRAVENOUS at 12:01

## 2022-10-27 RX ADMIN — PANTOPRAZOLE SODIUM 40 MG: 40 INJECTION, POWDER, FOR SOLUTION INTRAVENOUS at 17:34

## 2022-10-27 RX ADMIN — SODIUM CHLORIDE: 9 INJECTION, SOLUTION INTRAVENOUS at 08:48

## 2022-10-27 RX ADMIN — GADOTERIDOL 10 ML: 279.3 INJECTION, SOLUTION INTRAVENOUS at 16:31

## 2022-10-27 RX ADMIN — THIAMINE HYDROCHLORIDE 100 MG: 100 INJECTION, SOLUTION INTRAMUSCULAR; INTRAVENOUS at 17:35

## 2022-10-27 ASSESSMENT — ENCOUNTER SYMPTOMS
NAUSEA: 1
ABDOMINAL PAIN: 1
SHORTNESS OF BREATH: 0
COUGH: 0
FEVER: 0
VOMITING: 0
PALPITATIONS: 0
CHILLS: 0
WEAKNESS: 1
WEIGHT LOSS: 1
DIZZINESS: 0

## 2022-10-27 ASSESSMENT — COGNITIVE AND FUNCTIONAL STATUS - GENERAL
TURNING FROM BACK TO SIDE WHILE IN FLAT BAD: A LITTLE
SUGGESTED CMS G CODE MODIFIER MOBILITY: CM
WALKING IN HOSPITAL ROOM: TOTAL
STANDING UP FROM CHAIR USING ARMS: TOTAL
CLIMB 3 TO 5 STEPS WITH RAILING: TOTAL
MOBILITY SCORE: 9
MOVING TO AND FROM BED TO CHAIR: A LOT
MOVING FROM LYING ON BACK TO SITTING ON SIDE OF FLAT BED: UNABLE

## 2022-10-27 ASSESSMENT — GAIT ASSESSMENTS: GAIT LEVEL OF ASSIST: UNABLE TO PARTICIPATE

## 2022-10-27 ASSESSMENT — PAIN DESCRIPTION - PAIN TYPE: TYPE: ACUTE PAIN

## 2022-10-27 NOTE — DIETARY
"Nutrition services: Day 1 of admit.  Licha Pope is a 87 y.o. female with admitting DX of Liver failure without hepatic coma     Consult received for failure to thrive (FTT).       Assessment:  Height: 162.6 cm (5' 4\")  Weight: 54.4 kg (119 lb 14.9 oz)  Body mass index is 20.59 kg/m²., BMI classification: underweight based on pt's age  Diet/Intake: Level 6-soft and bite sized  with Level 0 - thin liquids/50-75% x  and <25% x 1    Evaluation:   Pt has recent dx of stage IV colon cancer adenocarcinoma. Per H&P, pt's son reports decreased appetite x 2-3 weeks and significant wt loss. Pt has temporal muscle wasting. Pt is [place].   Skin: DTI PI noted on sacrum/coccyx. Per 4 Eyes Skin Assessment, Wound consult completed  Pt was not in room when RD visited. Pt's son present and able to answer some of RD's questions. Per pt's son, pt has not been eating very well. He said appetite has been better here. Uneaten lunch at bedside, though. He said that pt said the food looked good. She was tired and ate a late breakfast which may have diminished her appetite for lunch. Pt's son said that pt has Boost supplements at home. She has trouble tolerating some high lactose foods, though. RD explained that Boost is lactose free. This will be added to pt's meals TID for additional nutrition.   Wt hx reviewed in Epic. Pt with wt loss of 7.8% x 8 months. Wt loss is not significant.     Malnutrition Risk: Unable to determine. Pt not in room for NFPE.    Recommendations/Plan:  Add Boost Plus to meals TID   Encourage intake of >50%   Document intake of all meals and supplements as % taken in ADL's to provide interdisciplinary communication across all shifts.   Monitor weight.    RD will follow          "

## 2022-10-27 NOTE — CARE PLAN
The patient is Stable - Low risk of patient condition declining or worsening    Shift Goals  Clinical Goals: Patient will be free from falls this shift  Patient Goals: Rest comfortably    Progress made toward(s) clinical / shift goals:  Patient has safety measures in place, no falls. Patient resting overnight. EKG completed.    Patient is not progressing towards the following goals: N/A      Problem: Knowledge Deficit - Standard  Goal: Patient and family/care givers will demonstrate understanding of plan of care, disease process/condition, diagnostic tests and medications  Outcome: Progressing     Problem: Skin Integrity  Goal: Skin integrity is maintained or improved  Outcome: Progressing     Problem: Fall Risk  Goal: Patient will remain free from falls  Outcome: Progressing

## 2022-10-27 NOTE — PROGRESS NOTES
"Hospital Medicine Daily Progress Note    Date of Service  10/27/2022    Chief Complaint  Licha Pope is a 87 y.o. female admitted 10/26/2022 with abdominal pain    Hospital Course  Per notes, \" 87-year-old female with past medical history of COPD, DVTs, hypertension, dyslipidemia recent diagnosis of stage IV colon cancer adenocarcinoma.  Admitted on 10/26/22 for increased generalized weakness and reported blood in stool. Patient had endoscopy performed on Friday 5 days ago performed by GI consultants in [place] with sphincterectomy. Reported melena to ED. Came from home, lives alone. Possible hx of PUD after ibuprofen, not currently using NSAIDs.  Son at bedside, confirming patient has had decreased appetite for about 2-3 weeks now and she has lost significant weight.     I spoke with EDP about patient's case. Dr. Ervin has spoken with Dr. Belle with [facility] who will review. For now, recommended monitoring CMP and exclude liver metastasis.\"    Interval Problem Update  Still complaining some abdominal pain, fatigue.  Pending MRI and final recommendations from GI    I have discussed this patient's plan of care and discharge plan at IDT rounds today with Case Management, Nursing, Nursing leadership, and other members of the IDT team.    Consultants/Specialty  GI    Code Status  DNAR/DNI    Disposition  Patient is not medically cleared for discharge.   Anticipate discharge to to home with organized home healthcare and close outpatient follow-up.  I have placed the appropriate orders for post-discharge needs.    Review of Systems  Review of Systems   Constitutional:  Positive for malaise/fatigue and weight loss.   All other systems reviewed and are negative.     Physical Exam  Temp:  [36.5 °C (97.7 °F)-37 °C (98.6 °F)] 37 °C (98.6 °F)  Pulse:  [72-82] 79  Resp:  [16-18] 16  BP: (149-165)/(75-97) 163/90  SpO2:  [92 %-94 %] 92 %    Physical Exam  Constitutional:       Appearance: She is ill-appearing.      " Comments: Sarcopenia, muscle weight   Cardiovascular:      Rate and Rhythm: Normal rate and regular rhythm.      Pulses: Normal pulses.      Heart sounds: Normal heart sounds.   Pulmonary:      Effort: Pulmonary effort is normal.      Breath sounds: Normal breath sounds.   Abdominal:      General: Abdomen is flat. Bowel sounds are normal.      Palpations: Abdomen is soft.   Musculoskeletal:      Right lower leg: No edema.      Left lower leg: No edema.   Neurological:      General: No focal deficit present.      Mental Status: She is alert and oriented to person, place, and time. Mental status is at baseline.       Fluids    Intake/Output Summary (Last 24 hours) at 10/27/2022 1613  Last data filed at 10/27/2022 1300  Gross per 24 hour   Intake 2499.55 ml   Output 950 ml   Net 1549.55 ml       Laboratory  Recent Labs     10/26/22  0940 10/27/22  0334   WBC 6.5 7.6   RBC 4.56 3.84*   HEMOGLOBIN 14.7 12.7   HEMATOCRIT 45.2 38.0   MCV 99.1* 99.0*   MCH 32.2 33.1*   MCHC 32.5* 33.4*   RDW 67.5* 68.2*   PLATELETCT 191 180   MPV 12.7 12.3     Recent Labs     10/26/22  0940 10/27/22  0334   SODIUM 137 137   POTASSIUM 4.3 4.3   CHLORIDE 98 102   CO2 30 27   GLUCOSE 147* 108*   BUN 15 13   CREATININE 0.83 0.81   CALCIUM 10.5* 9.2     Recent Labs     10/26/22  0940   APTT 26.7   INR 0.97               Imaging  DX-CHEST-PORTABLE (1 VIEW)   Final Result         1. No acute cardiopulmonary abnormalities are identified.      MR-ABDOMEN-WITH & W/O    (Results Pending)        Assessment/Plan  * Liver failure without hepatic coma (HCC)- (present on admission)  Assessment & Plan  , ALT 72, , Tbili 1.7  Lipase 113  Concern for liver metastasis from colon  GIC evaluating patient, follow recommendations  Repeat CMP daily  Avoid acetaminophen  MRI liver protocol w/wo    Esophageal diverticulum- (present on admission)  Assessment & Plan  Seen on barium swallow 4/22  SLP eval given anorexia, weight loss    Dark stools-  (present on admission)  Assessment & Plan  Pt reported  Just had recent ERCP with sphincterotomy  Hgb 14, likely hemoconcentrated  Repeat CBC daily. Changed to Q6H if patient has ongoing melena.  GIC evaluating patient in ED, possible procedure?    Advance care planning- (present on admission)  Assessment & Plan  Last code status DNR/DNI 4/11/22    I spent extra time discussing with patient about advanced care planning.    I spent a total of 25 minutes with the patient directly at bedside, and discussed patient's current diagnosis, prognosis and goals of care. I discussed code status, medical interventions, nutritional interventions.    Patient wished to speak to her son before making a decision.  Will convert patient back to FULL CODE for now.    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    Patient had endoscopy performed on Friday 5 days ago performed by GI consultants in [place] with sphincterectomy.  GIC evaluating patient in ED, appreciate any recommendations  Monitoring LFTs    Generalized weakness- (present on admission)  Assessment & Plan  Unclear cause, possible dehydration, worsening colon cancer  Concern of ongoing weight loss, anorexia symptoms, and pt lives alone  - IVF  - PT/OT  - Palliative Care consult  - fall precautions  -Will likely need placement, pending final work-up and recommendations from GI, PT/OT    Gastroesophageal reflux disease without esophagitis- (present on admission)  Assessment & Plan  IV pantoprazole given reported black stools. This likely came from post sphincterotomy from ERCP.    COPD  (chronic obstructive pulmonary disease) (HCC)- (present on admission)  Assessment & Plan  Not on maintenance therapy or home oxygen  Monitor  RT consult for Duonebs if needed       VTE prophylaxis: SCDs/TEDs    I have performed a physical exam and reviewed and updated ROS and Plan today (10/27/2022). In review of yesterday's note (10/26/2022), there are no changes except as documented above.

## 2022-10-27 NOTE — WOUND TEAM
"[facility]  Inpatient Services  Initial Wound and Skin Care Evaluation    Admission Date: 10/26/2022     Last order of IP CONSULT TO WOUND CARE was found on 10/26/2022 from Hospital Encounter on 10/26/2022     HPI, PMH, SH: Reviewed    Past Surgical History:   Procedure Laterality Date    IN LAP,DIAGNOSTIC ABDOMEN Bilateral 3/30/2022    Procedure: LAPAROSCOPY;  Surgeon: Mando Johnson M.D.;  Location: SURGERY [facility];  Service: General    IN EXPLORATORY OF ABDOMEN Bilateral 3/30/2022    Procedure: LAPAROTOMY, EXPLORATORY, ILEOCECECTOMY;  Surgeon: Mando Johnson M.D.;  Location: SURGERY [facility];  Service: General    IN LAP,APPENDECTOMY  2021    Procedure: APPENDECTOMY, LAPAROSCOPIC;  Surgeon: Pedro Mcneil M.D.;  Location: [facility];  Service: Gastroenterology    CATARACT PHACO WITH IOL  2014    Performed by Jarred Almazan M.D. at [facility]    CATARACT PHACO WITH IOL  2014    Performed by Jarred Almazan M.D. at [facility]    KNEE ARTHROPLASTY TOTAL  2014    Performed by Jose Hahn M.D. at [facility]    IN  DELIVERY ONLY  1969    HEMORRHOIDECTOMY       Social History     Tobacco Use    Smoking status: Former     Packs/day: 1.00     Years: 20.00     Pack years: 20.00     Types: Cigarettes     Quit date: 1975     Years since quittin.8    Smokeless tobacco: Never   Substance Use Topics    Alcohol use: Not Currently     Alcohol/week: 1.5 oz     Types: 3 Standard drinks or equivalent per week     Comment: RARE     Chief Complaint   Patient presents with    Bloody Stools     X 24 hours, s/p endoscopy on Friday    Weakness     Diagnosis: Liver failure without hepatic coma (HCC) [K72.90]    Unit where seen by Wound Team:      WOUND CONSULT/FOLLOW UP RELATED TO:  sacrococcygeal area     WOUND HISTORY:  Pt admitted with wound. \"She has been unable to get up today d/t " "weakness.\" Per ED note    WOUND ASSESSMENT/LDA  Wound 10/26/22 Pressure Injury Sacrum;Coccyx POA DTI (Active)      10/26/22 1800   Site Assessment Red;Light Purple    Periwound Assessment Intact    Margins Defined edges    Closure Open to air    Drainage Amount None    Treatments Cleansed    Wound Cleansing Normal Saline Irrigation    Periwound Protectant Not Applicable    Dressing Cleansing/Solutions Not Applicable    Dressing Options Mepilex    Dressing Changed New    Dressing Status Intact    Dressing Change/Treatment Frequency Every 72 hrs, and As Needed    NEXT Dressing Change/Treatment Date 10/29/22    WOUND NURSE ONLY - Pressure Injury Stage DTPI 10/26/22 1800   Wound Length (cm) 4 cm 10/26/22 1800   Wound Width (cm) 7 cm 10/26/22 1800   Wound Surface Area (cm^2) 28 cm^2 10/26/22 1800   Shape irregular    Wound Odor None    Exposed Structures FORD    Number of days: 0        Vascular:    KELLY:   No results found.    Lab Values:    Lab Results   Component Value Date/Time    WBC 6.5 10/26/2022 09:40 AM    RBC 4.56 10/26/2022 09:40 AM    HEMOGLOBIN 14.7 10/26/2022 09:40 AM    HEMATOCRIT 45.2 10/26/2022 09:40 AM    CREACTPROT <0.30 02/18/2022 01:30 AM    SEDRATEWES 34 (H) 02/18/2022 01:30 AM    HBA1C 5.1 02/19/2022 04:32 AM        Culture Results show:  No results found for this or any previous visit (from the past 720 hour(s)).    Pain Level/Medicated:  No c/o pain with turning       INTERVENTIONS BY WOUND TEAM:  Chart and images reviewed. Discussed with bedside RN. All areas of concern (based on picture review, LDA review and discussion with bedside RN) have been thoroughly assessed. Documentation of areas based on significant findings. This RN in to assess patient. Performed standard wound care which includes appropriate positioning, dressing removal and non-selective debridement. Pictures and measurements obtained weekly if/when required.  Preparation for Dressing removal: NA  Non-selectively Debrided with:  " NS and gauze.  Sharp debridement: NA  Kaitlin wound: Cleansed with NS, dried  Primary Dressing: mepilex  Secondary (Outer) Dressing: NA    Interdisciplinary consultation: Patient, Bedside RN    EVALUATION / RATIONALE FOR TREATMENT:  Most Recent Date:  10/26: Pt has POA DTI to sacrococcygeal area. She has rough hard pad underneath her from [facility] for transport. She wanted to get up to [facility] [place] [place] with CNA. Mepilex to be applied.      Goals:Maintain intact skin until DTI reveals, then steady decrease in wound area and depth weekly.    WOUND TEAM PLAN OF CARE ([X] for frequency of wound follow up,):   Nursing to follow dressing orders written for wound care. Contact wound team if area fails to progress, deteriorates or with any questions/concerns if something comes up before next scheduled follow up (See below as to whether wound is following and frequency of wound follow up)  Dressing changes by wound team:                   Follow up 3 times weekly:                NPWT change 3 times weekly:     Follow up 1-2 times weekly:      Follow up Bi-Monthly:                   Follow up as needed:   please consult if wound worsens  Other (explain):     NURSING PLAN OF CARE ORDERS (X):  Dressing changes: See Dressing Care orders:   Skin care: See Skin Care orders:   RN Prevention Protocol: x  Rectal tube care: See Rectal Tube Care orders:   Other orders:    RSKIN:   CURRENTLY IN PLACE (X), APPLIED THIS VISIT (A), ORDERED (O):   Q shift Rodriguez:  X  Q shift pressure point assessments:  X    Surface/Positioning   Pressure redistribution mattress   x         Low Airloss          Bariatric foam      Bariatric ALEX     Waffle cushion        Waffle Overlay     o     Reposition q 2 hours   x  TAPs Turning system     Z Greg Pillow     Offloading/Redistribution   Sacral Mepilex (Silicone dressing)   a  Heel Mepilex (Silicone dressing)         Heel float boots (Prevalon boot)             Float Heels off Bed with Pillows           Respiratory  RA  Silicone O2 tubing         Gray Foam Ear protectors     Cannula fixation Device (Tender )          High flow offloading Clip    Elastic head band offloading device      Anchorfast                                                         Trach with Optifoam split foam             Containment/Moisture Prevention BSC    Rectal tube or BMS    Purwick/Condom Cath        Gomez Catheter    Barrier wipes           Barrier paste       Antifungal tx      Interdry        Mobilization up tp BSC      Up to chair        Ambulate      PT/OT      Nutrition       Dietician        Diabetes Education      PO   x  TF     TPN     NPO   # days     Other        Anticipated discharge plans: TBD waiting for DTI to reveal  LTACH:        SNF/Rehab:                  Home Health Care:           Outpatient Wound Center:            Self/Family Care:        Other:           Vac Discharge Needs:   Not Applicable Pt not on a wound vac:   x    Regular Vac while inpatient, alternative dressing at DC:        Regular Vac in use and continued at DC:            Reg. Vac w/ Skin Sub/Biologic in use. Will need to be changed 2x wkly:      Veraflo Vac while inpatient, ok to transition to Regular Vac on Discharge:           Veraflo Vac while inpatient, will need to remain on Veraflo Vac upon discharge:

## 2022-10-27 NOTE — HOSPITAL COURSE
"Per notes, \" 87-year-old female with past medical history of COPD, DVTs, hypertension, dyslipidemia recent diagnosis of stage IV colon cancer adenocarcinoma.  Admitted on 10/26/22 for increased generalized weakness and reported blood in stool. Patient had endoscopy performed on Friday 5 days ago performed by GI consultants in [place] with sphincterectomy. Reported melena to ED. Came from home, lives alone. Possible hx of PUD after ibuprofen, not currently using NSAIDs.  Son at bedside, confirming patient has had decreased appetite for about 2-3 weeks now and she has lost significant weight.     I spoke with EDP about patient's case. Dr. Ervin has spoken with Dr. Belle with [facility] who will review. For now, recommended monitoring CMP and exclude liver metastasis.\"  "

## 2022-10-27 NOTE — PROGRESS NOTES
Gastroenterology Progress Note               Author:  BEL Rae Date & Time Created: 10/27/2022 10:15 AM       Patient ID:  Name:             Licha Pope  YOB: 1935  Age:                 87 y.o.  female  MRN:               [id number]      Referring Provider:  Sandor Espinal MD      Presenting Chief Complaint:  Abdominal pain, Elevated liver enzymes      History of Present Illness:    This is a very pleasant 87 y.o. 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  The patient recently underwent ERCP by Dr. Rubén Guthrie on 10/21/2022 at [facility].  Post procedure, the patient's had increased abdominal pain, as well as intractable nausea and vomiting.  This resolved by 10/22 2022 and she was discharged home.  The patient's son, Aniceto, states that after her procedure she was eating well but was having recurrent abdominal pain, some nausea, and has been lethargic.  She has lost 10 pounds recently.    Labs and imaging reviewed from this admission including normal CBC other than MCV 99.1, RDW 67.5.  CMP with glucose 147, calcium 10.5, , ALT 72, alkaline phosphatase 448, bilirubin 1.7.  Lipase elevated 113.  Chest x-ray is normal.    Interval History  10/27/2022: Stable, no acute events. Patient has not had a documented stool since yesterday. Hgb down, but multiple cell lines down due to hemodilution likely. No current abdominal pain. Patient is hungry. MRI pending    Review of Systems:  Review of Systems   Constitutional:  Negative for chills and fever.   Respiratory:  Negative for cough and shortness of breath.    Cardiovascular:  Negative for chest pain and palpitations.   Gastrointestinal:  Positive for abdominal pain, melena and nausea. Negative for vomiting.   Neurological:   Positive for weakness. Negative for dizziness.           Past Medical History:  Past Medical History:   Diagnosis Date    Arthritis     COPD (chronic obstructive pulmonary disease) (HCC)     DVT (deep venous thrombosis) (HCC)     EMPHYSEMA     Gastroesophageal reflux disease without esophagitis 3/13/2019    Hypertension     Hypertriglyceridemia 2019    Prediabetes 2019     Active Hospital Problems    Diagnosis     Liver failure without hepatic coma (HCC) [K72.90]     Hypercalcemia [E83.52]     Post-ERCP acute pancreatitis [K91.89, K85.90]     Advance care planning [Z71.89]     Dark stools [R19.5]     Esophageal diverticulum [Q39.6]     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]     Generalized weakness [R53.1]     Gastroesophageal reflux disease without esophagitis [K21.9]     COPD (chronic obstructive pulmonary disease) (HCC) [J44.9]          Past Surgical History:  Past Surgical History:   Procedure Laterality Date    AL LAP,DIAGNOSTIC ABDOMEN Bilateral 3/30/2022    Procedure: LAPAROSCOPY;  Surgeon: Mando Johnson M.D.;  Location: [facility];  Service: General    AL EXPLORATORY OF ABDOMEN Bilateral 3/30/2022    Procedure: LAPAROTOMY, EXPLORATORY, ILEOCECECTOMY;  Surgeon: Mando Johnson M.D.;  Location: [facility];  Service: General    AL LAP,APPENDECTOMY  2021    Procedure: APPENDECTOMY, LAPAROSCOPIC;  Surgeon: Pedro Mcneil M.D.;  Location: [facility];  Service: Gastroenterology    CATARACT PHACO WITH IOL  2014    Performed by Jarred Almazan M.D. at [facility]    CATARACT PHACO WITH IOL  2014    Performed by Jarred Almazan M.D. at [facility]    KNEE ARTHROPLASTY TOTAL  2014    Performed by Jose Hahn M.D. at [facility]    AL  DELIVERY ONLY  1969    HEMORRHOIDECTOMY             Hospital Medications:  Current Facility-Administered Medications   Medication Dose Frequency Provider  Last Rate Last Admin    senna-docusate (PERICOLACE or SENOKOT S) 8.6-50 MG per tablet 2 Tablet  2 Tablet BID Lester Reynoso M.D.        And    polyethylene glycol/lytes (MIRALAX) PACKET 1 Packet  1 Packet QDAY PRN Lester Reynoso M.D.        And    magnesium hydroxide (MILK OF MAGNESIA) suspension 30 mL  30 mL QDAY PRN Lester Reynoso M.D.        And    bisacodyl (DULCOLAX) suppository 10 mg  10 mg QDAY PRN Lester Reynoso M.D.        NS infusion   Continuous Lester Reynoso M.D. 100 mL/hr at 10/27/22 0848 New Bag at 10/27/22 0848    labetalol (NORMODYNE/TRANDATE) injection 10 mg  10 mg Q4HRS PRN Lester Reynoso M.D.        ondansetron (ZOFRAN) syringe/vial injection 4 mg  4 mg Q4HRS PRN Lester Reynoso M.D.        ondansetron (ZOFRAN ODT) dispertab 4 mg  4 mg Q4HRS PRN Lester Reynoso M.D.        Pharmacy Consult Request ...Pain Management Review 1 Each  1 Each PHARMACY TO DOSE Lester Reynoso M.D.        oxyCODONE immediate-release (ROXICODONE) tablet 2.5 mg  2.5 mg Q3HRS PRN Lester Reynoso M.D.        Or    oxyCODONE immediate-release (ROXICODONE) tablet 5 mg  5 mg Q3HRS PRN Lester Reynoso M.D.        Or    HYDROmorphone (Dilaudid) injection 0.25 mg  0.25 mg Q3HRS PRN Lester Reynoso M.D.        thiamine (B-1) 100 mg in dextrose 5% 100 mL IVPB  100 mg Q EVENING Lester Reynoso M.D.   Stopped at 10/26/22 2130   Last reviewed on 10/26/2022  3:13 PM by Iliana Almendarez R.N.       Current Outpatient Medications:  Medications Prior to Admission   Medication Sig Dispense Refill Last Dose    POTASSIUM PO Take 1 Tablet by mouth every day.   unknown at unknown         Medication Allergies:  Allergies   Allergen Reactions    Codeine Vomiting    Demerol Vomiting     Injectable type    Shellfish Allergy Shortness of Breath     Soft shell    Ciprofloxacin Rash     rash    Ibuprofen      Patient developed gastric ulcer/GI bleed from ibuprofen use    Iodine      PATIENT  "ALLERGIC TO SHELLFISH, NOT SURE IF SHE IS ALLERGIC TO IODINE         Family Medical History:  Family History   Problem Relation Age of Onset    Alzheimer's Disease Mother     Heart Disease Father     Other Father         AAA    Heart Attack Sister     Diabetes Brother          Social History:  Social History     Socioeconomic History    Marital status:      Spouse name: Not on file    Number of children: Not on file    Years of education: Not on file    Highest education level: Not on file   Occupational History    Not on file   Tobacco Use    Smoking status: Former     Packs/day: 1.00     Years: 20.00     Pack years: 20.00     Types: Cigarettes     Quit date: 1975     Years since quittin.8    Smokeless tobacco: Never   Vaping Use    Vaping Use: Never used   Substance and Sexual Activity    Alcohol use: Not Currently     Alcohol/week: 1.5 oz     Types: 3 Standard drinks or equivalent per week     Comment: RARE    Drug use: No    Sexual activity: Not Currently     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     Comment: , retired lab tech   Other Topics Concern    Not on file   Social History Narrative    Not on file     Social Determinants of Health     Financial Resource Strain: Low Risk     Difficulty of Paying Living Expenses: Not hard at all   Food Insecurity: No Food Insecurity    Worried About Running Out of Food in the Last Year: Never true    Ran Out of Food in the Last Year: Never true   Transportation Needs: No Transportation Needs    Lack of Transportation (Medical): No    Lack of Transportation (Non-Medical): No   Physical Activity: Not on file   Stress: Not on file   Social Connections: Not on file   Intimate Partner Violence: Not on file   Housing Stability: Not on file         Vital signs:  Weight/BMI: Body mass index is 20.59 kg/m².  BP (!) 160/77   Pulse 72   Temp 36.5 °C (97.7 °F) (Temporal)   Resp 18   Ht 1.626 m (5' 4\")   Wt 54.4 kg (119 lb 14.9 oz)   SpO2 94%   Vitals:    10/26/22 1324 10/26/22 " 1649 10/26/22 2100 10/27/22 0400   BP: (!) 154/89 (!) 165/97 (!) 149/75 (!) 160/77   Pulse: 78 82 81 72   Resp: 15 16 18 18   Temp: 36.3 °C (97.3 °F) 36.9 °C (98.4 °F) 36.7 °C (98.1 °F) 36.5 °C (97.7 °F)   TempSrc: Oral Temporal Temporal Temporal   SpO2: 95% 94% 93% 94%   Weight:   54.4 kg (119 lb 14.9 oz)    Height:         Oxygen Therapy:  Pulse Oximetry: 94 %, O2 (LPM): 0, O2 Delivery Device: None - Room Air    Intake/Output Summary (Last 24 hours) at 10/27/2022 1015  Last data filed at 10/27/2022 0900  Gross per 24 hour   Intake 2399.55 ml   Output 550 ml   Net 1849.55 ml         Physical Exam:  Physical Exam  Vitals and nursing note reviewed.   Constitutional:       Appearance: She is ill-appearing.   HENT:      Head: Normocephalic and atraumatic.      Right Ear: External ear normal.      Left Ear: External ear normal.      Ears:      Comments: Hard of hearing     Nose: Nose normal. No rhinorrhea.      Mouth/Throat:      Mouth: Mucous membranes are dry.      Pharynx: Oropharynx is clear.   Eyes:      General: No scleral icterus.     Extraocular Movements: Extraocular movements intact.      Pupils: Pupils are equal, round, and reactive to light.   Cardiovascular:      Rate and Rhythm: Normal rate and regular rhythm.      Pulses: Normal pulses.      Heart sounds: Normal heart sounds.   Pulmonary:      Effort: Pulmonary effort is normal. No respiratory distress.      Breath sounds: Normal breath sounds. No wheezing.   Abdominal:      General: Abdomen is flat. Bowel sounds are normal. There is distension.      Palpations: Abdomen is soft.      Tenderness: There is abdominal tenderness (Epigastric, RUQ, LUQ, Umbilical tenderness). There is no rebound.   Musculoskeletal:      Cervical back: Neck supple.      Right lower leg: No edema.      Left lower leg: No edema.   Lymphadenopathy:      Cervical: No cervical adenopathy.   Skin:     General: Skin is warm and dry.      Coloration: Skin is pale.   Neurological:       General: No focal deficit present.      Mental Status: She is alert and oriented to person, place, and time.   Psychiatric:         Thought Content: Thought content normal.         Judgment: Judgment normal.         Labs:  Recent Labs     10/26/22  0940 10/27/22  0334   SODIUM 137 137   POTASSIUM 4.3 4.3   CHLORIDE 98 102   CO2 30 27   BUN 15 13   CREATININE 0.83 0.81   MAGNESIUM 2.3 2.0   PHOSPHORUS  --  3.8   CALCIUM 10.5* 9.2       Recent Labs     10/26/22  0940 10/27/22  033   ALTSGPT 72* 58*   ASTSGOT 100* 92*   ALKPHOSPHAT 448* 373*   TBILIRUBIN 1.7* 1.7*   LIPASE 113*  --    GLUCOSE 147* 108*       Recent Labs     10/26/22  0940 10/27/22  0334   WBC 6.5 7.6   NEUTSPOLYS 83.60*  --    LYMPHOCYTES 8.20*  --    MONOCYTES 5.40  --    EOSINOPHILS 1.90  --    BASOPHILS 0.60  --    ASTSGOT 100* 92*   ALTSGPT 72* 58*   ALKPHOSPHAT 448* 373*   TBILIRUBIN 1.7* 1.7*       Recent Labs     10/26/22  0940 10/27/22  0334   RBC 4.56 3.84*   HEMOGLOBIN 14.7 12.7   HEMATOCRIT 45.2 38.0   PLATELETCT 191 180   PROTHROMBTM 12.5  --    APTT 26.7  --    INR 0.97  --        Recent Results (from the past 24 hour(s))   TSH    Collection Time: 10/26/22  4:27 PM   Result Value Ref Range    TSH 0.895 0.380 - 5.330 uIU/mL   EKG    Collection Time: 10/27/22 12:55 AM   Result Value Ref Range    Report       [facility]    Test Date:  2022-10-27  Pt Name:    SATINDER LARES                  Department: [facility]  MRN:        [id number]                      Room:       2211  Gender:     Female                       Technician: [id number]  :        1935                   Requested By:YOUSUF JOHNSTON  Order #:    [id number]                    Reading MD:    Measurements  Intervals                                Axis  Rate:       77                           P:          57  RI:         179                          QRS:        -52  QRSD:       87                           T:          48  QT:         391  QTc:        443    Interpretive  Statements  Sinus rhythm  LAD, consider left anterior fascicular block  Anteroseptal infarct, age indeterminate  Compared to ECG 03/25/2022 18:42:47  Sinus tachycardia no longer present  Myocardial infarct finding still present     CBC without Differential    Collection Time: 10/27/22  3:34 AM   Result Value Ref Range    WBC 7.6 4.8 - 10.8 K/uL    RBC 3.84 (L) 4.20 - 5.40 M/uL    Hemoglobin 12.7 12.0 - 16.0 g/dL    Hematocrit 38.0 37.0 - 47.0 %    MCV 99.0 (H) 81.4 - 97.8 fL    MCH 33.1 (H) 27.0 - 33.0 pg    MCHC 33.4 (L) 33.6 - 35.0 g/dL    RDW 68.2 (H) 35.9 - 50.0 fL    Platelet Count 180 164 - 446 K/uL    MPV 12.3 9.0 - 12.9 fL   Comp Metabolic Panel (CMP)    Collection Time: 10/27/22  3:34 AM   Result Value Ref Range    Sodium 137 135 - 145 mmol/L    Potassium 4.3 3.6 - 5.5 mmol/L    Chloride 102 96 - 112 mmol/L    Co2 27 20 - 33 mmol/L    Anion Gap 8.0 7.0 - 16.0    Glucose 108 (H) 65 - 99 mg/dL    Bun 13 8 - 22 mg/dL    Creatinine 0.81 0.50 - 1.40 mg/dL    Calcium 9.2 8.4 - 10.2 mg/dL    AST(SGOT) 92 (H) 12 - 45 U/L    ALT(SGPT) 58 (H) 2 - 50 U/L    Alkaline Phosphatase 373 (H) 30 - 99 U/L    Total Bilirubin 1.7 (H) 0.1 - 1.5 mg/dL    Albumin 3.1 (L) 3.2 - 4.9 g/dL    Total Protein 6.0 6.0 - 8.2 g/dL    Globulin 2.9 1.9 - 3.5 g/dL    A-G Ratio 1.1 g/dL   MAGNESIUM    Collection Time: 10/27/22  3:34 AM   Result Value Ref Range    Magnesium 2.0 1.5 - 2.5 mg/dL   PHOSPHORUS    Collection Time: 10/27/22  3:34 AM   Result Value Ref Range    Phosphorus 3.8 2.5 - 4.5 mg/dL   ESTIMATED GFR    Collection Time: 10/27/22  3:34 AM   Result Value Ref Range    GFR (CKD-EPI) 70 >60 mL/min/1.73 m 2         Radiology Review:  DX-CHEST-PORTABLE (1 VIEW)   Final Result         1. No acute cardiopulmonary abnormalities are identified.      MR-ABDOMEN-WITH & W/O    (Results Pending)         MDM (Data Review):   -Records reviewed and summarized in current documentation  -I personally reviewed and interpreted the laboratory  results  -I personally reviewed the radiology images        Medical Decision Making, by Problem:  Active Hospital Problems    Diagnosis     Liver failure without hepatic coma (HCC) [K72.90]     Hypercalcemia [E83.52]     Post-ERCP acute pancreatitis [K91.89, K85.90]     Advance care planning [Z71.89]     Dark stools [R19.5]     Esophageal diverticulum [Q39.6]     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]     Generalized weakness [R53.1]     Gastroesophageal reflux disease without esophagitis [K21.9]     COPD (chronic obstructive pulmonary disease) (HCC) [J44.9]            Assessment/Recommendations:  87-year-old female with history of stage IV ileal cecal adenocarcinoma status post resection, chemotherapy, and recent ERCP on 10/21/2022 seen for abdominal pain, weakness, elevated liver enzymes.  Patient had an MRCP last month that demonstrated possible hepatic lesion and intrahepatic ductal dilation which was not identified on ERCP.  Brushing biopsies pathology negative.  Post procedure the patient states she had pain and has documented nausea and vomiting.  She continues to have mildly elevated liver enzymes as well as bilirubin.  Question whether patient does have infiltrative lesion of the liver given her continued weight loss, lethargy, and persistently elevating alkaline phosphatase along with findings on previous MRCP.  Additionally, patient complained of black stool however no overt GI bleeding in the hospital and hemoglobin as well as BUN are normal.    Assessment:  -Abdominal pain-patient is tender to the upper abdomen, but complains of both upper and lower abdominal pain.  Some of this is chronic and some seems to be worse after her recent ERCP  -Elevated liver enzymes  -Stage IV ileocecal adenocarcinoma status post resection  -Suspected post ERCP pancreatitis  -Weakness  -Dark or black stools  -Esophageal or gastric diverticulum    Plan:  -Await MRI with and without contrast hepatic protocol  -Await  AFP  -Protonix 40 mg IV twice daily  -Diet as tolerated  -Monitor for overt GI bleeding.  Serial hemoglobin and hematocrit  -If needed, can consider EGD but if no evidence of ongoing overt GI bleeding would hold off given that the patient is sensitive to anesthesia    ALISE Rae.      Thank you for inviting me to participate in the care of this patient. Please do not hesitate to call GI consultants with additional questions/concerns or changes in the patient's clinical status at [phone].      Core Quality Measures   Reviewed items:  Labs, Medications and Radiology reports reviewed

## 2022-10-27 NOTE — THERAPY
Physical Therapy   Initial Evaluation     Patient Name: Licha Pope  Age:  87 y.o., Sex:  female  Medical Record #: [id number]  Today's Date: 10/27/2022     Precautions  Precautions: (P) Fall Risk    Assessment  Patient is 87 y.o. female with a diagnosis of liver failure.Pt lives at home alone and is usually mod active Pt is very weak at present and unable to stand.Acute PT needed to improve bed mob,transfers and ambulation.    Plan    Recommend Physical Therapy 3 times per week until therapy goals are met for the following treatments:  Bed Mobility, Gait Training, Neuro Re-Education / Balance, Therapeutic Activities, and Therapeutic Exercises    DC Equipment Recommendations: (P) Unable to determine at this time  Discharge Recommendations: (P)  (Will need post placement unless shows a big improvement)       Objective       10/27/22 1500   Charge Group   PT Evaluation PT Evaluation Low   Total Time Spent   PT Total Time Yes   PT Evaluation Time Spent (Mins) 25   Initial Contact Note    Initial Contact Note Order Received and Verified, Physical Therapy Evaluation in Progress with Full Report to Follow.   Precautions   Precautions Fall Risk   Prior Living Situation   Prior Services None   Housing / Facility 1 Story House   Steps Into Home 0   Steps In Home 0   Equipment Owned Front-Wheel Walker   Lives with - Patient's Self Care Capacity Alone and Able to Care For Self   Prior Level of Functional Mobility   Bed Mobility Independent   Transfer Status Independent   Ambulation Independent   Distance Ambulation (Feet)   (limited community)   Assistive Devices Used None   Cognition    Cognition / Consciousness WDL   Passive ROM Lower Body   Passive ROM Lower Body WDL   Active ROM Lower Body    Active ROM Lower Body  WDL   Strength Lower Body   Comments very weak general   Coordination Lower Body    Coordination Lower Body  WDL   Balance Assessment   Sitting Balance (Static) Fair   Sitting Balance (Dynamic) Fair   Standing  Balance (Static) Dependent   Standing Balance (Dynamic) Dependent   Weight Shift Sitting Poor   Weight Shift Standing Absent   Gait Analysis   Gait Level Of Assist Unable to Participate   Comments Pt too weak to stand   Bed Mobility    Supine to Sit Minimal Assist   Sit to Supine Minimal Assist   Scooting Modified Independent   Functional Mobility   Sit to Stand Unable to Participate   How much difficulty does the patient currently have...   Turning over in bed (including adjusting bedclothes, sheets and blankets)? 3   Sitting down on and standing up from a chair with arms (e.g., wheelchair, bedside commode, etc.) 1   Moving from lying on back to sitting on the side of the bed? 2   How much help from another person does the patient currently need...   Moving to and from a bed to a chair (including a wheelchair)? 1   Need to walk in a hospital room? 1   Climbing 3-5 steps with a railing? 1   6 clicks Mobility Score 9   Activity Tolerance   Sitting Edge of Bed 5   Patient / Family Goals    Patient / Family Goal #1 not stated   Short Term Goals    Short Term Goal # 1 S with bed mob in 6V   Short Term Goal # 2 S with transfers in 6 V   Short Term Goal # 3 S with amb x 50 feet in 6 V   Problem List    Problems Impaired Bed Mobility;Impaired Transfers;Impaired Ambulation;Functional Strength Deficit;Decreased Activity Tolerance   Anticipated Discharge Equipment and Recommendations   DC Equipment Recommendations Unable to determine at this time   Discharge Recommendations   (Will need post placement unless shows a big improvement)   Interdisciplinary Plan of Care Collaboration   IDT Collaboration with  Nursing   Session Information   Date / Session Number  10/27-1 1/3 11/3

## 2022-10-27 NOTE — RESPIRATORY CARE
"   COPD EDUCATION by COPD CLINICAL EDUCATOR  10/27/2022 at 9:51 AM by Karen England, RRT     Patient reviewed by COPD education team. Patient does not have a formal history or diagnosis of COPD and is a former smoker.  Therefore, patient does not qualify for the COPD program and is not interested.     COPD Screen  COPD Risk Screening  Do you have a history of COPD?: No    COPD Assessment  COPD Clinical Specialists ONLY  COPD Education Initiated: Yes--Short Intervention (Pt given dx/ hx 4/17/18 of COPD, pt states has othing wrong with her breathing, quit smoking in 1975, no inhalers or medications...does not want bedside PFT, recent diagnosis of stage IV colon cancer adenocarcinoma & increased generalized weakness .)  DME Company: None  Physician Name: Prateek ANDERSON M.D.  Pulmonologist Name: none  Referrals Initiated: Yes  Pulmonary Rehab: Declined  Smoking Cessation: N/A  Hospice: No  Home Health Care: Yes  [facility] Outreach: N/A  [facility]: N/A  [facility]: Declined  Private In-Home Care Agency: N/A  Is this a COPD exacerbation patient?: No  (OP) Pulmonary Function Testing: Yes (Pt has never had a PFT)    PFT Results    No results found for: PFT    Meds to Beds  Would the patient like to opt in for Bedside Medication Delivery at Discharge?: No     MY COPD ACTION PLAN     It is recommended that patients and physicians /healthcare providers complete this action plan together. This plan should be discussed at each physician visit and updated as needed.    The green, yellow and red zones show groups of symptoms of COPD. This list of symptoms is not comprehensive, and you may experience other symptoms. In the \"Actions\" column, your healthcare provider has recommended actions for you to take based on your symptoms.    Patient Name: Licha Pope   YOB: 1935   Last Updated on:     Green Zone:  I am doing well today Actions     Usual activitiy and exercise level   Take " "daily medications     Usual amounts of cough and phlegm/mucus   Use oxygen as prescribed     Sleep well at night   Continue regular exercise/diet plan     Appetite is good   At all times avoid cigarette smoke, inhaled irritants     Daily Medications (these medications are taken every day):                Yellow Zone:  I am having a bad day or a COPD flare Actions     More breathless than usual   Continue daily medications     I have less energy for my daily activities   Use quick relief inhaler as ordered     Increased or thicker phlegm/mucus   Use oxygen as prescribed     Using quick relief inhaler/nebulizer more often   Get plenty of rest     Swelling of ankles more than usual   Use pursed lip breathing     More coughing than usual   At all times avoid cigarette smoke, inhaled irritants     I feel like I have a \"chest cold\"     Poor sleep and my symptoms woke me up     My appetite is not good     My medicine is not helping      Call provider immediately if symptoms don’t improve     Continue daily medications, add rescue medications:               Medications to be used during a flare up, (as Discussed with Provider):              Red Zone:  I need urgent medical care Actions     Severe shortness of breath even at rest   Call 911 or seek medical care immediately     Not able to do any activity because of breathing      Fever or shaking chills      Feeling confused or very drowsy       Chest pains      Coughing up blood                  "

## 2022-10-27 NOTE — DISCHARGE PLANNING
Case Management Discharge Planning    Admission Date: 10/26/2022  GMLOS: 4.7  ALOS: 1    6-Clicks ADL Score: 16  6-Clicks Mobility Score: 16  PT and/or OT Eval ordered: Yes  Post-acute Referrals Ordered: No  Post-acute Choice Obtained: NA  Has referral(s) been sent to post-acute provider:  RAUL      Anticipated Discharge Dispo: Discharge Disposition: Discharged to home/self care (01)    DME Needed: No    Action(s) Taken: Updated Provider/Nurse on Discharge Plan    Pending PT/OT evals     Escalations Completed: PT    Medically Clear: No    Next Steps: PT/OT evals     Barriers to Discharge: Medical clearance and Pending PT Evaluation

## 2022-10-27 NOTE — DOCUMENTATION QUERY
[facility]                                                                       Query Response Note      PATIENT:               SATINDER LARES  ACCT #:                  [id number]  MRN:                     [id number]  :                      1935  ADMIT DATE:       10/26/2022 9:07 AM  DISCH DATE:          RESPONDING  PROVIDER #:        [id number]           QUERY TEXT:    Documentation in the medical record indicates that this patient is being treated for ulcer of the following site: Pressure injury sacrum; coccyx, DTI POA per wound team assessment     Can the diagnosis of ulcer be further clarified as to type/etiology of the wound, location, and if present on admission (POA) based on the above clinical indicators?    Please include query response in your progress notes and/or DC summary      The patient's Clinical Indicators include:  - Findings:  wound team assessment 10/26:  Pressure Injury Sacrum;Coccyx POA DTI     - Treatments:  wound team assessment, normal saline irrigation, mepilex    - Risk factors:  cancer, emphysema, abnormal weight loss     Thank You,  Abbie Johnson RN  Clinical Documentation   [email or web address]  Connect via [email or web address]  Options provided:   -- Pressure Injury Sacrum;Coccyx, DTI  present on admission   -- Other explanation, Please specify other explanation   -- Unable to determine      Query created by: Abbie Johnson on 10/27/2022 1:18 PM    RESPONSE TEXT:    Pressure Injury Sacrum;Coccyx, DTI present on admission          Electronically signed by:  FERNANDA ROACH MD 10/27/2022 2:44 PM

## 2022-10-28 ENCOUNTER — APPOINTMENT (OUTPATIENT)
Dept: RADIOLOGY | Facility: MEDICAL CENTER | Age: 87
DRG: 438 | End: 2022-10-28
Attending: INTERNAL MEDICINE
Payer: MEDICARE

## 2022-10-28 LAB
AFP-TM SERPL-MCNC: 2 NG/ML (ref 0–9)
ALBUMIN SERPL BCP-MCNC: 3.1 G/DL (ref 3.2–4.9)
ALBUMIN/GLOB SERPL: 1.1 G/DL
ALP SERPL-CCNC: 377 U/L (ref 30–99)
ALT SERPL-CCNC: 65 U/L (ref 2–50)
ANION GAP SERPL CALC-SCNC: 10 MMOL/L (ref 7–16)
AST SERPL-CCNC: 106 U/L (ref 12–45)
BILIRUB SERPL-MCNC: 1.7 MG/DL (ref 0.1–1.5)
BUN SERPL-MCNC: 12 MG/DL (ref 8–22)
CALCIUM SERPL-MCNC: 9.2 MG/DL (ref 8.4–10.2)
CHLORIDE SERPL-SCNC: 102 MMOL/L (ref 96–112)
CO2 SERPL-SCNC: 26 MMOL/L (ref 20–33)
CREAT SERPL-MCNC: 0.85 MG/DL (ref 0.5–1.4)
ERYTHROCYTE [DISTWIDTH] IN BLOOD BY AUTOMATED COUNT: 66.7 FL (ref 35.9–50)
GFR SERPLBLD CREATININE-BSD FMLA CKD-EPI: 66 ML/MIN/1.73 M 2
GLOBULIN SER CALC-MCNC: 2.9 G/DL (ref 1.9–3.5)
GLUCOSE SERPL-MCNC: 108 MG/DL (ref 65–99)
HCT VFR BLD AUTO: 37.9 % (ref 37–47)
HGB BLD-MCNC: 12.1 G/DL (ref 12–16)
MCH RBC QN AUTO: 31.6 PG (ref 27–33)
MCHC RBC AUTO-ENTMCNC: 31.9 G/DL (ref 33.6–35)
MCV RBC AUTO: 99 FL (ref 81.4–97.8)
PLATELET # BLD AUTO: 173 K/UL (ref 164–446)
PMV BLD AUTO: 12.7 FL (ref 9–12.9)
POTASSIUM SERPL-SCNC: 4.1 MMOL/L (ref 3.6–5.5)
PROT SERPL-MCNC: 6 G/DL (ref 6–8.2)
RBC # BLD AUTO: 3.83 M/UL (ref 4.2–5.4)
SODIUM SERPL-SCNC: 138 MMOL/L (ref 135–145)
WBC # BLD AUTO: 7.1 K/UL (ref 4.8–10.8)

## 2022-10-28 PROCEDURE — 700105 HCHG RX REV CODE 258: Performed by: INTERNAL MEDICINE

## 2022-10-28 PROCEDURE — 99498 ADVNCD CARE PLAN ADDL 30 MIN: CPT | Performed by: NURSE PRACTITIONER

## 2022-10-28 PROCEDURE — 99497 ADVNCD CARE PLAN 30 MIN: CPT | Performed by: NURSE PRACTITIONER

## 2022-10-28 PROCEDURE — 700111 HCHG RX REV CODE 636 W/ 250 OVERRIDE (IP): Performed by: INTERNAL MEDICINE

## 2022-10-28 PROCEDURE — 700102 HCHG RX REV CODE 250 W/ 637 OVERRIDE(OP): Performed by: INTERNAL MEDICINE

## 2022-10-28 PROCEDURE — A9270 NON-COVERED ITEM OR SERVICE: HCPCS | Performed by: INTERNAL MEDICINE

## 2022-10-28 PROCEDURE — 36415 COLL VENOUS BLD VENIPUNCTURE: CPT

## 2022-10-28 PROCEDURE — 85027 COMPLETE CBC AUTOMATED: CPT

## 2022-10-28 PROCEDURE — 97165 OT EVAL LOW COMPLEX 30 MIN: CPT

## 2022-10-28 PROCEDURE — 770006 HCHG ROOM/CARE - MED/SURG/GYN SEMI*

## 2022-10-28 PROCEDURE — 80053 COMPREHEN METABOLIC PANEL: CPT

## 2022-10-28 PROCEDURE — 700111 HCHG RX REV CODE 636 W/ 250 OVERRIDE (IP): Performed by: NURSE PRACTITIONER

## 2022-10-28 PROCEDURE — 99233 SBSQ HOSP IP/OBS HIGH 50: CPT | Performed by: INTERNAL MEDICINE

## 2022-10-28 PROCEDURE — C9113 INJ PANTOPRAZOLE SODIUM, VIA: HCPCS | Performed by: NURSE PRACTITIONER

## 2022-10-28 RX ADMIN — PANTOPRAZOLE SODIUM 40 MG: 40 INJECTION, POWDER, FOR SOLUTION INTRAVENOUS at 17:56

## 2022-10-28 RX ADMIN — PANTOPRAZOLE SODIUM 40 MG: 40 INJECTION, POWDER, FOR SOLUTION INTRAVENOUS at 06:00

## 2022-10-28 RX ADMIN — ONDANSETRON 4 MG: 2 INJECTION INTRAMUSCULAR; INTRAVENOUS at 19:38

## 2022-10-28 RX ADMIN — SENNOSIDES AND DOCUSATE SODIUM 2 TABLET: 50; 8.6 TABLET ORAL at 17:56

## 2022-10-28 RX ADMIN — THIAMINE HYDROCHLORIDE 100 MG: 100 INJECTION, SOLUTION INTRAMUSCULAR; INTRAVENOUS at 18:02

## 2022-10-28 RX ADMIN — SENNOSIDES AND DOCUSATE SODIUM 2 TABLET: 50; 8.6 TABLET ORAL at 06:00

## 2022-10-28 ASSESSMENT — ENCOUNTER SYMPTOMS
DIZZINESS: 0
SHORTNESS OF BREATH: 0
FEVER: 0
PALPITATIONS: 0
WEAKNESS: 1
ABDOMINAL PAIN: 0
VOMITING: 0
WEIGHT LOSS: 1
COUGH: 0
CHILLS: 0
NAUSEA: 0

## 2022-10-28 ASSESSMENT — COGNITIVE AND FUNCTIONAL STATUS - GENERAL
DAILY ACTIVITIY SCORE: 18
SUGGESTED CMS G CODE MODIFIER DAILY ACTIVITY: CK
HELP NEEDED FOR BATHING: A LOT
PERSONAL GROOMING: A LITTLE
DRESSING REGULAR LOWER BODY CLOTHING: A LITTLE
TOILETING: A LITTLE
DRESSING REGULAR UPPER BODY CLOTHING: A LITTLE

## 2022-10-28 ASSESSMENT — ACTIVITIES OF DAILY LIVING (ADL): TOILETING: INDEPENDENT

## 2022-10-28 NOTE — THERAPY
Occupational Therapy   Initial Evaluation     Patient Name: Licha Pope  Age:  87 y.o., Sex:  female  Medical Record #: [id number]  Today's Date: 10/28/2022     Precautions  Precautions: Fall Risk    Assessment  Patient is 87 y.o. female admitted with abd pain. Dx: Liver failure without hepatic coma. Hx of COPD,DVTs,HTN, recent dx of stage IV colon cancer. Presents A&Ox4, [place]. Family in room. Pt donns hearing aids. Agreeable to activity. Demonstrates SBA/CGA with functional mobility in room; uses FWW safely. No LOB noted. Generalized weakness. Toileitng at [facility] with SBA; rec staff to assist pt up to br during days. Seated grooming with SBA/CGA. Sponge bath with Zac, which is level at home. Lives alone with son and dtr in law living next door; son states available to assist when needed. Rec HH at this time. Pt states she has yet decided on hospice upon dc. D/w RN. No further acute OT needs.                    Plan  Recommend Occupational Therapy for Evaluation only   .    DC Equipment Recommendations: None  Discharge Recommendations: Recommend home health for continued occupational therapy services     Subjective  Pleasant and cooperative     Objective     10/28/22 1422   Prior Living Situation   Prior Services Intermittent Physical Support for ADL Per Family   Housing / Facility 1 Story House   Steps Into Home 0   Steps In Home 0   Bathroom Set up Walk In Shower;Shower Chair;Grab Bars   Equipment Owned Front-Wheel Walker;Tub / Shower Seat;Grab Bar(s) In Tub / Shower   Lives with - Patient's Self Care Capacity Alone and Able to Care For Self   Comments Son and dtr in law live next door; help with IADL's and spv with showers.   Prior Level of ADL Function   Self Feeding Independent   Grooming / Hygiene Independent   Bathing Requires Assist   Dressing Independent   Toileting Independent   Prior Level of IADL Function   Medication Management Unable To Determine At This Time   Laundry Requires Assist   Kitchen Mobility  Independent   Finances Requires Assist   Home Management Requires Assist   Shopping Requires Assist   Prior Level Of Mobility Independent With Device in Home   Driving / Transportation Relatives / Others Provide Transportation   Occupation (Pre-Hospital Vocational) Retired Due To Age   Leisure Interests Card;Hobbies;Heap   Vitals   O2 Delivery Device None - Room Air   Cognition    Cognition / Consciousness WDL   Passive ROM Upper Body   Passive ROM Upper Body WDL   Active ROM Upper Body   Active ROM Upper Body  WDL   Strength Upper Body   Comments generalized weakness   Sensation Upper Body   Upper Extremity Sensation  WDL   Upper Body Muscle Tone   Upper Body Muscle Tone  WDL   Coordination Upper Body   Coordination WDL   Balance Assessment   Sitting Balance (Static) Good   Sitting Balance (Dynamic) Fair +   Standing Balance (Static) Fair   Standing Balance (Dynamic) Fair   Weight Shift Sitting Good   Weight Shift Standing Fair   Comments fww   Bed Mobility    Supine to Sit Standby Assist   Sit to Supine Standby Assist   Scooting Standby Assist   ADL Assessment   Grooming Supervision;Seated   Upper Body Dressing Supervision   Lower Body Dressing Contact Guard Assist   Toileting Contact Guard Assist   How much help from another person does the patient currently need...   Putting on and taking off regular lower body clothing? 3   Bathing (including washing, rinsing, and drying)? 2   Toileting, which includes using a toilet, bedpan, or urinal? 3   Putting on and taking off regular upper body clothing? 3   Taking care of personal grooming such as brushing teeth? 3   Eating meals? 4   6 Clicks Daily Activity Score 18   Functional Mobility   Sit to Stand Standby Assist   Bed, Chair, Wheelchair Transfer Standby Assist   Toilet Transfers Contact Guard Assist   Transfer Method Stand Step   Mobility eob>bsc>walk>eob   Visual Perception   Visual Perception  WDL   Activity Tolerance   Sitting in Chair 6   Sitting Edge of  Bed 13   Standing 6   Education Group   Education Provided Home Safety   Role of Occupational Therapist Patient Response Patient;Family;Acceptance;Explanation;Verbal Demonstration   Home Safety Patient Response Patient;Family;Acceptance;Explanation;Verbal Demonstration   Anticipated Discharge Equipment and Recommendations   DC Equipment Recommendations None   Discharge Recommendations Recommend home health for continued occupational therapy services

## 2022-10-28 NOTE — CARE PLAN
The patient is Stable - Low risk of patient condition declining or worsening    Shift Goals  Clinical Goals: MRI completed, free of falls,  Patient Goals: resting, MRI completed  Family Goals: n/a    Progress made toward(s) clinical / shift goals:  MRI completed. Pt remained free of falls. Will continue to monitor.     Patient is not progressing towards the following goals:

## 2022-10-28 NOTE — CONSULTS
"Reason for Palliative Care Consult: Advance Care Planning (ACP)    Consulted by: Lester Reynoso M.D.    HPI: Licha Pope is a very pleasant 87 y.o. 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  The patient recently underwent ERCP by Dr. Rubén Guthrie on 10/21/2022 at [facility].  After being discharged home, patient continued to experience LUQ abdominal pain, intractable nausea/vomiting, melena, and generalized weakness.    Palliative Care consulting for goals of care, hospice discussion, POLST form completion.      Past medical/surgical history:   Past Medical History:   Diagnosis Date    Arthritis     COPD (chronic obstructive pulmonary disease) (HCC)     DVT (deep venous thrombosis) (HCC)     EMPHYSEMA     Gastroesophageal reflux disease without esophagitis 3/13/2019    Hypertension     Hypertriglyceridemia 2019    Prediabetes 2019     Additional consults:     Hospital Medicine  Nutrition (Dietary)  Wound Care    Assessment:  Neuro: Patient alert & oriented X4  Dyspnea: No-    Last BM: 10/27/22-    Pain: No-    Depression: Mood appropriate for situation-    Dementia: No;       Living situation & psychosocial: Patient is , retired, and lives alone in her home in [place].  Her   from brain cancer on 2021.  They had been  for 59 years at the time of his death.  \"He was such a good .\"  She has 2 sons (1  in ).  Her son Aniceto and daughter-in-law (FRACISCO) Shanna live \"around the corner\" and provide patient with physical and emotional support.      Licha derives cintia from spending time with her Jehovah's witness family.  She had a \"prayer quilt\" on the bottom of her bed in her hospital room.  She said, \"Each knot in the quilt represents a prayer that a " "[employer] member offered for me.\"  She shared she has made \"prayer pocket quilts\" with her Bahai group that have crosses on them.    Spiritual:  Is Gnosticist or spirituality important for coping with this illness? Yes- Pt declined offer for chaplaincy visit.  Has a  or spiritual provider visit been requested? No    Palliative Performance Scale: 60%    Advance Directive:  (Pt has existing AD as part of her trust; pt to provide copy)-    DPOA: No-    POLST: Yes-      To locate and/or review the AD/POLST, please hover the cursor over the patient's code status, then scroll down under Documents to access ACP document links.    Code Status: DNR- DNI    Discussion:  Met with patient at bedside. Introduced myself and explained my role in Palliative Care (PC). From a functional standpoint, prior to admission patient was living alone in her [place] home, and was independent with her ADL's.    When queried about her understanding of her current health situation, Licha said, \"I haven't been well.\"  She affirms she has colon cancer, stating, \"I've been off chemo for 2 months.  The doctor wants to evaluate different things that are going on.  A spot showed up on my liver on MRI last week.\"  She reports she had a biopsy of the liver done at [place] last week, \"but the doctor said he couldn't find any masses.\"  She reports she subsequently had \"black stools\" at home, with one episode of fecal incontinence.  \"On Wednesday morning when I woke up, I'm so weak and I can't even move.\"  She immediately called her son Aniceto, who came over and subsequently called [facility] to transport pt to ED, as \"I was so weak I can't even walk out to the car.\"    Discussed goals of care (GOC).  When I asked patient how she was wanting to proceed with her medical treatment she said, \"I really don't know what I want.  I put it in the hands of God and Dr. Olivo.\"  When I asked if she would want to undergo more chemotherapy, she said, \"I guess.  If it helps " "me to regain some strength.\"  She reports she has an outpatient appointment with Dr. Olivo on Tuesday 11/1/2022, and would like to discuss her treatment options with him at that time.  At this point in discussions,  GI TEJ Daniels came in to speak with patient.  Patient agreeable to plan for endoscopy with Dr. Ratliff tomorrow morning, with goal to locate source of GI bleed.  TEJ Daniels then exited room.      When I asked patient what her most important goals would be if her health continues to decline she said, \"I don't know.  There are so many things I need to take care of, and show my son what needs to be done.  I want to be able to get my affairs in order.\"  She would also like help cleaning her house, and is considering hiring a .    When asked what her biggest fears are with her illness, Licha said, \"I don't want to be a burden on my son and his family.\"    Discussed Hospice as a treatment option.  Advised hospice is employed when goal of medical treatment shifts from \"cure\" to \"comfort.\"  Patient shared she is familiar with hospice stating, \"My  was on hospice.\"  When I asking if she had ever though about going on hospice she said, \"I have thought about it.\"  She advised that when her  was on hospice, her son Aniceto \"did most of the work.  He was there all the time when my  was on hospice.\"  She shared what a good son Aniceto has been to her.  She is open to the idea of transitioning to hospice in the future.  However, she would like to discuss this with Dr. Olivo next week, prior to making a final decision.  At this point, she is wishing to continue pursuing selective treatment, including endoscopy.    Discussed Advance Directive (AD) and educated patient about purpose of form.  Patient advised she already has existing AD, which she completed \"as part of my trust.\"  She advised her son Aniceto is her DPOA-HC.  I advised patient we do not have AD on file, and " requested that she e-mail me copy of AD, when convenient, after she returns home.  Patient agreeable.    Discussed code status and educated patient about term.  Patient affirmed she wishes to remain DNR/DNI.    Discussed POLST form and educated patient about purpose of form.  POLST form reviewed and completed with Licha.  Patient selected A) DNR/DNI  B) Selective Treatment  C) No artificial nutrition or feeding tube.  This APRN made copy of POLST, which I will scan into EMR.  Original hot pink POLST to patient at bedside and taped onto white board.  Instructed patient to place POLST on refrigerator at home after discharge.  All questions answered.    Acknowledged these are really hard things to think about and talk about.  Advised me and my team are here to support her and her family during this difficult time.  Provided Licha with Palliative Care contact information, and encouraged her to call with any questions/needs, or if she just needs to talk.     Provided therapeutic communication including open-ended questions, therapeutic silence, reflective listening, normalization of feelings, and empathic support throughout encounter.      Outcome:  Patient wishes to continue pursuing selective treatment, but is open to considering transition to hospice in the future.  Patient to provide copy of existing AD.  POLST form completed and will be scanned into EMR.    Plan: Palliative care to continue to follow, provide support, and help facilitate decision making as clinical picture evolves.    Updated: Dr. Delaney in-person.    Thank you for allowing Palliative Care to participate in Licha's care. Please call our team with questions and/or additional needs.    As appropriate, Palliative Care encourages medical team to engage in further conversation, education for patient/family at bedside regarding code status, GOC/POC.    Total visit time was 50 minutes discussing and coordinating advance care planning.     Mariola Walter  TYRA Weiner, APRN, [facility]  Palliative Care Nurse Practitioner  [phone]

## 2022-10-28 NOTE — CARE PLAN
Problem: Psychosocial  Goal: Patient and family will demonstrate ability to cope with life altering diagnosis and/or procedure  Outcome: Progressing     Problem: Communication  Goal: The ability to communicate needs accurately and effectively will improve  Outcome: Progressing     Problem: Mobility  Goal: Patient's capacity to carry out activities will improve  Outcome: Progressing     The patient is Stable - Low risk of patient condition declining or worsening    Shift Goals  Clinical Goals: Pt will remain free from falls or injury this shift  Patient Goals: Pain and nausea control  Family Goals: n/a    Progress made toward(s) clinical / shift goals:  Pt remained free from falls or injury this shift, able to walk with OT without significant difficulty. Pt denies nausea or pain this shift.    Patient is not progressing towards the following goals: n/a

## 2022-10-28 NOTE — PROGRESS NOTES
Bedside report received from night RN. Assumed care of patient. Daily plan of care discussed. Pt awake and alert, denies complaints or concerns this AM. Call light within reach. Hourly rounding in place.

## 2022-10-28 NOTE — PROGRESS NOTES
Gastroenterology Progress Note               Author:  BEL Rae Date & Time Created: 10/28/2022 10:18 AM       Patient ID:  Name:             Licha Pope  YOB: 1935  Age:                 87 y.o.  female  MRN:               [id number]      Referring Provider:  Sandor Espinal MD      Presenting Chief Complaint:  Abdominal pain, Elevated liver enzymes      History of Present Illness:    This is a very pleasant 87 y.o. 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  The patient recently underwent ERCP by Dr. Rubén Guthrie on 10/21/2022 at [facility].  Post procedure, the patient's had increased abdominal pain, as well as intractable nausea and vomiting.  This resolved by 10/22 2022 and she was discharged home.  The patient's son, Aniceto, states that after her procedure she was eating well but was having recurrent abdominal pain, some nausea, and has been lethargic.  She has lost 10 pounds recently.    Labs and imaging reviewed from this admission including normal CBC other than MCV 99.1, RDW 67.5.  CMP with glucose 147, calcium 10.5, , ALT 72, alkaline phosphatase 448, bilirubin 1.7.  Lipase elevated 113.  Chest x-ray is normal.    Interval History  10/27/2022: Stable, no acute events. Patient has not had a documented stool since yesterday. Hgb down, but multiple cell lines down due to hemodilution likely. No current abdominal pain. Patient is hungry. MRI pending    10/28/2022: Stable, no acute events. Patient states he had an additional black stool yesterday. No abdominal pain, but does have pain when palpated. MRI done but read pending. AFP normal.     Review of Systems:  Review of Systems   Constitutional:  Negative for chills and fever.   Respiratory:  Negative for cough and  shortness of breath.    Cardiovascular:  Negative for chest pain and palpitations.   Gastrointestinal:  Positive for melena. Negative for abdominal pain, nausea and vomiting.   Neurological:  Positive for weakness. Negative for dizziness.           Past Medical History:  Past Medical History:   Diagnosis Date    Arthritis     COPD (chronic obstructive pulmonary disease) (HCC)     DVT (deep venous thrombosis) (HCC)     EMPHYSEMA     Gastroesophageal reflux disease without esophagitis 3/13/2019    Hypertension     Hypertriglyceridemia 5/13/2019    Prediabetes 5/13/2019     Active Hospital Problems    Diagnosis     Liver failure without hepatic coma (HCC) [K72.90]     Hypercalcemia [E83.52]     Post-ERCP acute pancreatitis [K91.89, K85.90]     Advance care planning [Z71.89]     Dark stools [R19.5]     Esophageal diverticulum [Q39.6]     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]     Generalized weakness [R53.1]     Gastroesophageal reflux disease without esophagitis [K21.9]     COPD (chronic obstructive pulmonary disease) (HCC) [J44.9]          Past Surgical History:  Past Surgical History:   Procedure Laterality Date    VA LAP,DIAGNOSTIC ABDOMEN Bilateral 3/30/2022    Procedure: LAPAROSCOPY;  Surgeon: Mando Johnson M.D.;  Location: [facility];  Service: General    VA EXPLORATORY OF ABDOMEN Bilateral 3/30/2022    Procedure: LAPAROTOMY, EXPLORATORY, ILEOCECECTOMY;  Surgeon: Mando Johnson M.D.;  Location: [facility];  Service: General    VA LAP,APPENDECTOMY  6/25/2021    Procedure: APPENDECTOMY, LAPAROSCOPIC;  Surgeon: Pedro Mcneil M.D.;  Location: [facility];  Service: Gastroenterology    CATARACT PHACO WITH IOL  12/2/2014    Performed by Jarred Almazan M.D. at SURGERY [facility]    CATARACT PHACO WITH IOL  11/18/2014    Performed by Jarred Almazan M.D. at [facility]    KNEE ARTHROPLASTY TOTAL  1/27/2014    Performed by Jose Hahn M.D. at [facility]  REJI ORS    SD  DELIVERY ONLY  1969    HEMORRHOIDECTOMY             Hospital Medications:  Current Facility-Administered Medications   Medication Dose Frequency Provider Last Rate Last Admin    pantoprazole (Protonix) injection 40 mg  40 mg BID BEL Rae   40 mg at 10/28/22 0600    senna-docusate (PERICOLACE or SENOKOT S) 8.6-50 MG per tablet 2 Tablet  2 Tablet BID Lester Reynoso M.D.   2 Tablet at 10/28/22 0600    And    polyethylene glycol/lytes (MIRALAX) PACKET 1 Packet  1 Packet QDAY PRN Letser Reynoso M.D.        And    magnesium hydroxide (MILK OF MAGNESIA) suspension 30 mL  30 mL QDAY PRN Lester Reynoso M.D.        And    bisacodyl (DULCOLAX) suppository 10 mg  10 mg QDAY PRN Lester Reynoso M.D.        labetalol (NORMODYNE/TRANDATE) injection 10 mg  10 mg Q4HRS PRN Lester Reynoso M.D.        ondansetron (ZOFRAN) syringe/vial injection 4 mg  4 mg Q4HRS PRABDON Reynoso M.D.        ondansetron (ZOFRAN ODT) dispertab 4 mg  4 mg Q4HRS PRN Lester Reynoso M.D.        Pharmacy Consult Request ...Pain Management Review 1 Each  1 Each PHARMACY TO DOSE Lester Reynoso M.D.        oxyCODONE immediate-release (ROXICODONE) tablet 2.5 mg  2.5 mg Q3HRS PRN Lester Reynoso M.D.        Or    oxyCODONE immediate-release (ROXICODONE) tablet 5 mg  5 mg Q3HRS PRABDON Reynoso M.D.        Or    HYDROmorphone (Dilaudid) injection 0.25 mg  0.25 mg Q3HRS PRN Lester Reynoso M.D.        thiamine (B-1) 100 mg in dextrose 5% 100 mL IVPB  100 mg Q EVENING Lester Reynoso M.D. 200 mL/hr at 10/27/22 1735 100 mg at 10/27/22 1735   Last reviewed on 10/26/2022  3:13 PM by Iliana Almendarez R.N.       Current Outpatient Medications:  Medications Prior to Admission   Medication Sig Dispense Refill Last Dose    POTASSIUM PO Take 1 Tablet by mouth every day.   unknown at unknown         Medication Allergies:  Allergies   Allergen Reactions    Codeine  Vomiting    Demerol Vomiting     Injectable type    Shellfish Allergy Shortness of Breath     Soft shell    Ciprofloxacin Rash     rash    Ibuprofen      Patient developed gastric ulcer/GI bleed from ibuprofen use    Iodine      PATIENT ALLERGIC TO SHELLFISH, NOT SURE IF SHE IS ALLERGIC TO IODINE         Family Medical History:  Family History   Problem Relation Age of Onset    Alzheimer's Disease Mother     Heart Disease Father     Other Father         AAA    Heart Attack Sister     Diabetes Brother          Social History:  Social History     Socioeconomic History    Marital status:      Spouse name: Not on file    Number of children: Not on file    Years of education: Not on file    Highest education level: Not on file   Occupational History    Not on file   Tobacco Use    Smoking status: Former     Packs/day: 1.00     Years: 20.00     Pack years: 20.00     Types: Cigarettes     Quit date: 1975     Years since quittin.8    Smokeless tobacco: Never   Vaping Use    Vaping Use: Never used   Substance and Sexual Activity    Alcohol use: Not Currently     Alcohol/week: 1.5 oz     Types: 3 Standard drinks or equivalent per week     Comment: RARE    Drug use: No    Sexual activity: Not Currently     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     Comment: , retired lab tech   Other Topics Concern    Not on file   Social History Narrative    Not on file     Social Determinants of Health     Financial Resource Strain: Low Risk     Difficulty of Paying Living Expenses: Not hard at all   Food Insecurity: No Food Insecurity    Worried About Running Out of Food in the Last Year: Never true    Ran Out of Food in the Last Year: Never true   Transportation Needs: No Transportation Needs    Lack of Transportation (Medical): No    Lack of Transportation (Non-Medical): No   Physical Activity: Not on file   Stress: Not on file   Social Connections: Not on file   Intimate Partner Violence: Not on file   Housing Stability: Not on file  "        Vital signs:  Weight/BMI: Body mass index is 20.59 kg/m².  BP (!) 169/78   Pulse 89   Temp 37 °C (98.6 °F) (Oral)   Resp 16   Ht 1.626 m (5' 4\")   Wt 54.4 kg (119 lb 14.9 oz)   SpO2 97%   Vitals:    10/27/22 1100 10/27/22 1700 10/27/22 2200 10/28/22 0400   BP: (!) 163/90 (!) 152/84 (!) 148/81 (!) 169/78   Pulse: 79 85 79 89   Resp: 16 16 16 16   Temp: 37 °C (98.6 °F) 37 °C (98.6 °F) 36.8 °C (98.2 °F) 37 °C (98.6 °F)   TempSrc: Oral Oral Oral Oral   SpO2: 92% 94% 97% 97%   Weight:       Height:         Oxygen Therapy:  Pulse Oximetry: 97 %, O2 (LPM): 0, O2 Delivery Device: None - Room Air    Intake/Output Summary (Last 24 hours) at 10/28/2022 1018  Last data filed at 10/28/2022 0800  Gross per 24 hour   Intake 460 ml   Output 1200 ml   Net -740 ml           Physical Exam:  Physical Exam  Vitals and nursing note reviewed.   Constitutional:       Appearance: She is ill-appearing.   HENT:      Head: Normocephalic and atraumatic.      Right Ear: External ear normal.      Left Ear: External ear normal.      Ears:      Comments: Hard of hearing     Nose: Nose normal. No rhinorrhea.      Mouth/Throat:      Mouth: Mucous membranes are dry.      Pharynx: Oropharynx is clear.   Eyes:      General: No scleral icterus.     Extraocular Movements: Extraocular movements intact.      Pupils: Pupils are equal, round, and reactive to light.   Cardiovascular:      Rate and Rhythm: Normal rate and regular rhythm.      Pulses: Normal pulses.      Heart sounds: Normal heart sounds.   Pulmonary:      Effort: Pulmonary effort is normal. No respiratory distress.      Breath sounds: Normal breath sounds. No wheezing.   Abdominal:      General: Abdomen is flat. Bowel sounds are normal. There is no distension.      Palpations: Abdomen is soft.      Tenderness: There is abdominal tenderness (Epigastric, RUQ, LUQ, Umbilical tenderness). There is no rebound.   Musculoskeletal:      Cervical back: Neck supple.      Right lower leg: No " edema.      Left lower leg: No edema.   Lymphadenopathy:      Cervical: No cervical adenopathy.   Skin:     General: Skin is warm and dry.   Neurological:      General: No focal deficit present.      Mental Status: She is alert and oriented to person, place, and time.   Psychiatric:         Thought Content: Thought content normal.         Judgment: Judgment normal.         Labs:  Recent Labs     10/26/22  0940 10/27/22  0334 10/28/22  0100   SODIUM 137 137 138   POTASSIUM 4.3 4.3 4.1   CHLORIDE 98 102 102   CO2 30 27 26   BUN 15 13 12   CREATININE 0.83 0.81 0.85   MAGNESIUM 2.3 2.0  --    PHOSPHORUS  --  3.8  --    CALCIUM 10.5* 9.2 9.2       Recent Labs     10/26/22  0940 10/27/22  0334 10/28/22  0100   ALTSGPT 72* 58* 65*   ASTSGOT 100* 92* 106*   ALKPHOSPHAT 448* 373* 377*   TBILIRUBIN 1.7* 1.7* 1.7*   LIPASE 113*  --   --    GLUCOSE 147* 108* 108*       Recent Labs     10/26/22  0940 10/27/22  0334 10/28/22  0100   WBC 6.5 7.6 7.1   NEUTSPOLYS 83.60*  --   --    LYMPHOCYTES 8.20*  --   --    MONOCYTES 5.40  --   --    EOSINOPHILS 1.90  --   --    BASOPHILS 0.60  --   --    ASTSGOT 100* 92* 106*   ALTSGPT 72* 58* 65*   ALKPHOSPHAT 448* 373* 377*   TBILIRUBIN 1.7* 1.7* 1.7*       Recent Labs     10/26/22  0940 10/27/22  0334 10/28/22  0100   RBC 4.56 3.84* 3.83*   HEMOGLOBIN 14.7 12.7 12.1   HEMATOCRIT 45.2 38.0 37.9   PLATELETCT 191 180 173   PROTHROMBTM 12.5  --   --    APTT 26.7  --   --    INR 0.97  --   --        Recent Results (from the past 24 hour(s))   CBC WITHOUT DIFFERENTIAL    Collection Time: 10/28/22  1:00 AM   Result Value Ref Range    WBC 7.1 4.8 - 10.8 K/uL    RBC 3.83 (L) 4.20 - 5.40 M/uL    Hemoglobin 12.1 12.0 - 16.0 g/dL    Hematocrit 37.9 37.0 - 47.0 %    MCV 99.0 (H) 81.4 - 97.8 fL    MCH 31.6 27.0 - 33.0 pg    MCHC 31.9 (L) 33.6 - 35.0 g/dL    RDW 66.7 (H) 35.9 - 50.0 fL    Platelet Count 173 164 - 446 K/uL    MPV 12.7 9.0 - 12.9 fL   Comp Metabolic Panel    Collection Time: 10/28/22  1:00  AM   Result Value Ref Range    Sodium 138 135 - 145 mmol/L    Potassium 4.1 3.6 - 5.5 mmol/L    Chloride 102 96 - 112 mmol/L    Co2 26 20 - 33 mmol/L    Anion Gap 10.0 7.0 - 16.0    Glucose 108 (H) 65 - 99 mg/dL    Bun 12 8 - 22 mg/dL    Creatinine 0.85 0.50 - 1.40 mg/dL    Calcium 9.2 8.4 - 10.2 mg/dL    AST(SGOT) 106 (H) 12 - 45 U/L    ALT(SGPT) 65 (H) 2 - 50 U/L    Alkaline Phosphatase 377 (H) 30 - 99 U/L    Total Bilirubin 1.7 (H) 0.1 - 1.5 mg/dL    Albumin 3.1 (L) 3.2 - 4.9 g/dL    Total Protein 6.0 6.0 - 8.2 g/dL    Globulin 2.9 1.9 - 3.5 g/dL    A-G Ratio 1.1 g/dL   ESTIMATED GFR    Collection Time: 10/28/22  1:00 AM   Result Value Ref Range    GFR (CKD-EPI) 66 >60 mL/min/1.73 m 2         Radiology Review:  DX-CHEST-PORTABLE (1 VIEW)   Final Result         1. No acute cardiopulmonary abnormalities are identified.      MR-ABDOMEN-WITH & W/O    (Results Pending)         MDM (Data Review):   -Records reviewed and summarized in current documentation  -I personally reviewed and interpreted the laboratory results  -I personally reviewed the radiology images        Medical Decision Making, by Problem:  Active Hospital Problems    Diagnosis     Liver failure without hepatic coma (HCC) [K72.90]     Hypercalcemia [E83.52]     Post-ERCP acute pancreatitis [K91.89, K85.90]     Advance care planning [Z71.89]     Dark stools [R19.5]     Esophageal diverticulum [Q39.6]     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]     Generalized weakness [R53.1]     Gastroesophageal reflux disease without esophagitis [K21.9]     COPD (chronic obstructive pulmonary disease) (HCC) [J44.9]            Assessment/Recommendations:  87-year-old female with history of stage IV ileal cecal adenocarcinoma status post resection, chemotherapy, and recent ERCP on 10/21/2022 seen for abdominal pain, weakness, elevated liver enzymes.  Patient had an MRCP last month that demonstrated possible hepatic lesion and intrahepatic ductal dilation which was not  identified on ERCP.  Brushing biopsies pathology negative.  Post procedure the patient states she had pain and has documented nausea and vomiting.  She continues to have mildly elevated liver enzymes as well as bilirubin.  Question whether patient does have infiltrative lesion of the liver given her continued weight loss, lethargy, and persistently elevating alkaline phosphatase along with findings on previous MRCP.  Additionally, patient complained of black stool x 3 now    Assessment:  -Abdominal pain-patient is tender to the upper abdomen, but complains of both upper and lower abdominal pain.  Some of this is chronic and some seems to be worse after her recent ERCP  -Elevated liver enzymes  -Stage IV ileocecal adenocarcinoma status post resection  -Suspected post ERCP pancreatitis  -Weakness  -Dark or black stools  -Esophageal or gastric diverticulum    Plan:  -Await MRI with and without contrast hepatic protocol  -Protonix 40 mg IV twice daily  -Diet as tolerated, NPO after midnight  -Plan for esophagogastroduodenoscopy with possible hemostasis, biopsies with Dr. Chhaya Ratliff tomorrow a.m., time to be determined.  Patient seen and examined before proceeding.    Risks, benefits, and alternatives of aforementioned procedures were discussed with patient.  Consenting person(s) were given opportunities to ask questions and discuss other options.  Risks including but not limited to perforation, infection, bleeding, missed lesion(s), possible need for surgery(ies) and/or interventional radiology, possible need for repeat procedure(s) and/or additional testing, hospitalization possibly prolonged, cardiac and/or pulmonary event, aspiration, hypoxia, stroke, medication and/or anesthesia reaction, indefinite diagnosis, discomfort, unsuccessful and/or incomplete procedure, ineffective therapy and/or persistent symptoms, damage to adjacent organs and/or vascular structures, and other adverse events possibly  life-threatening.  Interactive discussion was undertaken with Layman's terms. I answered questions in full and to satisfaction.  Consenting person(s) stated understanding and acceptance of these risks, and wished to proceed.  Informed consent was given in clear state of mind.    ALISE Rae.      Thank you for inviting me to participate in the care of this patient. Please do not hesitate to call GI consultants with additional questions/concerns or changes in the patient's clinical status at [phone].      Core Quality Measures   Reviewed items:  Labs, Medications and Radiology reports reviewed

## 2022-10-28 NOTE — CARE PLAN
The patient is Stable - Low risk of patient condition declining or worsening    Shift Goals  Clinical Goals:  free of falls  Patient Goals: resting    Progress made toward(s) clinical / shift goals:  not in physical or respiratory distress.    Patient is not progressing towards the following goals:

## 2022-10-28 NOTE — PROGRESS NOTES
"Hospital Medicine Daily Progress Note    Date of Service  10/28/2022    Chief Complaint  Licha Pope is a 87 y.o. female admitted 10/26/2022 with abdominal pain    Hospital Course  Per notes, \" 87-year-old female with past medical history of COPD, DVTs, hypertension, dyslipidemia recent diagnosis of stage IV colon cancer adenocarcinoma.  Admitted on 10/26/22 for increased generalized weakness and reported blood in stool. Patient had endoscopy performed on Friday 5 days ago performed by GI consultants in [place] with sphincterectomy. Reported melena to ED. Came from home, lives alone. Possible hx of PUD after ibuprofen, not currently using NSAIDs.  Son at bedside, confirming patient has had decreased appetite for about 2-3 weeks now and she has lost significant weight.     I spoke with EDP about patient's case. Dr. Ervin has spoken with Dr. Belle with [facility] who will review. For now, recommended monitoring CMP and exclude liver metastasis.\"    Interval Problem Update  Still complaining some abdominal pain, fatigue.  Pending MRI, which was completed on 10/27, still has not been read.    I have discussed this patient's plan of care and discharge plan at IDT rounds today with Case Management, Nursing, Nursing leadership, and other members of the IDT team.    Consultants/Specialty  GI    Code Status  DNAR/DNI    Disposition  Patient is not medically cleared for discharge.   Anticipate discharge to to home with organized home healthcare and close outpatient follow-up.  I have placed the appropriate orders for post-discharge needs.    Review of Systems  Review of Systems   Constitutional:  Positive for malaise/fatigue and weight loss.   All other systems reviewed and are negative.     Physical Exam  Temp:  [36.8 °C (98.2 °F)-37 °C (98.6 °F)] 36.9 °C (98.4 °F)  Pulse:  [79-89] 81  Resp:  [16-18] 18  BP: (148-169)/(78-84) 158/80  SpO2:  [94 %-97 %] 95 %    Physical Exam  Vitals and nursing note reviewed. "   Constitutional:       Appearance: She is ill-appearing.      Comments: Sarcopenia, muscle weight   Cardiovascular:      Rate and Rhythm: Normal rate and regular rhythm.      Pulses: Normal pulses.      Heart sounds: Normal heart sounds.   Pulmonary:      Effort: Pulmonary effort is normal.      Breath sounds: Normal breath sounds.   Abdominal:      General: Abdomen is flat. Bowel sounds are normal.      Palpations: Abdomen is soft.   Musculoskeletal:      Right lower leg: No edema.      Left lower leg: No edema.   Neurological:      General: No focal deficit present.      Mental Status: She is alert and oriented to person, place, and time. Mental status is at baseline.       Fluids    Intake/Output Summary (Last 24 hours) at 10/28/2022 1303  Last data filed at 10/28/2022 1047  Gross per 24 hour   Intake 360 ml   Output 1300 ml   Net -940 ml       Laboratory  Recent Labs     10/26/22  0940 10/27/22  0334 10/28/22  0100   WBC 6.5 7.6 7.1   RBC 4.56 3.84* 3.83*   HEMOGLOBIN 14.7 12.7 12.1   HEMATOCRIT 45.2 38.0 37.9   MCV 99.1* 99.0* 99.0*   MCH 32.2 33.1* 31.6   MCHC 32.5* 33.4* 31.9*   RDW 67.5* 68.2* 66.7*   PLATELETCT 191 180 173   MPV 12.7 12.3 12.7     Recent Labs     10/26/22  0940 10/27/22  0334 10/28/22  0100   SODIUM 137 137 138   POTASSIUM 4.3 4.3 4.1   CHLORIDE 98 102 102   CO2 30 27 26   GLUCOSE 147* 108* 108*   BUN 15 13 12   CREATININE 0.83 0.81 0.85   CALCIUM 10.5* 9.2 9.2     Recent Labs     10/26/22  0940   APTT 26.7   INR 0.97               Imaging  DX-CHEST-PORTABLE (1 VIEW)   Final Result         1. No acute cardiopulmonary abnormalities are identified.      MR-ABDOMEN-WITH & W/O    (Results Pending)   MR-ABDOMEN-WITH & W/O    (Results Pending)        Assessment/Plan  * Liver failure without hepatic coma (HCC)- (present on admission)  Assessment & Plan  , ALT 72, , Tbili 1.7  Lipase 113  Concern for liver metastasis from colon  GIC evaluating patient, follow recommendations  Repeat  CMP daily  Avoid acetaminophen  MRI liver protocol w/wo-pending read    Esophageal diverticulum- (present on admission)  Assessment & Plan  Seen on barium swallow 4/22  SLP fer given anorexia, weight loss    Dark stools- (present on admission)  Assessment & Plan  Pt reported  Just had recent ERCP with sphincterotomy  Hgb 14, likely hemoconcentrated  Repeat CBC daily. Changed to Q6H if patient has ongoing melena.  GIC evaluating patient in ED, possible procedure?    Advance care planning- (present on admission)  Assessment & Plan  Last code status DNR/DNI 4/11/22    I spent extra time discussing with patient about advanced care planning.    I spent a total of 25 minutes with the patient directly at bedside, and discussed patient's current diagnosis, prognosis and goals of care. I discussed code status, medical interventions, nutritional interventions.    Patient wished to speak to her son before making a decision.  Will convert patient back to FULL CODE for now.    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    Patient had endoscopy performed on Friday 5 days ago performed by GI consultants in [place] with sphincterectomy.  GIC evaluating patient in ED, appreciate any recommendations  Monitoring LFTs    Generalized weakness- (present on admission)  Assessment & Plan  Unclear cause, possible dehydration, worsening colon cancer  Concern of ongoing weight loss, anorexia symptoms, and pt lives alone  - IVF  - PT/OT  - Palliative Care consult  - fall precautions  -Will likely need placement, pending final work-up and  recommendations from GI, PT/OT    Gastroesophageal reflux disease without esophagitis- (present on admission)  Assessment & Plan  IV pantoprazole given reported black stools. This likely came from post sphincterotomy from ERCP.    COPD (chronic obstructive pulmonary disease) (HCC)- (present on admission)  Assessment & Plan  Not on maintenance therapy or home oxygen  Monitor  RT consult for Duonebs if needed       VTE prophylaxis: SCDs/TEDs    I have performed a physical exam and reviewed and updated ROS and Plan today (10/28/2022). In review of yesterday's note (10/27/2022), there are no changes except as documented above.

## 2022-10-29 ENCOUNTER — ANESTHESIA EVENT (OUTPATIENT)
Dept: SURGERY | Facility: MEDICAL CENTER | Age: 87
DRG: 438 | End: 2022-10-29
Payer: MEDICARE

## 2022-10-29 ENCOUNTER — ANESTHESIA (OUTPATIENT)
Dept: SURGERY | Facility: MEDICAL CENTER | Age: 87
DRG: 438 | End: 2022-10-29
Payer: MEDICARE

## 2022-10-29 LAB
ANION GAP SERPL CALC-SCNC: 8 MMOL/L (ref 7–16)
BUN SERPL-MCNC: 12 MG/DL (ref 8–22)
CALCIUM SERPL-MCNC: 9.3 MG/DL (ref 8.4–10.2)
CHLORIDE SERPL-SCNC: 100 MMOL/L (ref 96–112)
CO2 SERPL-SCNC: 28 MMOL/L (ref 20–33)
CREAT SERPL-MCNC: 0.78 MG/DL (ref 0.5–1.4)
ERYTHROCYTE [DISTWIDTH] IN BLOOD BY AUTOMATED COUNT: 64.9 FL (ref 35.9–50)
GFR SERPLBLD CREATININE-BSD FMLA CKD-EPI: 73 ML/MIN/1.73 M 2
GLUCOSE SERPL-MCNC: 122 MG/DL (ref 65–99)
HCT VFR BLD AUTO: 37.2 % (ref 37–47)
HGB BLD-MCNC: 12.1 G/DL (ref 12–16)
MAGNESIUM SERPL-MCNC: 1.9 MG/DL (ref 1.5–2.5)
MCH RBC QN AUTO: 32 PG (ref 27–33)
MCHC RBC AUTO-ENTMCNC: 32.5 G/DL (ref 33.6–35)
MCV RBC AUTO: 98.4 FL (ref 81.4–97.8)
PATHOLOGY CONSULT NOTE: NORMAL
PLATELET # BLD AUTO: 175 K/UL (ref 164–446)
PMV BLD AUTO: 12.6 FL (ref 9–12.9)
POTASSIUM SERPL-SCNC: 3.9 MMOL/L (ref 3.6–5.5)
RBC # BLD AUTO: 3.78 M/UL (ref 4.2–5.4)
SODIUM SERPL-SCNC: 136 MMOL/L (ref 135–145)
WBC # BLD AUTO: 7.1 K/UL (ref 4.8–10.8)

## 2022-10-29 PROCEDURE — 700111 HCHG RX REV CODE 636 W/ 250 OVERRIDE (IP): Performed by: NURSE PRACTITIONER

## 2022-10-29 PROCEDURE — 0DB98ZX EXCISION OF DUODENUM, VIA NATURAL OR ARTIFICIAL OPENING ENDOSCOPIC, DIAGNOSTIC: ICD-10-PCS | Performed by: INTERNAL MEDICINE

## 2022-10-29 PROCEDURE — 700102 HCHG RX REV CODE 250 W/ 637 OVERRIDE(OP): Performed by: INTERNAL MEDICINE

## 2022-10-29 PROCEDURE — 85027 COMPLETE CBC AUTOMATED: CPT

## 2022-10-29 PROCEDURE — 160035 HCHG PACU - 1ST 60 MINS PHASE I: Performed by: INTERNAL MEDICINE

## 2022-10-29 PROCEDURE — C9113 INJ PANTOPRAZOLE SODIUM, VIA: HCPCS | Performed by: NURSE PRACTITIONER

## 2022-10-29 PROCEDURE — 00731 ANES UPR GI NDSC PX NOS: CPT | Performed by: ANESTHESIOLOGY

## 2022-10-29 PROCEDURE — 160002 HCHG RECOVERY MINUTES (STAT): Performed by: INTERNAL MEDICINE

## 2022-10-29 PROCEDURE — 99233 SBSQ HOSP IP/OBS HIGH 50: CPT | Performed by: INTERNAL MEDICINE

## 2022-10-29 PROCEDURE — 83735 ASSAY OF MAGNESIUM: CPT

## 2022-10-29 PROCEDURE — 80048 BASIC METABOLIC PNL TOTAL CA: CPT

## 2022-10-29 PROCEDURE — 700105 HCHG RX REV CODE 258: Performed by: INTERNAL MEDICINE

## 2022-10-29 PROCEDURE — 160048 HCHG OR STATISTICAL LEVEL 1-5: Performed by: INTERNAL MEDICINE

## 2022-10-29 PROCEDURE — 36415 COLL VENOUS BLD VENIPUNCTURE: CPT

## 2022-10-29 PROCEDURE — 160009 HCHG ANES TIME/MIN: Performed by: INTERNAL MEDICINE

## 2022-10-29 PROCEDURE — 88305 TISSUE EXAM BY PATHOLOGIST: CPT

## 2022-10-29 PROCEDURE — 99100 ANES PT EXTEME AGE<1 YR&>70: CPT | Performed by: ANESTHESIOLOGY

## 2022-10-29 PROCEDURE — 770006 HCHG ROOM/CARE - MED/SURG/GYN SEMI*

## 2022-10-29 PROCEDURE — 160202 HCHG ENDO MINUTES - 1ST 30 MINS LEVEL 3: Performed by: INTERNAL MEDICINE

## 2022-10-29 PROCEDURE — A9270 NON-COVERED ITEM OR SERVICE: HCPCS | Performed by: INTERNAL MEDICINE

## 2022-10-29 PROCEDURE — 700111 HCHG RX REV CODE 636 W/ 250 OVERRIDE (IP): Performed by: ANESTHESIOLOGY

## 2022-10-29 RX ORDER — DEXAMETHASONE SODIUM PHOSPHATE 4 MG/ML
INJECTION, SOLUTION INTRA-ARTICULAR; INTRALESIONAL; INTRAMUSCULAR; INTRAVENOUS; SOFT TISSUE PRN
Status: DISCONTINUED | OUTPATIENT
Start: 2022-10-29 | End: 2022-10-29 | Stop reason: SURG

## 2022-10-29 RX ORDER — HYDRALAZINE HYDROCHLORIDE 20 MG/ML
5 INJECTION INTRAMUSCULAR; INTRAVENOUS
Status: DISCONTINUED | OUTPATIENT
Start: 2022-10-29 | End: 2022-10-29 | Stop reason: HOSPADM

## 2022-10-29 RX ORDER — LABETALOL HYDROCHLORIDE 5 MG/ML
5 INJECTION, SOLUTION INTRAVENOUS
Status: DISCONTINUED | OUTPATIENT
Start: 2022-10-29 | End: 2022-10-29 | Stop reason: HOSPADM

## 2022-10-29 RX ORDER — SODIUM CHLORIDE, SODIUM LACTATE, POTASSIUM CHLORIDE, CALCIUM CHLORIDE 600; 310; 30; 20 MG/100ML; MG/100ML; MG/100ML; MG/100ML
INJECTION, SOLUTION INTRAVENOUS CONTINUOUS
Status: ACTIVE | OUTPATIENT
Start: 2022-10-29 | End: 2022-10-29

## 2022-10-29 RX ORDER — SODIUM CHLORIDE, SODIUM LACTATE, POTASSIUM CHLORIDE, CALCIUM CHLORIDE 600; 310; 30; 20 MG/100ML; MG/100ML; MG/100ML; MG/100ML
INJECTION, SOLUTION INTRAVENOUS CONTINUOUS
Status: DISCONTINUED | OUTPATIENT
Start: 2022-10-29 | End: 2022-10-29 | Stop reason: HOSPADM

## 2022-10-29 RX ORDER — ONDANSETRON 2 MG/ML
INJECTION INTRAMUSCULAR; INTRAVENOUS PRN
Status: DISCONTINUED | OUTPATIENT
Start: 2022-10-29 | End: 2022-10-29 | Stop reason: SURG

## 2022-10-29 RX ORDER — ONDANSETRON 2 MG/ML
4 INJECTION INTRAMUSCULAR; INTRAVENOUS
Status: DISCONTINUED | OUTPATIENT
Start: 2022-10-29 | End: 2022-10-29 | Stop reason: HOSPADM

## 2022-10-29 RX ADMIN — PANTOPRAZOLE SODIUM 40 MG: 40 INJECTION, POWDER, FOR SOLUTION INTRAVENOUS at 17:58

## 2022-10-29 RX ADMIN — SODIUM CHLORIDE, POTASSIUM CHLORIDE, SODIUM LACTATE AND CALCIUM CHLORIDE: 600; 310; 30; 20 INJECTION, SOLUTION INTRAVENOUS at 08:39

## 2022-10-29 RX ADMIN — DEXAMETHASONE SODIUM PHOSPHATE 4 MG: 4 INJECTION, SOLUTION INTRA-ARTICULAR; INTRALESIONAL; INTRAMUSCULAR; INTRAVENOUS; SOFT TISSUE at 09:12

## 2022-10-29 RX ADMIN — PANTOPRAZOLE SODIUM 40 MG: 40 INJECTION, POWDER, FOR SOLUTION INTRAVENOUS at 05:20

## 2022-10-29 RX ADMIN — SENNOSIDES AND DOCUSATE SODIUM 2 TABLET: 50; 8.6 TABLET ORAL at 05:20

## 2022-10-29 RX ADMIN — SENNOSIDES AND DOCUSATE SODIUM 2 TABLET: 50; 8.6 TABLET ORAL at 17:59

## 2022-10-29 RX ADMIN — PROPOFOL 150 MCG/KG/MIN: 10 INJECTION, EMULSION INTRAVENOUS at 09:11

## 2022-10-29 RX ADMIN — ONDANSETRON 4 MG: 2 INJECTION INTRAMUSCULAR; INTRAVENOUS at 09:12

## 2022-10-29 ASSESSMENT — ENCOUNTER SYMPTOMS: WEIGHT LOSS: 1

## 2022-10-29 NOTE — ANESTHESIA POSTPROCEDURE EVALUATION
Patient: Licha Pope    Procedure Summary     Date: 10/29/22 Room / Location:  ENDOSCOPIC ULTRASOUND ROOM / SURGERY [facility]    Anesthesia Start: 0908 Anesthesia Stop: 0935    Procedure: GASTROSCOPY (Mouth) Diagnosis:     Surgeons: Chhaya Ratliff M.D. Responsible Provider: Jarred Garcia M.D.    Anesthesia Type: MAC ASA Status: 3          Final Anesthesia Type: MAC  Last vitals  BP   Blood Pressure : (!) 159/77 (RN aware), NIBP: 146/86    Temp   36.5 °C (97.7 °F)    Pulse   83   Resp   14    SpO2   100 %      Anesthesia Post Evaluation    Patient location during evaluation: PACU  Patient participation: complete - patient participated  Level of consciousness: sleepy but conscious    Airway patency: patent  Anesthetic complications: no  Cardiovascular status: hemodynamically stable  Respiratory status: acceptable  Hydration status: balanced    PONV: none          No notable events documented.     Nurse Pain Score: 0 (NPRS)

## 2022-10-29 NOTE — ANESTHESIA PREPROCEDURE EVALUATION
Case: [id number] Date/Time: 10/29/22 0900    Procedure: GASTROSCOPY    Location:  ENDOSCOPIC ULTRASOUND ROOM / SURGERY [facility]    Surgeons: Chhaya Ratliff M.D.          Relevant Problems   PULMONARY   (positive) COPD (chronic obstructive pulmonary disease) (HCC)      NEURO   (positive) History of deep venous thrombosis      CARDIAC   (positive) Essential hypertension, benign      GI   (positive) Gastroesophageal reflux disease without esophagitis         (positive) NATIVIDAD (acute kidney injury) (HCC)   (positive) Liver failure without hepatic coma (HCC)      Other   (positive) Adenocarcinoma of ileum (HCC)   (positive) Arthritis   (positive) Esophageal diverticulum   (positive) Post-ERCP acute pancreatitis     Anes H&P:  PAST MEDICAL HISTORY:   87 y.o. female who presents for Procedure(s):  GASTROSCOPY.  She has current and past medical problems significant for:    Past Medical History:   Diagnosis Date   • Arthritis    • COPD (chronic obstructive pulmonary disease) (HCC)    • DVT (deep venous thrombosis) (HCC)    • EMPHYSEMA    • Gastroesophageal reflux disease without esophagitis 3/13/2019   • Hypertension    • Hypertriglyceridemia 2019   • Prediabetes 2019       SMOKING/ALCOHOL/RECREATIONAL DRUG USE:  Social History     Tobacco Use   • Smoking status: Former     Packs/day: 1.00     Years: 20.00     Pack years: 20.00     Types: Cigarettes     Quit date: 1975     Years since quittin.8   • Smokeless tobacco: Never   Vaping Use   • Vaping Use: Never used   Substance Use Topics   • Alcohol use: Not Currently     Alcohol/week: 1.5 oz     Types: 3 Standard drinks or equivalent per week     Comment: RARE   • Drug use: No     Social History     Substance and Sexual Activity   Drug Use No       PAST SURGICAL HISTORY:  Past Surgical History:   Procedure Laterality Date   • CA LAP,DIAGNOSTIC ABDOMEN Bilateral 3/30/2022    Procedure: LAPAROSCOPY;  Surgeon: Mando Johnson M.D.;  Location: SURGERY  [facility];  Service: General   • GA EXPLORATORY OF ABDOMEN Bilateral 3/30/2022    Procedure: LAPAROTOMY, EXPLORATORY, ILEOCECECTOMY;  Surgeon: Mando Johnson M.D.;  Location: SURGERY [facility];  Service: General   • GA LAP,APPENDECTOMY  2021    Procedure: APPENDECTOMY, LAPAROSCOPIC;  Surgeon: Pedro Mcneil M.D.;  Location: [facility];  Service: Gastroenterology   • CATARACT PHACO WITH IOL  2014    Performed by Jarred Almazan M.D. at [facility] ORS   • CATARACT PHACO WITH IOL  2014    Performed by Jarred Almazan M.D. at [facility] ORS   • KNEE ARTHROPLASTY TOTAL  2014    Performed by Jose Hahn M.D. at [facility] ORS   • GA  DELIVERY ONLY     • HEMORRHOIDECTOMY         ALLERGIES:   Allergies   Allergen Reactions   • Codeine Vomiting   • Demerol Vomiting     Injectable type   • Shellfish Allergy Shortness of Breath     Soft shell   • Ciprofloxacin Rash     rash   • Ibuprofen      Patient developed gastric ulcer/GI bleed from ibuprofen use   • Iodine      PATIENT ALLERGIC TO SHELLFISH, NOT SURE IF SHE IS ALLERGIC TO IODINE       MEDICATIONS:  No current facility-administered medications on file prior to encounter.     Current Outpatient Medications on File Prior to Encounter   Medication Sig Dispense Refill   • POTASSIUM PO Take 1 Tablet by mouth every day.         LABS:  Lab Results   Component Value Date/Time    HEMOGLOBIN 12.1 10/29/2022 0410    HEMATOCRIT 37.2 10/29/2022 0410    WBC 7.1 10/29/2022 0410     Lab Results   Component Value Date/Time    SODIUM 136 10/29/2022 0410    POTASSIUM 3.9 10/29/2022 0410    CHLORIDE 100 10/29/2022 0410    CO2 28 10/29/2022 0410    GLUCOSE 122 (H) 10/29/2022 0410    BUN 12 10/29/2022 0410    CALCIUM 9.3 10/29/2022 0410         PREVIOUS ANESTHETICS: See EMR  __________________________________________      Physical Exam    Airway   Mallampati: II  TM distance: >3 FB  Neck  ROM: full       Cardiovascular - normal exam  Rhythm: regular  Rate: normal  (-) murmur     Dental - normal exam           Pulmonary - normal exam  Breath sounds clear to auscultation     Abdominal   (-) obese     Neurological - normal exam                 Anesthesia Plan    ASA 3   ASA physical status 3 criteria: COPD    Plan - MAC               Induction: intravenous      Pertinent diagnostic labs and testing reviewed    Informed Consent:    Anesthetic plan and risks discussed with patient.

## 2022-10-29 NOTE — DISCHARGE PLANNING
Received Choice form at 3004  Agency/Facility Name: [facility]  Referral sent per Choice form @ [id number]

## 2022-10-29 NOTE — OR NURSING
Patient allergies and NPO status verified. Patient verbalizes understanding of pain scale, expected course of stay and plan of care. Surgical site verified with patient. IV assessed for patency. Pt awaiting procedure.

## 2022-10-29 NOTE — OR SURGEON
Immediate Post OP Note    PreOp Diagnosis: epigastric pain, nausea, abdominal distention      PostOp Diagnosis: proximal esophageal vascular appearing lesion not biopsied, large esophageal diverticulum, hiatal hernia, retained food in stomach, poor gastric motility, duodenitis      Procedure(s):  GASTROSCOPY - Wound Class: None    Surgeon(s):  Chhaya Ratliff M.D.    Anesthesiologist/Type of Anesthesia:  Anesthesiologist: Jarred Garcia M.D./General    Surgical Staff:  Circulator: Zakiya Santos R.N.  Endoscopy Technician: Lito Arellano    Specimens removed if any:  ID Type Source Tests Collected by Time Destination   A : duodenal biopsy Tissue Duodenum PATHOLOGY SPECIMEN Chhaya Ratliff M.D. 10/29/2022  9:20 AM        Estimated Blood Loss: none    Findings:   Esophagus:  large diverticulum 35-37cm with retained debris, erosive esophagitis within diverticulum, 6mm lesion, ?vascular bleb proximal esophagus (?related to recent ERCP)  Stomach:  hiatal hernia 37-39cm with retained debris, large amount of vegetable matter and retained debris, poor gastric motiltiy, limited visualization on retroflexion due to vegetable matter  Duodenum:  erosion in second portion, villi appear white throughout duodenum, bx taken    Complications: none        10/29/2022 9:29 AM Chhaya Ratliff M.D.

## 2022-10-29 NOTE — PROGRESS NOTES
Pt returned to floor from PACU. Pt awake and alert, denies pain or nausea at this time. Family members present at bedside. Reoriented patient to floor. VSS. Hourly rounding in place.

## 2022-10-29 NOTE — PROCEDURES
DATE OF PROCEDURE:  10/29/2022     PROCEDURE:  Esophagogastroduodenoscopy with biopsies.     PREPROCEDURE DIAGNOSES:  An 87-year-old female with history of stage IV   ileocecal adenocarcinoma, who presented with epigastric pain, nausea,   abdominal distention.     POSTPROCEDURE DIAGNOSES:  1.  Large distal esophageal diverticulum.  2.  Hiatal hernia.  3.  Retained food in the stomach.  4.  Poor gastric motility.  5.  Minimal duodenitis.     CONSENT:  Risks, benefits had been explained to the family, the patient who   gave consent to proceed.     ANESTHESIA:  Monitored anesthesia care.     ANESTHESIOLOGIST:  Jarred Garcia MD     DESCRIPTION OF PROCEDURE:  The patient was turned in the left lateral   decubitus position and anesthesia monitoring was in place.  The Olympus adult   flexible endoscope was inserted into the esophagus under direct visualization.    In the distal esophagus, there was digested food matter.  From 35-37 cm was   a large diverticulum with retained debris.  The mucosa within the diverticulum   was consistent with an erosive esophagitis.  From 37-39 cm was a hiatal   hernia again with retained food debris.  There was a large amount of vegetable   matter and retained debris in the stomach.  There was poor gastric motility.    The endoscope was slowly advanced towards the antrum.  Retroflexion was   performed and there was retained vegetable matter making it difficult to see   the fundic cap.  Retroflexion was taken down and the endoscope was advanced   through the pylorus and the first and second portions of the duodenum.  The   villi had a whitish appearance throughout the duodenum.  In the second   portion, there was a mucosal erosion and 2 biopsies were taken.  The endoscope   was pulled back into the stomach.  Air was removed and the endoscope was   brought back to the proximal esophagus where a 6 mm dark purple bleb was   noted.  The patient had ERCP approximately one week ago and this  may have been   from scope injury.  Biopsies were not taken.        ______________________________  MD GEORGE PAT/DION    DD:  10/29/2022 09:36  DT:  10/29/2022 10:06    Job#:  [id number]

## 2022-10-29 NOTE — OR NURSING
0930: To PACU from OR via gurney, sleeping on L side,  respirations spontaneous and non-labored. Dressing to sacral area and incontinence brief both remain c/d/i.  0945: rouses to name, denies pain/nausea, O2 d/abad  0946: s/b Dr Ratliff  1000: Tolerates RA. Meets criteria to transfer to floor.

## 2022-10-29 NOTE — PROGRESS NOTES
"Hospital Medicine Daily Progress Note    Date of Service  10/29/2022    Chief Complaint  Licha Pope is a 87 y.o. female admitted 10/26/2022 with abdominal pain    Hospital Course  Per notes, \" 87-year-old female with past medical history of COPD, DVTs, hypertension, dyslipidemia recent diagnosis of stage IV colon cancer adenocarcinoma.  Admitted on 10/26/22 for increased generalized weakness and reported blood in stool. Patient had endoscopy performed on Friday 5 days ago performed by GI consultants in [place] with sphincterectomy. Reported melena to ED. Came from home, lives alone. Possible hx of PUD after ibuprofen, not currently using NSAIDs.  Son at bedside, confirming patient has had decreased appetite for about 2-3 weeks now and she has lost significant weight.     I spoke with EDP about patient's case. Dr. Ervin has spoken with Dr. Belle with [facility] who will review. For now, recommended monitoring CMP and exclude liver metastasis.\"    Interval Problem Update  Patient underwent EGD on 10/29.  Large distal esophageal diverticulum, hiatal hernia, poor gastric motility, minimal cellulitis.  Biopsies taken.  MRI showed no liver lesions.  We will touch base with gastroenterology and possible DC in 24-48 hours and no further work-up.    I have discussed this patient's plan of care and discharge plan at IDT rounds today with Case Management, Nursing, Nursing leadership, and other members of the IDT team.    Consultants/Specialty  GI    Code Status  DNAR/DNI    Disposition  Patient is not medically cleared for discharge.   Anticipate discharge to to home with organized home healthcare and close outpatient follow-up.  I have placed the appropriate orders for post-discharge needs.    Review of Systems  Review of Systems   Constitutional:  Positive for malaise/fatigue and weight loss.   All other systems reviewed and are negative.     Physical Exam  Temp:  [36.5 °C (97.7 °F)-36.9 °C (98.4 °F)] 36.7 °C (98 " °F)  Pulse:  [77-97] 85  Resp:  [14-20] 16  BP: (142-176)/(71-95) 148/74  SpO2:  [91 %-100 %] 93 %    Physical Exam  Vitals and nursing note reviewed.   Constitutional:       Appearance: She is ill-appearing.      Comments: Sarcopenia, muscle weight   Cardiovascular:      Rate and Rhythm: Normal rate and regular rhythm.      Pulses: Normal pulses.      Heart sounds: Normal heart sounds.   Pulmonary:      Effort: Pulmonary effort is normal.      Breath sounds: Normal breath sounds.   Abdominal:      General: Abdomen is flat. Bowel sounds are normal.      Palpations: Abdomen is soft.   Musculoskeletal:      Right lower leg: No edema.      Left lower leg: No edema.   Neurological:      General: No focal deficit present.      Mental Status: She is alert and oriented to person, place, and time. Mental status is at baseline.       Fluids    Intake/Output Summary (Last 24 hours) at 10/29/2022 1358  Last data filed at 10/29/2022 1000  Gross per 24 hour   Intake 515 ml   Output --   Net 515 ml       Laboratory  Recent Labs     10/27/22  0334 10/28/22  0100 10/29/22  0410   WBC 7.6 7.1 7.1   RBC 3.84* 3.83* 3.78*   HEMOGLOBIN 12.7 12.1 12.1   HEMATOCRIT 38.0 37.9 37.2   MCV 99.0* 99.0* 98.4*   MCH 33.1* 31.6 32.0   MCHC 33.4* 31.9* 32.5*   RDW 68.2* 66.7* 64.9*   PLATELETCT 180 173 175   MPV 12.3 12.7 12.6     Recent Labs     10/27/22  0334 10/28/22  0100 10/29/22  0410   SODIUM 137 138 136   POTASSIUM 4.3 4.1 3.9   CHLORIDE 102 102 100   CO2 27 26 28   GLUCOSE 108* 108* 122*   BUN 13 12 12   CREATININE 0.81 0.85 0.78   CALCIUM 9.2 9.2 9.3                     Imaging  MR-ABDOMEN-WITH & W/O   Final Result      1.  Mild biliary dilation without evidence for common bile duct stone.   2.  Liver cysts again seen.   3.  No liver mass demonstrated to indicate metastasis.   4.  Small volume ascites.   5.  Multiple dilated bowel loops concerning for obstruction.   6.  Cystic pancreatic lesions are unchanged.             DX-CHEST-PORTABLE (1 VIEW)   Final Result         1. No acute cardiopulmonary abnormalities are identified.           Assessment/Plan  * Liver failure without hepatic coma (HCC)- (present on admission)  Assessment & Plan  , ALT 72, , Tbili 1.7  Lipase 113  Concern for liver metastasis from colon  GIC evaluating patient, follow recommendations  Repeat CMP daily  Avoid acetaminophen  MRI liver protocol showing no liver lesions.    Esophageal diverticulum- (present on admission)  Assessment & Plan  Seen on barium swallow 4/22  SLP fer given anorexia, weight loss    Dark stools- (present on admission)  Assessment & Plan  Pt reported  Just had recent ERCP with sphincterotomy  Hgb 14, likely hemoconcentrated  Repeat CBC daily. Changed to Q6H if patient has ongoing melena.  GIC evaluating patient in ED, possible procedure?    Advance care planning- (present on admission)  Assessment & Plan  Last code status DNR/DNI 4/11/22    I spent extra time discussing with patient about advanced care planning.    I spent a total of 25 minutes with the patient directly at bedside, and discussed patient's current diagnosis, prognosis and goals of care. I discussed code status, medical interventions, nutritional interventions.    Patient wished to speak to her son before making a decision.  Will convert patient back to FULL CODE for now.    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    Patient had endoscopy performed on Friday 5 days ago performed by GI consultants in [place] with sphincterectomy.  GIC evaluating patient in ED,  appreciate any recommendations  Monitoring LFTs    Generalized weakness- (present on admission)  Assessment & Plan  Unclear cause, possible dehydration, worsening colon cancer  Concern of ongoing weight loss, anorexia symptoms, and pt lives alone  - IVF  - Palliative Care consult-POLST signed.  Patient and family waiting for final work-up from GI and oncology before making any decisions, which is completely reasonable.  - fall precautions  OT actually recommending home with home health, if continues to improve may be able to discharge in the next 24 to 48 hours    Gastroesophageal reflux disease without esophagitis- (present on admission)  Assessment & Plan  IV pantoprazole given reported black stools. This likely came from post sphincterotomy from ERCP.    COPD (chronic obstructive pulmonary disease) (HCC)- (present on admission)  Assessment & Plan  Not on maintenance therapy or home oxygen  Monitor  RT consult for Duonebs if needed       VTE prophylaxis: SCDs/TEDs    I have performed a physical exam and reviewed and updated ROS and Plan today (10/29/2022). In review of yesterday's note (10/28/2022), there are no changes except as documented above.

## 2022-10-29 NOTE — PROGRESS NOTES
Bedside report received from night RN. Assumed care of patient. Daily plan of care discussed. Pt awake and alert, denies complaints or concerns at this time. Family at bedside. Pt and family aware of plan for EGD this AM. Hourly rounding in place.

## 2022-10-29 NOTE — DISCHARGE PLANNING
Case Management Discharge Planning    Admission Date: 10/26/2022  GMLOS: 4.7  ALOS: 3    6-Clicks ADL Score: 18  6-Clicks Mobility Score: 9  PT and/or OT Eval ordered: Yes  Post-acute Referrals Ordered: Yes  Post-acute Choice Obtained: Yes  Has referral(s) been sent to post-acute provider:  Yes      Anticipated Discharge Dispo: Discharge Disposition: Discharged to home/self care (01)    DME Needed: No    Action(s) Taken: Choice obtained and Referral(s) sent    Escalations Completed: None    Medically Clear: No    Next Steps: RNCM obtained choice for HH. Choice form faxed to [facility]. Patient is pending GI consult and EGD. Will likely discharge tomorrow.     Barriers to Discharge: Medical clearance and Outpatient referrals pending    Is the patient up for discharge tomorrow: Yes

## 2022-10-29 NOTE — CARE PLAN
Problem: Discharge Barriers/Planning  Goal: Patient's continuum of care needs are met  Outcome: Progressing     Problem: Gastrointestinal Irritability  Goal: Nausea and vomiting will be absent or improve  Outcome: Progressing     Problem: Self Care  Goal: Patient will have the ability to perform ADLs independently or with assistance (bathe, groom, dress, toilet and feed)  Outcome: Progressing     The patient is Stable - Low risk of patient condition declining or worsening    Shift Goals  Clinical Goals: Completion of EGD today  Patient Goals: Keep nausea under control, remain free from episodes of emesis  Family Goals: Stay updated re: plan of care/discharge plan    Progress made toward(s) clinical / shift goals:  EGD completed today, nausea controlled with overnight administration of zofran. Pt had no further episodes of nausea or emesis this shift. Family members involved in discharge planning. Per hospitalist, plan to discharge home within next 2 days.    Patient is not progressing towards the following goals: n/a

## 2022-10-30 PROBLEM — E86.0 DEHYDRATION: Status: ACTIVE | Noted: 2022-10-30

## 2022-10-30 PROBLEM — K59.00 CONSTIPATION: Status: ACTIVE | Noted: 2022-10-30

## 2022-10-30 LAB
ANION GAP SERPL CALC-SCNC: 9 MMOL/L (ref 7–16)
BUN SERPL-MCNC: 12 MG/DL (ref 8–22)
CALCIUM SERPL-MCNC: 9.1 MG/DL (ref 8.4–10.2)
CHLORIDE SERPL-SCNC: 99 MMOL/L (ref 96–112)
CO2 SERPL-SCNC: 28 MMOL/L (ref 20–33)
CREAT SERPL-MCNC: 0.84 MG/DL (ref 0.5–1.4)
ERYTHROCYTE [DISTWIDTH] IN BLOOD BY AUTOMATED COUNT: 65.3 FL (ref 35.9–50)
GFR SERPLBLD CREATININE-BSD FMLA CKD-EPI: 67 ML/MIN/1.73 M 2
GLUCOSE SERPL-MCNC: 111 MG/DL (ref 65–99)
HCT VFR BLD AUTO: 37.7 % (ref 37–47)
HGB BLD-MCNC: 12 G/DL (ref 12–16)
MAGNESIUM SERPL-MCNC: 2 MG/DL (ref 1.5–2.5)
MCH RBC QN AUTO: 31.7 PG (ref 27–33)
MCHC RBC AUTO-ENTMCNC: 31.8 G/DL (ref 33.6–35)
MCV RBC AUTO: 99.7 FL (ref 81.4–97.8)
PLATELET # BLD AUTO: 186 K/UL (ref 164–446)
PMV BLD AUTO: 13 FL (ref 9–12.9)
POTASSIUM SERPL-SCNC: 3.9 MMOL/L (ref 3.6–5.5)
RBC # BLD AUTO: 3.78 M/UL (ref 4.2–5.4)
SODIUM SERPL-SCNC: 136 MMOL/L (ref 135–145)
WBC # BLD AUTO: 7.8 K/UL (ref 4.8–10.8)

## 2022-10-30 PROCEDURE — A9270 NON-COVERED ITEM OR SERVICE: HCPCS | Performed by: INTERNAL MEDICINE

## 2022-10-30 PROCEDURE — 94760 N-INVAS EAR/PLS OXIMETRY 1: CPT

## 2022-10-30 PROCEDURE — 85027 COMPLETE CBC AUTOMATED: CPT

## 2022-10-30 PROCEDURE — 700102 HCHG RX REV CODE 250 W/ 637 OVERRIDE(OP): Performed by: INTERNAL MEDICINE

## 2022-10-30 PROCEDURE — 700111 HCHG RX REV CODE 636 W/ 250 OVERRIDE (IP): Performed by: NURSE PRACTITIONER

## 2022-10-30 PROCEDURE — 99233 SBSQ HOSP IP/OBS HIGH 50: CPT | Performed by: INTERNAL MEDICINE

## 2022-10-30 PROCEDURE — 770006 HCHG ROOM/CARE - MED/SURG/GYN SEMI*

## 2022-10-30 PROCEDURE — 83735 ASSAY OF MAGNESIUM: CPT

## 2022-10-30 PROCEDURE — 700105 HCHG RX REV CODE 258: Performed by: INTERNAL MEDICINE

## 2022-10-30 PROCEDURE — 36415 COLL VENOUS BLD VENIPUNCTURE: CPT

## 2022-10-30 PROCEDURE — 80048 BASIC METABOLIC PNL TOTAL CA: CPT

## 2022-10-30 PROCEDURE — C9113 INJ PANTOPRAZOLE SODIUM, VIA: HCPCS | Performed by: NURSE PRACTITIONER

## 2022-10-30 RX ORDER — SODIUM CHLORIDE 9 MG/ML
INJECTION, SOLUTION INTRAVENOUS CONTINUOUS
Status: DISCONTINUED | OUTPATIENT
Start: 2022-10-30 | End: 2022-10-31 | Stop reason: HOSPADM

## 2022-10-30 RX ADMIN — PANTOPRAZOLE SODIUM 40 MG: 40 INJECTION, POWDER, FOR SOLUTION INTRAVENOUS at 05:51

## 2022-10-30 RX ADMIN — SODIUM CHLORIDE: 9 INJECTION, SOLUTION INTRAVENOUS at 23:02

## 2022-10-30 RX ADMIN — SENNOSIDES AND DOCUSATE SODIUM 2 TABLET: 50; 8.6 TABLET ORAL at 18:04

## 2022-10-30 RX ADMIN — SODIUM CHLORIDE: 9 INJECTION, SOLUTION INTRAVENOUS at 09:54

## 2022-10-30 RX ADMIN — SENNOSIDES AND DOCUSATE SODIUM 2 TABLET: 50; 8.6 TABLET ORAL at 05:51

## 2022-10-30 ASSESSMENT — ENCOUNTER SYMPTOMS
VOMITING: 0
FEVER: 0
CHILLS: 0
SHORTNESS OF BREATH: 0
DIZZINESS: 0
WEIGHT LOSS: 1
WEAKNESS: 1
PALPITATIONS: 0
NAUSEA: 0
COUGH: 0
ABDOMINAL PAIN: 0

## 2022-10-30 ASSESSMENT — PAIN DESCRIPTION - PAIN TYPE: TYPE: ACUTE PAIN

## 2022-10-30 NOTE — PROGRESS NOTES
Bedside report received from night RN. Assumed care of patient. Daily plan of care discussed. Pt awake and alert, denies complaints or concerns this AM. Pt agreeable to discharge today if medically cleared. Hourly rounding in place.

## 2022-10-30 NOTE — CARE PLAN
The patient is Stable - Low risk of patient condition declining or worsening    Shift Goals  Clinical Goals: rest and comfort  Patient Goals: to go home  Family Goals: Stay updated re: plan of care/discharge plan    Progress made toward(s) clinical / shift goals:  Not in physical or respiratory distress.

## 2022-10-30 NOTE — PROGRESS NOTES
"Hospital Medicine Daily Progress Note    Date of Service  10/30/2022    Chief Complaint  Licha Pope is a 87 y.o. female admitted 10/26/2022 with abdominal pain    Hospital Course  Per notes, \" 87-year-old female with past medical history of COPD, DVTs, hypertension, dyslipidemia recent diagnosis of stage IV colon cancer adenocarcinoma.  Admitted on 10/26/22 for increased generalized weakness and reported blood in stool. Patient had endoscopy performed on Friday 5 days ago performed by GI consultants in [place] with sphincterectomy. Reported melena to ED. Came from home, lives alone. Possible hx of PUD after ibuprofen, not currently using NSAIDs.  Son at bedside, confirming patient has had decreased appetite for about 2-3 weeks now and she has lost significant weight.     I spoke with EDP about patient's case. Dr. Ervin has spoken with Dr. Belle with [facility] who will review. For now, recommended monitoring CMP and exclude liver metastasis.\"    Interval Problem Update  Patient underwent EGD on 10/29.  Large distal esophageal diverticulum, hiatal hernia, poor gastric motility, minimal cellulitis.  Biopsies taken.  MRI showed no liver lesions.      No further work-up from gastroenterology.  Patient has not had a bowel movement in several days, will regimen.  Is is feeling very weak and  Exams will do IV fluids.  Possible DC in 24 to 48 hours depending on progression.    Patient did have a discussion with palliative care acute dizziness/tingling.  Will need to continue to discuss goals of care with family, PCP and oncology.    I have discussed this patient's plan of care and discharge plan at IDT rounds today with Case Management, Nursing, Nursing leadership, and other members of the IDT team.    Consultants/Specialty  GI    Code Status  DNAR/DNI    Disposition  Patient is not medically cleared for discharge.   Anticipate discharge to to home with organized home healthcare and close outpatient follow-up.  I " have placed the appropriate orders for post-discharge needs.    Review of Systems  Review of Systems   Constitutional:  Positive for malaise/fatigue and weight loss.   All other systems reviewed and are negative.     Physical Exam  Temp:  [36.5 °C (97.7 °F)-36.7 °C (98.1 °F)] 36.6 °C (97.9 °F)  Pulse:  [77-88] 78  Resp:  [16] 16  BP: (118-158)/(63-85) 130/74  SpO2:  [91 %-95 %] 93 %    Physical Exam  Vitals and nursing note reviewed.   Constitutional:       Appearance: She is ill-appearing.      Comments: Sarcopenia, muscle weight   Cardiovascular:      Rate and Rhythm: Normal rate and regular rhythm.      Pulses: Normal pulses.      Heart sounds: Normal heart sounds.   Pulmonary:      Effort: Pulmonary effort is normal.      Breath sounds: Normal breath sounds.   Abdominal:      General: Abdomen is flat. Bowel sounds are normal.      Palpations: Abdomen is soft.   Musculoskeletal:      Right lower leg: No edema.      Left lower leg: No edema.   Neurological:      General: No focal deficit present.      Mental Status: She is alert and oriented to person, place, and time. Mental status is at baseline.       Fluids    Intake/Output Summary (Last 24 hours) at 10/30/2022 1017  Last data filed at 10/30/2022 0800  Gross per 24 hour   Intake 340 ml   Output 0 ml   Net 340 ml       Laboratory  Recent Labs     10/28/22  0100 10/29/22  0410 10/30/22  0310   WBC 7.1 7.1 7.8   RBC 3.83* 3.78* 3.78*   HEMOGLOBIN 12.1 12.1 12.0   HEMATOCRIT 37.9 37.2 37.7   MCV 99.0* 98.4* 99.7*   MCH 31.6 32.0 31.7   MCHC 31.9* 32.5* 31.8*   RDW 66.7* 64.9* 65.3*   PLATELETCT 173 175 186   MPV 12.7 12.6 13.0*     Recent Labs     10/28/22  0100 10/29/22  0410 10/30/22  0310   SODIUM 138 136 136   POTASSIUM 4.1 3.9 3.9   CHLORIDE 102 100 99   CO2 26 28 28   GLUCOSE 108* 122* 111*   BUN 12 12 12   CREATININE 0.85 0.78 0.84   CALCIUM 9.2 9.3 9.1                     Imaging  MR-ABDOMEN-WITH & W/O   Final Result      1.  Mild biliary dilation without  evidence for common bile duct stone.   2.  Liver cysts again seen.   3.  No liver mass demonstrated to indicate metastasis.   4.  Small volume ascites.   5.  Multiple dilated bowel loops concerning for obstruction.   6.  Cystic pancreatic lesions are unchanged.            DX-CHEST-PORTABLE (1 VIEW)   Final Result         1. No acute cardiopulmonary abnormalities are identified.           Assessment/Plan  * Liver failure without hepatic coma (HCC)- (present on admission)  Assessment & Plan  , ALT 72, , Tbili 1.7  Lipase 113  Concern for liver metastasis from colon  GIC evaluating patient, follow recommendations  Repeat CMP daily  Avoid acetaminophen  MRI liver protocol showing no liver lesions.    Dehydration- (present on admission)  Assessment & Plan  Patient with poor oral appetite, looks little dry on exam today  Gentle IV fluids, reevaluate daily    Constipation- (present on admission)  Assessment & Plan  Likely combination of her medications, reduced mobility, underlying cancer, slow transit, which was seen on EGD.  Bowel regimen  This is likely contributing to her poor appetite and discomfort.    Esophageal diverticulum- (present on admission)  Assessment & Plan  Seen on barium swallow 4/22  SLP eval given anorexia, weight loss    Dark stools- (present on admission)  Assessment & Plan  Pt reported  Just had recent ERCP with sphincterotomy  Hgb 14, likely hemoconcentrated  Repeat CBC daily. Changed to Q6H if patient has ongoing melena.  EGD on 10/29 showed no active source of bleeding    Advance care planning- (present on admission)  Assessment & Plan  Last code status DNR/DNI 4/11/22    I spent extra time discussing with patient about advanced care planning.    I spent a total of 25 minutes with the patient directly at bedside, and discussed patient's current diagnosis, prognosis and goals of care. I discussed code status, medical interventions, nutritional interventions.    Patient had multiple  discussions regarding goals of care.  Did meet with palliative team.  We will continue to discuss goals of care with palliative team, PCP and oncology before making final decision.  At this time she is not ready for hospice, but is open in the future.     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    Patient had endoscopy performed on Friday 5 days ago performed by GI consultants in [place] with sphincterectomy.  GIC evaluating patient in ED, appreciate any recommendations  Monitoring LFTs    Generalized weakness- (present on admission)  Assessment & Plan  Unclear cause, possible dehydration, worsening colon cancer  Concern of ongoing weight loss, anorexia symptoms, and pt lives alone  - IVF  - Palliative Care consult-POLST signed.  Patient and family waiting for final work-up from GI and oncology before making any decisions, which is completely reasonable.  - fall precautions  OT actually recommending home with home health, if continues to improve may be able to discharge in the next 24 to 48 hours    Gastroesophageal reflux disease without esophagitis- (present on admission)  Assessment & Plan  IV pantoprazole given reported black stools. This likely came from post sphincterotomy from ERCP.    COPD (chronic obstructive pulmonary disease) (HCC)- (present on admission)  Assessment & Plan  Not on maintenance therapy or home oxygen  Monitor  RT consult for Duonebs if needed       VTE prophylaxis: SCDs/TEDs    I have performed a physical exam and reviewed and updated ROS and Plan today (10/30/2022). In review of yesterday's note  (10/29/2022), there are no changes except as documented above.

## 2022-10-30 NOTE — CARE PLAN
Problem: Fall Risk  Goal: Patient will remain free from falls  Outcome: Progressing     Problem: Fluid Volume  Goal: Fluid volume balance will be maintained  Outcome: Progressing     Problem: Gastrointestinal Irritability  Goal: Nausea and vomiting will be absent or improve  Outcome: Progressing     The patient is Stable - Low risk of patient condition declining or worsening    Shift Goals  Clinical Goals: Discharge if medically cleared  Patient Goals: Remain free from nausea this shift, tolerate ordered diet  Family Goals: n/a    Progress made toward(s) clinical / shift goals: Pt not medically cleared for discharge, but per hospitalist goal is to discharge within next 1-2 days. Pt denied nausea this shift, able to tolerate currently ordered diet with added Boost supplements.    Patient is not progressing towards the following goals: n/a

## 2022-10-30 NOTE — DISCHARGE PLANNING
Case Management Discharge Planning    Admission Date: 10/26/2022  GMLOS: 4.7  ALOS: 4    6-Clicks ADL Score: 18  6-Clicks Mobility Score: 9  PT and/or OT Eval ordered: Yes  Post-acute Referrals Ordered: Yes  Post-acute Choice Obtained: Yes  Has referral(s) been sent to post-acute provider:  Yes      Anticipated Discharge Dispo: Discharge Disposition: D/T to home under A care in anticipation of covered skilled care (06)    DME Needed: No    Action(s) Taken: RNCM requested HH order. HH order received. HH referral refaxed to [facility] at 1028.    Escalations Completed: None    Medically Clear: No    Next Steps: f/u with DPA regarding HH acceptance    Barriers to Discharge: Medical clearance

## 2022-10-30 NOTE — ASSESSMENT & PLAN NOTE
Patient with poor oral appetite, looks little dry on exam today  Gentle IV fluids, reevaluate daily

## 2022-10-30 NOTE — FACE TO FACE
Face to Face Supporting Documentation - Home Health    The encounter with this patient was in whole or in part the primary reason for home health admission.    Date of encounter:   Patient:                    MRN:                       YOB: 2022  Licha Pope  [id number]  1935     Home health to see patient for:  Skilled Nursing care for assessment, interventions & education, Physical Therapy evaluation and treatment, and Occupational therapy evaluation and treatment    Skilled need for:  Exacerbation of Chronic Disease State colon cancer    Skilled nursing interventions to include:  Comment: pt/ot    Homebound status evidenced by:  Need the aid of supportive devices such as crutches, canes, wheelchairs or walkers or Needs the assistance of another person in order to leave the home. Leaving home requires a considerable and taxing effort. There is a normal inability to leave the home.    Community Physician to provide follow up care: Prateek ANDERSON M.D.     Optional Interventions? No      I certify the face to face encounter for this home health care referral meets the CMS requirements and the encounter/clinical assessment with the patient was, in whole, or in part, for the medical condition(s) listed above, which is the primary reason for home health care. Based on my clinical findings: the service(s) are medically necessary, support the need for home health care, and the homebound criteria are met.  I certify that this patient has had a face to face encounter by myself.  Nabil Delaney M.D. - NPI: [id number]

## 2022-10-30 NOTE — ASSESSMENT & PLAN NOTE
Likely combination of her medications, reduced mobility, underlying cancer, slow transit, which was seen on EGD.  Bowel regimen  This is likely contributing to her poor appetite and discomfort.

## 2022-10-30 NOTE — PROGRESS NOTES
Gastroenterology Progress Note               Author:  BEL Rae Date & Time Created: 10/30/2022 8:32 AM       Patient ID:  Name:             Licha Pope  YOB: 1935  Age:                 87 y.o.  female  MRN:               [id number]      Referring Provider:  Sandor Espinal MD      Presenting Chief Complaint:  Abdominal pain, Elevated liver enzymes      History of Present Illness:    This is a very pleasant 87 y.o. 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  The patient recently underwent ERCP by Dr. Rubén Guthrie on 10/21/2022 at [facility].  Post procedure, the patient's had increased abdominal pain, as well as intractable nausea and vomiting.  This resolved by 10/22 2022 and she was discharged home.  The patient's son, Aniceto, states that after her procedure she was eating well but was having recurrent abdominal pain, some nausea, and has been lethargic.  She has lost 10 pounds recently.    Labs and imaging reviewed from this admission including normal CBC other than MCV 99.1, RDW 67.5.  CMP with glucose 147, calcium 10.5, , ALT 72, alkaline phosphatase 448, bilirubin 1.7.  Lipase elevated 113.  Chest x-ray is normal.    Interval History  10/27/2022: Stable, no acute events. Patient has not had a documented stool since yesterday. Hgb down, but multiple cell lines down due to hemodilution likely. No current abdominal pain. Patient is hungry. MRI pending    10/28/2022: Stable, no acute events. Patient states he had an additional black stool yesterday. No abdominal pain, but does have pain when palpated. MRI done but read pending. AFP normal.    10/29/2022 EGD  POSTPROCEDURE DIAGNOSES:  1.  Large distal esophageal diverticulum.  2.  Hiatal hernia.  3.  Retained food in  the stomach.  4.  Poor gastric motility.  5.  Minimal duodenitis.    10/30/2022: Patient feels well today. No ongoing abdominal pain, vomiting. Her MRI did not demonstrate any new lesions, only simple cysts seen. EGD negative for bleeding. There was poor gastric motility and large esophageal diverticulum and hiatal hernia.     Review of Systems:  Review of Systems   Constitutional:  Negative for chills and fever.   Respiratory:  Negative for cough and shortness of breath.    Cardiovascular:  Negative for chest pain and palpitations.   Gastrointestinal:  Negative for abdominal pain, melena, nausea and vomiting.   Neurological:  Positive for weakness. Negative for dizziness.           Past Medical History:  Past Medical History:   Diagnosis Date    Arthritis     COPD (chronic obstructive pulmonary disease) (HCC)     DVT (deep venous thrombosis) (HCC)     EMPHYSEMA     Gastroesophageal reflux disease without esophagitis 3/13/2019    Hypertension     Hypertriglyceridemia 5/13/2019    Prediabetes 5/13/2019     Active Hospital Problems    Diagnosis     Liver failure without hepatic coma (HCC) [K72.90]     Hypercalcemia [E83.52]     Post-ERCP acute pancreatitis [K91.89, K85.90]     Advance care planning [Z71.89]     Dark stools [R19.5]     Esophageal diverticulum [Q39.6]     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]     Generalized weakness [R53.1]     Gastroesophageal reflux disease without esophagitis [K21.9]     COPD (chronic obstructive pulmonary disease) (HCC) [J44.9]          Past Surgical History:  Past Surgical History:   Procedure Laterality Date    KY LAP,DIAGNOSTIC ABDOMEN Bilateral 3/30/2022    Procedure: LAPAROSCOPY;  Surgeon: Mando Johnson M.D.;  Location: SURGERY [facility];  Service: General    KY EXPLORATORY OF ABDOMEN Bilateral 3/30/2022    Procedure: LAPAROTOMY, EXPLORATORY, ILEOCECECTOMY;  Surgeon: Mando Johnson M.D.;  Location: SURGERY [facility];  Service: General    KY LAP,APPENDECTOMY   2021    Procedure: APPENDECTOMY, LAPAROSCOPIC;  Surgeon: Pedro Mcneil M.D.;  Location: [facility];  Service: Gastroenterology    CATARACT PHACO WITH IOL  2014    Performed by Jarred Almazan M.D. at SURGERY [facility]    CATARACT PHACO WITH IOL  2014    Performed by Jarred Almazan M.D. at [facility]    KNEE ARTHROPLASTY TOTAL  2014    Performed by Jose Hahn M.D. at [facility]    [place]  DELIVERY ONLY  1969    HEMORRHOIDECTOMY             Hospital Medications:  Current Facility-Administered Medications   Medication Dose Frequency Provider Last Rate Last Admin    pantoprazole (Protonix) injection 40 mg  40 mg BID ALFRED RaePSharitaRSharitaN.   40 mg at 10/30/22 0551    senna-docusate (PERICOLACE or SENOKOT S) 8.6-50 MG per tablet 2 Tablet  2 Tablet BID Lester Reynoso M.D.   2 Tablet at 10/30/22 0551    And    polyethylene glycol/lytes (MIRALAX) PACKET 1 Packet  1 Packet QDAY PRN Lester Reynoso M.D.        And    magnesium hydroxide (MILK OF MAGNESIA) suspension 30 mL  30 mL QDAY PRN Lester Reynoso M.D.        And    bisacodyl (DULCOLAX) suppository 10 mg  10 mg QDAY PRN Lester Reynoso M.D.        labetalol (NORMODYNE/TRANDATE) injection 10 mg  10 mg Q4HRS PRN Lester Reynoso M.D.        ondansetron (ZOFRAN) syringe/vial injection 4 mg  4 mg Q4HRS PRN Lester Reynoso M.D.   4 mg at 10/28/22 1938    ondansetron (ZOFRAN ODT) dispertab 4 mg  4 mg Q4HRS PRN Lester Reynoso M.D.        Pharmacy Consult Request ...Pain Management Review 1 Each  1 Each PHARMACY TO DOSE Lester Reynoso M.D.        oxyCODONE immediate-release (ROXICODONE) tablet 2.5 mg  2.5 mg Q3HRS PRN Lester Reynoso M.D.        Or    oxyCODONE immediate-release (ROXICODONE) tablet 5 mg  5 mg Q3HRS PRN Lester Reynoso M.D.        Or    HYDROmorphone (Dilaudid) injection 0.25 mg  0.25 mg Q3HRS PRN Lester Reynoso M.D.        Last reviewed on 10/29/2022  8:44 AM by Jonna Dia R.N.       Current Outpatient Medications:  Medications Prior to Admission   Medication Sig Dispense Refill Last Dose    POTASSIUM PO Take 1 Tablet by mouth every day.   unknown at unknown         Medication Allergies:  Allergies   Allergen Reactions    Codeine Vomiting    Demerol Vomiting     Injectable type    Shellfish Allergy Shortness of Breath     Soft shell    Ciprofloxacin Rash     rash    Ibuprofen      Patient developed gastric ulcer/GI bleed from ibuprofen use    Iodine      PATIENT ALLERGIC TO SHELLFISH, NOT SURE IF SHE IS ALLERGIC TO IODINE         Family Medical History:  Family History   Problem Relation Age of Onset    Alzheimer's Disease Mother     Heart Disease Father     Other Father         AAA    Heart Attack Sister     Diabetes Brother          Social History:  Social History     Socioeconomic History    Marital status:      Spouse name: Not on file    Number of children: Not on file    Years of education: Not on file    Highest education level: Not on file   Occupational History    Not on file   Tobacco Use    Smoking status: Former     Packs/day: 1.00     Years: 20.00     Pack years: 20.00     Types: Cigarettes     Quit date: 1975     Years since quittin.8    Smokeless tobacco: Never   Vaping Use    Vaping Use: Never used   Substance and Sexual Activity    Alcohol use: Not Currently     Alcohol/week: 1.5 oz     Types: 3 Standard drinks or equivalent per week     Comment: RARE    Drug use: No    Sexual activity: Not Currently     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     Comment: , retired lab tech   Other Topics Concern    Not on file   Social History Narrative    Not on file     Social Determinants of Health     Financial Resource Strain: Low Risk     Difficulty of Paying Living Expenses: Not hard at all   Food Insecurity: No Food Insecurity    Worried About Running Out of Food in the Last Year: Never true    Ran Out of Food in the  "Last Year: Never true   Transportation Needs: No Transportation Needs    Lack of Transportation (Medical): No    Lack of Transportation (Non-Medical): No   Physical Activity: Not on file   Stress: Not on file   Social Connections: Not on file   Intimate Partner Violence: Not on file   Housing Stability: Not on file         Vital signs:  Weight/BMI: Body mass index is 20.59 kg/m².  /74   Pulse 78   Temp 36.6 °C (97.9 °F) (Oral)   Resp 16   Ht 1.626 m (5' 4\")   Wt 54.4 kg (119 lb 14.9 oz)   SpO2 93%   Vitals:    10/29/22 1145 10/29/22 1625 10/29/22 2300 10/30/22 0500   BP: (!) 148/74 118/63 (!) 158/85 130/74   Pulse: 85 86 86 78   Resp: 16 16 16 16   Temp: 36.7 °C (98 °F) 36.7 °C (98.1 °F) 36.7 °C (98 °F) 36.6 °C (97.9 °F)   TempSrc: Oral Oral Oral Oral   SpO2: 93% 91% 93% 93%   Weight:       Height:         Oxygen Therapy:  Pulse Oximetry: 93 %, O2 (LPM): 0, O2 Delivery Device: None - Room Air    Intake/Output Summary (Last 24 hours) at 10/30/2022 0832  Last data filed at 10/30/2022 0800  Gross per 24 hour   Intake 615 ml   Output 0 ml   Net 615 ml           Physical Exam:  Physical Exam  Vitals and nursing note reviewed.   Constitutional:       Appearance: She is ill-appearing.   HENT:      Head: Normocephalic and atraumatic.      Right Ear: External ear normal.      Left Ear: External ear normal.      Ears:      Comments: Hard of hearing     Nose: Nose normal. No rhinorrhea.      Mouth/Throat:      Mouth: Mucous membranes are dry.      Pharynx: Oropharynx is clear.   Eyes:      General: No scleral icterus.     Extraocular Movements: Extraocular movements intact.      Pupils: Pupils are equal, round, and reactive to light.   Cardiovascular:      Rate and Rhythm: Normal rate and regular rhythm.      Pulses: Normal pulses.      Heart sounds: Normal heart sounds.   Pulmonary:      Effort: Pulmonary effort is normal. No respiratory distress.      Breath sounds: Normal breath sounds. No wheezing.   Abdominal: "      General: Abdomen is flat. Bowel sounds are normal. There is no distension.      Palpations: Abdomen is soft.      Tenderness: There is abdominal tenderness (Epigastric, RUQ, LUQ, Umbilical tenderness). There is no rebound.   Musculoskeletal:      Cervical back: Neck supple.      Right lower leg: No edema.      Left lower leg: No edema.   Lymphadenopathy:      Cervical: No cervical adenopathy.   Skin:     General: Skin is warm and dry.   Neurological:      General: No focal deficit present.      Mental Status: She is alert and oriented to person, place, and time.   Psychiatric:         Thought Content: Thought content normal.         Judgment: Judgment normal.         Labs:  Recent Labs     10/28/22  0100 10/29/22  0410 10/30/22  0310   SODIUM 138 136 136   POTASSIUM 4.1 3.9 3.9   CHLORIDE 102 100 99   CO2 26 28 28   BUN 12 12 12   CREATININE 0.85 0.78 0.84   MAGNESIUM  --  1.9 2.0   CALCIUM 9.2 9.3 9.1       Recent Labs     10/28/22  0100 10/29/22  0410 10/30/22  0310   ALTSGPT 65*  --   --    ASTSGOT 106*  --   --    ALKPHOSPHAT 377*  --   --    TBILIRUBIN 1.7*  --   --    GLUCOSE 108* 122* 111*       Recent Labs     10/28/22  0100 10/29/22  0410 10/30/22  0310   WBC 7.1 7.1 7.8   ASTSGOT 106*  --   --    ALTSGPT 65*  --   --    ALKPHOSPHAT 377*  --   --    TBILIRUBIN 1.7*  --   --        Recent Labs     10/28/22  0100 10/29/22  0410 10/30/22  0310   RBC 3.83* 3.78* 3.78*   HEMOGLOBIN 12.1 12.1 12.0   HEMATOCRIT 37.9 37.2 37.7   PLATELETCT 173 175 186       Recent Results (from the past 24 hour(s))   Histology Request    Collection Time: 10/29/22  9:20 AM   Result Value Ref Range    Pathology Request Sent to Histo    MAGNESIUM    Collection Time: 10/30/22  3:10 AM   Result Value Ref Range    Magnesium 2.0 1.5 - 2.5 mg/dL   Basic Metabolic Panel    Collection Time: 10/30/22  3:10 AM   Result Value Ref Range    Sodium 136 135 - 145 mmol/L    Potassium 3.9 3.6 - 5.5 mmol/L    Chloride 99 96 - 112 mmol/L    Co2 28  20 - 33 mmol/L    Glucose 111 (H) 65 - 99 mg/dL    Bun 12 8 - 22 mg/dL    Creatinine 0.84 0.50 - 1.40 mg/dL    Calcium 9.1 8.4 - 10.2 mg/dL    Anion Gap 9.0 7.0 - 16.0   CBC WITHOUT DIFFERENTIAL    Collection Time: 10/30/22  3:10 AM   Result Value Ref Range    WBC 7.8 4.8 - 10.8 K/uL    RBC 3.78 (L) 4.20 - 5.40 M/uL    Hemoglobin 12.0 12.0 - 16.0 g/dL    Hematocrit 37.7 37.0 - 47.0 %    MCV 99.7 (H) 81.4 - 97.8 fL    MCH 31.7 27.0 - 33.0 pg    MCHC 31.8 (L) 33.6 - 35.0 g/dL    RDW 65.3 (H) 35.9 - 50.0 fL    Platelet Count 186 164 - 446 K/uL    MPV 13.0 (H) 9.0 - 12.9 fL   ESTIMATED GFR    Collection Time: 10/30/22  3:10 AM   Result Value Ref Range    GFR (CKD-EPI) 67 >60 mL/min/1.73 m 2         Radiology Review:  MR-ABDOMEN-WITH & W/O   Final Result      1.  Mild biliary dilation without evidence for common bile duct stone.   2.  Liver cysts again seen.   3.  No liver mass demonstrated to indicate metastasis.   4.  Small volume ascites.   5.  Multiple dilated bowel loops concerning for obstruction.   6.  Cystic pancreatic lesions are unchanged.            DX-CHEST-PORTABLE (1 VIEW)   Final Result         1. No acute cardiopulmonary abnormalities are identified.            MDM (Data Review):   -Records reviewed and summarized in current documentation  -I personally reviewed and interpreted the laboratory results  -I personally reviewed the radiology images        Medical Decision Making, by Problem:  Active Hospital Problems    Diagnosis     Liver failure without hepatic coma (HCC) [K72.90]     Hypercalcemia [E83.52]     Post-ERCP acute pancreatitis [K91.89, K85.90]     Advance care planning [Z71.89]     Dark stools [R19.5]     Esophageal diverticulum [Q39.6]     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]     Generalized weakness [R53.1]     Gastroesophageal reflux disease without esophagitis [K21.9]     COPD (chronic obstructive pulmonary disease) (HCC) [J44.9]            Assessment/Recommendations:  87-year-old female  with history of stage IV ileal cecal adenocarcinoma status post resection, chemotherapy, and recent ERCP on 10/21/2022 seen for abdominal pain, weakness, elevated liver enzymes.  Patient had an MRCP last month that demonstrated possible hepatic lesion and intrahepatic ductal dilation which was not identified on ERCP.  Brushing biopsies pathology negative.  Post procedure the patient states she had pain and has documented nausea and vomiting.  She continues to have mildly elevated liver enzymes as well as bilirubin.  MRI negative for infiltrative lesion. EGD without evidence of bleeding.    Assessment:  -Abdominal pain-patient is tender to the upper abdomen, but complains of both upper and lower abdominal pain.  Some of this is chronic and some seems to be worse after her recent ERCP  -Elevated liver enzymes  -Stage IV ileocecal adenocarcinoma status post resection  -Suspected post ERCP pancreatitis  -Weakness  -Esophageal diverticulum  -Poor gastric motility    Plan:  -Low fiber/GI soft diet. See if dietician can come talk to her and son regarding tips for this diet at home  -If patient complains of pain or severe constipation consider KUB as MRI suggested dilated loops of bowel which may be side effect of her previous surgery however would rule out ileus or obstruction  -Recommend palliative care consultation  -Outpatient follow up with [facility] in 8-10 weeks (if patient is doing well and wants further aggressive care) to recheck liver enzymes and decide if additional labs or imaging is needed     GI will sign off    BEL Rae      Thank you for inviting me to participate in the care of this patient. Please do not hesitate to call GI consultants with additional questions/concerns or changes in the patient's clinical status at [phone].      Core Quality Measures   Reviewed items:  Labs, Medications and Radiology reports reviewed

## 2022-10-31 VITALS
HEART RATE: 91 BPM | HEIGHT: 64 IN | SYSTOLIC BLOOD PRESSURE: 161 MMHG | OXYGEN SATURATION: 92 % | RESPIRATION RATE: 16 BRPM | TEMPERATURE: 98.5 F | BODY MASS INDEX: 20.47 KG/M2 | WEIGHT: 119.93 LBS | DIASTOLIC BLOOD PRESSURE: 87 MMHG

## 2022-10-31 PROCEDURE — 700102 HCHG RX REV CODE 250 W/ 637 OVERRIDE(OP): Performed by: INTERNAL MEDICINE

## 2022-10-31 PROCEDURE — 92526 ORAL FUNCTION THERAPY: CPT

## 2022-10-31 PROCEDURE — 700105 HCHG RX REV CODE 258: Performed by: INTERNAL MEDICINE

## 2022-10-31 PROCEDURE — 97110 THERAPEUTIC EXERCISES: CPT

## 2022-10-31 PROCEDURE — 97116 GAIT TRAINING THERAPY: CPT

## 2022-10-31 PROCEDURE — A9270 NON-COVERED ITEM OR SERVICE: HCPCS | Performed by: INTERNAL MEDICINE

## 2022-10-31 PROCEDURE — 99239 HOSP IP/OBS DSCHRG MGMT >30: CPT | Performed by: INTERNAL MEDICINE

## 2022-10-31 RX ADMIN — SENNOSIDES AND DOCUSATE SODIUM 2 TABLET: 50; 8.6 TABLET ORAL at 06:04

## 2022-10-31 RX ADMIN — SODIUM CHLORIDE: 9 INJECTION, SOLUTION INTRAVENOUS at 11:57

## 2022-10-31 RX ADMIN — POLYETHYLENE GLYCOL 3350 1 PACKET: 17 POWDER, FOR SOLUTION ORAL at 06:04

## 2022-10-31 ASSESSMENT — GAIT ASSESSMENTS
DEVIATION: DECREASED BASE OF SUPPORT;BRADYKINETIC
DISTANCE (FEET): 150
GAIT LEVEL OF ASSIST: STANDBY ASSIST
ASSISTIVE DEVICE: FRONT WHEEL WALKER

## 2022-10-31 ASSESSMENT — COGNITIVE AND FUNCTIONAL STATUS - GENERAL
CLIMB 3 TO 5 STEPS WITH RAILING: A LITTLE
MOBILITY SCORE: 22
SUGGESTED CMS G CODE MODIFIER MOBILITY: CJ
WALKING IN HOSPITAL ROOM: A LITTLE

## 2022-10-31 ASSESSMENT — PAIN DESCRIPTION - PAIN TYPE: TYPE: ACUTE PAIN

## 2022-10-31 NOTE — THERAPY
"Speech Language Pathology  Daily Treatment     Patient Name: Licha Pope  Age:  87 y.o., Sex:  female  Medical Record #: [id number]  Today's Date: 10/31/2022     Precautions  Precautions: Fall Risk    Assessment    Pt seen on this date for dysphagia therapy. Pt's diet has been changed following EGD to GI soft/small meals. Pt endorsed improved appetite but not yet at baseline. She consumed trials of her GI soft breakfast with no overt s/sx of aspiration. Mastication and swallow trigger were timely. She endorsed preference for softer foods at this time so consuming mostly cream of wheat and yogurt. She fed self independently but did benefit from assistance with cutting up food and opening containers. At this time, recommend continuation of GI soft/small meals per GI recommendation; no further acute SLP services recommended but please re-consult with any difficulty.    Plan    1) recommend continuation of GI soft/small meals per GI recommendation; no further acute SLP services recommended but please re-consult with any difficulty    Discharge secondary to goals met.    Discharge Recommendations: Anticipate that the patient will have no further speech therapy needs after discharge from the hospital       Objective       10/31/22 0944   Precautions   Precautions Fall Risk   Vitals   O2 (LPM) 0   O2 Delivery Device None - Room Air   Pain 0 - 10 Group   Therapist Pain Assessment Post Activity Pain Same as Prior to Activity;Nurse Notified;0   Patient / Family Goals   Patient / Family Goal #1 \"She's been getting nauseous when she eats\"   Goal #1 Outcome Goal met   Short Term Goals   Short Term Goal # 1 Pt will consume an SB6/TN0 diet with no overt s/sx of aspiration   Goal Outcome # 1 Goal met   Education Group   Education Provided Dysphagia   Dysphagia Patient Response Patient;Acceptance;Explanation;Verbal Demonstration   Anticipated Discharge Needs   Discharge Recommendations Anticipate that the patient will have no " further speech therapy needs after discharge from the hospital   Therapy Recommendations Upon DC Not Indicated   Interdisciplinary Plan of Care Collaboration   IDT Collaboration with  Nursing;Physician   Patient Position at End of Therapy Seated;In Bed;Call Light within Reach;Tray Table within Reach;Phone within Reach

## 2022-10-31 NOTE — DIETARY
Nutrition Services Brief Update:    Day 5 of admit.  Licha Pope is a 87 y.o. female with admitting DX of Liver failure without hepatic coma (HCC) [K72.90]    Current Diet: low fiber (GI soft) w/ small meals and Boost Plus TID.     PO intake has improved with most recent recorded meal on 10/30 at %. No recent documentation of intake of Boost supplements.     Problem: Nutritional:  Goal: Achieve adequate nutritional intake  Description: Patient will consume >50% of meals and supplements  Outcome: progressing    RD will follow.

## 2022-10-31 NOTE — CARE PLAN
The patient is Stable - Low risk of patient condition declining or worsening    Shift Goals  Clinical Goals: Have a BM. safety with ambulation  Patient Goals: Have a BM, go home tomorrow  Family Goals: n/a    Progress made toward(s) clinical / shift goals:    Problem: Knowledge Deficit - Standard  Goal: Patient and family/care givers will demonstrate understanding of plan of care, disease process/condition, diagnostic tests and medications  Outcome: Progressing     Problem: Communication  Goal: The ability to communicate needs accurately and effectively will improve  Outcome: Progressing       Patient is not progressing towards the following goals:

## 2022-10-31 NOTE — DISCHARGE SUMMARY
"Discharge Summary    CHIEF COMPLAINT ON ADMISSION  Chief Complaint   Patient presents with    Bloody Stools     X 24 hours, s/p endoscopy on Friday    Weakness       Reason for Admission  EMS     Admission Date  10/26/2022    CODE STATUS  DNAR/DNI    HPI & HOSPITAL COURSE  Per notes, \" 87-year-old female with past medical history of COPD, DVTs, hypertension, dyslipidemia recent diagnosis of stage IV colon cancer adenocarcinoma.  Admitted on 10/26/22 for increased generalized weakness and reported blood in stool. Patient had endoscopy performed on Friday 5 days ago performed by GI consultants in [place] with sphincterectomy. Reported melena to ED. Came from home, lives alone. Possible hx of PUD after ibuprofen, not currently using NSAIDs.  Son at bedside, confirming patient has had decreased appetite for about 2-3 weeks now and she has lost significant weight.     I spoke with EDP about patient's case. Dr. Ervin has spoken with Dr. Belle with [facility] who will review. For now, recommended monitoring CMP and exclude liver metastasis.\"    Patient was admitted and evaluated by gastroenterology.Patient underwent EGD on 10/29.  Large distal esophageal diverticulum, hiatal hernia, poor gastric motility, minimal cellulitis.  Biopsies taken.  MRI showed no liver lesions.  At the request of oncology, general surgery.  Patient's abdominal scans.  There were no signs of carcinomatosis or obstruction identified, on MRI or the need for further work-up at this time, per surgery.  Per gastroenterology, no further work-up needed.  Patient will need to follow-up with gastroenterology, oncology and PCP in the outpatient setting.  She also be discharged with home health, as recommended by PT/OT.  She was able have a bowel movement prior to discharge.    Therefore, she is discharged in guarded and stable condition to home with organized home healthcare and close outpatient follow-up.    The patient met 2-midnight criteria for an " inpatient stay at the time of discharge.    Discharge Date  10/31/2022    FOLLOW UP ITEMS POST DISCHARGE  FU with oncology   FU with PCP   FU with GI    DISCHARGE DIAGNOSES  Principal Problem:    Liver failure without hepatic coma (HCC) POA: Yes  Active Problems:    COPD (chronic obstructive pulmonary disease) (HCC) POA: Yes    Gastroesophageal reflux disease without esophagitis POA: Yes    Generalized weakness POA: Yes    Adenocarcinoma of ileum (HCC) POA: Yes    Hypercalcemia POA: Yes    Post-ERCP acute pancreatitis POA: Yes    Advance care planning POA: Yes    Dark stools POA: Yes    Esophageal diverticulum (Chronic) POA: Yes    Constipation POA: Yes    Dehydration POA: Yes  Resolved Problems:    * No resolved hospital problems. *      FOLLOW UP  No future appointments.  No follow-up provider specified.    MEDICATIONS ON DISCHARGE     Medication List        CONTINUE taking these medications        Instructions   POTASSIUM PO   Take 1 Tablet by mouth every day.  Dose: 1 Tablet              Allergies  Allergies   Allergen Reactions    Codeine Vomiting    Demerol Vomiting     Injectable type    Shellfish Allergy Shortness of Breath     Soft shell    Ciprofloxacin Rash     rash    Ibuprofen      Patient developed gastric ulcer/GI bleed from ibuprofen use    Iodine      PATIENT ALLERGIC TO SHELLFISH, NOT SURE IF SHE IS ALLERGIC TO IODINE       DIET  Orders Placed This Encounter   Procedures    Diet Order Diet: Low Fiber(GI Soft); Tray Modifications (optional): Small Meals     Standing Status:   Standing     Number of Occurrences:   1     Order Specific Question:   Diet:     Answer:   Low Fiber(GI Soft) [2]     Order Specific Question:   Tray Modifications (optional)     Answer:   Small Meals       ACTIVITY  As tolerated.  Weight bearing as tolerated    CONSULTATIONS  GI   General surgery     PROCEDURES  EGD    LABORATORY  Lab Results   Component Value Date    SODIUM 136 10/30/2022    POTASSIUM 3.9 10/30/2022     CHLORIDE 99 10/30/2022    CO2 28 10/30/2022    GLUCOSE 111 (H) 10/30/2022    BUN 12 10/30/2022    CREATININE 0.84 10/30/2022    GLOMRATE 61 10/22/2022        Lab Results   Component Value Date    WBC 7.8 10/30/2022    HEMOGLOBIN 12.0 10/30/2022    HEMATOCRIT 37.7 10/30/2022    PLATELETCT 186 10/30/2022        Total time of the discharge process exceeds 45 minutes.

## 2022-10-31 NOTE — THERAPY
Physical Therapy   Daily Treatment     Patient Name: Licha Pope  Age:  87 y.o., Sex:  female  Medical Record #: [id number]  Today's Date: 10/31/2022     Precautions  Precautions: Fall Risk    Assessment    Pt is progressing well with therapy and was able to demonstrate improvement in overall strength, balance, gait mechanics, and activity tolerance. Pt was able to ambulate for 150ft w/FWW use. Pt was able to complete therapeutic exercises with frequent seated rest breaks. Pt encouraged to continue mobilizing with [facility] staff to continue progressing functional mobility. Recommend home health transitional care for continued physical therapy services. Pt states son can assist upon d/c to home if needed.      Plan    Continue current treatment plan.    DC Equipment Recommendations: (P) Front-Wheel Walker  Discharge Recommendations: (P) Recommend home health for continued physical therapy services    Objective       10/31/22 0840   Precautions   Precautions Fall Risk   Pain 0 - 10 Group   Therapist Pain Assessment Nurse Notified;0   Cognition    Cognition / Consciousness WDL   Level of Consciousness Alert   Passive ROM Lower Body   Passive ROM Lower Body WDL   Active ROM Lower Body    Active ROM Lower Body  WDL   Strength Lower Body   Lower Body Strength  X   Gross Strength Generalized Weakness, Equal Bilaterally   Comments improved from prior to session; able to demo gross strength of 4/5 in B LE   Sensation Lower Body   Lower Extremity Sensation   WDL   Lower Body Muscle Tone   Lower Body Muscle Tone  WDL   Sitting Lower Body Exercises   Sitting Lower Body Exercises Yes   Long Arc Quad 1 set of 10;Bilateral   Sit to Stand 1 set of 10  (w/FWW use)   Comments sit<>stands 1 set 10 reps with arms crossed   Standing Lower Body Exercises   Standing Lower Body Exercises Yes   Marching 2 sets of 10   Balance   Sitting Balance (Static) Good   Sitting Balance (Dynamic) Fair +   Standing Balance (Static) Good   Standing Balance  (Dynamic) Fair +   Weight Shift Sitting Good   Weight Shift Standing Fair   Skilled Intervention Verbal Cuing;Postural Facilitation;Facilitation   Comments w/fww   Gait Analysis   Gait Level Of Assist Standby Assist   Assistive Device Front Wheel Walker   Distance (Feet) 150   # of Times Distance was Traveled 1   Deviation Decreased Base Of Support;Bradykinetic   Weight Bearing Status fwb   Skilled Intervention Verbal Cuing;Facilitation   Comments provided with VC and facilitation to increased gait speed and stride length; requires VC for upright posture   Bed Mobility    Supine to Sit Standby Assist   Sit to Supine Standby Assist   Scooting Standby Assist   Skilled Intervention Verbal Cuing   Functional Mobility   Sit to Stand Standby Assist   Bed, Chair, Wheelchair Transfer Standby Assist   Transfer Method Stand Step   Mobility EOB, sit<>stand, ambulation, back to bed   Skilled Intervention Verbal Cuing;Sequencing   Comments VC for handplacement prior to sitting and sequencing with appropriate FWW use during transfers   How much difficulty does the patient currently have...   Turning over in bed (including adjusting bedclothes, sheets and blankets)? 4   Sitting down on and standing up from a chair with arms (e.g., wheelchair, bedside commode, etc.) 4   Moving from lying on back to sitting on the side of the bed? 4   How much help from another person does the patient currently need...   Moving to and from a bed to a chair (including a wheelchair)? 4   Need to walk in a hospital room? 3   Climbing 3-5 steps with a railing? 3   6 clicks Mobility Score 22   Activity Tolerance   Sitting in Chair NT   Sitting Edge of Bed 5 mins   Standing 10 mins   Comments limited due to fatigue; requries seated rest breaks in between exercises   Patient / Family Goals    Patient / Family Goal #1 not stated   Short Term Goals    Short Term Goal # 1 S with bed mob in 6V   Goal Outcome # 1 Progressing as expected   Short Term Goal # 2 S  with transfers in 6 V   Goal Outcome # 2 Progressing as expected   Short Term Goal # 3 S with amb x 50 feet in 6 V   Goal Outcome # 3 Progressing as expected   Education Group   Education Provided Role of Physical Therapist   Role of Physical Therapist Patient Response Patient;Acceptance;Explanation;Demonstration;Verbal Demonstration;Action Demonstration   Anticipated Discharge Equipment and Recommendations   DC Equipment Recommendations Front-Wheel Walker   Discharge Recommendations Recommend home health for continued physical therapy services   Interdisciplinary Plan of Care Collaboration   IDT Collaboration with  Nursing   Patient Position at End of Therapy In Bed;Bed Alarm On;Call Light within Reach;Tray Table within Reach;Phone within Reach   Collaboration Comments aware of visit and recs   Session Information   Date / Session Number  10/31-2 (2/3, 11/2)

## 2022-10-31 NOTE — CARE PLAN
The patient is Stable - Low risk of patient condition declining or worsening    Shift Goals  Clinical Goals: patient will remain free from falls during shift and have a BM  Patient Goals: rest  Family Goals: n/a    Progress made toward(s) clinical / shift goals:  Patient calls approprietly before getting up out of bed, bed in low and locked position.  Call light within reach.      Patient is not progressing towards the following goals: NA      Problem: Knowledge Deficit - Standard  Goal: Patient and family/care givers will demonstrate understanding of plan of care, disease process/condition, diagnostic tests and medications  Outcome: Progressing     Problem: Skin Integrity  Goal: Skin integrity is maintained or improved  Outcome: Progressing     Problem: Fall Risk  Goal: Patient will remain free from falls  Outcome: Progressing     Problem: Psychosocial  Goal: Patient's level of anxiety will decrease  Outcome: Progressing  Goal: Patient's ability to verbalize feelings about condition will improve  Outcome: Progressing  Goal: Patient's ability to re-evaluate and adapt role responsibilities will improve  Outcome: Progressing  Goal: Spiritual and cultural needs incorporated into hospitalization  Outcome: Progressing

## 2022-10-31 NOTE — DISCHARGE PLANNING
Case Management Discharge Planning    Admission Date: 10/26/2022  GMLOS: 4.7  ALOS: 5    6-Clicks ADL Score: 18  6-Clicks Mobility Score: 22      Anticipated Discharge Dispo: Discharge Disposition: D/T to home under care of home health w/planned hosp IP readmit (86)    DME Needed: No    Action(s) Taken: Updated Provider/Nurse on Discharge Plan    1100: RN CM called [facility] who state they have accepted patient. No other DC needs noted at this time.     Escalations Completed: None    Medically Clear: No    Next Steps: Medical clearance    Barriers to Discharge: Medical clearance

## 2022-11-02 LAB — VIT B1 BLD-MCNC: 104 NMOL/L (ref 70–180)

## 2022-11-09 ENCOUNTER — PATIENT MESSAGE (OUTPATIENT)
Dept: HEALTH INFORMATION MANAGEMENT | Facility: OTHER | Age: 87
End: 2022-11-09

## 2022-11-12 ENCOUNTER — HOSPITAL ENCOUNTER (OUTPATIENT)
Dept: RADIOLOGY | Facility: MEDICAL CENTER | Age: 87
End: 2022-11-12
Attending: INTERNAL MEDICINE
Payer: MEDICARE

## 2022-11-22 ENCOUNTER — HOSPITAL ENCOUNTER (INPATIENT)
Facility: MEDICAL CENTER | Age: 87
LOS: 2 days | DRG: 374 | End: 2022-11-24
Attending: EMERGENCY MEDICINE | Admitting: HOSPITALIST
Payer: MEDICARE

## 2022-11-22 ENCOUNTER — APPOINTMENT (OUTPATIENT)
Dept: RADIOLOGY | Facility: MEDICAL CENTER | Age: 87
DRG: 374 | End: 2022-11-22
Attending: EMERGENCY MEDICINE
Payer: MEDICARE

## 2022-11-22 PROBLEM — K83.1 BILIARY OBSTRUCTION: Status: ACTIVE | Noted: 2022-11-22

## 2022-11-22 PROBLEM — Z66 DNR (DO NOT RESUSCITATE): Status: ACTIVE | Noted: 2022-11-22

## 2022-11-22 LAB
ALBUMIN SERPL BCP-MCNC: 3.6 G/DL (ref 3.2–4.9)
ALBUMIN/GLOB SERPL: 1.1 G/DL
ALP SERPL-CCNC: 294 U/L (ref 30–99)
ALT SERPL-CCNC: 72 U/L (ref 2–50)
ANION GAP SERPL CALC-SCNC: 13 MMOL/L (ref 7–16)
APTT PPP: 25.4 SEC (ref 24.7–36)
AST SERPL-CCNC: 104 U/L (ref 12–45)
BASOPHILS # BLD AUTO: 0.4 % (ref 0–1.8)
BASOPHILS # BLD: 0.03 K/UL (ref 0–0.12)
BILIRUB SERPL-MCNC: 7.6 MG/DL (ref 0.1–1.5)
BLOOD CULTURE HOLD CXBCH: NORMAL
BLOOD CULTURE HOLD CXBCH: NORMAL
BUN SERPL-MCNC: 17 MG/DL (ref 8–22)
CALCIUM SERPL-MCNC: 9.6 MG/DL (ref 8.4–10.2)
CHLORIDE SERPL-SCNC: 91 MMOL/L (ref 96–112)
CO2 SERPL-SCNC: 29 MMOL/L (ref 20–33)
CREAT SERPL-MCNC: 0.65 MG/DL (ref 0.5–1.4)
EKG IMPRESSION: NORMAL
EOSINOPHIL # BLD AUTO: 0.02 K/UL (ref 0–0.51)
EOSINOPHIL NFR BLD: 0.3 % (ref 0–6.9)
ERYTHROCYTE [DISTWIDTH] IN BLOOD BY AUTOMATED COUNT: 62.1 FL (ref 35.9–50)
GFR SERPLBLD CREATININE-BSD FMLA CKD-EPI: 85 ML/MIN/1.73 M 2
GLOBULIN SER CALC-MCNC: 3.2 G/DL (ref 1.9–3.5)
GLUCOSE SERPL-MCNC: 128 MG/DL (ref 65–99)
HCT VFR BLD AUTO: 43.8 % (ref 37–47)
HGB BLD-MCNC: 14.6 G/DL (ref 12–16)
IMM GRANULOCYTES # BLD AUTO: 0.04 K/UL (ref 0–0.11)
IMM GRANULOCYTES NFR BLD AUTO: 0.5 % (ref 0–0.9)
INR PPP: 1 (ref 0.87–1.13)
LACTATE SERPL-SCNC: 2.1 MMOL/L (ref 0.5–2)
LIPASE SERPL-CCNC: 40 U/L (ref 7–58)
LYMPHOCYTES # BLD AUTO: 0.77 K/UL (ref 1–4.8)
LYMPHOCYTES NFR BLD: 9.9 % (ref 22–41)
MCH RBC QN AUTO: 32.8 PG (ref 27–33)
MCHC RBC AUTO-ENTMCNC: 33.3 G/DL (ref 33.6–35)
MCV RBC AUTO: 98.4 FL (ref 81.4–97.8)
MONOCYTES # BLD AUTO: 0.52 K/UL (ref 0–0.85)
MONOCYTES NFR BLD AUTO: 6.7 % (ref 0–13.4)
NEUTROPHILS # BLD AUTO: 6.37 K/UL (ref 2–7.15)
NEUTROPHILS NFR BLD: 82.2 % (ref 44–72)
NRBC # BLD AUTO: 0 K/UL
NRBC BLD-RTO: 0 /100 WBC
PLATELET # BLD AUTO: 297 K/UL (ref 164–446)
PMV BLD AUTO: 12.4 FL (ref 9–12.9)
POTASSIUM SERPL-SCNC: 3.5 MMOL/L (ref 3.6–5.5)
PROT SERPL-MCNC: 6.8 G/DL (ref 6–8.2)
PROTHROMBIN TIME: 12.8 SEC (ref 12–14.6)
RBC # BLD AUTO: 4.45 M/UL (ref 4.2–5.4)
SODIUM SERPL-SCNC: 133 MMOL/L (ref 135–145)
TROPONIN T SERPL-MCNC: 22 NG/L (ref 6–19)
WBC # BLD AUTO: 7.8 K/UL (ref 4.8–10.8)

## 2022-11-22 PROCEDURE — 96365 THER/PROPH/DIAG IV INF INIT: CPT

## 2022-11-22 PROCEDURE — 99285 EMERGENCY DEPT VISIT HI MDM: CPT

## 2022-11-22 PROCEDURE — 85610 PROTHROMBIN TIME: CPT

## 2022-11-22 PROCEDURE — 700101 HCHG RX REV CODE 250: Performed by: EMERGENCY MEDICINE

## 2022-11-22 PROCEDURE — 96375 TX/PRO/DX INJ NEW DRUG ADDON: CPT

## 2022-11-22 PROCEDURE — 36415 COLL VENOUS BLD VENIPUNCTURE: CPT

## 2022-11-22 PROCEDURE — 85025 COMPLETE CBC W/AUTO DIFF WBC: CPT

## 2022-11-22 PROCEDURE — 99223 1ST HOSP IP/OBS HIGH 75: CPT | Mod: AI | Performed by: HOSPITALIST

## 2022-11-22 PROCEDURE — 700105 HCHG RX REV CODE 258: Performed by: EMERGENCY MEDICINE

## 2022-11-22 PROCEDURE — 43752 NASAL/OROGASTRIC W/TUBE PLMT: CPT

## 2022-11-22 PROCEDURE — 83605 ASSAY OF LACTIC ACID: CPT

## 2022-11-22 PROCEDURE — 74177 CT ABD & PELVIS W/CONTRAST: CPT

## 2022-11-22 PROCEDURE — 85730 THROMBOPLASTIN TIME PARTIAL: CPT

## 2022-11-22 PROCEDURE — C9113 INJ PANTOPRAZOLE SODIUM, VIA: HCPCS | Performed by: EMERGENCY MEDICINE

## 2022-11-22 PROCEDURE — 700111 HCHG RX REV CODE 636 W/ 250 OVERRIDE (IP): Performed by: EMERGENCY MEDICINE

## 2022-11-22 PROCEDURE — 96366 THER/PROPH/DIAG IV INF ADDON: CPT

## 2022-11-22 PROCEDURE — 83690 ASSAY OF LIPASE: CPT

## 2022-11-22 PROCEDURE — 80053 COMPREHEN METABOLIC PANEL: CPT

## 2022-11-22 PROCEDURE — 84484 ASSAY OF TROPONIN QUANT: CPT

## 2022-11-22 PROCEDURE — 304538 HCHG NG TUBE

## 2022-11-22 PROCEDURE — 700105 HCHG RX REV CODE 258: Performed by: HOSPITALIST

## 2022-11-22 PROCEDURE — 93005 ELECTROCARDIOGRAM TRACING: CPT | Performed by: EMERGENCY MEDICINE

## 2022-11-22 PROCEDURE — 770006 HCHG ROOM/CARE - MED/SURG/GYN SEMI*

## 2022-11-22 PROCEDURE — 700117 HCHG RX CONTRAST REV CODE 255: Performed by: EMERGENCY MEDICINE

## 2022-11-22 RX ORDER — ENEMA 19; 7 G/133ML; G/133ML
1 ENEMA RECTAL ONCE
Status: COMPLETED | OUTPATIENT
Start: 2022-11-22 | End: 2022-11-22

## 2022-11-22 RX ORDER — POLYETHYLENE GLYCOL 3350 17 G/17G
1 POWDER, FOR SOLUTION ORAL
Status: DISCONTINUED | OUTPATIENT
Start: 2022-11-22 | End: 2022-11-24 | Stop reason: HOSPADM

## 2022-11-22 RX ORDER — BISACODYL 10 MG
10 SUPPOSITORY, RECTAL RECTAL
Status: DISCONTINUED | OUTPATIENT
Start: 2022-11-22 | End: 2022-11-24 | Stop reason: HOSPADM

## 2022-11-22 RX ORDER — SODIUM CHLORIDE 9 MG/ML
500 INJECTION, SOLUTION INTRAVENOUS ONCE
Status: COMPLETED | OUTPATIENT
Start: 2022-11-22 | End: 2022-11-22

## 2022-11-22 RX ORDER — AMOXICILLIN 250 MG
2 CAPSULE ORAL 2 TIMES DAILY
Status: DISCONTINUED | OUTPATIENT
Start: 2022-11-22 | End: 2022-11-24 | Stop reason: HOSPADM

## 2022-11-22 RX ORDER — ONDANSETRON 2 MG/ML
4 INJECTION INTRAMUSCULAR; INTRAVENOUS EVERY 4 HOURS PRN
Status: DISCONTINUED | OUTPATIENT
Start: 2022-11-22 | End: 2022-11-24 | Stop reason: HOSPADM

## 2022-11-22 RX ORDER — SODIUM CHLORIDE, SODIUM LACTATE, POTASSIUM CHLORIDE, CALCIUM CHLORIDE 600; 310; 30; 20 MG/100ML; MG/100ML; MG/100ML; MG/100ML
INJECTION, SOLUTION INTRAVENOUS CONTINUOUS
Status: DISCONTINUED | OUTPATIENT
Start: 2022-11-22 | End: 2022-11-24 | Stop reason: HOSPADM

## 2022-11-22 RX ORDER — MEGESTROL ACETATE 40 MG/ML
400 SUSPENSION ORAL PRN
Status: ON HOLD | COMMUNITY
End: 2022-11-24

## 2022-11-22 RX ORDER — MORPHINE SULFATE 4 MG/ML
1-4 INJECTION INTRAVENOUS
Status: DISCONTINUED | OUTPATIENT
Start: 2022-11-22 | End: 2022-11-24 | Stop reason: HOSPADM

## 2022-11-22 RX ORDER — ONDANSETRON 2 MG/ML
4 INJECTION INTRAMUSCULAR; INTRAVENOUS ONCE
Status: COMPLETED | OUTPATIENT
Start: 2022-11-22 | End: 2022-11-22

## 2022-11-22 RX ORDER — ONDANSETRON 4 MG/1
4 TABLET, ORALLY DISINTEGRATING ORAL EVERY 4 HOURS PRN
Status: DISCONTINUED | OUTPATIENT
Start: 2022-11-22 | End: 2022-11-24 | Stop reason: HOSPADM

## 2022-11-22 RX ORDER — SCOLOPAMINE TRANSDERMAL SYSTEM 1 MG/1
1 PATCH, EXTENDED RELEASE TRANSDERMAL
COMMUNITY

## 2022-11-22 RX ORDER — ONDANSETRON 4 MG/1
4 TABLET, ORALLY DISINTEGRATING ORAL EVERY 6 HOURS PRN
COMMUNITY

## 2022-11-22 RX ADMIN — SODIUM CHLORIDE 80 MG: 9 INJECTION, SOLUTION INTRAVENOUS at 12:35

## 2022-11-22 RX ADMIN — ONDANSETRON 4 MG: 2 INJECTION INTRAMUSCULAR; INTRAVENOUS at 11:34

## 2022-11-22 RX ADMIN — SODIUM PHOSPHATE 133 ML: 7; 19 ENEMA RECTAL at 12:52

## 2022-11-22 RX ADMIN — IOHEXOL 100 ML: 350 INJECTION, SOLUTION INTRAVENOUS at 12:18

## 2022-11-22 RX ADMIN — SODIUM CHLORIDE, POTASSIUM CHLORIDE, SODIUM LACTATE AND CALCIUM CHLORIDE: 600; 310; 30; 20 INJECTION, SOLUTION INTRAVENOUS at 20:25

## 2022-11-22 RX ADMIN — SODIUM CHLORIDE 500 ML: 9 INJECTION, SOLUTION INTRAVENOUS at 10:24

## 2022-11-22 ASSESSMENT — ENCOUNTER SYMPTOMS
SHORTNESS OF BREATH: 0
CONSTIPATION: 1
ABDOMINAL PAIN: 1
VOMITING: 1
WEIGHT LOSS: 1

## 2022-11-22 ASSESSMENT — FIBROSIS 4 INDEX: FIB4 SCORE: 6.15

## 2022-11-22 ASSESSMENT — PAIN DESCRIPTION - PAIN TYPE
TYPE: CHRONIC PAIN
TYPE: CHRONIC PAIN

## 2022-11-22 NOTE — ED TRIAGE NOTES
"Chief Complaint   Patient presents with    Abdominal Pain     C/o lower abd pain, ongoing for about a month, became progressively worse over the last couple of days    N/V     Dark brown emesis since last night      Loss of Appetite     Per report pt eats only a bite or two of food each day     BP (!) 151/92   Pulse 88   Temp 36.6 °C (97.8 °F) (Temporal)   Resp 20   Ht 1.626 m (5' 4\")   Wt 54.4 kg (119 lb 14.9 oz)   SpO2 94%   BMI 20.59 kg/m²     Pt BIB [facility] from home for c/o increasing abd pain, N/V and loss of appetite.  Son states pt received daily HH visits, has not been eating and having difficulty due to c/o pain.    "

## 2022-11-22 NOTE — ED NOTES
Assumed patient care and reviewed the triage notes.     Patient incontinent of stool and patient cleaned with new linen placed.      NG in place and on low suction.

## 2022-11-22 NOTE — ED NOTES
Pt has small liquid brown stool, no active bleeding noted.  Pt cleaned and repositioned on jefry.

## 2022-11-22 NOTE — ASSESSMENT & PLAN NOTE
Noted on CT scan  NG placed to suction for symptomatic control  She has associated significant constipation which may improve with enemas/suppositories if she wants to have them  Dr. Thomas surgery consulted  -She is not a candidate for surgery given her terminal diagnosis  IV morphine for pain and IV Zofran  SBFT showed continued obstruction     Family and patient would like to go home with hospice

## 2022-11-22 NOTE — ED NOTES
Protonix gtt infusing as ordered.  Enema given as ordered.    16F NG placed in Right Nare without difficulty.

## 2022-11-22 NOTE — ASSESSMENT & PLAN NOTE
Elevated liver enzymes with new bilirubin elevation of 7.6  She had a recent ERCP which was negative for obstruction but now has an apparent mass.

## 2022-11-22 NOTE — ED PROVIDER NOTES
ED Provider Note    CHIEF COMPLAINT  Chief Complaint   Patient presents with    Abdominal Pain     C/o lower abd pain, ongoing for about a month, became progressively worse over the last couple of days    N/V     Dark brown emesis since last night      Loss of Appetite     Per report pt eats only a bite or two of food each day       HPI  Licha Pope is a 87 y.o. female who presents with a chief complaint of abdominal pain, nausea, vomiting, decreased appetite, and constipation.  She notes she has had some lower abdominal pain that is been ongoing for the past month but over the past several days it has worsened.  Last night she developed nausea with multiple episodes of dark brown emesis.  She does not think she has had any fevers but overnight last night she had some intermittent chest pain.  She has no shortness of breath, dysuria, cough or cold symptoms.    REVIEW OF SYSTEMS  See HPI for further details.  Chest pain.  Abdominal pain.  Nausea.  Vomiting.  Decreased appetite.  Constipation.  Decreased p.o. intake.  All other systems are negative.     PAST MEDICAL HISTORY   has a past medical history of Arthritis, Cancer (HCC), Colon cancer (HCC), COPD (chronic obstructive pulmonary disease) (HCC), DVT (deep venous thrombosis) (HCC), EMPHYSEMA, Gastroesophageal reflux disease without esophagitis (2019), Hypertension, Hypertriglyceridemia (2019), and Prediabetes (2019).    SOCIAL HISTORY  Social History     Tobacco Use    Smoking status: Former     Packs/day: 1.00     Years: 20.00     Pack years: 20.00     Types: Cigarettes     Quit date: 1975     Years since quittin.9    Smokeless tobacco: Never   Vaping Use    Vaping Use: Never used   Substance and Sexual Activity    Alcohol use: Not Currently     Alcohol/week: 1.5 oz     Types: 3 Standard drinks or equivalent per week     Comment: RARE    Drug use: No    Sexual activity: Not Currently     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     Comment: , retired  "       SURGICAL HISTORY   has a past surgical history that includes hemorrhoidectomy (/);  delivery only (); knee arthroplasty total (2014); cataract phaco with iol (2014); cataract phaco with iol (2014); lap,appendectomy (2021); lap,diagnostic abdomen (Bilateral, 3/30/2022); exploratory of abdomen (Bilateral, 3/30/2022); and upper gi endoscopy,diagnosis (N/A, 10/29/2022).    CURRENT MEDICATIONS  Home Medications    **Home medications have not yet been reviewed for this encounter**         ALLERGIES  Allergies   Allergen Reactions    Codeine Vomiting    Demerol Vomiting     Injectable type    Shellfish Allergy Shortness of Breath     Soft shell    Ciprofloxacin Rash     rash    Ibuprofen      Patient developed gastric ulcer/GI bleed from ibuprofen use    Iodine      PATIENT ALLERGIC TO SHELLFISH, NOT SURE IF SHE IS ALLERGIC TO IODINE       PHYSICAL EXAM  VITAL SIGNS: BP (!) 151/92   Pulse 88   Temp 36.6 °C (97.8 °F) (Temporal)   Resp 20   Ht 1.626 m (5' 4\")   Wt 54.4 kg (119 lb 14.9 oz)   SpO2 94%   BMI 20.59 kg/m²     Pulse ox interpretation: I interpret this pulse ox as normal.  Constitutional: Alert, ill-appearing, dry heaving and actively vomiting small amount of dark brown emesis.  HENT: No signs of trauma, Bilateral external ears normal, Nose normal.  Dry mucous membranes.  Eyes: Pupils are equal and reactive, Conjunctiva normal, scleral icterus.   Neck: Normal range of motion, Supple, No stridor.   Lymphatic: No lymphadenopathy noted.   Cardiovascular: Regular rate and rhythm, no murmurs. Pulses symmetrical.  Thorax & Lungs: Normal breath sounds, No respiratory distress, No wheezing, No chest tenderness.   Abdomen: Bowel sounds normal, distended, soft, No tenderness, palpable mass in the left abdomen, No pulsatile masses. No peritoneal signs.  Skin: Jaundiced.  Warm, Dry, No erythema, No rash.   Back: Normal alignment.   Extremities: No " cyanosis.  Musculoskeletal: No major deformities noted.   Neurologic: Alert, No focal deficits noted.   Psychiatric: Affect normal, Judgment normal, Mood normal.     DIAGNOSTIC STUDIES / PROCEDURES    LABS  Results for orders placed or performed during the hospital encounter of 11/22/22   CBC WITH DIFFERENTIAL   Result Value Ref Range    WBC 7.8 4.8 - 10.8 K/uL    RBC 4.45 4.20 - 5.40 M/uL    Hemoglobin 14.6 12.0 - 16.0 g/dL    Hematocrit 43.8 37.0 - 47.0 %    MCV 98.4 (H) 81.4 - 97.8 fL    MCH 32.8 27.0 - 33.0 pg    MCHC 33.3 (L) 33.6 - 35.0 g/dL    RDW 62.1 (H) 35.9 - 50.0 fL    Platelet Count 297 164 - 446 K/uL    MPV 12.4 9.0 - 12.9 fL    Neutrophils-Polys 82.20 (H) 44.00 - 72.00 %    Lymphocytes 9.90 (L) 22.00 - 41.00 %    Monocytes 6.70 0.00 - 13.40 %    Eosinophils 0.30 0.00 - 6.90 %    Basophils 0.40 0.00 - 1.80 %    Immature Granulocytes 0.50 0.00 - 0.90 %    Nucleated RBC 0.00 /100 WBC    Neutrophils (Absolute) 6.37 2.00 - 7.15 K/uL    Lymphs (Absolute) 0.77 (L) 1.00 - 4.80 K/uL    Monos (Absolute) 0.52 0.00 - 0.85 K/uL    Eos (Absolute) 0.02 0.00 - 0.51 K/uL    Baso (Absolute) 0.03 0.00 - 0.12 K/uL    Immature Granulocytes (abs) 0.04 0.00 - 0.11 K/uL    NRBC (Absolute) 0.00 K/uL   PT/INR   Result Value Ref Range    PT 12.8 12.0 - 14.6 sec    INR 1.00 0.87 - 1.13   PTT   Result Value Ref Range    APTT 25.4 24.7 - 36.0 sec   LACTIC ACID   Result Value Ref Range    Lactic Acid 2.1 (H) 0.5 - 2.0 mmol/L   Blood Culture,Hold   Result Value Ref Range    Blood Culture Hold Collected    Comp Metabolic Panel   Result Value Ref Range    Sodium 133 (L) 135 - 145 mmol/L    Potassium 3.5 (L) 3.6 - 5.5 mmol/L    Chloride 91 (L) 96 - 112 mmol/L    Co2 29 20 - 33 mmol/L    Anion Gap 13.0 7.0 - 16.0    Glucose 128 (H) 65 - 99 mg/dL    Bun 17 8 - 22 mg/dL    Creatinine 0.65 0.50 - 1.40 mg/dL    Calcium 9.6 8.4 - 10.2 mg/dL    AST(SGOT) 104 (H) 12 - 45 U/L    ALT(SGPT) 72 (H) 2 - 50 U/L    Alkaline Phosphatase 294 (H) 30 -  99 U/L    Total Bilirubin 7.6 (H) 0.1 - 1.5 mg/dL    Albumin 3.6 3.2 - 4.9 g/dL    Total Protein 6.8 6.0 - 8.2 g/dL    Globulin 3.2 1.9 - 3.5 g/dL    A-G Ratio 1.1 g/dL   LIPASE   Result Value Ref Range    Lipase 40 7 - 58 U/L   TROPONIN   Result Value Ref Range    Troponin T 22 (H) 6 - 19 ng/L   Blood Culture,Hold   Result Value Ref Range    Blood Culture Hold Collected    ESTIMATED GFR   Result Value Ref Range    GFR (CKD-EPI) 85 >60 mL/min/1.73 m 2   EKG   Result Value Ref Range    Report       [facility] Emergency Dept.    Test Date:  2022  Pt Name:    SATINDER LARES                  Department: [facility]  MRN:        [id number]                      [facility]  Gender:     Female                       Technician: MABEL  :        [id number]                   Requested By:SALVADOR BECKER  Order #:    [id number]                    Reading MD: Salvador Becker MD    Measurements  Intervals                                Axis  Rate:       86                           P:          63  NH:         166                          QRS:        -69  QRSD:       87                           T:          73  QT:         359  QTc:        430    Interpretive Statements  Sinus rhythm  Inferior infarct, old  Anterior infarct, old  Compared to ECG 10/27/2022 00:55:59  No significant changes  Electronically Signed On 2022 10:08:54 PST by Salvador Becker MD       RADIOLOGY  CT-ABDOMEN-PELVIS WITH   Final Result      1.  Distal small bowel obstruction transition point likely in the RIGHT lower quadrant.   2.  No evidence of bowel perforation   3.  Small volume ascites   4.  Marked intrahepatic biliary ductal dilation concerning for central obstructing mass with possible low-density lesion in daiana hepatis measuring 12 mm.   5.  Distal pancreatic mass measuring 2.3 cm.   6.  Multiple liver cysts.   7.  Moderate hiatal hernia.   8.  Multiple lower abdominal wall and RIGHT lower quadrant mesenteric nodules  consistent with tumor.        COURSE & MEDICAL DECISION MAKING  Pertinent Labs & Imaging studies reviewed. (See chart for details)  Records obtained and reviewed: Patient has a history of DVT, hypertension, COPD, dyslipidemia, and recent diagnosis of stage IV colon cancer adenocarcinoma.  She was most recently admitted 10/26/2022 for generalized weakness and blood in stool.  LFTs were elevated with AST/ALT of 106/65 and a total bilirubin of 1.7.  She had an endoscopy performed 5 days prior to her hospitalization and then underwent a repeat EGD on 1029 which revealed a large distal esophageal diverticulum, hiatal hernia, poor gastric motility, and minimal cellulitis.  MRI was performed which did not demonstrate liver lesions.  There were no signs of carcinomatosis or obstruction.  No further work-up was indicated per the gastroenterologist.  She was discharged on 10/31 to follow-up as an outpatient.    This is an 87-year-old female who is here with nausea and vomiting in the context of known colon cancer.  Differential diagnosis includes, but is not limited to, worsening neoplastic disease, bowel obstruction, viral syndrome, ACS, infection.    Arrives hypertensive but afebrile with otherwise normal vital signs.  Appears dehydrated and ill.  She is jaundiced with scleral icterus.  Abdomen is soft and nontender but there are multiple masses felt in the left side of the abdomen.    Patient was started on IV fluids for dehydration and given a dose of Zofran.  She does appear to be vomiting up bloody material so was given a Protonix bolus and a drip was ordered.    CBC is without leukocytosis or anemia.  Lactic acid is slightly elevated to 2.1.  Metabolic panel demonstrates multiple new and worsening abnormalities including hyponatremia to 133, hypokalemia to 3.5, chloride of 91, glucose of 128, AST/ALT of 104/72, and total bilirubin of 7.6 which is up from 1.7 a month ago.  Troponin is slightly elevated but there are no  obvious ischemic EKG changes.    CT abdomen/pelvis revealed small bowel obstruction with a transition point in the right lower quadrant without evidence of bowel perforation.  She has new marked intrahepatic biliary ductal dilation and a distal pancreatic mass with multiple liver cysts and multiple lower abdominal wall and right lower quadrant mesenteric nodules consistent with tumor.    NG tube was placed, set dissection, and drained approximately 400 cc of gastric fluid. An enema was performed as she does appear to have quite a large volume of stool in the rectal vault.    I spoke with our on-call general surgeon, Dr. Thomas, who will see the patient but does not feel that surgery is indicated presently and concurs that hospice is most likely next step.    Patient was discussed with the on-call hospitalist and admitted in guarded condition.    HYDRATION  HYDRATION: Based on the patient's presentation of Acute Vomiting and Dehydration the patient was given IV fluids. IV Hydration was used because oral hydration was not adequate alone. Upon recheck following hydration, the patient was improved.    FINAL IMPRESSION  1.  Small bowel obstruction  2.  Transaminitis  3.  Pancreatic mass  4.  Metastatic colon cancer     Electronically signed by: Jamey Becker M.D., 11/22/2022 9:47 AM

## 2022-11-22 NOTE — ED NOTES
Med rec updated and complete, per pts son   Allergies reviewed, per pts son  Interviewed pt with son at bedside with permission from pt.  Pts son reports that she stopped taking her CHEMO pill about 2-3 months ago.  Per son reports that pt has not been taking any vitamins or her POTASSIUM

## 2022-11-22 NOTE — ASSESSMENT & PLAN NOTE
Stage IV  Recent PET scan revealed increased mets and CT now reveals biliary obstruction.  Prognosis is terminal.  Dr. Olivo was updated via phone and recommends Hospice  Hospice consulted

## 2022-11-22 NOTE — H&P
"Hospital Medicine History & Physical Note    Date of Service  11/22/2022    Primary Care Physician  Prateek ANDERSON M.D.    Consultants  general surgery    Specialist Names: Thomas    Code Status  DNAR/DNI    Chief Complaint  Chief Complaint   Patient presents with    Abdominal Pain     C/o lower abd pain, ongoing for about a month, became progressively worse over the last couple of days    N/V     Dark brown emesis since last night      Loss of Appetite     Per report pt eats only a bite or two of food each day       History of Presenting Illness  Licha Pope is a 87 y.o. female who presented 11/22/2022 with abdominal pain and vomiting.  Ms. Pope has a past medical history of stage IV colon cancer recently underwent an EGD with sphincterotomy in [place] was admitted to this hospital from 10/26/2022 through 10/31/2022 with abdominal pain.  She followed up with Dr. Polo oncology and had an outpatient PET scan revealing new diffuse metastasis.  Today she developed intractable vomiting with worsening abdominal pain.  Also notably she is getting very constipated with her last \"good bowel movement\" 10 days ago and had \"a tiny bit\" of bowel movement 5 days ago.  In the emergency room she was found to have new biliary obstruction on CAT scan with a bilirubin of 7.5 which is new and a small bowel obstruction as well.  She has had a NG tube placed to suction which has been quite successful in relieving her vomiting.  I spoke with her oncologist Dr. Olivo on the phone and explained the new CT findings.     I discussed the plan of care with her son, Aniceto at bedside and Dr. Olivo oncology via phone. Ms. Pope is quite clear that she does not want surgery and she is ready for end of life. If the small bowel obstruction does not resolve with an NG tube she is open to comfort care measures.    Review of Systems  Review of Systems   Constitutional:  Positive for malaise/fatigue and weight loss. "   Respiratory:  Negative for shortness of breath.    Cardiovascular:  Negative for chest pain.   Gastrointestinal:  Positive for abdominal pain, constipation and vomiting.   All other systems reviewed and are negative.    Past Medical History   has a past medical history of Arthritis, Cancer (HCC), Colon cancer (HCC), COPD (chronic obstructive pulmonary disease) (HCC), DVT (deep venous thrombosis) (HCC), EMPHYSEMA, Gastroesophageal reflux disease without esophagitis (2019), Hypertension, Hypertriglyceridemia (2019), and Prediabetes (2019).    Surgical History   has a past surgical history that includes hemorrhoidectomy (); pr  delivery only (); knee arthroplasty total (2014); cataract phaco with iol (2014); cataract phaco with iol (2014); pr lap,appendectomy (2021); pr lap,diagnostic abdomen (Bilateral, 3/30/2022); pr exploratory of abdomen (Bilateral, 3/30/2022); and pr upper gi endoscopy,diagnosis (N/A, 10/29/2022).     Family History  family history includes Alzheimer's Disease in her mother; Diabetes in her brother; Heart Attack in her sister; Heart Disease in her father; Other in her father.   Family history reviewed with patient. There is no family history that is pertinent to the chief complaint.     Social History   reports that she quit smoking about 47 years ago. Her smoking use included cigarettes. She has a 20.00 pack-year smoking history. She has never used smokeless tobacco. She reports that she does not currently use alcohol after a past usage of about 1.5 oz per week. She reports that she does not use drugs.    Allergies  Allergies   Allergen Reactions    Codeine Vomiting    Demerol Vomiting     Injectable type    Shellfish Allergy Shortness of Breath and Rash     Soft shell    Ciprofloxacin Rash     rash    Ibuprofen      Patient developed gastric ulcer/GI bleed from ibuprofen use    Iodine      PATIENT ALLERGIC TO SHELLFISH, NOT SURE IF  SHE IS ALLERGIC TO IODINE       Medications  Prior to Admission Medications   Prescriptions Last Dose Informant Patient Reported? Taking?   megestrol (MEGACE) 40 MG/ML Suspension 11/21/2022 at 1130 Family Member Yes Yes   Sig: Take 400 mg by mouth as needed (for appetite). Per pts son gave 10ML   ondansetron (ZOFRAN ODT) 4 MG TABLET DISPERSIBLE 11/20/2022 at Unknown Family Member Yes Yes   Sig: Take 4 mg by mouth every 6 hours as needed for Nausea/Vomiting.   scopolamine (TRANSDERM-SCOP) 1 mg/72hr PATCH 72 HR 11/17/2022 at 1100 Family Member Yes Yes   Sig: Place 1 Patch on the skin every 72 hours. Per son pt put on 11/17/2022 @ 1100 and took it off on 11/18/2022 @ 1500      Facility-Administered Medications: None       Physical Exam  Temp:  [36.6 °C (97.8 °F)] 36.6 °C (97.8 °F)  Pulse:  [88] 88  Resp:  [15-20] 15  BP: (146-151)/(82-92) 146/82  SpO2:  [94 %] 94 %  Blood Pressure : (!) 146/82   Temperature: 36.6 °C (97.8 °F)   Pulse: 88   Respiration: 15   Pulse Oximetry: 94 %       Physical Exam  Vitals and nursing note reviewed.   Constitutional:       Appearance: She is ill-appearing and toxic-appearing.   HENT:      Head:      Comments: Temporal wasting     Mouth/Throat:      Mouth: Mucous membranes are dry.      Pharynx: Oropharynx is clear.   Eyes:      General: Scleral icterus present.      Conjunctiva/sclera: Conjunctivae normal.   Cardiovascular:      Rate and Rhythm: Normal rate and regular rhythm.   Pulmonary:      Effort: Pulmonary effort is normal.      Breath sounds: Normal breath sounds.   Abdominal:      General: There is distension.      Comments: Hard, irregular lower abd  No bowel sounds heard after 15 seconds   Musculoskeletal:      Cervical back: Normal range of motion and neck supple.      Right lower leg: No edema.      Left lower leg: No edema.   Skin:     General: Skin is warm and dry.      Coloration: Skin is pale.   Neurological:      General: No focal deficit present.      Mental Status:  She is alert.   Psychiatric:         Mood and Affect: Mood normal.         Behavior: Behavior normal.       Laboratory:  Recent Labs     11/22/22  0935   WBC 7.8   RBC 4.45   HEMOGLOBIN 14.6   HEMATOCRIT 43.8   MCV 98.4*   MCH 32.8   MCHC 33.3*   RDW 62.1*   PLATELETCT 297   MPV 12.4     Recent Labs     11/22/22  1054   SODIUM 133*   POTASSIUM 3.5*   CHLORIDE 91*   CO2 29   GLUCOSE 128*   BUN 17   CREATININE 0.65   CALCIUM 9.6     Recent Labs     11/22/22  1054   ALTSGPT 72*   ASTSGOT 104*   ALKPHOSPHAT 294*   TBILIRUBIN 7.6*   LIPASE 40   GLUCOSE 128*     Recent Labs     11/22/22  1054   APTT 25.4   INR 1.00     No results for input(s): NTPROBNP in the last 72 hours.      Recent Labs     11/22/22  1054   TROPONINT 22*       Imaging:  CT-ABDOMEN-PELVIS WITH   Final Result      1.  Distal small bowel obstruction transition point likely in the RIGHT lower quadrant.   2.  No evidence of bowel perforation   3.  Small volume ascites   4.  Marked intrahepatic biliary ductal dilation concerning for central obstructing mass with possible low-density lesion in daiana hepatis measuring 12 mm.   5.  Distal pancreatic mass measuring 2.3 cm.   6.  Multiple liver cysts.   7.  Moderate hiatal hernia.   8.  Multiple lower abdominal wall and RIGHT lower quadrant mesenteric nodules consistent with tumor.              Assessment/Plan:  Justification for Admission Status  I anticipate this patient will require at least two midnights for appropriate medical management, necessitating inpatient admission because NG tube to suction to allow the small bowel obstruction to resolve    Patient will need a Med/Surg bed on MEDICAL service .  The need is secondary to as above.    * SBO (small bowel obstruction) (HCC)- (present on admission)  Assessment & Plan  Noted on CT scan  NG placed to suction for symptomatic control  She has associated significant constipation which may improve with enemas/suppositories if she wants to have them  Dr. Thomas  surgery consulted  She is not a candidate for surgery given her terminal diagnosis  IV morphine for pain and IV Zofran  If the SBO does not improve, consider comfort care.     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  Prognosis is terminal.  Dr. Olivo was updated via phone and recommends Hospice      Biliary obstruction- (present on admission)  Assessment & Plan  Elevated liver enzymes with new bilirubin elevation of 7.6  She had a recent ERCP which was negative for obstruction but now has an apparent mass.    DNR (do not resuscitate)- (present on admission)  Assessment & Plan  DNR/DNI established  She may transition to comfort care.     History of deep venous thrombosis- (present on admission)  Assessment & Plan  Hx of       VTE prophylaxis: SCDs/TEDs

## 2022-11-23 ENCOUNTER — APPOINTMENT (OUTPATIENT)
Dept: RADIOLOGY | Facility: MEDICAL CENTER | Age: 87
DRG: 374 | End: 2022-11-23
Attending: INTERNAL MEDICINE
Payer: MEDICARE

## 2022-11-23 LAB
ALBUMIN SERPL BCP-MCNC: 3 G/DL (ref 3.2–4.9)
ALBUMIN/GLOB SERPL: 1.1 G/DL
ALP SERPL-CCNC: 235 U/L (ref 30–99)
ALT SERPL-CCNC: 49 U/L (ref 2–50)
ANION GAP SERPL CALC-SCNC: 12 MMOL/L (ref 7–16)
AST SERPL-CCNC: 61 U/L (ref 12–45)
BILIRUB SERPL-MCNC: 7 MG/DL (ref 0.1–1.5)
BUN SERPL-MCNC: 15 MG/DL (ref 8–22)
CALCIUM SERPL-MCNC: 9.1 MG/DL (ref 8.4–10.2)
CHLORIDE SERPL-SCNC: 97 MMOL/L (ref 96–112)
CO2 SERPL-SCNC: 28 MMOL/L (ref 20–33)
CREAT SERPL-MCNC: 0.62 MG/DL (ref 0.5–1.4)
ERYTHROCYTE [DISTWIDTH] IN BLOOD BY AUTOMATED COUNT: 62.7 FL (ref 35.9–50)
GFR SERPLBLD CREATININE-BSD FMLA CKD-EPI: 86 ML/MIN/1.73 M 2
GLOBULIN SER CALC-MCNC: 2.8 G/DL (ref 1.9–3.5)
GLUCOSE SERPL-MCNC: 92 MG/DL (ref 65–99)
HCT VFR BLD AUTO: 36.7 % (ref 37–47)
HGB BLD-MCNC: 12 G/DL (ref 12–16)
MCH RBC QN AUTO: 32 PG (ref 27–33)
MCHC RBC AUTO-ENTMCNC: 32.7 G/DL (ref 33.6–35)
MCV RBC AUTO: 97.9 FL (ref 81.4–97.8)
PLATELET # BLD AUTO: 218 K/UL (ref 164–446)
PMV BLD AUTO: 11.8 FL (ref 9–12.9)
POTASSIUM SERPL-SCNC: 3.5 MMOL/L (ref 3.6–5.5)
PROT SERPL-MCNC: 5.8 G/DL (ref 6–8.2)
RBC # BLD AUTO: 3.75 M/UL (ref 4.2–5.4)
SODIUM SERPL-SCNC: 137 MMOL/L (ref 135–145)
WBC # BLD AUTO: 8.4 K/UL (ref 4.8–10.8)

## 2022-11-23 PROCEDURE — 99233 SBSQ HOSP IP/OBS HIGH 50: CPT | Performed by: INTERNAL MEDICINE

## 2022-11-23 PROCEDURE — 700105 HCHG RX REV CODE 258: Performed by: HOSPITALIST

## 2022-11-23 PROCEDURE — 700117 HCHG RX CONTRAST REV CODE 255: Performed by: INTERNAL MEDICINE

## 2022-11-23 PROCEDURE — 85027 COMPLETE CBC AUTOMATED: CPT

## 2022-11-23 PROCEDURE — 770006 HCHG ROOM/CARE - MED/SURG/GYN SEMI*

## 2022-11-23 PROCEDURE — 80053 COMPREHEN METABOLIC PANEL: CPT

## 2022-11-23 PROCEDURE — 700111 HCHG RX REV CODE 636 W/ 250 OVERRIDE (IP): Performed by: HOSPITALIST

## 2022-11-23 PROCEDURE — 700111 HCHG RX REV CODE 636 W/ 250 OVERRIDE (IP): Performed by: INTERNAL MEDICINE

## 2022-11-23 PROCEDURE — 36415 COLL VENOUS BLD VENIPUNCTURE: CPT

## 2022-11-23 RX ORDER — PROCHLORPERAZINE EDISYLATE 5 MG/ML
10 INJECTION INTRAMUSCULAR; INTRAVENOUS EVERY 6 HOURS PRN
Status: DISCONTINUED | OUTPATIENT
Start: 2022-11-23 | End: 2022-11-24 | Stop reason: HOSPADM

## 2022-11-23 RX ORDER — POTASSIUM CHLORIDE 7.45 MG/ML
10 INJECTION INTRAVENOUS
Status: DISCONTINUED | OUTPATIENT
Start: 2022-11-23 | End: 2022-11-23

## 2022-11-23 RX ADMIN — SODIUM CHLORIDE, POTASSIUM CHLORIDE, SODIUM LACTATE AND CALCIUM CHLORIDE: 600; 310; 30; 20 INJECTION, SOLUTION INTRAVENOUS at 05:35

## 2022-11-23 RX ADMIN — IOHEXOL 100 ML: 350 INJECTION, SOLUTION INTRAVENOUS at 12:10

## 2022-11-23 RX ADMIN — IOHEXOL 100 ML: 350 INJECTION, SOLUTION INTRAVENOUS at 13:05

## 2022-11-23 RX ADMIN — SODIUM CHLORIDE, POTASSIUM CHLORIDE, SODIUM LACTATE AND CALCIUM CHLORIDE: 600; 310; 30; 20 INJECTION, SOLUTION INTRAVENOUS at 21:17

## 2022-11-23 RX ADMIN — PROCHLORPERAZINE EDISYLATE 10 MG: 5 INJECTION, SOLUTION INTRAMUSCULAR; INTRAVENOUS at 17:06

## 2022-11-23 RX ADMIN — ONDANSETRON 4 MG: 2 INJECTION INTRAMUSCULAR; INTRAVENOUS at 15:08

## 2022-11-23 ASSESSMENT — LIFESTYLE VARIABLES
ALCOHOL_USE: NO
HOW MANY TIMES IN THE PAST YEAR HAVE YOU HAD 5 OR MORE DRINKS IN A DAY: 0
EVER FELT BAD OR GUILTY ABOUT YOUR DRINKING: NO
TOTAL SCORE: 0
EVER HAD A DRINK FIRST THING IN THE MORNING TO STEADY YOUR NERVES TO GET RID OF A HANGOVER: NO
AVERAGE NUMBER OF DAYS PER WEEK YOU HAVE A DRINK CONTAINING ALCOHOL: 0
ON A TYPICAL DAY WHEN YOU DRINK ALCOHOL HOW MANY DRINKS DO YOU HAVE: 0
CONSUMPTION TOTAL: NEGATIVE
TOTAL SCORE: 0
HAVE PEOPLE ANNOYED YOU BY CRITICIZING YOUR DRINKING: NO
TOTAL SCORE: 0
HAVE YOU EVER FELT YOU SHOULD CUT DOWN ON YOUR DRINKING: NO

## 2022-11-23 ASSESSMENT — COGNITIVE AND FUNCTIONAL STATUS - GENERAL
MOVING FROM LYING ON BACK TO SITTING ON SIDE OF FLAT BED: A LITTLE
DAILY ACTIVITIY SCORE: 16
PERSONAL GROOMING: A LITTLE
WALKING IN HOSPITAL ROOM: A LOT
CLIMB 3 TO 5 STEPS WITH RAILING: A LOT
DRESSING REGULAR LOWER BODY CLOTHING: A LITTLE
MOVING TO AND FROM BED TO CHAIR: A LITTLE
SUGGESTED CMS G CODE MODIFIER MOBILITY: CK
MOBILITY SCORE: 16
STANDING UP FROM CHAIR USING ARMS: A LITTLE
TURNING FROM BACK TO SIDE WHILE IN FLAT BAD: A LITTLE
SUGGESTED CMS G CODE MODIFIER DAILY ACTIVITY: CK
HELP NEEDED FOR BATHING: A LOT
DRESSING REGULAR UPPER BODY CLOTHING: A LITTLE
EATING MEALS: A LITTLE
TOILETING: A LOT

## 2022-11-23 ASSESSMENT — ENCOUNTER SYMPTOMS
ABDOMINAL PAIN: 1
VOMITING: 0
SHORTNESS OF BREATH: 0
MYALGIAS: 0
DIZZINESS: 0
DEPRESSION: 0
CHILLS: 0
CONSTIPATION: 1
HEADACHES: 0
NAUSEA: 0
FEVER: 0
WEAKNESS: 1

## 2022-11-23 NOTE — DISCHARGE PLANNING
note:  Susie from [facility] came in to talk to pt and son. She gave him his contact number and he is still deciding and has not signed for hospice yet.     Susie's contact number is [phone].

## 2022-11-23 NOTE — PROGRESS NOTES
4 Eyes Skin Assessment Completed by SIDRA Jain and SIDRA Funk.    Head WDL  Ears WDL  Nose WDL  Mouth WDL  Neck WDL  Breast/Chest Jaundice  Shoulder Blades WDL  Spine WDL  (R) Arm/Elbow/Hand WDL  (L) Arm/Elbow/Hand WDL  Abdomen WDL  Groin WDL  Scrotum/Coccyx/Buttocks Redness and Blanching  (R) Leg Jaundice  (L) Leg Jaundice  (R) Heel/Foot/Toe WDL, dry  (L) Heel/Foot/Toe WDL, dry          Devices In Places OG/NG      Interventions In Place Pillows    Possible Skin Injury Yes    Pictures Uploaded Into Epic No, needs to be completed  Wound Consult Placed N/A  RN Wound Prevention Protocol Ordered No

## 2022-11-23 NOTE — PROGRESS NOTES
"Hospital Medicine Daily Progress Note    Date of Service  11/23/2022    Chief Complaint  Licha Pope is a 87 y.o. female admitted 11/22/2022 with abdominal pain and vomiting.     Hospital Course  87 y.o. female with past medical history of stage IV colon cancer recently underwent an EGD with sphincterotomy in [place] who presented 11/22/2022 with abdominal pain and vomiting. Of note she was admitted to this hospital from 10/26/2022 through 10/31/2022 with abdominal pain. She followed up with Dr. Polo oncology and had an outpatient PET scan revealing new diffuse metastasis.  Today she developed intractable vomiting with worsening abdominal pain.  Also notably she is getting very constipated with her last \"good bowel movement\" 10 days ago and had \"a tiny bit\" of bowel movement 5 days ago.  In the emergency room she was found to have new biliary obstruction on CAT scan with a bilirubin of 7.5 which is new and a small bowel obstruction as well.  She has had a NG tube placed to suction which has been quite successful in relieving her vomiting.  I spoke with her oncologist Dr. Olivo on the phone and explained the new CT findings.      I discussed the plan of care with her son, Aniceto at bedside and Dr. Olivo oncology via phone. Ms. Pope is quite clear that she does not want surgery and she is ready for end of life. If the small bowel obstruction does not resolve with an NG tube she is open to comfort care measures.    Interval Problem Update  Vitals stable. Patient still distended with reported sharp abdominal pain. She states she is passing gas, no BM. On exam has bowel sounds and minimal NG output. Discussed plan of care with family and the patient, will get SBFT. Ultimately discussed that patient would benefit from hospice on discharge regardless of improvement during hospitalization due to her increased abdominal metastases. Family would like list of hospice companies for discharge, case " management consulted.     I have discussed this patient's plan of care and discharge plan at IDT rounds today with Case Management, Nursing, Nursing leadership, and other members of the IDT team.    Consultants/Specialty  N/A    Code Status  DNAR/DNI    Disposition  Patient is not medically cleared for discharge.   Anticipate discharge to to hospice.  I have placed the appropriate orders for post-discharge needs.    Review of Systems  Review of Systems   Constitutional:  Positive for malaise/fatigue. Negative for chills and fever.   Respiratory:  Negative for shortness of breath.    Cardiovascular:  Negative for chest pain.   Gastrointestinal:  Positive for abdominal pain and constipation. Negative for nausea and vomiting.   Genitourinary:  Negative for dysuria.   Musculoskeletal:  Negative for myalgias.   Skin:  Negative for rash.   Neurological:  Positive for weakness. Negative for dizziness and headaches.   Psychiatric/Behavioral:  Negative for depression.    All other systems reviewed and are negative.     Physical Exam  Temp:  [36.4 °C (97.5 °F)-36.7 °C (98 °F)] 36.7 °C (98 °F)  Pulse:  [] 84  Resp:  [14-16] 16  BP: (122-132)/(67-76) 129/75  SpO2:  [92 %-96 %] 92 %    Physical Exam  Vitals and nursing note reviewed.   Constitutional:       General: She is sleeping. She is not in acute distress.     Appearance: She is ill-appearing.      Comments: Family at bedside   HENT:      Head: Normocephalic and atraumatic.      Ears:      Comments: Hard of hearing   Eyes:      General: Scleral icterus present.      Extraocular Movements: Extraocular movements intact.      Conjunctiva/sclera: Conjunctivae normal.   Cardiovascular:      Rate and Rhythm: Normal rate and regular rhythm.      Pulses: Normal pulses.      Heart sounds: Normal heart sounds.   Pulmonary:      Effort: Pulmonary effort is normal. No respiratory distress.      Breath sounds: Normal breath sounds.   Abdominal:      General: Bowel sounds are  normal. There is distension.      Palpations: Abdomen is soft. There is mass.      Tenderness: There is abdominal tenderness. There is no guarding.   Musculoskeletal:      Cervical back: Normal range of motion and neck supple.      Right lower leg: No edema.      Left lower leg: No edema.   Skin:     General: Skin is warm and dry.      Coloration: Skin is jaundiced.   Neurological:      General: No focal deficit present.      Mental Status: She is oriented to person, place, and time and easily aroused.      Cranial Nerves: No cranial nerve deficit.   Psychiatric:         Behavior: Behavior normal. Behavior is cooperative.       Fluids    Intake/Output Summary (Last 24 hours) at 11/23/2022 1304  Last data filed at 11/23/2022 0536  Gross per 24 hour   Intake --   Output 50 ml   Net -50 ml       Laboratory  Recent Labs     11/22/22  0935 11/23/22  0853   WBC 7.8 8.4   RBC 4.45 3.75*   HEMOGLOBIN 14.6 12.0   HEMATOCRIT 43.8 36.7*   MCV 98.4* 97.9*   MCH 32.8 32.0   MCHC 33.3* 32.7*   RDW 62.1* 62.7*   PLATELETCT 297 218   MPV 12.4 11.8     Recent Labs     11/22/22  1054 11/23/22  0853   SODIUM 133* 137   POTASSIUM 3.5* 3.5*   CHLORIDE 91* 97   CO2 29 28   GLUCOSE 128* 92   BUN 17 15   CREATININE 0.65 0.62   CALCIUM 9.6 9.1     Recent Labs     11/22/22  1054   APTT 25.4   INR 1.00               Imaging  CT-ABDOMEN-PELVIS WITH   Final Result      1.  Distal small bowel obstruction transition point likely in the RIGHT lower quadrant.   2.  No evidence of bowel perforation   3.  Small volume ascites   4.  Marked intrahepatic biliary ductal dilation concerning for central obstructing mass with possible low-density lesion in daiana hepatis measuring 12 mm.   5.  Distal pancreatic mass measuring 2.3 cm.   6.  Multiple liver cysts.   7.  Moderate hiatal hernia.   8.  Multiple lower abdominal wall and RIGHT lower quadrant mesenteric nodules consistent with tumor.      DX-SMALL BOWEL SERIES    (Results Pending)         Assessment/Plan  * SBO (small bowel obstruction) (HCC)- (present on admission)  Assessment & Plan  Noted on CT scan  NG placed to suction for symptomatic control  She has associated significant constipation which may improve with enemas/suppositories if she wants to have them  Dr. Thomas surgery consulted  -She is not a candidate for surgery given her terminal diagnosis  IV morphine for pain and IV Zofran  Pending SBFT     DNR (do not resuscitate)- (present on admission)  Assessment & Plan  DNR/DNI established  Hospice consult placed    Biliary obstruction- (present on admission)  Assessment & Plan  Elevated liver enzymes with new bilirubin elevation of 7.6  She had a recent ERCP which was negative for obstruction but now has an apparent mass.    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  Prognosis is terminal.  Dr. Olivo was updated via phone and recommends Hospice  Hospice consulted    History of deep venous thrombosis- (present on admission)  Assessment & Plan  Hx of        VTE prophylaxis: SCDs/TEDs    I have performed a physical exam and reviewed and updated ROS and Plan today (11/23/2022). In review of yesterday's note (11/22/2022), there are no changes except as documented above.

## 2022-11-23 NOTE — ED NOTES
Report to Susy VALENTE.  All questions and concerns addressed.    Patient transferred to floor via hospital bed.    LR and protonix infusion continue to floor.

## 2022-11-23 NOTE — CARE PLAN
The patient is Watcher - Medium risk of patient condition declining or worsening    Shift Goals  Clinical Goals: relief from small bowel obstruction  Patient Goals: sleep for hours  Family Goals: n/a    Progress made toward(s) clinical / shift goals:  slept well through the night, with ngt to R nare to low intermittent suction draining dark brown output.      Problem: Gastrointestinal Irritability  Goal: Nausea and vomiting will be absent or improve  Outcome: Progressing     Problem: Knowledge Deficit - Standard  Goal: Patient and family/care givers will demonstrate understanding of plan of care, disease process/condition, diagnostic tests and medications  Outcome: Progressing

## 2022-11-23 NOTE — DISCHARGE PLANNING
Case Management Discharge Planning    Admission Date: 11/22/2022  GMLOS: 4.6  ALOS: 1    6-Clicks ADL Score: 16  6-Clicks Mobility Score: 16  PT and/or OT Eval ordered: NA  Post-acute Referrals Ordered: NA  Post-acute Choice Obtained: NA  Has referral(s) been sent to post-acute provider:  RAUL    Anticipated Discharge Dispo: Discharge Disposition: D/T to hospice home (50)    DME Needed: No    Action(s) Taken: Updated Provider/Nurse on Discharge Plan    RN CM spoke with patients son Aniceto who is requesting hospice list be emailed to him at [email or web address].  Aniceto to review hospice options with patient and provide choice.     Escalations Completed: None    Medically Clear: No    Next Steps: Medical clearance    Barriers to Discharge: Medical clearance

## 2022-11-23 NOTE — DISCHARGE PLANNING
Met with pt and son. Unable to assess pt as she was lethargic. Son, Aniceto was at bedside. Son said that pt was living alone in her house but son and DIL are actually living across the street from pt's home. However, because pt is weaker, son moved in with pt to assist her. Son is retired so he is there 24/7.    Pt has [facility] currently but son made an appointment with hospice nurse.    Susie from [facility] met with son and pt but the son has not made up his mind whether he is choosing hospice or not.     Pt has a walker, shower chair and power wheelchair. Son would like a transport wheelchair and possibly a hospital bed. Explained that hospice will  provide these DMEs.     Care Transition Team Assessment    Information Source  Orientation Level: Other (Comment)  Information Given By: Other (Comments) (son)  Informant's Name: Aniceto  Who is responsible for making decisions for patient? : Patient    Readmission Evaluation  Is this a readmission?: No    Elopement Risk  Legal Hold: No  Ambulatory or Self Mobile in Wheelchair: No-Not an Elopement Risk    Interdisciplinary Discharge Planning  Does Admitting Nurse Feel This Could be a Complex Discharge?: No  Primary Care Physician: rOal Mar MD  Lives with - Patient's Self Care Capacity: Alone and Unable to Care For Self, Adult Children  Support Systems: Children  Housing / Facility: [address]  Do You Take your Prescribed Medications Regularly: Yes  Able to Return to Previous ADL's: No  Mobility Issues: Yes  Prior Services: Home-Independent, Skilled Home Health Services  Patient Prefers to be Discharged to:: Home with hospice  Assistance Needed: Yes  Durable Medical Equipment: Walker    Discharge Preparedness  What is your plan after discharge?: Home health care, Other (comment)  What are your discharge supports?: Spouse, Child  Prior Functional Level: Ambulatory, Needs Assist with Activities of Daily Living, Needs Assist with Medication Management,  Uses Walker  Difficulity with ADLs: Bathing, Dressing, Toileting, Walking  Difficulity with IADLs: Cooking, Driving, Laundry, Shopping    Functional Assesment  Prior Functional Level: Ambulatory, Needs Assist with Activities of Daily Living, Needs Assist with Medication Management, Uses Walker    Finances  Financial Barriers to Discharge: No  Prescription Coverage: Yes    Vision / Hearing Impairment  Vision Impairment : Yes  Hearing Impairment : No    Values / Beliefs / Concerns  Values / Beliefs Concerns : No    Advance Directive  Advance Directive?: Living Will, DPOA for Health Care, POLST  Durable Power of  Name and Contact : Aniceto ([phone])    Domestic Abuse  Have you ever been the victim of abuse or violence?: No         Discharge Risks or Barriers  Discharge risks or barriers?: Post-acute placement / services, Complex medical needs  Patient risk factors: Cognitive / sensory / physical deficit, Uninsured or underinsured    Anticipated Discharge Information  Discharge Disposition: D/T to hospice home (50)

## 2022-11-23 NOTE — ED NOTES
Patient transferred on to a hospital bed for comfort measures.    Patient incontinent of watery brown stool and skin barrier applied to the buttock area.     Pending a bed assignment

## 2022-11-23 NOTE — ED NOTES
Family members at bedside visiting with patient.    Updated with her plan of care.      IV Fluids and protonix infusing as ordered.

## 2022-11-23 NOTE — PROGRESS NOTES
Received in bed, aox4, with NGT to KANDICE andres to LIS.Assessment as per GSU. Call light within reach. Needs attended. Plan of care discussed and understood.

## 2022-11-24 VITALS
RESPIRATION RATE: 20 BRPM | SYSTOLIC BLOOD PRESSURE: 145 MMHG | WEIGHT: 103.17 LBS | DIASTOLIC BLOOD PRESSURE: 76 MMHG | TEMPERATURE: 97.6 F | OXYGEN SATURATION: 90 % | HEIGHT: 64 IN | BODY MASS INDEX: 17.61 KG/M2 | HEART RATE: 93 BPM

## 2022-11-24 PROCEDURE — 700111 HCHG RX REV CODE 636 W/ 250 OVERRIDE (IP): Performed by: HOSPITALIST

## 2022-11-24 PROCEDURE — 700111 HCHG RX REV CODE 636 W/ 250 OVERRIDE (IP): Performed by: INTERNAL MEDICINE

## 2022-11-24 PROCEDURE — 94760 N-INVAS EAR/PLS OXIMETRY 1: CPT

## 2022-11-24 PROCEDURE — 700105 HCHG RX REV CODE 258: Performed by: HOSPITALIST

## 2022-11-24 PROCEDURE — 99239 HOSP IP/OBS DSCHRG MGMT >30: CPT | Mod: 25 | Performed by: INTERNAL MEDICINE

## 2022-11-24 PROCEDURE — 74250 X-RAY XM SM INT 1CNTRST STD: CPT

## 2022-11-24 PROCEDURE — 99497 ADVNCD CARE PLAN 30 MIN: CPT | Performed by: INTERNAL MEDICINE

## 2022-11-24 RX ADMIN — SODIUM CHLORIDE, POTASSIUM CHLORIDE, SODIUM LACTATE AND CALCIUM CHLORIDE: 600; 310; 30; 20 INJECTION, SOLUTION INTRAVENOUS at 09:40

## 2022-11-24 RX ADMIN — PROCHLORPERAZINE EDISYLATE 10 MG: 5 INJECTION, SOLUTION INTRAMUSCULAR; INTRAVENOUS at 00:08

## 2022-11-24 RX ADMIN — MORPHINE SULFATE 4 MG: 4 INJECTION INTRAVENOUS at 00:08

## 2022-11-24 ASSESSMENT — ENCOUNTER SYMPTOMS
NAUSEA: 0
SHORTNESS OF BREATH: 0
CONSTIPATION: 1
ABDOMINAL PAIN: 1
DEPRESSION: 0
CHILLS: 0
HEADACHES: 0
VOMITING: 0
FEVER: 0
MYALGIAS: 0
WEAKNESS: 1
DIZZINESS: 0

## 2022-11-24 ASSESSMENT — PAIN DESCRIPTION - PAIN TYPE
TYPE: ACUTE PAIN
TYPE: ACUTE PAIN;CHRONIC PAIN

## 2022-11-24 ASSESSMENT — FIBROSIS 4 INDEX: FIB4 SCORE: 3.48

## 2022-11-24 NOTE — DISCHARGE INSTRUCTIONS
Discharge Instructions    Discharged to home by car with relative. Discharged via wheelchair, hospital escort: Yes.  Special equipment needed: Not Applicable  Hospice Home     Be sure to schedule a follow-up appointment with your primary care doctor or any specialists as instructed.     Discharge Plan:   Diet Plan: Discussed  Activity Level: Discussed  Confirmed Follow up Appointment: Patient to Call and Schedule Appointment  Confirmed Symptoms Management: Discussed  Medication Reconciliation Updated: Yes  Influenza Vaccine Indication: Patient Refuses    I understand that a diet low in cholesterol, fat, and sodium is recommended for good health. Unless I have been given specific instructions below for another diet, I accept this instruction as my diet prescription.       Special Instructions: None    -Is this patient being discharged with medication to prevent blood clots?  No    Is patient discharged on Warfarin / Coumadin?   No

## 2022-11-24 NOTE — CARE PLAN
The patient is Stable - Low risk of patient condition declining or worsening    Shift Goals  Clinical Goals: Monitor patients NGT output  Patient Goals: pain, n/v mngt  Family Goals: n/a    Progress made toward(s) clinical / shift goals:  Patients NGT is taken out as ordered, residual is recorded, obtained, greenish to brownish color output    Patient is not progressing towards the following goals:    Problem: Psychosocial  Goal: Patient and family will demonstrate ability to cope with life altering diagnosis and/or procedure  Outcome: Progressing  Flowsheets (Taken 11/24/2022 1236)  Patient Behaviors: Flat Affect     Problem: Gastrointestinal Irritability  Goal: Nausea and vomiting will be absent or improve  Outcome: Progressing

## 2022-11-24 NOTE — PROGRESS NOTES
4 Eyes Skin Assessment Completed by Sonia RN and Carolyn RN.    Head WDL  Ears WDL  Nose WDL  Mouth WDL  Neck WDL  Breast/Chest WDL  Shoulder Blades WDL  Spine WDL  (R) Arm/Elbow/Hand scar  (L) Arm/Elbow/Hand WDL  Abdomen Scar  Groin WDL  Scrotum/Coccyx/Buttocks WDL  (R) Leg WDL  (L) Leg WDL  (R) Heel/Foot/Toe WDL  (L) Heel/Foot/Toe WDL          Devices In Places let wrist brace      Interventions In Place Pillows and Pressure Redistribution Mattress    Possible Skin Injury No    Pictures Uploaded Into Epic N/A  Wound Consult Placed N/A  RN Wound Prevention Protocol Ordered No

## 2022-11-24 NOTE — PROGRESS NOTES
"Hospital Medicine Daily Progress Note    Date of Service  11/24/2022    Chief Complaint  Licha Pope is a 87 y.o. female admitted 11/22/2022 with abdominal pain and vomiting.     Hospital Course  87 y.o. female with past medical history of stage IV colon cancer recently underwent an EGD with sphincterotomy in [place] who presented 11/22/2022 with abdominal pain and vomiting. Of note she was admitted to this hospital from 10/26/2022 through 10/31/2022 with abdominal pain. She followed up with Dr. Polo oncology and had an outpatient PET scan revealing new diffuse metastasis.  Today she developed intractable vomiting with worsening abdominal pain.  Also notably she is getting very constipated with her last \"good bowel movement\" 10 days ago and had \"a tiny bit\" of bowel movement 5 days ago.  In the emergency room she was found to have new biliary obstruction on CAT scan with a bilirubin of 7.5 which is new and a small bowel obstruction as well.  She has had a NG tube placed to suction which has been quite successful in relieving her vomiting.  I spoke with her oncologist Dr. Olivo on the phone and explained the new CT findings.      I discussed the plan of care with her son, Aniceto at bedside and Dr. Olivo oncology via phone. Ms. Pope is quite clear that she does not want surgery and she is ready for end of life. If the small bowel obstruction does not resolve with an NG tube she is open to comfort care measures.    Interval Problem Update  11/23 Vitals stable. Patient still distended with reported sharp abdominal pain. She states she is passing gas, no BM. On exam has bowel sounds and minimal NG output. Discussed plan of care with family and the patient, will get SBFT. Ultimately discussed that patient would benefit from hospice on discharge regardless of improvement during hospitalization due to her increased abdominal metastases. Family would like list of hospice companies for discharge, case " management consulted.     11/24 SBFT yesterday, showed distal small bowel obstruction. Unfortunately patient has not improved with NG tube to intermittent suction, she continues to have pain. Goals of care discussion at bedside, obstruction is unlikely to clear due to her metastatic cancer. She is not a surgical candidate nor does she want surgery. Patient and family would like to go home on home hospice. NG tube will be removed for comfort. Can dc today if hospice can be set up.    I have discussed this patient's plan of care and discharge plan at IDT rounds today with Case Management, Nursing, Nursing leadership, and other members of the IDT team.    Consultants/Specialty  N/A    Code Status  DNAR/DNI    Disposition  Patient is medically cleared for discharge.   Anticipate discharge to to hospice.  I have placed the appropriate orders for post-discharge needs.    Review of Systems  Review of Systems   Constitutional:  Positive for malaise/fatigue. Negative for chills and fever.   Respiratory:  Negative for shortness of breath.    Cardiovascular:  Negative for chest pain.   Gastrointestinal:  Positive for abdominal pain and constipation. Negative for nausea and vomiting.   Genitourinary:  Negative for dysuria.   Musculoskeletal:  Negative for myalgias.   Skin:  Negative for rash.   Neurological:  Positive for weakness. Negative for dizziness and headaches.   Psychiatric/Behavioral:  Negative for depression.    All other systems reviewed and are negative.     Physical Exam  Temp:  [36.3 °C (97.4 °F)-36.5 °C (97.7 °F)] 36.4 °C (97.6 °F)  Pulse:  [] 93  Resp:  [16-20] 20  BP: (122-145)/(69-78) 145/76  SpO2:  [90 %-92 %] 90 %    Physical Exam  Vitals and nursing note reviewed.   Constitutional:       General: She is sleeping. She is not in acute distress.     Appearance: She is ill-appearing.      Comments: Family at bedside   HENT:      Head: Normocephalic and atraumatic.      Ears:      Comments: Hard of  hearing   Eyes:      General: Scleral icterus present.      Extraocular Movements: Extraocular movements intact.      Conjunctiva/sclera: Conjunctivae normal.   Cardiovascular:      Rate and Rhythm: Normal rate and regular rhythm.      Pulses: Normal pulses.      Heart sounds: Normal heart sounds.   Pulmonary:      Effort: Pulmonary effort is normal. No respiratory distress.      Breath sounds: Normal breath sounds.   Abdominal:      General: Bowel sounds are normal. There is distension.      Palpations: There is mass.      Tenderness: There is abdominal tenderness. There is no guarding.   Musculoskeletal:      Cervical back: Normal range of motion and neck supple.      Right lower leg: No edema.      Left lower leg: No edema.   Skin:     General: Skin is warm and dry.      Coloration: Skin is jaundiced.   Neurological:      General: No focal deficit present.      Mental Status: She is oriented to person, place, and time and easily aroused.      Cranial Nerves: No cranial nerve deficit.   Psychiatric:         Behavior: Behavior normal. Behavior is cooperative.       Fluids    Intake/Output Summary (Last 24 hours) at 11/24/2022 1229  Last data filed at 11/24/2022 1200  Gross per 24 hour   Intake 0 ml   Output 930 ml   Net -930 ml       Laboratory  Recent Labs     11/22/22  0935 11/23/22  0853   WBC 7.8 8.4   RBC 4.45 3.75*   HEMOGLOBIN 14.6 12.0   HEMATOCRIT 43.8 36.7*   MCV 98.4* 97.9*   MCH 32.8 32.0   MCHC 33.3* 32.7*   RDW 62.1* 62.7*   PLATELETCT 297 218   MPV 12.4 11.8     Recent Labs     11/22/22  1054 11/23/22  0853   SODIUM 133* 137   POTASSIUM 3.5* 3.5*   CHLORIDE 91* 97   CO2 29 28   GLUCOSE 128* 92   BUN 17 15   CREATININE 0.65 0.62   CALCIUM 9.6 9.1     Recent Labs     11/22/22  1054   APTT 25.4   INR 1.00               Imaging  DX-SMALL BOWEL SERIES   Final Result      1.  Findings consistent with distal small bowel obstruction.   2.  Diverticulum at the GE junction versus atypical hiatal hernia.       CT-ABDOMEN-PELVIS WITH   Final Result      1.  Distal small bowel obstruction transition point likely in the RIGHT lower quadrant.   2.  No evidence of bowel perforation   3.  Small volume ascites   4.  Marked intrahepatic biliary ductal dilation concerning for central obstructing mass with possible low-density lesion in daiana hepatis measuring 12 mm.   5.  Distal pancreatic mass measuring 2.3 cm.   6.  Multiple liver cysts.   7.  Moderate hiatal hernia.   8.  Multiple lower abdominal wall and RIGHT lower quadrant mesenteric nodules consistent with tumor.           Assessment/Plan  * SBO (small bowel obstruction) (HCC)- (present on admission)  Assessment & Plan  Noted on CT scan  NG placed to suction for symptomatic control  She has associated significant constipation which may improve with enemas/suppositories if she wants to have them  Dr. Thomas surgery consulted  -She is not a candidate for surgery given her terminal diagnosis  IV morphine for pain and IV Zofran  SBFT showed continued obstruction     Family and patient would like to go home with hospice     DNR (do not resuscitate)- (present on admission)  Assessment & Plan  DNR/DNI established  Hospice consult placed    Biliary obstruction- (present on admission)  Assessment & Plan  Elevated liver enzymes with new bilirubin elevation of 7.6  She had a recent ERCP which was negative for obstruction but now has an apparent mass.    Advance care planning- (present on admission)  Assessment & Plan  I discussed advance care planning for at least 30 minutes with the patient and her children, including diagnosis, prognosis, plan of care, risks and benefits of any therapies that could be offered, as well as alternatives including palliation and hospice as appropriate.  The patient and family have opted to go home with hospice.  Time spent was exclusive of evaluation and management of other separately billable procedures.  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  Prognosis is terminal.  Dr. Olivo was updated via phone and recommends Hospice  Hospice consulted    History of deep venous thrombosis- (present on admission)  Assessment & Plan  Hx of        VTE prophylaxis: SCDs/TEDs    I have performed a physical exam and reviewed and updated ROS and Plan today (11/24/2022). In review of yesterday's note (11/23/2022), there are no changes except as documented above.

## 2022-11-24 NOTE — PROGRESS NOTES
Received patient from Night shift RN . Patient is awake and alert.No sign of distress. NGT to right nares in place.Clamped as of the moment. Distended abdomen noted. Jaundice is also observed.  Fall precaution in placed, kept bed in lowest position and call light within reach.   0930- patient's NGT connected to low intermittent suction as ordered  1125-NGT contents removed prior to removal as ordered.Abdomen remained distended .

## 2022-11-24 NOTE — DISCHARGE PLANNING
Per RN VIRI, DPA faxed Hospice referral to [facility]    @1218  Per RN CM, DPA faxed Hospice referral to [facility].    @7040  Agency/Facility Name: [facility]  Spoke To: Mariaelena  Outcome: Per Alejandra, they are all booked out for the day.  RN CM notified    Agency/Facility Name: [facility]  Spoke To: Dolores  Outcome: Per Dolores, transport is set for 1645 today going to the pt's home address.  RN CM notified

## 2022-11-24 NOTE — CARE PLAN
Patient A&OX4, room air, NG tube to low wall suction, bed alarm, IV infusing, call light within reach, bed low and locked    The patient is Stable - Low risk of patient condition declining or worsening    Shift Goals  Clinical Goals: pain, nausea control  Patient Goals: pain, nausea control, rest  Family Goals: n/a    Progress made toward(s) clinical / shift goals:    Problem: Knowledge Deficit - Standard  Goal: Patient and family/care givers will demonstrate understanding of plan of care, disease process/condition, diagnostic tests and medications  Outcome: Progressing       Patient is not progressing towards the following goals:

## 2022-11-24 NOTE — ASSESSMENT & PLAN NOTE
I discussed advance care planning for at least 30 minutes with the patient and her children, including diagnosis, prognosis, plan of care, risks and benefits of any therapies that could be offered, as well as alternatives including palliation and hospice as appropriate.  The patient and family have opted to go home with hospice.  Time spent was exclusive of evaluation and management of other separately billable procedures.  Start time: 0935  End time: 1000

## 2022-11-24 NOTE — DISCHARGE PLANNING
Case Management Discharge Planning    Admission Date: 11/22/2022  GMLOS: 4.6  ALOS: 2    6-Clicks ADL Score: 16  6-Clicks Mobility Score: 16    Anticipated Discharge Dispo: Discharge Disposition: D/T to hospice home (50)      Action(s) Taken: RNVIRI was notified by Dr. Boucher that family wants to pursue hospice. RNCM reached out to [facility] as they are open and admitting patients today. RNCM spoke with family who stated they did not want [facility] they wanted [facility]. RNCM reached out to [facility] and spoke with Christy who requested referral be sent. RNCM then received message from Dolores VALENTE that family no longer wants [facility], they want [facility]. RNCM had MESHA Valera fax referral to [facility]. RNCM spoke with Flower at [facility]. They will review referral and ask for acceptance from their Dr. If patient is accepted, they will be looking to discharge patient home at 1700. Dr. Boucher notified.     Escalations Completed: None    Medically Clear: Yes    Next Steps: RNCM to be available for any discharge planning needs.     Barriers to Discharge: Outpatient referrals pending    Is the patient up for discharge tomorrow: No      1334 RNCM received call from Flower at [facility]. Patient has been accepted on service. Admitting nurse to be at hospital by 1700 for admission. Flower stated she would reach out to family.  notified.     1505 RNCM clarified with Flower that admission nurse from hospice will meet patient and family at their home to do admission.     1549 RNCM was notified that patient needs a wheelchair. No DME companies delivering today. RNCM attempted to arrange transport with [facility] and they were booked for the evening. RNCM will have Moraima ZIMMER [facility]. RNCM will follow up with Flower at [facility] as needed regarding transport.

## 2022-11-24 NOTE — DISCHARGE SUMMARY
"Discharge Summary    CHIEF COMPLAINT ON ADMISSION  Chief Complaint   Patient presents with    Abdominal Pain     C/o lower abd pain, ongoing for about a month, became progressively worse over the last couple of days    N/V     Dark brown emesis since last night      Loss of Appetite     Per report pt eats only a bite or two of food each day       Reason for Admission  EMS     Admission Date  11/22/2022    CODE STATUS  DNAR/DNI    HPI & HOSPITAL COURSE  This is a 87 y.o. female here with abdominal pain and vomiting.      87 y.o. female with past medical history of stage IV colon cancer recently underwent an EGD with sphincterotomy in [place] who presented 11/22/2022 with abdominal pain and vomiting. Of note she was admitted to this hospital from 10/26/2022 through 10/31/2022 with abdominal pain. She followed up with Dr. Polo oncology and had an outpatient PET scan revealing new diffuse metastasis.  Today she developed intractable vomiting with worsening abdominal pain.  Also notably she is getting very constipated with her last \"good bowel movement\" 10 days ago and had \"a tiny bit\" of bowel movement 5 days ago.  In the emergency room she was found to have new biliary obstruction on CAT scan with a bilirubin of 7.5 which is new and a small bowel obstruction as well.  She has had a NG tube placed to suction which has been quite successful in relieving her vomiting.  I spoke with her oncologist Dr. Olivo on the phone and explained the new CT findings. Discussed plan of care with her son, Aniceto at bedside and Dr. Olivo oncology via phone. Ms. Pope is quite clear that she does not want surgery and she is ready for end of life. If the small bowel obstruction does not resolve with an NG tube she is open to comfort care measures.     Unfortunately despite NG tube to suction, SBFT continued to show obstruction. This is unlike to resolve given her tumor burden in her abdomen. After discussion with patient and " family they have decided to take the patient home on hospice, NG tube was discontinued. Patient medically clear for discharge on hospice.     Therefore, she is discharged in guarded and stable condition to hospice.    The patient met 2-midnight criteria for an inpatient stay at the time of discharge.    Discharge Date  11/24/2022    FOLLOW UP ITEMS POST DISCHARGE  N/A    DISCHARGE DIAGNOSES  Principal Problem:    SBO (small bowel obstruction) (HCC) POA: Yes  Active Problems:    History of deep venous thrombosis POA: Yes    Adenocarcinoma of ileum (HCC) POA: Yes    Advance care planning POA: Yes    Biliary obstruction POA: Yes    DNR (do not resuscitate) POA: Yes  Resolved Problems:    * No resolved hospital problems. *      FOLLOW UP  No future appointments.  No follow-up provider specified.    MEDICATIONS ON DISCHARGE     Medication List        CONTINUE taking these medications        Instructions   ondansetron 4 MG Tbdp  Commonly known as: ZOFRAN ODT   Take 4 mg by mouth every 6 hours as needed for Nausea/Vomiting.  Dose: 4 mg     scopolamine 1 mg/72hr Pt72  Commonly known as: TRANSDERM-SCOP   Place 1 Patch on the skin every 72 hours. Per son pt put on 11/17/2022 @ 1100 and took it off on 11/18/2022 @ 1500  Dose: 1 Patch            STOP taking these medications      megestrol 40 MG/ML Susp  Commonly known as: MEGACE              Allergies  Allergies   Allergen Reactions    Codeine Vomiting    Demerol Vomiting     Injectable type    Shellfish Allergy Shortness of Breath and Rash     Soft shell    Ciprofloxacin Rash     rash    Ibuprofen      Patient developed gastric ulcer/GI bleed from ibuprofen use    Iodine      PATIENT ALLERGIC TO SHELLFISH, NOT SURE IF SHE IS ALLERGIC TO IODINE       DIET  Orders Placed This Encounter   Procedures    Diet NPO Restrict to: Strict     Standing Status:   Standing     Number of Occurrences:   1     Order Specific Question:   Diet NPO Restrict to:     Answer:   Strict [1]        ACTIVITY  As tolerated.  Weight bearing as tolerated    CONSULTATIONS  N/A    PROCEDURES  N/A    LABORATORY  Lab Results   Component Value Date    SODIUM 137 11/23/2022    POTASSIUM 3.5 (L) 11/23/2022    CHLORIDE 97 11/23/2022    CO2 28 11/23/2022    GLUCOSE 92 11/23/2022    BUN 15 11/23/2022    CREATININE 0.62 11/23/2022    GLOMRATE 61 10/22/2022        Lab Results   Component Value Date    WBC 8.4 11/23/2022    HEMOGLOBIN 12.0 11/23/2022    HEMATOCRIT 36.7 (L) 11/23/2022    PLATELETCT 218 11/23/2022        Total time of the discharge process exceeds 55 minutes.

## 2022-11-25 NOTE — PROGRESS NOTES
Discharge paper work reviewed with patient and family at bedside.Copy given.Questions encouraged and aswered. I.V removed. Patient  was taken by [facility] via stretcher.Patient discharge to home with hospice  .

## 2023-11-29 ENCOUNTER — PATIENT MESSAGE (OUTPATIENT)
Dept: HEALTH INFORMATION MANAGEMENT | Facility: OTHER | Age: 88
End: 2023-11-29

## (undated) DEVICE — MASK ANESTHESIA ADULT  - (100/CA)

## (undated) DEVICE — STAPLER 45MM ARTICULATING - (3EA/BX)

## (undated) DEVICE — TOWEL STOP TIMEOUT SAFETY FLAG (40EA/CA)

## (undated) DEVICE — STAPLE 75MM LINEAR (12EA/BX)

## (undated) DEVICE — SYRINGE SAFETY 5 ML 18 GA X 1-1/2 BLUNT LL (100/BX 4BX/CA)

## (undated) DEVICE — SUTURE 0 COATED VICRYL 6-18IN - (12PK/BX)

## (undated) DEVICE — SENSOR SPO2 NEO LNCS ADHESIVE (20/BX) SEE USER NOTES

## (undated) DEVICE — CANNULA W/SEAL 5X100 Z-THRE - ADED KII (12/BX)

## (undated) DEVICE — LACTATED RINGERS INJ 1000 ML - (14EA/CA 60CA/PF)

## (undated) DEVICE — PLUMEPEN ULTRA 3/8 IN X 10 FT HOSE (20EA/CA)

## (undated) DEVICE — GLOVE BIOGEL INDICATOR SZ 7.5 SURGICAL PF LTX - (50PR/BX 4BX/CA)

## (undated) DEVICE — DERMABOND ADVANCED - (12EA/BX)

## (undated) DEVICE — SUCTION INSTRUMENT YANKAUER BULBOUS TIP W/O VENT (50EA/CA)

## (undated) DEVICE — ELECTRODE DUAL RETURN W/ CORD - (50/PK)

## (undated) DEVICE — SET EXTENSION WITH 2 PORTS (48EA/CA) ***PART #2C8610 IS A SUBSTITUTE*****

## (undated) DEVICE — SET SUCTION/IRRIGATION WITH DISPOSABLE TIP (6/CA )PART #0250-070-520 IS A SUB

## (undated) DEVICE — SUTURE 3-0 ETHILON FS-1 - (36/BX) 30 INCH

## (undated) DEVICE — SLEEVE, VASO, THIGH, MED

## (undated) DEVICE — GLOVE BIOGEL SZ 7.5 SURGICAL PF LTX - (50PR/BX 4BX/CA)

## (undated) DEVICE — CANISTER SUCTION RIGID RED 1500CC (40EA/CA)

## (undated) DEVICE — ELECTRODE 850 FOAM ADHESIVE - HYDROGEL RADIOTRNSPRNT (50/PK)

## (undated) DEVICE — BAG RETRIEVAL 10ML (10EA/BX)

## (undated) DEVICE — SET LEADWIRE 5 LEAD BEDSIDE DISPOSABLE ECG (1SET OF 5/EA)

## (undated) DEVICE — TUBE CONNECTING SUCTION - CLEAR PLASTIC STERILE 72 IN (50EA/CA)

## (undated) DEVICE — GOWN WARMING STANDARD FLEX - (30/CA)

## (undated) DEVICE — TROCAR Z THREAD12MM OPTICAL - NON BLADED (6/BX)

## (undated) DEVICE — WATER IRRIGATION STERILE 1000ML (12EA/CA)

## (undated) DEVICE — TUBING CLEARLINK DUO-VENT - C-FLO (48EA/CA)

## (undated) DEVICE — HUMID-VENT HEAT AND MOISTURE EXCHANGE- (50/BX)

## (undated) DEVICE — LIGASURE LAPAROSCOPIC 5MM - (6EA/CA)

## (undated) DEVICE — BLADE SURGICAL #15 - (50/BX 3BX/CA)

## (undated) DEVICE — STAPLE 45MM WHTE 2.5MM - (12/BX)

## (undated) DEVICE — SODIUM CHL IRRIGATION 0.9% 1000ML (12EA/CA)

## (undated) DEVICE — Device

## (undated) DEVICE — SENSOR OXIMETER ADULT SPO2 RD SET (20EA/BX)

## (undated) DEVICE — BLOCK BITE ENDOSCOPIC 2809 - (100/BX) INTERMEDIATE

## (undated) DEVICE — PAD LAP STERILE 18 X 18 - (5/PK 40PK/CA)

## (undated) DEVICE — SYRINGE DISP. 60 CC LL - (30/BX, 12BX/CA)**WHEN THESE ARE GONE ORDER #500206**

## (undated) DEVICE — NEEDLE INSFL 120MM 14GA VRRS - (20/BX)

## (undated) DEVICE — SUTURE 3-0 VICRYL PLUS SH - 8X 18 INCH (12/BX)

## (undated) DEVICE — STAPLER 75MM LINEAR OPEN (3EA/BX)

## (undated) DEVICE — SUTURE GENERAL

## (undated) DEVICE — CLIP MED LG INTNL HRZN TI ESCP - (20/BX)

## (undated) DEVICE — KIT  I.V. START (100EA/CA)

## (undated) DEVICE — HEAD HOLDER JUNIOR/ADULT

## (undated) DEVICE — TUBE NG SALEM SUMP 16FR (50EA/CA)

## (undated) DEVICE — SUTURE 3-0 VICRYL PLUS CT-1 - 36 INCH (36/BX)

## (undated) DEVICE — CANISTER SUCTION 3000ML MECHANICAL FILTER AUTO SHUTOFF MEDI-VAC NONSTERILE LF DISP  (40EA/CA)

## (undated) DEVICE — TUBE SUCTION YANKAUER  1/4 X 6FT (20EA/CA)"

## (undated) DEVICE — PROTECTOR ULNA NERVE - (36PR/CA)

## (undated) DEVICE — SYRINGE 12 CC LUER TIP - (80/BX) OBSOLETE ITEM

## (undated) DEVICE — SCISSORS HANDLE HARMONIC ACE - 45CM CURVED (6/BX)

## (undated) DEVICE — GLOVE, LITE (PAIR)

## (undated) DEVICE — CHLORAPREP 26 ML APPLICATOR - ORANGE TINT(25/CA)

## (undated) DEVICE — SUTURE 0 VICRYL PLUS UR-6 - 27 INCH (36/BX)

## (undated) DEVICE — KIT ANESTHESIA W/CIRCUIT & 3/LT BAG W/FILTER (20EA/CA)

## (undated) DEVICE — STAPLE 45MM BLUE 3.5MM ECHELON (12EA/BX)

## (undated) DEVICE — TUBE CONNECT SUCTION CLEAR 120 X 1/4" (50EA/CA)"

## (undated) DEVICE — MASK WITH FACE SHIELD (25/BX 4BX/CA)

## (undated) DEVICE — GOWN SURGEONS LARGE - (32/CA)

## (undated) DEVICE — STAPLER POWERED 45MM (3EA/BX)

## (undated) DEVICE — SYRINGE SAFETY 3 ML 18 GA X 1 1/2 BLUNT LL (100/BX 8BX/CA)

## (undated) DEVICE — SYSTEM PREVENA INCISION MNGM - (1/EA)

## (undated) DEVICE — STAPLER SKIN DISP - (6/BX 10BX/CA) VISISTAT

## (undated) DEVICE — FORCEP RADIAL JAW 4 STANDARD CAPACITY W/NEEDLE 240CM (40EA/BX)

## (undated) DEVICE — ELECTRODE 5MM LHK LAPSCP STERILE DISP- MEGADYNE  (5/CA)

## (undated) DEVICE — SCISSORS 5MM CVD (6EA/BX)

## (undated) DEVICE — BINDER ABDOMINAL FITS WAIST 36 IN-65IN MED/LRG (1/EA)

## (undated) DEVICE — SUTURE 4-0 VICRYL PLUS FS-2 - 27 INCH (36/BX)

## (undated) DEVICE — NEPTUNE 4 PORT MANIFOLD - (20/PK)

## (undated) DEVICE — GRAFT MESH SEPRAFILM PRO PACK - 5/BX CONTAINS 6 3X5 PIECES

## (undated) DEVICE — SPONGE GAUZE NON-STERILE 4X4 - (2000/CA 10PK/CA)

## (undated) DEVICE — SUTURE 1 VICRYL PLUS CTX - 36 INCH (36/BX)

## (undated) DEVICE — SYRINGE SAFETY 10 ML 18 GA X 1 1/2 BLUNT LL (100/BX 4BX/CA)

## (undated) DEVICE — DRAPESURG STERI-DRAPE LONG - (10/BX 4BX/CA)

## (undated) DEVICE — TROCAR 5X100 NON BLADED Z-TH - READ KII (6/BX)

## (undated) DEVICE — RESERVOIR SUCTION 100 CC - SILICONE (20EA/CA)

## (undated) DEVICE — GLOVE BIOGEL M SZ 8 SURGICAL PF LTX - (50/BX 4BX/CA)

## (undated) DEVICE — SPONGE GAUZESTER. 2X2 4-PL - (2/PK 50PK/BX 30BX/CS)

## (undated) DEVICE — KIT CUSTOM PROCEDURE SINGLE FOR ENDO  (15/CA)

## (undated) DEVICE — SLEEVE VASO CALF MED - (10PR/CA)

## (undated) DEVICE — BAG SPONGE COUNT 10.25 X 32 - BLUE (250/CA)